# Patient Record
Sex: FEMALE | Race: BLACK OR AFRICAN AMERICAN | NOT HISPANIC OR LATINO | Employment: UNEMPLOYED | URBAN - METROPOLITAN AREA
[De-identification: names, ages, dates, MRNs, and addresses within clinical notes are randomized per-mention and may not be internally consistent; named-entity substitution may affect disease eponyms.]

---

## 2018-02-22 ENCOUNTER — OFFICE VISIT (OUTPATIENT)
Dept: FAMILY MEDICINE CLINIC | Facility: CLINIC | Age: 33
End: 2018-02-22

## 2018-02-22 VITALS
WEIGHT: 134 LBS | HEART RATE: 62 BPM | BODY MASS INDEX: 22.33 KG/M2 | HEIGHT: 65 IN | OXYGEN SATURATION: 99 % | RESPIRATION RATE: 20 BRPM | SYSTOLIC BLOOD PRESSURE: 114 MMHG | TEMPERATURE: 98.4 F | DIASTOLIC BLOOD PRESSURE: 64 MMHG

## 2018-02-22 DIAGNOSIS — M79.671 PAIN IN RIGHT FOOT: ICD-10-CM

## 2018-02-22 DIAGNOSIS — Z76.89 ESTABLISHING CARE WITH NEW DOCTOR, ENCOUNTER FOR: ICD-10-CM

## 2018-02-22 DIAGNOSIS — N92.0 MENORRHAGIA WITH REGULAR CYCLE: Primary | ICD-10-CM

## 2018-02-22 LAB
25(OH)D3 SERPL-MCNC: 6.2 NG/ML
ALBUMIN SERPL-MCNC: 4.1 G/DL (ref 3.2–4.8)
ALBUMIN/GLOB SERPL: 1.7 G/DL (ref 1.5–2.5)
ALP SERPL-CCNC: 78 U/L (ref 25–100)
ALT SERPL W P-5'-P-CCNC: 11 U/L (ref 7–40)
ANION GAP SERPL CALCULATED.3IONS-SCNC: 3 MMOL/L (ref 3–11)
ARTICHOKE IGE QN: 72 MG/DL (ref 0–130)
AST SERPL-CCNC: 13 U/L (ref 0–33)
BASOPHILS # BLD AUTO: 0.02 10*3/MM3 (ref 0–0.2)
BASOPHILS NFR BLD AUTO: 0.4 % (ref 0–1)
BILIRUB BLD-MCNC: NEGATIVE MG/DL
BILIRUB SERPL-MCNC: 0.3 MG/DL (ref 0.3–1.2)
BUN BLD-MCNC: 10 MG/DL (ref 9–23)
BUN/CREAT SERPL: 12.5 (ref 7–25)
CALCIUM SPEC-SCNC: 8.6 MG/DL (ref 8.7–10.4)
CHLORIDE SERPL-SCNC: 108 MMOL/L (ref 99–109)
CHOLEST SERPL-MCNC: 140 MG/DL (ref 0–200)
CLARITY, POC: CLEAR
CO2 SERPL-SCNC: 28 MMOL/L (ref 20–31)
COLOR UR: YELLOW
CREAT BLD-MCNC: 0.8 MG/DL (ref 0.6–1.3)
DEPRECATED RDW RBC AUTO: 47.7 FL (ref 37–54)
EOSINOPHIL # BLD AUTO: 0.12 10*3/MM3 (ref 0–0.3)
EOSINOPHIL NFR BLD AUTO: 2.2 % (ref 0–3)
ERYTHROCYTE [DISTWIDTH] IN BLOOD BY AUTOMATED COUNT: 13.9 % (ref 11.3–14.5)
EXPIRATION DATE: ABNORMAL
GFR SERPL CREATININE-BSD FRML MDRD: 100 ML/MIN/1.73
GLOBULIN UR ELPH-MCNC: 2.4 GM/DL
GLUCOSE BLD-MCNC: 88 MG/DL (ref 70–100)
GLUCOSE UR STRIP-MCNC: NEGATIVE MG/DL
HBA1C MFR BLD: 5.5 % (ref 4.8–5.6)
HCT VFR BLD AUTO: 39.6 % (ref 34.5–44)
HDLC SERPL-MCNC: 60 MG/DL (ref 40–60)
HGB BLD-MCNC: 13.1 G/DL (ref 11.5–15.5)
IMM GRANULOCYTES # BLD: 0.02 10*3/MM3 (ref 0–0.03)
IMM GRANULOCYTES NFR BLD: 0.4 % (ref 0–0.6)
KETONES UR QL: NEGATIVE
LEUKOCYTE EST, POC: NEGATIVE
LYMPHOCYTES # BLD AUTO: 2.06 10*3/MM3 (ref 0.6–4.8)
LYMPHOCYTES NFR BLD AUTO: 37.1 % (ref 24–44)
Lab: ABNORMAL
MCH RBC QN AUTO: 31 PG (ref 27–31)
MCHC RBC AUTO-ENTMCNC: 33.1 G/DL (ref 32–36)
MCV RBC AUTO: 93.8 FL (ref 80–99)
MONOCYTES # BLD AUTO: 0.26 10*3/MM3 (ref 0–1)
MONOCYTES NFR BLD AUTO: 4.7 % (ref 0–12)
NEUTROPHILS # BLD AUTO: 3.08 10*3/MM3 (ref 1.5–8.3)
NEUTROPHILS NFR BLD AUTO: 55.2 % (ref 41–71)
NITRITE UR-MCNC: NEGATIVE MG/ML
PH UR: 5.5 [PH] (ref 5–8)
PLATELET # BLD AUTO: 250 10*3/MM3 (ref 150–450)
PMV BLD AUTO: 11.5 FL (ref 6–12)
POTASSIUM BLD-SCNC: 4.5 MMOL/L (ref 3.5–5.5)
PROT SERPL-MCNC: 6.5 G/DL (ref 5.7–8.2)
PROT UR STRIP-MCNC: NEGATIVE MG/DL
RBC # BLD AUTO: 4.22 10*6/MM3 (ref 3.89–5.14)
RBC # UR STRIP: ABNORMAL /UL
SODIUM BLD-SCNC: 139 MMOL/L (ref 132–146)
SP GR UR: 1.03 (ref 1–1.03)
TRIGL SERPL-MCNC: 55 MG/DL (ref 0–150)
TSH SERPL DL<=0.05 MIU/L-ACNC: 0.73 MIU/ML (ref 0.35–5.35)
UROBILINOGEN UR QL: NORMAL
WBC NRBC COR # BLD: 5.56 10*3/MM3 (ref 3.5–10.8)

## 2018-02-22 PROCEDURE — 80053 COMPREHEN METABOLIC PANEL: CPT | Performed by: NURSE PRACTITIONER

## 2018-02-22 PROCEDURE — 82306 VITAMIN D 25 HYDROXY: CPT | Performed by: NURSE PRACTITIONER

## 2018-02-22 PROCEDURE — 85025 COMPLETE CBC W/AUTO DIFF WBC: CPT | Performed by: NURSE PRACTITIONER

## 2018-02-22 PROCEDURE — 80061 LIPID PANEL: CPT | Performed by: NURSE PRACTITIONER

## 2018-02-22 PROCEDURE — 81003 URINALYSIS AUTO W/O SCOPE: CPT | Performed by: NURSE PRACTITIONER

## 2018-02-22 PROCEDURE — 83036 HEMOGLOBIN GLYCOSYLATED A1C: CPT | Performed by: NURSE PRACTITIONER

## 2018-02-22 PROCEDURE — 84443 ASSAY THYROID STIM HORMONE: CPT | Performed by: NURSE PRACTITIONER

## 2018-02-22 PROCEDURE — 99385 PREV VISIT NEW AGE 18-39: CPT | Performed by: NURSE PRACTITIONER

## 2018-02-22 NOTE — PROGRESS NOTES
Subjective   Rai Guillory is a 32 y.o. female.   Chief Complaint   Patient presents with   • Establish Care    Patient is here to establish care.     History of Present Illness   Patient is here to establish care. She has recently moved to the [facility] from [place].   She is concerned with her right foot - the ball continues to have an over growth of thick skin and it is painful to walk on it. She reports she has seen a podiatrist in the past and had surgery. Would like to have it reevaluated.   Patient is also concerned about an increase in the heaviness of her menstrual cycle. She reports [facility]. She would like to be seen by an GYN.     The following portions of the patient's history were reviewed and updated as appropriate: allergies, current medications, past family history, past medical history, past social history, past surgical history and problem list.    Review of Systems   HENT: Negative.    Eyes: Negative.    Respiratory: Negative.    Cardiovascular: Negative.    Gastrointestinal: Negative.    Genitourinary: Positive for vaginal bleeding.   Musculoskeletal:        Continued issues with ball of right foot - thickening of the pad - painful had surgery on in September 2017. Has not follow up - Moved from [facility].   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      Skin: Negative.    Allergic/Immunologic: Negative.    Neurological: Negative.    Hematological: Negative.    Psychiatric/Behavioral: Positive for sleep disturbance.        Occasional - sleeplessness.          Objective   No Known Allergies    Physical Exam   Constitutional: She is oriented to person, place, and time. She appears well-developed and well-nourished.   Eyes: Pupils are equal, round, and reactive to light.   Neck: Normal range of motion.   Cardiovascular: Normal rate, regular rhythm, normal heart sounds and intact distal pulses.  Exam reveals no gallop and no friction rub.    No murmur  heard.  Pulmonary/Chest: Effort normal and breath sounds normal.   Abdominal: Soft. Bowel sounds are normal.   Musculoskeletal: Normal range of motion. She exhibits no edema, tenderness or deformity.        Lymphadenopathy:     She has no cervical adenopathy.   Neurological: She is alert and oriented to person, place, and time.   Skin: Skin is warm and dry.   Psychiatric: She has a normal mood and affect. Her behavior is normal. Judgment and thought content normal.   Vitals reviewed.      Assessment/Plan   Rai was seen today for establish care.    Diagnoses and all orders for this visit:    Menorrhagia with regular cycle  -     Ambulatory Referral to Gynecology    Pain in right foot  -     Ambulatory Referral to Podiatry    Establishing care with new doctor, encounter for  -     CBC & Differential  -     Comprehensive Metabolic Panel  -     Hemoglobin A1c  -     Lipid Panel  -     POC Urinalysis Dipstick, Automated  -     TSH  -     Vitamin D 25 Hydroxy  -     CBC Auto Differential    Patient Counseling:   --Nutrition: Stressed importance of moderation in sodium/caffeine intake, saturated fat and cholesterol, caloric balance, sufficient intake of fresh fruits, vegetables, fiber, calcium, iron, and 1 mg of folate supplement per day (for females capable of pregnancy).   --Discussed the issue of calcium supplement, and the daily use of baby aspirin.   --Exercise: Stressed the importance of regular exercise.   --Substance Abuse: Discussed cessation/primary prevention of tobacco, alcohol, or other drug use; driving or other dangerous activities under the influence; availability of treatment for abuse.   --Sexuality: Discussed sexually transmitted diseases, partner selection, use of condoms, avoidance of unintended pregnancy and contraceptive alternatives.   --Injury prevention: Discussed safety belts, safety helmets, smoke detector, smoking near bedding or upholstery.   --Dental health: Discussed importance of  regular tooth brushing, flossing, and dental visits.   --Immunizations reviewed.   -  discussed smoking cessation - she is planning on having a quit date in mind at next visit.   See patient instructions: steps to quit smoking  Follow up in 2 weeks to review labs.   Keep referral appointments.              Jane Wong, APRN

## 2018-02-22 NOTE — PATIENT INSTRUCTIONS
Steps to Quit Smoking  Smoking tobacco can be bad for your health. It can also affect almost every organ in your body. Smoking puts you and people around you at risk for many serious long-lasting (chronic) diseases. Quitting smoking is hard, but it is one of the best things that you can do for your health. It is never too late to quit.  What are the benefits of quitting smoking?  When you quit smoking, you lower your risk for getting serious diseases and conditions. They can include:  · Lung cancer or lung disease.  · Heart disease.  · Stroke.  · Heart attack.  · Not being able to have children (infertility).  · Weak bones (osteoporosis) and broken bones (fractures).  If you have coughing, wheezing, and shortness of breath, those symptoms may get better when you quit. You may also get sick less often. If you are pregnant, quitting smoking can help to lower your chances of having a baby of low birth weight.  What can I do to help me quit smoking?  Talk with your doctor about what can help you quit smoking. Some things you can do (strategies) include:  · Quitting smoking totally, instead of slowly cutting back how much you smoke over a period of time.  · Going to in-person counseling. You are more likely to quit if you go to many counseling sessions.  · Using resources and support systems, such as:  ¨ Online chats with a counselor.  ¨ Phone quitlines.  ¨ Printed self-help materials.  ¨ Support groups or group counseling.  ¨ Text messaging programs.  ¨ Mobile phone apps or applications.  · Taking medicines. Some of these medicines may have nicotine in them. If you are pregnant or breastfeeding, do not take any medicines to quit smoking unless your doctor says it is okay. Talk with your doctor about counseling or other things that can help you.  Talk with your doctor about using more than one strategy at the same time, such as taking medicines while you are also going to in-person counseling. This can help make quitting  easier.  What things can I do to make it easier to quit?  Quitting smoking might feel very hard at first, but there is a lot that you can do to make it easier. Take these steps:  · Talk to your family and friends. Ask them to support and encourage you.  · Call phone quitlines, reach out to support groups, or work with a counselor.  · Ask people who smoke to not smoke around you.  · Avoid places that make you want (trigger) to smoke, such as:  ¨ Bars.  ¨ Parties.  ¨ Smoke-break areas at work.  · Spend time with people who do not smoke.  · Lower the stress in your life. Stress can make you want to smoke. Try these things to help your stress:  ¨ Getting regular exercise.  ¨ Deep-breathing exercises.  ¨ Yoga.  ¨ Meditating.  ¨ Doing a body scan. To do this, close your eyes, focus on one area of your body at a time from head to toe, and notice which parts of your body are tense. Try to relax the muscles in those areas.  · Download or buy apps on your mobile phone or tablet that can help you stick to your quit plan. There are many free apps, such as QuitGuide from the CDC (Centers for Disease Control and Prevention). You can find more support from smokefree.gov and other websites.  This information is not intended to replace advice given to you by your health care provider. Make sure you discuss any questions you have with your health care provider.  Document Released: 10/14/2010 Document Revised: 08/15/2017 Document Reviewed: 05/03/2016  Elsevier Interactive Patient Education © 2017 [employer]

## 2018-03-13 ENCOUNTER — OFFICE VISIT (OUTPATIENT)
Dept: OBSTETRICS AND GYNECOLOGY | Facility: CLINIC | Age: 33
End: 2018-03-13

## 2018-03-13 VITALS
HEIGHT: 63 IN | DIASTOLIC BLOOD PRESSURE: 72 MMHG | SYSTOLIC BLOOD PRESSURE: 102 MMHG | WEIGHT: 133.2 LBS | HEART RATE: 82 BPM | RESPIRATION RATE: 14 BRPM | OXYGEN SATURATION: 97 % | BODY MASS INDEX: 23.6 KG/M2

## 2018-03-13 DIAGNOSIS — N92.0 MENORRHAGIA WITH REGULAR CYCLE: Primary | ICD-10-CM

## 2018-03-13 PROCEDURE — 99204 OFFICE O/P NEW MOD 45 MIN: CPT | Performed by: OBSTETRICS & GYNECOLOGY

## 2018-03-13 NOTE — PROGRESS NOTES
"Subjective   Chief Complaint   Patient presents with   • Establish Care     Menses very heavy      Rai Guillory is a 32 y.o. year old .  Patient's last menstrual period was 2018 (exact date).  She presents to be seen for worsening menorrhagia and dysmenorrhea. She notes menses lasts for 5-7 days now with at least 4 days of heavy bleeding and more intense cramping. She denies pain or bleeding outside of menses     OTHER COMPLAINTS:  Nothing else    The following portions of the patient's history were reviewed and updated as appropriate:current medications, allergies, past family history, past medical history, past social history and past surgical history    Smoking status: Current Some Day Smoker                                                    Packs/day: 0.25      Years: 10.00        Types: Cigarettes  Smokeless tobacco: Never Used                        Review of Systems  Constitutional POS: nothing reported    NEG: anorexia or night sweats   Gastointestinal POS: nothing reported    NEG: bloating, change in bowel habits, melena or reflux symptoms   Genitourinary POS: see HPI    NEG: dysuria or hematuria   Integument POS: nothing reported    NEG: moles that are changing in size, shape, color or rashes   Breast POS: nothing reported    NEG: persistent breast lump, skin dimpling or nipple discharge         Objective   /72   Pulse 82   Resp 14   Ht 160 cm (63\")   Wt 60.4 kg (133 lb 3.2 oz)   LMP 2018 (Exact Date)   SpO2 97%   Breastfeeding? No   BMI 23.60 kg/m²     General:  well developed; well nourished  no acute distress   Skin:  No suspicious lesions seen   Thyroid: normal to inspection and palpation   Lungs:  breathing is unlabored   Heart:  regular rate and rhythm, S1, S2 normal, no murmur, click, rub or gallop   Breasts:  Not performed.   Abdomen: soft, non-tender; no masses  no umbilical or inginual hernias are present  no hepato-splenomegaly   Pelvis: Not performed. "     Lab Review   No data reviewed    Imaging   No data reviewed        Assessment   1. Menorrhagia with regular cycle  2. Dysmenorrhea     Plan   1. Discussed therapeutic options with patient. Pt opts for hysteroscopy d&c and novasure. Will schedule      No orders of the defined types were placed in this encounter.         This note was electronically signed.    Maximiliano Cuellar M.D. [facility]

## 2018-03-26 ENCOUNTER — TELEPHONE (OUTPATIENT)
Dept: OBSTETRICS AND GYNECOLOGY | Facility: CLINIC | Age: 33
End: 2018-03-26

## 2018-03-29 ENCOUNTER — OUTSIDE FACILITY SERVICE (OUTPATIENT)
Dept: OBSTETRICS AND GYNECOLOGY | Facility: CLINIC | Age: 33
End: 2018-03-29

## 2018-03-29 ENCOUNTER — LAB REQUISITION (OUTPATIENT)
Dept: LAB | Facility: HOSPITAL | Age: 33
End: 2018-03-29

## 2018-03-29 DIAGNOSIS — N92.0 EXCESSIVE AND FREQUENT MENSTRUATION WITH REGULAR CYCLE: ICD-10-CM

## 2018-03-29 PROCEDURE — 58563 HYSTEROSCOPY ABLATION: CPT | Performed by: OBSTETRICS & GYNECOLOGY

## 2018-03-29 PROCEDURE — 88305 TISSUE EXAM BY PATHOLOGIST: CPT | Performed by: OBSTETRICS & GYNECOLOGY

## 2018-03-30 ENCOUNTER — TELEPHONE (OUTPATIENT)
Dept: OBSTETRICS AND GYNECOLOGY | Facility: CLINIC | Age: 33
End: 2018-03-30

## 2018-03-30 LAB
CYTO UR: NORMAL
LAB AP CASE REPORT: NORMAL
LAB AP CLINICAL INFORMATION: NORMAL
Lab: NORMAL
PATH REPORT.FINAL DX SPEC: NORMAL
PATH REPORT.GROSS SPEC: NORMAL

## 2018-03-30 RX ORDER — HYDROCODONE BITARTRATE AND ACETAMINOPHEN 5; 325 MG/1; MG/1
1 TABLET ORAL EVERY 6 HOURS PRN
Qty: 20 TABLET | Refills: 0 | Status: SHIPPED | OUTPATIENT
Start: 2018-03-30 | End: 2018-04-13

## 2018-03-30 NOTE — TELEPHONE ENCOUNTER
Patient of Dr. Cuellar.  Comes to office today having trouble filling the Vicoprofen 5/200 he Rx.  Comes with the unfilled Rx.    New Medications Ordered This Visit   Medications   • HYDROcodone-acetaminophen (LORTAB) 5-325 MG per tablet     Sig: Take 1 tablet by mouth Every 6 (Six) Hours As Needed for Mild Pain  or Moderate Pain .     Dispense:  20 tablet     Refill:  0

## 2018-03-30 NOTE — TELEPHONE ENCOUNTER
Provider Name: Polo  Reason for Call: The medicine called in was the wrong mg, Please call patient. She also states she doesn't want to drive to [place]  Pharmacy Name:  Call Back Number [phone]

## 2018-04-03 ENCOUNTER — TELEPHONE (OUTPATIENT)
Dept: OBSTETRICS AND GYNECOLOGY | Facility: CLINIC | Age: 33
End: 2018-04-03

## 2018-04-13 ENCOUNTER — OFFICE VISIT (OUTPATIENT)
Dept: OBSTETRICS AND GYNECOLOGY | Facility: CLINIC | Age: 33
End: 2018-04-13

## 2018-04-13 VITALS
DIASTOLIC BLOOD PRESSURE: 78 MMHG | SYSTOLIC BLOOD PRESSURE: 106 MMHG | BODY MASS INDEX: 23.57 KG/M2 | RESPIRATION RATE: 14 BRPM | OXYGEN SATURATION: 99 % | HEART RATE: 91 BPM | HEIGHT: 63 IN | WEIGHT: 133 LBS

## 2018-04-13 DIAGNOSIS — Z09 POSTOPERATIVE EXAMINATION: Primary | ICD-10-CM

## 2018-04-13 PROCEDURE — 99024 POSTOP FOLLOW-UP VISIT: CPT | Performed by: OBSTETRICS & GYNECOLOGY

## 2018-04-13 NOTE — PROGRESS NOTES
"Subjective   Chief Complaint   Patient presents with   • Post-op     No complaints     Rai Guillory is a 32 y.o. year old  presenting to be seen for her post-operative visit.  Currently she reports no problems with eating, bowel movements, voiding, or wound drainage and pain is well controlled.    The pathology results from her procedure are in Rai's record and are benign.      OTHER COMPLAINTS:  Nothing else    The following portions of the patient's history were reviewed and updated as appropriate:current medications and allergies       Objective   /78   Pulse 91   Resp 14   Ht 160 cm (63\")   Wt 60.3 kg (133 lb)   LMP 04/10/2018   SpO2 99%   Breastfeeding? No   BMI 23.56 kg/m²     General:  well developed; well nourished  no acute distress   Abdomen: soft, non-tender; no masses  no umbilical or inginual hernias are present  no hepato-splenomegaly   Pelvis: Not performed.          Assessment   1. S/P endometrial ablation     Plan   1. May return to full activity with no restrictions  2. Follow up for annual exam 5 month    No orders of the defined types were placed in this encounter.         This note was electronically signed.    Maximiliano Cuellar M.D. [facility]  2018  "

## 2019-01-22 ENCOUNTER — TRANSCRIBE ORDERS (OUTPATIENT)
Dept: LAB | Facility: HOSPITAL | Age: 34
End: 2019-01-22

## 2019-01-22 ENCOUNTER — LAB (OUTPATIENT)
Dept: LAB | Facility: HOSPITAL | Age: 34
End: 2019-01-22

## 2019-01-22 DIAGNOSIS — Z20.2 VENEREAL DISEASE CONTACT: ICD-10-CM

## 2019-01-22 DIAGNOSIS — Z20.2 VENEREAL DISEASE CONTACT: Primary | ICD-10-CM

## 2019-01-22 LAB
HBV SURFACE AG SERPL QL IA: NORMAL
HCV AB SER DONR QL: NORMAL
HIV1+2 AB SER QL: NORMAL

## 2019-01-22 PROCEDURE — 86803 HEPATITIS C AB TEST: CPT

## 2019-01-22 PROCEDURE — 87340 HEPATITIS B SURFACE AG IA: CPT

## 2019-01-22 PROCEDURE — 86592 SYPHILIS TEST NON-TREP QUAL: CPT

## 2019-01-22 PROCEDURE — 36415 COLL VENOUS BLD VENIPUNCTURE: CPT

## 2019-01-22 PROCEDURE — 86696 HERPES SIMPLEX TYPE 2 TEST: CPT

## 2019-01-22 PROCEDURE — G0432 EIA HIV-1/HIV-2 SCREEN: HCPCS

## 2019-01-22 PROCEDURE — 86695 HERPES SIMPLEX TYPE 1 TEST: CPT

## 2019-01-23 LAB
HSV1 IGG SER IA-ACNC: <0.91 INDEX (ref 0–0.9)
HSV2 IGG SER IA-ACNC: 6.53 INDEX (ref 0–0.9)
RPR SER QL: NORMAL

## 2019-03-05 ENCOUNTER — OFFICE VISIT (OUTPATIENT)
Dept: FAMILY MEDICINE CLINIC | Facility: CLINIC | Age: 34
End: 2019-03-05

## 2019-03-05 VITALS
TEMPERATURE: 98.4 F | SYSTOLIC BLOOD PRESSURE: 98 MMHG | DIASTOLIC BLOOD PRESSURE: 64 MMHG | BODY MASS INDEX: 21.85 KG/M2 | WEIGHT: 131.13 LBS | HEIGHT: 65 IN | HEART RATE: 109 BPM | RESPIRATION RATE: 18 BRPM | OXYGEN SATURATION: 97 %

## 2019-03-05 DIAGNOSIS — R05.9 COUGH: ICD-10-CM

## 2019-03-05 DIAGNOSIS — J06.9 ACUTE URI: Primary | ICD-10-CM

## 2019-03-05 PROCEDURE — 99214 OFFICE O/P EST MOD 30 MIN: CPT | Performed by: NURSE PRACTITIONER

## 2019-03-05 RX ORDER — AZITHROMYCIN 250 MG/1
TABLET, FILM COATED ORAL
Qty: 6 TABLET | Refills: 0 | Status: SHIPPED | OUTPATIENT
Start: 2019-03-05 | End: 2019-07-15

## 2019-03-05 RX ORDER — FLUCONAZOLE 150 MG/1
150 TABLET ORAL DAILY
Qty: 2 TABLET | Refills: 0 | Status: SHIPPED | OUTPATIENT
Start: 2019-03-05 | End: 2019-07-15

## 2019-03-05 RX ORDER — DEXTROMETHORPHAN HYDROBROMIDE AND PROMETHAZINE HYDROCHLORIDE 15; 6.25 MG/5ML; MG/5ML
5 SYRUP ORAL NIGHTLY PRN
Qty: 118 ML | Refills: 0 | Status: SHIPPED | OUTPATIENT
Start: 2019-03-05 | End: 2019-07-15

## 2019-03-05 NOTE — PROGRESS NOTES
Subjective   Rai Guillory is a 33 y.o. female.     URI    This is a new problem. Episode onset: 3 days ago. The problem has been gradually worsening. There has been no fever. Associated symptoms include congestion, coughing, headaches, a plugged ear sensation, rhinorrhea, sinus pain and a sore throat. Pertinent negatives include no abdominal pain, chest pain, diarrhea, dysuria, ear pain, nausea, rash, vomiting or wheezing. She has tried antihistamine and decongestant for the symptoms. The treatment provided no relief.       The following portions of the patient's history were reviewed and updated as appropriate: allergies, current medications, past family history, past medical history, past social history, past surgical history and problem list.     No Known Allergies   No routine medications    Review of Systems   Constitutional: Positive for fatigue. Negative for appetite change, chills, diaphoresis and fever.   HENT: Positive for congestion, postnasal drip, rhinorrhea, sinus pressure, sinus pain and sore throat. Negative for ear pain.    Respiratory: Positive for cough. Negative for choking, chest tightness, shortness of breath, wheezing and stridor.    Cardiovascular: Negative.  Negative for chest pain.   Gastrointestinal: Negative for abdominal pain, diarrhea, nausea and vomiting.   Genitourinary: Negative for dysuria.   Musculoskeletal: Negative for arthralgias and myalgias.   Skin: Negative for rash.   Neurological: Positive for headaches. Negative for dizziness.       Objective   Physical Exam   Constitutional: She is oriented to person, place, and time. Vital signs are normal. She appears well-developed and well-nourished. She is cooperative. She appears ill. No distress.   HENT:   Head: Normocephalic and atraumatic.   Right Ear: External ear normal. Tympanic membrane is bulging. Tympanic membrane is not erythematous. A middle ear effusion is present.   Left Ear: External ear normal. Tympanic membrane is  bulging. Tympanic membrane is not erythematous. A middle ear effusion is present.   Nose: Mucosal edema present.   Mouth/Throat: Uvula is midline and mucous membranes are normal. Posterior oropharyngeal erythema (mild) present.   Neck: Normal range of motion. Neck supple. No thyromegaly present.   Cardiovascular: Normal rate, regular rhythm and normal heart sounds.   Pulmonary/Chest: Effort normal and breath sounds normal.   Lymphadenopathy:     She has no cervical adenopathy.   Neurological: She is alert and oriented to person, place, and time.   Skin: Skin is warm, dry and intact. No rash noted. She is not diaphoretic.   Psychiatric: She has a normal mood and affect. Her behavior is normal. Judgment and thought content normal.   Vitals reviewed.    Vitals:    03/05/19 0831   BP: 98/64   Pulse: 109   Resp: 18   Temp: 98.4 °F (36.9 °C)   SpO2: 97%   Body mass index is 21.82 kg/m².     Assessment/Plan   Rai was seen today for cough and uri.    Diagnoses and all orders for this visit:    Acute URI  -     azithromycin (ZITHROMAX) 250 MG tablet; Take 2 tablets the first day, then 1 tablet daily for 4 days.  -     fluconazole (DIFLUCAN) 150 MG tablet; Take 1 tablet by mouth Daily.    Cough  -     promethazine-dextromethorphan (PROMETHAZINE-DM) 6.25-15 MG/5ML syrup; Take 5 mL by mouth At Night As Needed for Cough.       Discussed the nature of the medical condition(s) risks, complications, implications, management, safe and proper use of medications. Encouraged medication compliance, and keeping scheduled follow up appointments with me and any other providers.

## 2019-03-05 NOTE — PATIENT INSTRUCTIONS
"Upper Respiratory Infection, Adult  Most upper respiratory infections (URIs) are a viral infection of the air passages leading to the lungs. A URI affects the nose, throat, and upper air passages. The most common type of URI is nasopharyngitis and is typically referred to as \"the common cold.\"  URIs run their course and usually go away on their own. Most of the time, a URI does not require medical attention, but sometimes a bacterial infection in the upper airways can follow a viral infection. This is called a secondary infection. Sinus and middle ear infections are common types of secondary upper respiratory infections.  Bacterial pneumonia can also complicate a URI. A URI can worsen asthma and chronic obstructive pulmonary disease (COPD). Sometimes, these complications can require emergency medical care and may be life threatening.  What are the causes?  Almost all URIs are caused by viruses. A virus is a type of germ and can spread from one person to another.  What increases the risk?  You may be at risk for a URI if:  · You smoke.  · You have chronic heart or lung disease.  · You have a weakened defense (immune) system.  · You are very young or very old.  · You have nasal allergies or asthma.  · You work in crowded or poorly ventilated areas.  · You work in health care facilities or schools.    What are the signs or symptoms?  Symptoms typically develop 2-3 days after you come in contact with a cold virus. Most viral URIs last 7-10 days. However, viral URIs from the influenza virus (flu virus) can last 14-18 days and are typically more severe. Symptoms may include:  · Runny or stuffy (congested) nose.  · Sneezing.  · Cough.  · Sore throat.  · Headache.  · Fatigue.  · Fever.  · Loss of appetite.  · Pain in your forehead, behind your eyes, and over your cheekbones (sinus pain).  · Muscle aches.    How is this diagnosed?  Your health care provider may diagnose a URI by:  · Physical exam.  · Tests to check that your " symptoms are not due to another condition such as:  ? Strep throat.  ? Sinusitis.  ? Pneumonia.  ? Asthma.    How is this treated?  A URI goes away on its own with time. It cannot be cured with medicines, but medicines may be prescribed or recommended to relieve symptoms. Medicines may help:  · Reduce your fever.  · Reduce your cough.  · Relieve nasal congestion.    Follow these instructions at home:  · Take medicines only as directed by your health care provider.  · Gargle warm saltwater or take cough drops to comfort your throat as directed by your health care provider.  · Use a warm mist humidifier or inhale steam from a shower to increase air moisture. This may make it easier to breathe.  · Drink enough fluid to keep your urine clear or pale yellow.  · Eat soups and other clear broths and maintain good nutrition.  · Rest as needed.  · Return to work when your temperature has returned to normal or as your health care provider advises. You may need to stay home longer to avoid infecting others. You can also use a face mask and careful hand washing to prevent spread of the virus.  · Increase the usage of your inhaler if you have asthma.  · Do not use any tobacco products, including cigarettes, chewing tobacco, or electronic cigarettes. If you need help quitting, ask your health care provider.  How is this prevented?  The best way to protect yourself from getting a cold is to practice good hygiene.  · Avoid oral or hand contact with people with cold symptoms.  · Wash your hands often if contact occurs.    There is no clear evidence that vitamin C, vitamin E, echinacea, or exercise reduces the chance of developing a cold. However, it is always recommended to get plenty of rest, exercise, and practice good nutrition.  Contact a health care provider if:  · You are getting worse rather than better.  · Your symptoms are not controlled by medicine.  · You have chills.  · You have worsening shortness of breath.  · You have  brown or red mucus.  · You have yellow or brown nasal discharge.  · You have pain in your face, especially when you bend forward.  · You have a fever.  · You have swollen neck glands.  · You have pain while swallowing.  · You have white areas in the back of your throat.  Get help right away if:  · You have severe or persistent:  ? Headache.  ? Ear pain.  ? Sinus pain.  ? Chest pain.  · You have chronic lung disease and any of the following:  ? Wheezing.  ? Prolonged cough.  ? Coughing up blood.  ? A change in your usual mucus.  · You have a stiff neck.  · You have changes in your:  ? Vision.  ? Hearing.  ? Thinking.  ? Mood.  This information is not intended to replace advice given to you by your health care provider. Make sure you discuss any questions you have with your health care provider.  Document Released: 06/13/2002 Document Revised: 08/20/2017 Document Reviewed: 03/25/2015  [employer] Interactive Patient Education © 2018 [employer]    Cough, Adult  Coughing is a reflex that clears your throat and your airways. Coughing helps to heal and protect your lungs. It is normal to cough occasionally, but a cough that happens with other symptoms or lasts a long time may be a sign of a condition that needs treatment. A cough may last only 2-3 weeks (acute), or it may last longer than 8 weeks (chronic).  What are the causes?  Coughing is commonly caused by:  · Breathing in substances that irritate your lungs.  · A viral or bacterial respiratory infection.  · Allergies.  · Asthma.  · Postnasal drip.  · Smoking.  · Acid backing up from the stomach into the esophagus (gastroesophageal reflux).  · Certain medicines.  · Chronic lung problems, including COPD (or rarely, lung cancer).  · Other medical conditions such as heart failure.    Follow these instructions at home:  Pay attention to any changes in your symptoms. Take these actions to help with your discomfort:  · Take medicines only as told by your health care  provider.  ? If you were prescribed an antibiotic medicine, take it as told by your health care provider. Do not stop taking the antibiotic even if you start to feel better.  ? Talk with your health care provider before you take a cough suppressant medicine.  · Drink enough fluid to keep your urine clear or pale yellow.  · If the air is dry, use a cold steam vaporizer or humidifier in your bedroom or your home to help loosen secretions.  · Avoid anything that causes you to cough at work or at home.  · If your cough is worse at night, try sleeping in a semi-upright position.  · Avoid cigarette smoke. If you smoke, quit smoking. If you need help quitting, ask your health care provider.  · Avoid caffeine.  · Avoid alcohol.  · Rest as needed.    Contact a health care provider if:  · You have new symptoms.  · You cough up pus.  · Your cough does not get better after 2-3 weeks, or your cough gets worse.  · You cannot control your cough with suppressant medicines and you are losing sleep.  · You develop pain that is getting worse or pain that is not controlled with pain medicines.  · You have a fever.  · You have unexplained weight loss.  · You have night sweats.  Get help right away if:  · You cough up blood.  · You have difficulty breathing.  · Your heartbeat is very fast.  This information is not intended to replace advice given to you by your health care provider. Make sure you discuss any questions you have with your health care provider.  Document Released: 06/15/2012 Document Revised: 05/25/2017 Document Reviewed: 02/24/2016  [employer] Interactive Patient Education © 2018 [employer]    Cough, Adult  Coughing is a reflex that clears your throat and your airways. Coughing helps to heal and protect your lungs. It is normal to cough occasionally, but a cough that happens with other symptoms or lasts a long time may be a sign of a condition that needs treatment. A cough may last only 2-3 weeks (acute), or it may last  longer than 8 weeks (chronic).  What are the causes?  Coughing is commonly caused by:  · Breathing in substances that irritate your lungs.  · A viral or bacterial respiratory infection.  · Allergies.  · Asthma.  · Postnasal drip.  · Smoking.  · Acid backing up from the stomach into the esophagus (gastroesophageal reflux).  · Certain medicines.  · Chronic lung problems, including COPD (or rarely, lung cancer).  · Other medical conditions such as heart failure.    Follow these instructions at home:  Pay attention to any changes in your symptoms. Take these actions to help with your discomfort:  · Take medicines only as told by your health care provider.  ? If you were prescribed an antibiotic medicine, take it as told by your health care provider. Do not stop taking the antibiotic even if you start to feel better.  ? Talk with your health care provider before you take a cough suppressant medicine.  · Drink enough fluid to keep your urine clear or pale yellow.  · If the air is dry, use a cold steam vaporizer or humidifier in your bedroom or your home to help loosen secretions.  · Avoid anything that causes you to cough at work or at home.  · If your cough is worse at night, try sleeping in a semi-upright position.  · Avoid cigarette smoke. If you smoke, quit smoking. If you need help quitting, ask your health care provider.  · Avoid caffeine.  · Avoid alcohol.  · Rest as needed.    Contact a health care provider if:  · You have new symptoms.  · You cough up pus.  · Your cough does not get better after 2-3 weeks, or your cough gets worse.  · You cannot control your cough with suppressant medicines and you are losing sleep.  · You develop pain that is getting worse or pain that is not controlled with pain medicines.  · You have a fever.  · You have unexplained weight loss.  · You have night sweats.  Get help right away if:  · You cough up blood.  · You have difficulty breathing.  · Your heartbeat is very fast.  This  information is not intended to replace advice given to you by your health care provider. Make sure you discuss any questions you have with your health care provider.  Document Released: 06/15/2012 Document Revised: 05/25/2017 Document Reviewed: 02/24/2016  [employer] Interactive Patient Education © 2018 [employer]  Dextromethorphan; Promethazine oral solution  What is this medicine?  DEXTROMETHORPHAN; PROMETHAZINE (dex troe meth OR fan; proe METH a zeen) is a cough suppressant and an antihistamine. It is used to treat coughing due to colds or allergies. This medicine will not treat an infection.  This medicine may be used for other purposes; ask your health care provider or pharmacist if you have questions.  COMMON BRAND NAME(S): Phen Tuss DM  What should I tell my health care provider before I take this medicine?  They need to know if you have any of the following conditions:  -asthma or other lung disease  -diabetes  -eczema  -seizure disorder  -serious or chronic illness  -sleep apnea  -an unusual or allergic reaction to dextromethorphan, promethazine, phenothiazines, other medicines, foods, dyes, or preservatives  -pregnant or trying to get pregnant  -breast-feeding  How should I use this medicine?  Take this medicine by mouth with a glass of water. Follow the directions on the prescription label. Use a specially marked spoon or container to measure your medicine. Household spoons are not accurate. Take your doses at regular intervals. Do not take your medicine more often than directed.  Talk to your pediatrician regarding the use of this medicine in children. Special care may be needed. Do not use this medicine in children less than 2 years of age.  Patients over 65 years old may have a stronger reaction and need a smaller dose.  Overdosage: If you think you have taken too much of this medicine contact a poison control center or emergency room at once.  NOTE: This medicine is only for you. Do not share this  medicine with others.  What if I miss a dose?  If you miss a dose, take it as soon as you can. If it is almost time for your next dose, take only that dose. Do not take double or extra doses.  What may interact with this medicine?  Do not take this medicine with any of the following medications:  -MAOIs like Carbex, Eldepryl, Marplan, Nardil, and Parnate  This medicine may also interact with the following medications:  -alcohol or alcohol-containing products  -barbiturate medicines like phenobarbital  -epinephrine  -medicines for depression, anxiety or psychotic disturbances  -medicines for Parkinson's disease  -medicines for sleep  -medicines for the stomach like metoclopramide, dicyclomine, glycopyrrolate  -pain medicines  -radio contrast dyes  -sibutramine  -some medicines for cold or allergies  This list may not describe all possible interactions. Give your health care provider a list of all the medicines, herbs, non-prescription drugs, or dietary supplements you use. Also tell them if you smoke, drink alcohol, or use illegal drugs. Some items may interact with your medicine.  What should I watch for while using this medicine?  Tell your doctor if your symptoms do not improve or if they get worse.  You may get drowsy or dizzy. Do not drive, use machinery, or do anything that needs mental alertness until you know how this medicine affects you. Do not stand or sit up quickly, especially if you are an older patient. This reduces the risk of dizzy or fainting spells. Alcohol may interfere with the effect of this medicine. Avoid alcoholic drinks.  This medicine can make you more sensitive to the sun. Keep out of the sun. If you cannot avoid being in the sun, wear protective clothing and use sunscreen. Do not use sun lamps or tanning beds/booths.  What side effects may I notice from receiving this medicine?  Side effects that you should report to your doctor or health care professional as soon as possible:  -allergic  reactions like skin rash, itching or hives, swelling of the face, lips, or tongue  -breathing problems  -changes in vision  -confused, disoriented, excitable  -fast, irregular heartbeat  -fever, sweating  -hallucinations  -high or low blood pressure  -lightheaded  -muscle stiffness  -seizures  -tremors, twitches  -yellow eyes or skin  Side effects that usually do not require medical attention (report to your doctor or health care professional if they continue or are bothersome):  -congestion in the nose  -dry mouth  -nausea, vomiting  -stomach upset  -trouble sleeping  This list may not describe all possible side effects. Call your doctor for medical advice about side effects. You may report side effects to FDA at 9-842-FDA-5671.  Where should I keep my medicine?  Keep out of the reach of children.  Store at room temperature between 20 and 25 degrees C (68 and 77 degrees F). Protect from light. Throw away any unused medicine after the expiration date.  NOTE: This sheet is a summary. It may not cover all possible information. If you have questions about this medicine, talk to your doctor, pharmacist, or health care provider.  © 2018 [employer] (2009-04-06 17:01:49)  Dextromethorphan; Promethazine oral solution  What is this medicine?  DEXTROMETHORPHAN; PROMETHAZINE (dex troe meth OR fan; proe METH a zeen) is a cough suppressant and an antihistamine. It is used to treat coughing due to colds or allergies. This medicine will not treat an infection.  This medicine may be used for other purposes; ask your health care provider or pharmacist if you have questions.  COMMON BRAND NAME(S): Phen Tuss DM  What should I tell my health care provider before I take this medicine?  They need to know if you have any of the following conditions:  -asthma or other lung disease  -diabetes  -eczema  -seizure disorder  -serious or chronic illness  -sleep apnea  -an unusual or allergic reaction to dextromethorphan,  promethazine, phenothiazines, other medicines, foods, dyes, or preservatives  -pregnant or trying to get pregnant  -breast-feeding  How should I use this medicine?  Take this medicine by mouth with a glass of water. Follow the directions on the prescription label. Use a specially marked spoon or container to measure your medicine. Household spoons are not accurate. Take your doses at regular intervals. Do not take your medicine more often than directed.  Talk to your pediatrician regarding the use of this medicine in children. Special care may be needed. Do not use this medicine in children less than 2 years of age.  Patients over 65 years old may have a stronger reaction and need a smaller dose.  Overdosage: If you think you have taken too much of this medicine contact a poison control center or emergency room at once.  NOTE: This medicine is only for you. Do not share this medicine with others.  What if I miss a dose?  If you miss a dose, take it as soon as you can. If it is almost time for your next dose, take only that dose. Do not take double or extra doses.  What may interact with this medicine?  Do not take this medicine with any of the following medications:  -MAOIs like Carbex, Eldepryl, Marplan, Nardil, and Parnate  This medicine may also interact with the following medications:  -alcohol or alcohol-containing products  -barbiturate medicines like phenobarbital  -epinephrine  -medicines for depression, anxiety or psychotic disturbances  -medicines for Parkinson's disease  -medicines for sleep  -medicines for the stomach like metoclopramide, dicyclomine, glycopyrrolate  -pain medicines  -radio contrast dyes  -sibutramine  -some medicines for cold or allergies  This list may not describe all possible interactions. Give your health care provider a list of all the medicines, herbs, non-prescription drugs, or dietary supplements you use. Also tell them if you smoke, drink alcohol, or use illegal drugs. Some  items may interact with your medicine.  What should I watch for while using this medicine?  Tell your doctor if your symptoms do not improve or if they get worse.  You may get drowsy or dizzy. Do not drive, use machinery, or do anything that needs mental alertness until you know how this medicine affects you. Do not stand or sit up quickly, especially if you are an older patient. This reduces the risk of dizzy or fainting spells. Alcohol may interfere with the effect of this medicine. Avoid alcoholic drinks.  This medicine can make you more sensitive to the sun. Keep out of the sun. If you cannot avoid being in the sun, wear protective clothing and use sunscreen. Do not use sun lamps or tanning beds/booths.  What side effects may I notice from receiving this medicine?  Side effects that you should report to your doctor or health care professional as soon as possible:  -allergic reactions like skin rash, itching or hives, swelling of the face, lips, or tongue  -breathing problems  -changes in vision  -confused, disoriented, excitable  -fast, irregular heartbeat  -fever, sweating  -hallucinations  -high or low blood pressure  -lightheaded  -muscle stiffness  -seizures  -tremors, twitches  -yellow eyes or skin  Side effects that usually do not require medical attention (report to your doctor or health care professional if they continue or are bothersome):  -congestion in the nose  -dry mouth  -nausea, vomiting  -stomach upset  -trouble sleeping  This list may not describe all possible side effects. Call your doctor for medical advice about side effects. You may report side effects to FDA at 9-402-FDA-5549.  Where should I keep my medicine?  Keep out of the reach of children.  Store at room temperature between 20 and 25 degrees C (68 and 77 degrees F). Protect from light. Throw away any unused medicine after the expiration date.  NOTE: This sheet is a summary. It may not cover all possible information. If you have  questions about this medicine, talk to your doctor, pharmacist, or health care provider.  © 2018 [employer] (2009-04-06 17:01:49)  Fluconazole tablets  What is this medicine?  FLUCONAZOLE (floo JHONNY na zole) is an antifungal medicine. It is used to treat certain kinds of fungal or yeast infections.  This medicine may be used for other purposes; ask your health care provider or pharmacist if you have questions.  COMMON BRAND NAME(S): Diflucan  What should I tell my health care provider before I take this medicine?  They need to know if you have any of these conditions:  -history of irregular heart beat  -kidney disease  -an unusual or allergic reaction to fluconazole, other azole antifungals, medicines, foods, dyes, or preservatives  -pregnant or trying to get pregnant  -breast-feeding  How should I use this medicine?  Take this medicine by mouth. Follow the directions on the prescription label. Do not take your medicine more often than directed.  Talk to your pediatrician regarding the use of this medicine in children. Special care may be needed. This medicine has been used in children as young as 6 months of age.  Overdosage: If you think you have taken too much of this medicine contact a poison control center or emergency room at once.  NOTE: This medicine is only for you. Do not share this medicine with others.  What if I miss a dose?  If you miss a dose, take it as soon as you can. If it is almost time for your next dose, take only that dose. Do not take double or extra doses.  What may interact with this medicine?  Do not take this medicine with any of the following medications:  -astemizole  -certain medicines for irregular heart beat like dofetilide, dronedarone, quinidine  -cisapride  -erythromycin  -lomitapide  -other medicines that prolong the QT interval (cause an abnormal heart rhythm)  -pimozide  -terfenadine  -thioridazine  -tolvaptan  -ziprasidone  This medicine may also interact with the  following medications:  -antiviral medicines for HIV or AIDS  -birth control pills  -certain antibiotics like rifabutin, rifampin  -certain medicines for blood pressure like amlodipine, isradipine, felodipine, hydrochlorothiazide, losartan, nifedipine  -certain medicines for cancer like cyclophosphamide, vinblastine, vincristine  -certain medicines for cholesterol like atorvastatin, lovastatin, fluvastatin, simvastatin  -certain medicines for depression, anxiety, or psychotic disturbances like amitriptyline, midazolam, nortriptyline, triazolam  -certain medicines for diabetes like glipizide, glyburide, tolbutamide  -certain medicines for pain like alfentanil, fentanyl, methadone  -certain medicines for seizures like carbamazepine, phenytoin  -certain medicines that treat or prevent blood clots like warfarin  -halofantrine  -medicines that lower your chance of fighting infection like cyclosporine, prednisone, tacrolimus  -NSAIDS, medicines for pain and inflammation, like celecoxib, diclofenac, flurbiprofen, ibuprofen, meloxicam, naproxen  -other medicines for fungal infections  -sirolimus  -theophylline  -tofacitinib  This list may not describe all possible interactions. Give your health care provider a list of all the medicines, herbs, non-prescription drugs, or dietary supplements you use. Also tell them if you smoke, drink alcohol, or use illegal drugs. Some items may interact with your medicine.  What should I watch for while using this medicine?  Visit your doctor or health care professional for regular checkups. If you are taking this medicine for a long time you may need blood work. Tell your doctor if your symptoms do not improve. Some fungal infections need many weeks or months of treatment to cure.  Alcohol can increase possible damage to your liver. Avoid alcoholic drinks.  If you have a vaginal infection, do not have sex until you have finished your treatment. You can wear a sanitary napkin. Do not use  tampons. Wear freshly washed cotton, not synthetic, panties.  What side effects may I notice from receiving this medicine?  Side effects that you should report to your doctor or health care professional as soon as possible:  -allergic reactions like skin rash or itching, hives, swelling of the lips, mouth, tongue, or throat  -dark urine  -feeling dizzy or faint  -irregular heartbeat or chest pain  -redness, blistering, peeling or loosening of the skin, including inside the mouth  -trouble breathing  -unusual bruising or bleeding  -vomiting  -yellowing of the eyes or skin  Side effects that usually do not require medical attention (report to your doctor or health care professional if they continue or are bothersome):  -changes in how food tastes  -diarrhea  -headache  -stomach upset or nausea  This list may not describe all possible side effects. Call your doctor for medical advice about side effects. You may report side effects to FDA at 0-810-FDA-2243.  Where should I keep my medicine?  Keep out of the reach of children.  Store at room temperature below 30 degrees C (86 degrees F). Throw away any medicine after the expiration date.  NOTE: This sheet is a summary. It may not cover all possible information. If you have questions about this medicine, talk to your doctor, pharmacist, or health care provider.  © 2018 [employer]vier/Gold Standard (2014-07-26 19:37:38)

## 2019-07-15 ENCOUNTER — OFFICE VISIT (OUTPATIENT)
Dept: FAMILY MEDICINE CLINIC | Facility: CLINIC | Age: 34
End: 2019-07-15

## 2019-07-15 VITALS
HEART RATE: 92 BPM | HEIGHT: 65 IN | TEMPERATURE: 98.4 F | SYSTOLIC BLOOD PRESSURE: 106 MMHG | DIASTOLIC BLOOD PRESSURE: 62 MMHG | BODY MASS INDEX: 20.83 KG/M2 | WEIGHT: 125 LBS | OXYGEN SATURATION: 99 % | RESPIRATION RATE: 16 BRPM

## 2019-07-15 DIAGNOSIS — K52.9 GASTROENTERITIS: Primary | ICD-10-CM

## 2019-07-15 PROCEDURE — 96372 THER/PROPH/DIAG INJ SC/IM: CPT | Performed by: NURSE PRACTITIONER

## 2019-07-15 PROCEDURE — 99213 OFFICE O/P EST LOW 20 MIN: CPT | Performed by: NURSE PRACTITIONER

## 2019-07-15 RX ORDER — PROMETHAZINE HYDROCHLORIDE 25 MG/ML
12.5 INJECTION, SOLUTION INTRAMUSCULAR; INTRAVENOUS ONCE
Status: COMPLETED | OUTPATIENT
Start: 2019-07-15 | End: 2019-07-15

## 2019-07-15 RX ORDER — SULFAMETHOXAZOLE AND TRIMETHOPRIM 400; 80 MG/1; MG/1
1 TABLET ORAL 2 TIMES DAILY
COMMUNITY
End: 2019-08-26

## 2019-07-15 RX ORDER — ONDANSETRON 4 MG/1
4 TABLET, ORALLY DISINTEGRATING ORAL EVERY 8 HOURS PRN
Qty: 20 TABLET | Refills: 0 | Status: SHIPPED | OUTPATIENT
Start: 2019-07-15 | End: 2019-08-26

## 2019-07-15 RX ADMIN — PROMETHAZINE HYDROCHLORIDE 12.5 MG: 25 INJECTION, SOLUTION INTRAMUSCULAR; INTRAVENOUS at 19:16

## 2019-07-15 NOTE — PATIENT INSTRUCTIONS
Viral Gastroenteritis, Adult  Viral gastroenteritis is also known as the stomach flu. This condition is caused by certain germs (viruses). These germs can be passed from person to person very easily (are very contagious). This condition can cause sudden watery poop (diarrhea), fever, and throwing up (vomiting).  Having watery poop and throwing up can make you feel weak and cause you to get dehydrated. Dehydration can make you tired and thirsty, make you have a dry mouth, and make it so you pee (urinate) less often. Older adults and people with other diseases or a weak defense system (immune system) are at higher risk for dehydration. It is important to replace the fluids that you lose from having watery poop and throwing up.  Follow these instructions at home:  Follow instructions from your doctor about how to care for yourself at home.  Eating and drinking  Follow these instructions as told by your doctor:  · Take an oral rehydration solution (ORS). This is a drink that is sold at pharmacies and stores.  · Drink clear fluids in small amounts as you are able, such as:  ? Water.  ? Ice chips.  ? Diluted fruit juice.  ? Low-calorie sports drinks.  · Eat bland, easy-to-digest foods in small amounts as you are able, such as:  ? Bananas.  ? Applesauce.  ? Rice.  ? Low-fat (lean) meats.  ? Toast.  ? Crackers.  · Avoid fluids that have a lot of sugar or caffeine in them.  · Avoid alcohol.  · Avoid spicy or fatty foods.    General instructions  · Drink enough fluid to keep your pee (urine) clear or pale yellow.  · Wash your hands often. If you cannot use soap and water, use hand .  · Make sure that all people in your home wash their hands well and often.  · Rest at home while you get better.  · Take over-the-counter and prescription medicines only as told by your doctor.  · Watch your condition for any changes.  · Take a warm bath to help with any burning or pain from having watery poop.  · Keep all follow-up  visits as told by your doctor. This is important.  Contact a doctor if:  · You cannot keep fluids down.  · Your symptoms get worse.  · You have new symptoms.  · You feel light-headed or dizzy.  · You have muscle cramps.  Get help right away if:  · You have chest pain.  · You feel very weak or you pass out (faint).  · You see blood in your throw-up.  · Your throw-up looks like coffee grounds.  · You have bloody or black poop (stools) or poop that look like tar.  · You have a very bad headache, a stiff neck, or both.  · You have a rash.  · You have very bad pain, cramping, or bloating in your belly (abdomen).  · You have trouble breathing.  · You are breathing very quickly.  · Your heart is beating very quickly.  · Your skin feels cold and clammy.  · You feel confused.  · You have pain when you pee.  · You have signs of dehydration, such as:  ? Dark pee, hardly any pee, or no pee.  ? Cracked lips.  ? Dry mouth.  ? Sunken eyes.  ? Sleepiness.  ? Weakness.  This information is not intended to replace advice given to you by your health care provider. Make sure you discuss any questions you have with your health care provider.  Document Released: 06/05/2009 Document Revised: 02/16/2018 Document Reviewed: 08/23/2016  [employer] Patient Education © 2019 [employer]

## 2019-07-15 NOTE — PROGRESS NOTES
Julio Guillory is a 34 y.o. female.   Chief Complaint   Patient presents with   • Vomiting     N&V with diarrhea since 3 this morning.     walk in acute visit   Julio Guillory is a 34 y.o. female who presents for evaluation of nausea and vomiting. Onset of symptoms was 2 am this morning. Patient describes nausea as moderate.  Vomiting has occurred 20  times over the past 16 hours. Vomitus is described as yellow emesis. Symptoms have been associated with nausea and diarrhea.  Symptoms have not changed.. Treatment to date has been drinking powerade.      last diarrhea was earlier today.   She missed work - she works for [employer].     She is here with a friend whom she reported drove her.     The flowing portions of the patient's history were reviewed and updated as appropriate: allergies, current medications, past family history, past medical history, past social history, past surgical history and problem list.    Review of Systems   Constitutional: Positive for activity change, appetite change, chills and fatigue.   Eyes: Negative.    Respiratory: Negative.    Cardiovascular: Negative.    Gastrointestinal: Positive for diarrhea, nausea and vomiting. Negative for abdominal pain.        Abdominal cramps before having diarrhea      Genitourinary: Negative.    Musculoskeletal: Negative.    Skin: Negative.    Allergic/Immunologic: Positive for environmental allergies.   Neurological: Positive for headaches.     Past Surgical History:   Procedure Laterality Date   • DIAGNOSTIC LAPAROSCOPY  03/29/2018    Endometrial ablation   • FOOT SURGERY  09/2017   • KNEE ACL RECONSTRUCTION  06/2007   • TUBAL ABDOMINAL LIGATION  11/2013     Past Medical History:   Diagnosis Date   • History of abnormal cervical Pap smear    • Ovarian cyst    • STD exposure     Chlamydia and GC       Objective   No Known Allergies  Visit Vitals  /62   Pulse 92   Temp 98.4 °F (36.9 °C)   Resp 16   Ht 165.1 cm  "(65\")   Wt 56.7 kg (125 lb)   LMP  (LMP Unknown)   SpO2 99%   BMI 20.80 kg/m²       Physical Exam   Constitutional: She is oriented to person, place, and time. Vital signs are normal. She appears well-developed and well-nourished. She is cooperative. She does not appear ill.   HENT:   Head: Normocephalic.   Right Ear: Hearing, tympanic membrane, external ear and ear canal normal.   Left Ear: Hearing, tympanic membrane, external ear and ear canal normal.   Nose: Right sinus exhibits no maxillary sinus tenderness and no frontal sinus tenderness. Left sinus exhibits no maxillary sinus tenderness and no frontal sinus tenderness.   Mouth/Throat: Uvula is midline, oropharynx is clear and moist and mucous membranes are normal.   Eyes: Pupils are equal, round, and reactive to light.   Neck: Normal range of motion.   Cardiovascular: Normal rate and regular rhythm.   Pulmonary/Chest: Effort normal and breath sounds normal.   Abdominal: Soft. She exhibits no shifting dullness, no distension, no pulsatile liver, no fluid wave, no abdominal bruit, no ascites, no pulsatile midline mass and no mass. Bowel sounds are increased. There is no tenderness. There is no CVA tenderness.   Musculoskeletal: Normal range of motion.   Lymphadenopathy:     She has no cervical adenopathy.   Neurological: She is alert and oriented to person, place, and time.   Skin: Skin is warm, dry and intact. Capillary refill takes less than 2 seconds.   Psychiatric: She has a normal mood and affect. Her behavior is normal.   Vitals reviewed.      Assessment/Plan   Rai was seen today for vomiting.    Diagnoses and all orders for this visit:    Gastroenteritis  -     promethazine (PHENERGAN) injection 12.5 mg  -     ondansetron ODT (ZOFRAN-ODT) 4 MG disintegrating tablet; Take 1 tablet by mouth Every 8 (Eight) Hours As Needed for Nausea or Vomiting.    increase fluid intake.   Follow up as needed.   May take over the counter antidiarrheal after 48 hours " such as peptobismol or kaopectate.   See viral gastroenteritis                 Patient Instructions   Viral Gastroenteritis, Adult  Viral gastroenteritis is also known as the stomach flu. This condition is caused by certain germs (viruses). These germs can be passed from person to person very easily (are very contagious). This condition can cause sudden watery poop (diarrhea), fever, and throwing up (vomiting).  Having watery poop and throwing up can make you feel weak and cause you to get dehydrated. Dehydration can make you tired and thirsty, make you have a dry mouth, and make it so you pee (urinate) less often. Older adults and people with other diseases or a weak defense system (immune system) are at higher risk for dehydration. It is important to replace the fluids that you lose from having watery poop and throwing up.  Follow these instructions at home:  Follow instructions from your doctor about how to care for yourself at home.  Eating and drinking  Follow these instructions as told by your doctor:  · Take an oral rehydration solution (ORS). This is a drink that is sold at pharmacies and stores.  · Drink clear fluids in small amounts as you are able, such as:  ? Water.  ? Ice chips.  ? Diluted fruit juice.  ? Low-calorie sports drinks.  · Eat bland, easy-to-digest foods in small amounts as you are able, such as:  ? Bananas.  ? Applesauce.  ? Rice.  ? Low-fat (lean) meats.  ? Toast.  ? Crackers.  · Avoid fluids that have a lot of sugar or caffeine in them.  · Avoid alcohol.  · Avoid spicy or fatty foods.    General instructions  · Drink enough fluid to keep your pee (urine) clear or pale yellow.  · Wash your hands often. If you cannot use soap and water, use hand .  · Make sure that all people in your home wash their hands well and often.  · Rest at home while you get better.  · Take over-the-counter and prescription medicines only as told by your doctor.  · Watch your condition for any  changes.  · Take a warm bath to help with any burning or pain from having watery poop.  · Keep all follow-up visits as told by your doctor. This is important.  Contact a doctor if:  · You cannot keep fluids down.  · Your symptoms get worse.  · You have new symptoms.  · You feel light-headed or dizzy.  · You have muscle cramps.  Get help right away if:  · You have chest pain.  · You feel very weak or you pass out (faint).  · You see blood in your throw-up.  · Your throw-up looks like coffee grounds.  · You have bloody or black poop (stools) or poop that look like tar.  · You have a very bad headache, a stiff neck, or both.  · You have a rash.  · You have very bad pain, cramping, or bloating in your belly (abdomen).  · You have trouble breathing.  · You are breathing very quickly.  · Your heart is beating very quickly.  · Your skin feels cold and clammy.  · You feel confused.  · You have pain when you pee.  · You have signs of dehydration, such as:  ? Dark pee, hardly any pee, or no pee.  ? Cracked lips.  ? Dry mouth.  ? Sunken eyes.  ? Sleepiness.  ? Weakness.  This information is not intended to replace advice given to you by your health care provider. Make sure you discuss any questions you have with your health care provider.  Document Released: 06/05/2009 Document Revised: 02/16/2018 Document Reviewed: 08/23/2016  [employer] Patient Education © 2019 [employer]        Jane Wong, APRN

## 2019-08-26 ENCOUNTER — OFFICE VISIT (OUTPATIENT)
Dept: FAMILY MEDICINE CLINIC | Facility: CLINIC | Age: 34
End: 2019-08-26

## 2019-08-26 VITALS
RESPIRATION RATE: 18 BRPM | DIASTOLIC BLOOD PRESSURE: 60 MMHG | TEMPERATURE: 98.2 F | OXYGEN SATURATION: 99 % | WEIGHT: 132 LBS | HEART RATE: 70 BPM | HEIGHT: 65 IN | SYSTOLIC BLOOD PRESSURE: 98 MMHG | BODY MASS INDEX: 21.99 KG/M2

## 2019-08-26 DIAGNOSIS — M79.671 PAIN IN RIGHT FOOT: Primary | ICD-10-CM

## 2019-08-26 DIAGNOSIS — L84 FOOT CALLUS: ICD-10-CM

## 2019-08-26 DIAGNOSIS — M21.612 BUNION OF GREAT TOE OF LEFT FOOT: ICD-10-CM

## 2019-08-26 DIAGNOSIS — M21.611 BUNION OF GREAT TOE OF RIGHT FOOT: ICD-10-CM

## 2019-08-26 PROCEDURE — 99213 OFFICE O/P EST LOW 20 MIN: CPT | Performed by: NURSE PRACTITIONER

## 2019-08-26 NOTE — PROGRESS NOTES
"Subjective   Rai Guillory is a 34 y.o. female.   Chief Complaint   Patient presents with   • Foot Pain     Right foot pain.       History of Present Illness Patient is here for foot pain. She wants are referral to see an orthopedic surgeon. She did see the podiatrist. \"He made xrays, ordered orthopedic inserts, only wanted to do cold therapy. His advice was to rebreak her foot if the pain did not stop. \"   She works at [employer]. She stands and walks for most of her shift. Pain is 6-8   She has seen  [facility] [place], [place] for her first foot surgery.   Past PCP -[facility] -[phone] [place], [place]   Has relocated to [place] from [place]     The following portions of the patient's history were reviewed and updated as appropriate: allergies, current medications, past family history, past medical history, past social history, past surgical history and problem list.     Review of Systems   Constitutional: Negative.    HENT: Negative.    Eyes: Negative.    Respiratory: Negative.    Cardiovascular: Negative.    Gastrointestinal: Negative.    Endocrine: Negative.    Genitourinary: Negative.    Musculoskeletal: Positive for myalgias.   Skin: Negative.    Allergic/Immunologic: Negative.    Neurological: Negative.    Hematological: Negative.    Psychiatric/Behavioral: Negative.      Past Surgical History:   Procedure Laterality Date   • DIAGNOSTIC LAPAROSCOPY  03/29/2018    Endometrial ablation   • FOOT SURGERY  09/2017   • KNEE ACL RECONSTRUCTION  06/2007   • TUBAL ABDOMINAL LIGATION  11/2013     Past Medical History:   Diagnosis Date   • History of abnormal cervical Pap smear    • Ovarian cyst    • STD exposure     Chlamydia and GC       Objective   No Known Allergies  Visit Vitals  BP 98/60   Pulse 70   Temp 98.2 °F (36.8 °C)   Resp 18   Ht 165.1 cm (65\")   Wt 59.9 kg (132 lb)   LMP  (LMP Unknown)   SpO2 99%   Breastfeeding? No   BMI 21.97 kg/m²       Physical Exam   Constitutional: She is oriented to " person, place, and time. She appears well-developed and well-nourished.   HENT:   Head: Normocephalic.   Cardiovascular: Normal rate.   Pulmonary/Chest: Effort normal and breath sounds normal.   Abdominal: Soft. Bowel sounds are normal.   Musculoskeletal: She exhibits tenderness.        Lymphadenopathy:     She has no cervical adenopathy.   Neurological: She is alert and oriented to person, place, and time.   Skin: Skin is warm, dry and intact. Capillary refill takes less than 2 seconds.   Bunion to both great toes   There is a thickening of skin - it feels firm in the sole of her right foot.   Painful, tender to palpation.      Psychiatric: She has a normal mood and affect. Her behavior is normal.   Vitals reviewed.      Assessment/Plan   aRi was seen today for foot pain.    Diagnoses and all orders for this visit:    Pain in right foot  -     Ambulatory Referral to Orthopedic Surgery  -     diclofenac (VOLTAREN) 50 MG EC tablet; Take 1 tablet by mouth 2 (Two) Times a Day.    Bunion of great toe of left foot  -     Ambulatory Referral to Orthopedic Surgery    Bunion of great toe of right foot  -     Ambulatory Referral to Orthopedic Surgery    Foot callus    patient reports she's had her left foot surgery in the past.   She reports it feels like she is walking on a marble or a sea shell.   It is getting worse. Has been to see     She had been to see a podiatrist - Dr. Faizan Michael   She was not happy with the care.   She would like to see an foot surgeon.     Will refer to Dr. Lucero    Follow up as needed and for annual exam.          LETA Kennedy

## 2019-09-13 ENCOUNTER — OFFICE VISIT (OUTPATIENT)
Dept: FAMILY MEDICINE CLINIC | Facility: CLINIC | Age: 34
End: 2019-09-13

## 2019-09-13 VITALS
TEMPERATURE: 98.1 F | HEART RATE: 78 BPM | DIASTOLIC BLOOD PRESSURE: 62 MMHG | WEIGHT: 132 LBS | OXYGEN SATURATION: 99 % | HEIGHT: 65 IN | BODY MASS INDEX: 21.99 KG/M2 | RESPIRATION RATE: 17 BRPM | SYSTOLIC BLOOD PRESSURE: 111 MMHG

## 2019-09-13 DIAGNOSIS — Z20.2 POSSIBLE EXPOSURE TO STD: Primary | ICD-10-CM

## 2019-09-13 DIAGNOSIS — Z00.00 PHYSICAL EXAM, ANNUAL: ICD-10-CM

## 2019-09-13 DIAGNOSIS — R79.89 LOW VITAMIN D LEVEL: ICD-10-CM

## 2019-09-13 LAB
25(OH)D3 SERPL-MCNC: 10.2 NG/ML (ref 30–100)
ALBUMIN SERPL-MCNC: 4.6 G/DL (ref 3.5–5.2)
ALBUMIN/GLOB SERPL: 1.8 G/DL
ALP SERPL-CCNC: 90 U/L (ref 39–117)
ALT SERPL W P-5'-P-CCNC: 9 U/L (ref 1–33)
ANION GAP SERPL CALCULATED.3IONS-SCNC: 11.2 MMOL/L (ref 5–15)
AST SERPL-CCNC: 16 U/L (ref 1–32)
BASOPHILS # BLD AUTO: 0.02 10*3/MM3 (ref 0–0.2)
BASOPHILS NFR BLD AUTO: 0.3 % (ref 0–1.5)
BILIRUB BLD-MCNC: NEGATIVE MG/DL
BILIRUB SERPL-MCNC: 0.3 MG/DL (ref 0.2–1.2)
BUN BLD-MCNC: 8 MG/DL (ref 6–20)
BUN/CREAT SERPL: 8.6 (ref 7–25)
CALCIUM SPEC-SCNC: 8.7 MG/DL (ref 8.6–10.5)
CHLORIDE SERPL-SCNC: 101 MMOL/L (ref 98–107)
CHOLEST SERPL-MCNC: 122 MG/DL (ref 0–200)
CLARITY, POC: CLEAR
CO2 SERPL-SCNC: 25.8 MMOL/L (ref 22–29)
COLOR UR: YELLOW
CREAT BLD-MCNC: 0.93 MG/DL (ref 0.57–1)
DEPRECATED RDW RBC AUTO: 53.4 FL (ref 37–54)
EOSINOPHIL # BLD AUTO: 0.12 10*3/MM3 (ref 0–0.4)
EOSINOPHIL NFR BLD AUTO: 1.9 % (ref 0.3–6.2)
ERYTHROCYTE [DISTWIDTH] IN BLOOD BY AUTOMATED COUNT: 14.6 % (ref 12.3–15.4)
GFR SERPL CREATININE-BSD FRML MDRD: 83 ML/MIN/1.73
GLOBULIN UR ELPH-MCNC: 2.5 GM/DL
GLUCOSE BLD-MCNC: 50 MG/DL (ref 65–99)
GLUCOSE UR STRIP-MCNC: NEGATIVE MG/DL
HBA1C MFR BLD: 5.3 % (ref 4.8–5.6)
HCT VFR BLD AUTO: 47.2 % (ref 34–46.6)
HDLC SERPL-MCNC: 67 MG/DL (ref 40–60)
HGB BLD-MCNC: 14.9 G/DL (ref 12–15.9)
IMM GRANULOCYTES # BLD AUTO: 0.02 10*3/MM3 (ref 0–0.05)
IMM GRANULOCYTES NFR BLD AUTO: 0.3 % (ref 0–0.5)
KETONES UR QL: NEGATIVE
LDLC SERPL CALC-MCNC: 32 MG/DL (ref 0–100)
LDLC/HDLC SERPL: 0.47 {RATIO}
LEUKOCYTE EST, POC: NEGATIVE
LYMPHOCYTES # BLD AUTO: 1.82 10*3/MM3 (ref 0.7–3.1)
LYMPHOCYTES NFR BLD AUTO: 28.7 % (ref 19.6–45.3)
MCH RBC QN AUTO: 31.1 PG (ref 26.6–33)
MCHC RBC AUTO-ENTMCNC: 31.6 G/DL (ref 31.5–35.7)
MCV RBC AUTO: 98.5 FL (ref 79–97)
MONOCYTES # BLD AUTO: 0.47 10*3/MM3 (ref 0.1–0.9)
MONOCYTES NFR BLD AUTO: 7.4 % (ref 5–12)
NEUTROPHILS # BLD AUTO: 3.89 10*3/MM3 (ref 1.7–7)
NEUTROPHILS NFR BLD AUTO: 61.4 % (ref 42.7–76)
NITRITE UR-MCNC: NEGATIVE MG/ML
NRBC BLD AUTO-RTO: 0 /100 WBC (ref 0–0.2)
PH UR: 7 [PH] (ref 5–8)
PLATELET # BLD AUTO: 275 10*3/MM3 (ref 140–450)
PMV BLD AUTO: 11.1 FL (ref 6–12)
POTASSIUM BLD-SCNC: 5.1 MMOL/L (ref 3.5–5.2)
PROT SERPL-MCNC: 7.1 G/DL (ref 6–8.5)
PROT UR STRIP-MCNC: NEGATIVE MG/DL
RBC # BLD AUTO: 4.79 10*6/MM3 (ref 3.77–5.28)
RBC # UR STRIP: ABNORMAL /UL
SODIUM BLD-SCNC: 138 MMOL/L (ref 136–145)
SP GR UR: 1.02 (ref 1–1.03)
TRIGL SERPL-MCNC: 116 MG/DL (ref 0–150)
TSH SERPL DL<=0.05 MIU/L-ACNC: 0.52 UIU/ML (ref 0.27–4.2)
UROBILINOGEN UR QL: NORMAL
VLDLC SERPL-MCNC: 23.2 MG/DL (ref 5–40)
WBC NRBC COR # BLD: 6.34 10*3/MM3 (ref 3.4–10.8)

## 2019-09-13 PROCEDURE — 80061 LIPID PANEL: CPT | Performed by: NURSE PRACTITIONER

## 2019-09-13 PROCEDURE — 84443 ASSAY THYROID STIM HORMONE: CPT | Performed by: NURSE PRACTITIONER

## 2019-09-13 PROCEDURE — 85025 COMPLETE CBC W/AUTO DIFF WBC: CPT | Performed by: NURSE PRACTITIONER

## 2019-09-13 PROCEDURE — 80053 COMPREHEN METABOLIC PANEL: CPT | Performed by: NURSE PRACTITIONER

## 2019-09-13 PROCEDURE — 99395 PREV VISIT EST AGE 18-39: CPT | Performed by: NURSE PRACTITIONER

## 2019-09-13 PROCEDURE — 83036 HEMOGLOBIN GLYCOSYLATED A1C: CPT | Performed by: NURSE PRACTITIONER

## 2019-09-13 PROCEDURE — 82306 VITAMIN D 25 HYDROXY: CPT | Performed by: NURSE PRACTITIONER

## 2019-09-13 NOTE — PROGRESS NOTES
"Subjective   Rai Guillory is a 34 y.o. female.   Chief Complaint   Patient presents with   • Annual Exam     Needs referral to gynocology       History of Present Illness   Patient is here for an annual exam she is fasting for labs.    She is having some white vaginal discharge. Possible STD exposure.   Started several weeks ago. No itching.     The following portions of the patient's history were reviewed and updated as appropriate: allergies, current medications, past family history, past medical history, past social history, past surgical history and problem list.    Review of Systems   Constitutional: Negative.    HENT: Negative.    Eyes: Negative.    Respiratory: Negative.    Cardiovascular: Negative.    Gastrointestinal: Negative.    Genitourinary: Positive for vaginal discharge.        White cloudy discharge      Musculoskeletal: Negative.    Skin: Negative.    Allergic/Immunologic: Positive for environmental allergies.   Neurological: Negative.    Hematological: Negative.    Psychiatric/Behavioral: Negative.      Past Surgical History:   Procedure Laterality Date   • DIAGNOSTIC LAPAROSCOPY  03/29/2018    Endometrial ablation   • FOOT SURGERY  09/2017   • KNEE ACL RECONSTRUCTION  06/2007   • TUBAL ABDOMINAL LIGATION  11/2013     Past Medical History:   Diagnosis Date   • History of abnormal cervical Pap smear    • Ovarian cyst    • STD exposure     Chlamydia and GC       Objective   No Known Allergies  Visit Vitals  /62   Pulse 78   Temp 98.1 °F (36.7 °C)   Resp 17   Ht 165.1 cm (65\")   Wt 59.9 kg (132 lb)   LMP  (LMP Unknown)   SpO2 99%   BMI 21.97 kg/m²       Physical Exam   Constitutional: She is oriented to person, place, and time. Vital signs are normal. She appears well-developed and well-nourished. She is cooperative. She does not appear ill.   HENT:   Head: Normocephalic.   Eyes: Pupils are equal, round, and reactive to light.   Neck: Normal range of motion.   Cardiovascular: Normal rate and " regular rhythm.   Pulmonary/Chest: Effort normal and breath sounds normal.   Abdominal: Soft. Bowel sounds are normal.   Neurological: She is alert and oriented to person, place, and time.   Skin: Skin is warm, dry and intact. Capillary refill takes less than 2 seconds.   Psychiatric: She has a normal mood and affect. Her behavior is normal. Judgment and thought content normal.   Vitals reviewed.      Assessment/Plan   Rai was seen today for annual exam.    Diagnoses and all orders for this visit:    Possible exposure to STD    Physical exam, annual  -     Ambulatory Referral to Gynecology  -     CBC & Differential  -     Comprehensive Metabolic Panel  -     Hemoglobin A1c  -     Lipid Panel  -     POC Urinalysis Dipstick, Automated  -     TSH  -     Vitamin D 25 Hydroxy  -     CBC Auto Differential    Low vitamin D level  -     Vitamin D 25 Hydroxy    Other orders  -     SCANNED - LABS      ONE Swab for STD testing   She wants to wait on the results for any treatment   Patient Counseling:  --Nutrition: Stressed importance of moderation in sodium/caffeine intake, saturated fat and cholesterol, caloric balance, sufficient intake of fresh fruits, vegetables, fiber, calcium, iron, and 1 mg of folate supplement per day (for females capable of pregnancy).  --Discussed the issue of estrogen replacement, calcium supplement, and the daily use of baby aspirin.  --Exercise: Stressed the importance of regular exercise.   --Substance Abuse: Discussed cessation/primary prevention of tobacco, alcohol, or other drug use; driving or other dangerous activities under the influence; availability of treatment for abuse.    --Sexuality: Discussed sexually transmitted diseases, partner selection, use of condoms, avoidance of unintended pregnancy and contraceptive alternatives.   --Injury prevention: Discussed safety belts, safety helmets, smoke detector, smoking near bedding or upholstery.   --Dental health: Discussed importance of  regular tooth brushing, flossing, and dental visits.  --Immunizations reviewed.  ---After hours’ service discussed with patient  Follow up in 4 weeks and as needed.            Jane Wong, APRN

## 2019-10-16 ENCOUNTER — OFFICE VISIT (OUTPATIENT)
Dept: ORTHOPEDIC SURGERY | Facility: CLINIC | Age: 34
End: 2019-10-16

## 2019-10-16 VITALS — OXYGEN SATURATION: 99 % | WEIGHT: 129.85 LBS | HEART RATE: 72 BPM | HEIGHT: 65 IN | BODY MASS INDEX: 21.63 KG/M2

## 2019-10-16 DIAGNOSIS — M77.41 METATARSALGIA OF RIGHT FOOT: ICD-10-CM

## 2019-10-16 DIAGNOSIS — M79.671 RIGHT FOOT PAIN: Primary | ICD-10-CM

## 2019-10-16 PROCEDURE — 99203 OFFICE O/P NEW LOW 30 MIN: CPT | Performed by: PHYSICIAN ASSISTANT

## 2019-10-16 NOTE — PROGRESS NOTES
[facility] Note    Subjective     Patient: Rai Guillory  : 1985    Primary Care Provider: Jane Wong APRN    Requesting Provider: As above    Pain of the Right Foot      History    Chief Complaint: Right foot pain    History of Present Illness: This is a very pleasant 34-year-old female presenting today to discuss her long-standing right foot pain since .  She reports she had surgery on the foot up in [place] and did not have any improvement of her pain.  She complains of forefoot pain with weightbearing and walking as well as some rest pain and night pain.  She complains of feeling like she is walking on a rock and has significant callus and with third metatarsal head.  Pain is 5-6/10 and dull aching and throbbing.  She has treated with anti-inflammatories without improvement but actually worsening pain.  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  She has never had any custom orthotics.  She is here for further evaluation and treatment recommendations.    Current Outpatient Medications on File Prior to Visit   Medication Sig Dispense Refill   • diclofenac (VOLTAREN) 50 MG EC tablet Take 1 tablet by mouth 2 (Two) Times a Day. 60 tablet 2     No current facility-administered medications on file prior to visit.       No Known Allergies   Past Medical History:   Diagnosis Date   • History of abnormal cervical Pap smear    • Ovarian cyst    • STD exposure     Chlamydia and GC     Past Surgical History:   Procedure Laterality Date   • DIAGNOSTIC LAPAROSCOPY  2018    Endometrial ablation   • FOOT SURGERY  2017   • KNEE ACL RECONSTRUCTION  2007   • TUBAL ABDOMINAL LIGATION  2013     Family History   Problem Relation Age of Onset   • No Known Problems Mother    • Diabetes Father    • Heart disease Father    • Alcohol abuse Father    • Hypertension Father       Social History     Socioeconomic History   • Marital status: Single     Spouse name:  "Not on file   • Number of children: Not on file   • Years of education: Not on file   • Highest education level: Not on file   Tobacco Use   • Smoking status: Current Some Day Smoker     Packs/day: 0.25     Years: 10.00     Pack years: 2.50     Types: Cigarettes   • Smokeless tobacco: Never Used   Substance and Sexual Activity   • Alcohol use: Yes     Alcohol/week: 1.2 oz     Types: 2 Shots of liquor per week     Comment: once or twice a wk   • Drug use: No   • Sexual activity: Yes     Partners: Male     Birth control/protection: Surgical     Comment: Tubal ligation        Review of Systems   Constitutional: Negative.    HENT: Negative.    Eyes: Negative.    Respiratory: Negative.    Cardiovascular: Negative.    Gastrointestinal: Negative.    Endocrine: Negative.    Genitourinary: Negative.    Musculoskeletal: Positive for myalgias.   Skin: Negative.    Allergic/Immunologic: Negative.    Neurological: Negative.    Hematological: Negative.    Psychiatric/Behavioral: Negative.        The following portions of the patient's history were reviewed and updated as appropriate: allergies, current medications, past family history, past medical history, past social history, past surgical history and problem list.      Objective      Physical Exam  Pulse 72   Ht 165.1 cm (65\")   Wt 58.9 kg (129 lb 13.6 oz)   SpO2 99%   BMI 21.61 kg/m²     Body mass index is 21.61 kg/m².    GENERAL: Body habitus: normal weight for height    Lower extremity edema: Left: none; Right: none    Varicose veins:  Left: none; Right: none    Gait: normal     Mental Status:  awake and alert; oriented to person, place, and time    Voice:  clear  SKIN:  Normal    Hair Growth:  Right:normal; Left:  normal  HEENT: Head: Normocephalic, atraumatic,  without obvious abnormality.  eye: normal external eye, no icterus   PULM:  Repiratory effort normal    Ortho Exam  V:  Dorsalis Pedis:  Right: 2+; Left:2+    Posterior Tibial: Right:2+; Left:2+    Capillary " Refill:  Brisk  MSK:      Tibia:  Right:  non tender; Left:  non tender      Ankle:  Right: non tender, ROM  normal and motor function  normal; Left:  non tender      Foot:  Right:  tender exquisitely under the third metatarsal head with thick callus.  She has thick callus over the median aspect of the great toe.  She has tenderness in the second metatarsal head.; Left:  non tender      NEURO: Heel Walking:  Right:  normal; Left:  normal    Toe Walking:  Right:  limited ability, painful; Left:  normal     [place] 5.07 monofilament test: not evaluated    Lower extremity sensation: intact     Calf Atrophy:none    Motor Function: all 5/5          Medical Decision Making    Data Review:   ordered and reviewed x-rays today    Assessment:  1. Right foot pain    2. Metatarsalgia of right foot        Plan:  Right foot metatarsalgia.  I reviewed clinical findings and today's x-rays past and current treatment the patient.  On exam, she has a very thick callus under the third metatarsal head and is exquisitely tender there.  She has some tenderness in the second metatarsal head as well.  X-rays today show mild hallux valgus metatarsus primus varus, slight irregularity at the neck of the third metatarsal suggesting possible prior plantar condylectomy, no hardware, mild third hammertoe, mild valgus of toes 2, 3, 4, no comparison. I explained to the patient that this is a very common problem, it is commonly hereditary but is made worse by shoe wear.  It commonly presents as a feeling of walking on a marble, or a rock.  It is generally worse barefoot, feels better with padding .  I explained the overload on the metatarsal head.  We discussed treatment recommendations including custom orthotics with metatarsal posting as well is daily ped egg on the calluses.  I explained that once the initial hard callus settles down this will also significantly improve her pain.  When the callus gets they can hurt his leg walking on a  be.  I have given her prescription for custom orthotics she will return to see us as needed.    Patient history, diagnosis and treatment plan discussed with Dr. Lucero.        Lalitha Rich PA-C  10/17/19  1:58 PM

## 2019-11-30 ENCOUNTER — OFFICE VISIT (OUTPATIENT)
Dept: FAMILY MEDICINE CLINIC | Facility: CLINIC | Age: 34
End: 2019-11-30

## 2019-11-30 VITALS
WEIGHT: 131.8 LBS | SYSTOLIC BLOOD PRESSURE: 120 MMHG | HEIGHT: 65 IN | RESPIRATION RATE: 18 BRPM | TEMPERATURE: 98.1 F | OXYGEN SATURATION: 99 % | HEART RATE: 71 BPM | BODY MASS INDEX: 21.96 KG/M2 | DIASTOLIC BLOOD PRESSURE: 68 MMHG

## 2019-11-30 DIAGNOSIS — N61.0 ACUTE MASTITIS: Primary | ICD-10-CM

## 2019-11-30 DIAGNOSIS — N64.52 DISCHARGE FROM LEFT NIPPLE: ICD-10-CM

## 2019-11-30 DIAGNOSIS — N64.4 NIPPLE PAIN: ICD-10-CM

## 2019-11-30 PROCEDURE — 99213 OFFICE O/P EST LOW 20 MIN: CPT | Performed by: NURSE PRACTITIONER

## 2019-11-30 RX ORDER — CEPHALEXIN 500 MG/1
500 CAPSULE ORAL 4 TIMES DAILY
Qty: 40 CAPSULE | Refills: 0 | Status: SHIPPED | OUTPATIENT
Start: 2019-11-30 | End: 2019-12-10

## 2019-11-30 RX ORDER — IBUPROFEN 600 MG/1
TABLET ORAL
Qty: 60 TABLET | Refills: 0 | OUTPATIENT
Start: 2019-11-30 | End: 2020-01-05

## 2019-12-01 NOTE — PROGRESS NOTES
Subjective   Rai Guillory is a 34 y.o. female.     Ms. Guillory presents today with c/o severe left breast x 2 days. She denies injury/trauma. She states that he has been wearing a new bra that she thinks may have irritated her skin.      Breast Pain   This is a new problem. Episode onset: 2 days ago. The problem occurs constantly. The problem has been gradually worsening. Pertinent negatives include no abdominal pain, arthralgias, change in bowel habit, chest pain, chills, diaphoresis, fatigue, fever, headaches, myalgias, nausea, rash, swollen glands or vomiting. Exacerbated by: anything that touches her breast. Treatments tried: warm compress. The treatment provided no relief.       The following portions of the patient's history were reviewed and updated as appropriate: allergies, current medications, past family history, past medical history, past social history, past surgical history and problem list.    Allergies as of 11/30/2019   • (No Known Allergies)       No current outpatient medications on file prior to visit.     No current facility-administered medications on file prior to visit.      No LMP recorded. Patient has had an ablation. She also had a tubal ligation 2013.    Review of Systems   Constitutional: Negative for activity change, appetite change, chills, diaphoresis, fatigue and fever.   HENT: Negative.    Respiratory: Negative.    Cardiovascular: Negative.  Negative for chest pain.   Gastrointestinal: Negative for abdominal pain, change in bowel habit, diarrhea, nausea and vomiting.   Musculoskeletal: Negative for arthralgias and myalgias.   Skin: Negative for rash.        Left nipple very sore and tender, states had some discharge from her nipple that was yellow with a small amount of bright red blood   Neurological: Negative.  Negative for headaches.       Objective   Physical Exam   Constitutional: She is oriented to person, place, and time. Vital signs are normal. She appears well-developed  and well-nourished. She is cooperative. She does not appear ill. No distress.   HENT:   Right Ear: Tympanic membrane normal.   Left Ear: Tympanic membrane normal.   Mouth/Throat: Uvula is midline and mucous membranes are normal.   Cardiovascular: Normal rate, regular rhythm and normal heart sounds.   Pulmonary/Chest: Effort normal and breath sounds normal. Right breast exhibits inverted nipple. Right breast exhibits no mass, no nipple discharge, no skin change and no tenderness. Left breast exhibits tenderness (severe). Left breast exhibits no inverted nipple, no mass, no nipple discharge and no skin change. Breasts are symmetrical. There is breast swelling (mild left breast swelling).   Genitourinary: No breast discharge or bleeding.   Neurological: She is alert and oriented to person, place, and time.   Skin: Skin is warm, dry and intact. No rash noted. She is not diaphoretic.   Psychiatric: She has a normal mood and affect. Her behavior is normal. Judgment and thought content normal.   Vitals reviewed.    Vitals:    11/30/19 1434   BP: 120/68   Pulse: 71   Resp: 18   Temp: 98.1 °F (36.7 °C)   SpO2: 99%     Body mass index is 21.93 kg/m².    Assessment/Plan   Rai was seen today for breast pain.    Diagnoses and all orders for this visit:    Acute mastitis  -     cephalexin (KEFLEX) 500 MG capsule; Take 1 capsule by mouth 4 (Four) Times a Day for 10 days.  -     mupirocin (BACTROBAN) 2 % ointment; Apply  topically to the appropriate area as directed 3 (Three) Times a Day.    Nipple pain  -     ibuprofen (ADVIL,MOTRIN) 600 MG tablet; 1 PO TID prn with food    Discharge from left nipple    Discussed the nature of the medical condition(s) risks, complications, implications, management, safe and proper use of medications. Encouraged medication compliance, and keeping scheduled follow up appointments with me and any other providers.      RTC if symptoms fail to improve, to ER if symptoms worsen.

## 2020-01-05 ENCOUNTER — HOSPITAL ENCOUNTER (EMERGENCY)
Facility: HOSPITAL | Age: 35
Discharge: HOME | End: 2020-01-05
Attending: EMERGENCY MEDICINE | Admitting: EMERGENCY MEDICINE

## 2020-01-05 ENCOUNTER — APPOINTMENT (OUTPATIENT)
Dept: CT IMAGING | Facility: HOSPITAL | Age: 35
End: 2020-01-05

## 2020-01-05 VITALS
RESPIRATION RATE: 17 BRPM | SYSTOLIC BLOOD PRESSURE: 116 MMHG | DIASTOLIC BLOOD PRESSURE: 76 MMHG | HEIGHT: 65 IN | HEART RATE: 88 BPM | WEIGHT: 128 LBS | OXYGEN SATURATION: 100 % | BODY MASS INDEX: 21.33 KG/M2 | TEMPERATURE: 99.1 F

## 2020-01-05 DIAGNOSIS — N61.1 BREAST ABSCESS: Primary | ICD-10-CM

## 2020-01-05 LAB
ALBUMIN SERPL-MCNC: 4.2 G/DL (ref 3.5–5.2)
ALBUMIN/GLOB SERPL: 1.6 G/DL
ALP SERPL-CCNC: 88 U/L (ref 39–117)
ALT SERPL W P-5'-P-CCNC: 7 U/L (ref 1–33)
AMPHET+METHAMPHET UR QL: NEGATIVE
AMPHETAMINES UR QL: NEGATIVE
ANION GAP SERPL CALCULATED.3IONS-SCNC: 9 MMOL/L (ref 5–15)
AST SERPL-CCNC: 12 U/L (ref 1–32)
BARBITURATES UR QL SCN: NEGATIVE
BASOPHILS # BLD AUTO: 0.02 10*3/MM3 (ref 0–0.2)
BASOPHILS NFR BLD AUTO: 0.2 % (ref 0–1.5)
BENZODIAZ UR QL SCN: NEGATIVE
BILIRUB SERPL-MCNC: 0.3 MG/DL (ref 0.2–1.2)
BUN BLD-MCNC: 9 MG/DL (ref 6–20)
BUN/CREAT SERPL: 14.8 (ref 7–25)
BUPRENORPHINE SERPL-MCNC: NEGATIVE NG/ML
CALCIUM SPEC-SCNC: 8.9 MG/DL (ref 8.6–10.5)
CANNABINOIDS SERPL QL: POSITIVE
CHLORIDE SERPL-SCNC: 106 MMOL/L (ref 98–107)
CO2 SERPL-SCNC: 26 MMOL/L (ref 22–29)
COCAINE UR QL: POSITIVE
CREAT BLD-MCNC: 0.61 MG/DL (ref 0.57–1)
D-LACTATE SERPL-SCNC: 1.1 MMOL/L (ref 0.5–2)
DEPRECATED RDW RBC AUTO: 47.8 FL (ref 37–54)
EOSINOPHIL # BLD AUTO: 0.11 10*3/MM3 (ref 0–0.4)
EOSINOPHIL NFR BLD AUTO: 1.3 % (ref 0.3–6.2)
ERYTHROCYTE [DISTWIDTH] IN BLOOD BY AUTOMATED COUNT: 13.3 % (ref 12.3–15.4)
GFR SERPL CREATININE-BSD FRML MDRD: 135 ML/MIN/1.73
GLOBULIN UR ELPH-MCNC: 2.7 GM/DL
GLUCOSE BLD-MCNC: 95 MG/DL (ref 65–99)
HCT VFR BLD AUTO: 37 % (ref 34–46.6)
HGB BLD-MCNC: 12.4 G/DL (ref 12–15.9)
IMM GRANULOCYTES # BLD AUTO: 0.04 10*3/MM3 (ref 0–0.05)
IMM GRANULOCYTES NFR BLD AUTO: 0.5 % (ref 0–0.5)
LYMPHOCYTES # BLD AUTO: 1.48 10*3/MM3 (ref 0.7–3.1)
LYMPHOCYTES NFR BLD AUTO: 17.7 % (ref 19.6–45.3)
MCH RBC QN AUTO: 32.5 PG (ref 26.6–33)
MCHC RBC AUTO-ENTMCNC: 33.5 G/DL (ref 31.5–35.7)
MCV RBC AUTO: 97.1 FL (ref 79–97)
METHADONE UR QL SCN: NEGATIVE
MONOCYTES # BLD AUTO: 0.58 10*3/MM3 (ref 0.1–0.9)
MONOCYTES NFR BLD AUTO: 6.9 % (ref 5–12)
NEUTROPHILS # BLD AUTO: 6.13 10*3/MM3 (ref 1.7–7)
NEUTROPHILS NFR BLD AUTO: 73.4 % (ref 42.7–76)
NRBC BLD AUTO-RTO: 0 /100 WBC (ref 0–0.2)
OPIATES UR QL: NEGATIVE
OXYCODONE UR QL SCN: NEGATIVE
PCP UR QL SCN: NEGATIVE
PLATELET # BLD AUTO: 252 10*3/MM3 (ref 140–450)
PMV BLD AUTO: 11.2 FL (ref 6–12)
POTASSIUM BLD-SCNC: 4.1 MMOL/L (ref 3.5–5.2)
PROCALCITONIN SERPL-MCNC: 0.04 NG/ML (ref 0.1–0.25)
PROPOXYPH UR QL: NEGATIVE
PROT SERPL-MCNC: 6.9 G/DL (ref 6–8.5)
RBC # BLD AUTO: 3.81 10*6/MM3 (ref 3.77–5.28)
SODIUM BLD-SCNC: 141 MMOL/L (ref 136–145)
TRICYCLICS UR QL SCN: NEGATIVE
WBC NRBC COR # BLD: 8.36 10*3/MM3 (ref 3.4–10.8)

## 2020-01-05 PROCEDURE — 25010000002 IOPAMIDOL 61 % SOLUTION: Performed by: EMERGENCY MEDICINE

## 2020-01-05 PROCEDURE — 85025 COMPLETE CBC W/AUTO DIFF WBC: CPT | Performed by: PHYSICIAN ASSISTANT

## 2020-01-05 PROCEDURE — 71260 CT THORAX DX C+: CPT

## 2020-01-05 PROCEDURE — 80053 COMPREHEN METABOLIC PANEL: CPT | Performed by: PHYSICIAN ASSISTANT

## 2020-01-05 PROCEDURE — 87040 BLOOD CULTURE FOR BACTERIA: CPT | Performed by: PHYSICIAN ASSISTANT

## 2020-01-05 PROCEDURE — 83605 ASSAY OF LACTIC ACID: CPT | Performed by: PHYSICIAN ASSISTANT

## 2020-01-05 PROCEDURE — 99283 EMERGENCY DEPT VISIT LOW MDM: CPT

## 2020-01-05 PROCEDURE — 80307 DRUG TEST PRSMV CHEM ANLYZR: CPT | Performed by: EMERGENCY MEDICINE

## 2020-01-05 PROCEDURE — 84145 PROCALCITONIN (PCT): CPT | Performed by: PHYSICIAN ASSISTANT

## 2020-01-05 RX ORDER — SODIUM CHLORIDE 0.9 % (FLUSH) 0.9 %
10 SYRINGE (ML) INJECTION AS NEEDED
Status: DISCONTINUED | OUTPATIENT
Start: 2020-01-05 | End: 2020-01-05 | Stop reason: HOSPADM

## 2020-01-05 RX ORDER — CEPHALEXIN 500 MG/1
500 CAPSULE ORAL 4 TIMES DAILY
Qty: 40 CAPSULE | Refills: 0 | Status: SHIPPED | OUTPATIENT
Start: 2020-01-05 | End: 2020-02-11

## 2020-01-05 RX ORDER — SULFAMETHOXAZOLE AND TRIMETHOPRIM 800; 160 MG/1; MG/1
1 TABLET ORAL 2 TIMES DAILY
Qty: 20 TABLET | Refills: 0 | Status: SHIPPED | OUTPATIENT
Start: 2020-01-05 | End: 2020-02-11

## 2020-01-05 RX ORDER — OXYCODONE HYDROCHLORIDE AND ACETAMINOPHEN 5; 325 MG/1; MG/1
1 TABLET ORAL ONCE
Status: COMPLETED | OUTPATIENT
Start: 2020-01-05 | End: 2020-01-05

## 2020-01-05 RX ORDER — HYDROCODONE BITARTRATE AND ACETAMINOPHEN 5; 325 MG/1; MG/1
1 TABLET ORAL EVERY 6 HOURS PRN
Qty: 12 TABLET | Refills: 0 | Status: SHIPPED | OUTPATIENT
Start: 2020-01-05 | End: 2020-02-11

## 2020-01-05 RX ORDER — FLUCONAZOLE 200 MG/1
200 TABLET ORAL DAILY
Qty: 2 TABLET | Refills: 0 | Status: SHIPPED | OUTPATIENT
Start: 2020-01-05 | End: 2020-02-11

## 2020-01-05 RX ADMIN — IOPAMIDOL 75 ML: 612 INJECTION, SOLUTION INTRAVENOUS at 16:43

## 2020-01-05 RX ADMIN — OXYCODONE HYDROCHLORIDE AND ACETAMINOPHEN 1 TABLET: 5; 325 TABLET ORAL at 16:27

## 2020-01-05 NOTE — DISCHARGE INSTRUCTIONS
Keflex and Bactrim as prescribed.  Call Dr. Costa (general surgery) tomorrow for follow up in office.  Return to ER if worse.  Lortab as prescribed for pain.

## 2020-01-05 NOTE — ED PROVIDER NOTES
Subjective   Rai Guillory is a 34 y.o.female who presents to the ED with complaints of left breast pain. The patient reports she has been experiencing pain and swelling to her left breast for the past two days. She describes her discomfort as a hot sensation. She states she feels a knot where the area is most painful. She experienced similar symptoms two weeks ago when she was diagnosed with mastitis. She was given a prescription for Amoxicillin, which did relieve her discomfort until it returned two days ago. Additionally, she denies any breast feeding or current pregnancy. She has been diagnosed with mastitis three times in the past. There are no other complaints at this time.       History provided by:  Patient  Breast Pain   Location:  Left breast  Quality:  Pain  Severity:  Moderate  Onset quality:  Sudden  Duration:  2 days  Timing:  Constant  Progression:  Unchanged  Chronicity:  Recurrent  Context:  Dx mastitis two weeks ago. Symptoms returned two days ago  Relieved by:  Nothing  Worsened by:  Nothing  Ineffective treatments:  Amoxicillin  Associated symptoms: no abdominal pain, no chest pain, no cough, no fever, no headaches, no nausea, no shortness of breath and no vomiting        Review of Systems   Constitutional: Negative for chills and fever.   Respiratory: Negative for cough and shortness of breath.    Cardiovascular: Negative for chest pain.   Gastrointestinal: Negative for abdominal pain, nausea and vomiting.   Genitourinary: Negative for dysuria.   Musculoskeletal: Negative for back pain.        Left breast pain and swelling.    Skin:        Left periaureolar induration.   Allergic/Immunologic: Negative for immunocompromised state.   Neurological: Negative for headaches.   Hematological: Negative for adenopathy.   Psychiatric/Behavioral: Negative.    All other systems reviewed and are negative.      Past Medical History:   Diagnosis Date   • History of abnormal cervical Pap smear    • Mastitis     • Ovarian cyst    • STD exposure     Chlamydia and GC       No Known Allergies    Past Surgical History:   Procedure Laterality Date   • DIAGNOSTIC LAPAROSCOPY  03/29/2018    Endometrial ablation   • FOOT SURGERY  09/2017   • KNEE ACL RECONSTRUCTION  06/2007   • TUBAL ABDOMINAL LIGATION  11/2013       Family History   Problem Relation Age of Onset   • No Known Problems Mother    • Diabetes Father    • Heart disease Father    • Alcohol abuse Father    • Hypertension Father        Social History     Socioeconomic History   • Marital status: Significant Other     Spouse name: Not on file   • Number of children: Not on file   • Years of education: Not on file   • Highest education level: Not on file   Tobacco Use   • Smoking status: Current Some Day Smoker     Packs/day: 0.25     Years: 10.00     Pack years: 2.50     Types: Cigarettes   • Smokeless tobacco: Never Used   Substance and Sexual Activity   • Alcohol use: Yes     Alcohol/week: 2.0 standard drinks     Types: 2 Shots of liquor per week     Comment: once or twice a wk   • Drug use: No   • Sexual activity: Yes     Partners: Male     Birth control/protection: Surgical     Comment: Tubal ligation         Objective   Physical Exam   Constitutional: She is oriented to person, place, and time. She appears well-developed and well-nourished.   HENT:   Head: Normocephalic and atraumatic.   Eyes: Pupils are equal, round, and reactive to light. Conjunctivae are normal. No scleral icterus.   Neck: Normal range of motion. Neck supple.   Cardiovascular: Normal rate, regular rhythm, normal heart sounds and intact distal pulses.   No murmur heard.  Pulmonary/Chest: Effort normal and breath sounds normal. No respiratory distress.   Abdominal: Soft. Bowel sounds are normal. There is no tenderness.   Musculoskeletal: Normal range of motion. She exhibits no edema.   Left breast periareolar induration with mild erythema and tenderness. No nipple discharge. No axillary  lymphadenopathy.    Neurological: She is alert and oriented to person, place, and time.   Skin: Skin is warm and dry.   Left periaureolar induration and tenderness.  No nipple d/c.     Psychiatric: She has a normal mood and affect. Her behavior is normal.   Nursing note and vitals reviewed.      Procedures         ED Course  ED Course as of Jan 05 2244   Sun Jan 05, 2020   [address] complete. [id number]    [TB]      ED Course User Index  [TB] Leopoldo Wheeler   CT chest shows possible 1 cm subaureolar abscess.  There are some left axillary nodes as well.  No concerning labs.  I spoke with Dr. Harris who recommended Keflex and Bactrim and f/u with Dr Costa.  Recent Results (from the past 24 hour(s))   Comprehensive Metabolic Panel    Collection Time: 01/05/20  4:22 PM   Result Value Ref Range    Glucose 95 65 - 99 mg/dL    BUN 9 6 - 20 mg/dL    Creatinine 0.61 0.57 - 1.00 mg/dL    Sodium 141 136 - 145 mmol/L    Potassium 4.1 3.5 - 5.2 mmol/L    Chloride 106 98 - 107 mmol/L    CO2 26.0 22.0 - 29.0 mmol/L    Calcium 8.9 8.6 - 10.5 mg/dL    Total Protein 6.9 6.0 - 8.5 g/dL    Albumin 4.20 3.50 - 5.20 g/dL    ALT (SGPT) 7 1 - 33 U/L    AST (SGOT) 12 1 - 32 U/L    Alkaline Phosphatase 88 39 - 117 U/L    Total Bilirubin 0.3 0.2 - 1.2 mg/dL    eGFR  African Amer 135 >60 mL/min/1.73    Globulin 2.7 gm/dL    A/G Ratio 1.6 g/dL    BUN/Creatinine Ratio 14.8 7.0 - 25.0    Anion Gap 9.0 5.0 - 15.0 mmol/L   Procalcitonin    Collection Time: 01/05/20  4:22 PM   Result Value Ref Range    Procalcitonin 0.04 (L) 0.10 - 0.25 ng/mL   Lactic Acid, Plasma    Collection Time: 01/05/20  4:23 PM   Result Value Ref Range    Lactate 1.1 0.5 - 2.0 mmol/L   CBC Auto Differential    Collection Time: 01/05/20  4:23 PM   Result Value Ref Range    WBC 8.36 3.40 - 10.80 10*3/mm3    RBC 3.81 3.77 - 5.28 10*6/mm3    Hemoglobin 12.4 12.0 - 15.9 g/dL    Hematocrit 37.0 34.0 - 46.6 %    MCV 97.1 (H) 79.0 - 97.0 fL    MCH 32.5 26.6 - 33.0 pg    MCHC 33.5  31.5 - 35.7 g/dL    RDW 13.3 12.3 - 15.4 %    RDW-SD 47.8 37.0 - 54.0 fl    MPV 11.2 6.0 - 12.0 fL    Platelets 252 140 - 450 10*3/mm3    Neutrophil % 73.4 42.7 - 76.0 %    Lymphocyte % 17.7 (L) 19.6 - 45.3 %    Monocyte % 6.9 5.0 - 12.0 %    Eosinophil % 1.3 0.3 - 6.2 %    Basophil % 0.2 0.0 - 1.5 %    Immature Grans % 0.5 0.0 - 0.5 %    Neutrophils, Absolute 6.13 1.70 - 7.00 10*3/mm3    Lymphocytes, Absolute 1.48 0.70 - 3.10 10*3/mm3    Monocytes, Absolute 0.58 0.10 - 0.90 10*3/mm3    Eosinophils, Absolute 0.11 0.00 - 0.40 10*3/mm3    Basophils, Absolute 0.02 0.00 - 0.20 10*3/mm3    Immature Grans, Absolute 0.04 0.00 - 0.05 10*3/mm3    nRBC 0.0 0.0 - 0.2 /100 WBC   Urine Drug Screen - Urine, Clean Catch    Collection Time: 01/05/20  5:13 PM   Result Value Ref Range    THC, Screen, Urine Positive (A) Negative    Phencyclidine (PCP), Urine Negative Negative    Cocaine Screen, Urine Positive (A) Negative    Methamphetamine, Ur Negative Negative    Opiate Screen Negative Negative    Amphetamine Screen, Urine Negative Negative    Benzodiazepine Screen, Urine Negative Negative    Tricyclic Antidepressants Screen Negative Negative    Methadone Screen, Urine Negative Negative    Barbiturates Screen, Urine Negative Negative    Oxycodone Screen, Urine Negative Negative    Propoxyphene Screen Negative Negative    Buprenorphine, Screen, Urine Negative Negative     Note: In addition to lab results from this visit, the labs listed above may include labs taken at another facility or during a different encounter within the last 24 hours. Please correlate lab times with ED admission and discharge times for further clarification of the services performed during this visit.    CT Chest With Contrast   Preliminary Result   1. Cutaneous thickening and focal inflammation around the left areola.   Faint suggestion of a 1 cm subareolar abscess, axial image #34 of series   2.    2. No evidence of active chest disease elsewhere.      "  DICTATED:   01/05/2020   EDITED/ls :   01/05/2020             Vitals:    01/05/20 1448   BP: 116/76   Pulse: 88   Resp: 17   Temp: 99.1 °F (37.3 °C)   SpO2: 100%   Weight: 58.1 kg (128 lb)   Height: 165.1 cm (65\")     Medications   oxyCODONE-acetaminophen (PERCOCET) 5-325 MG per tablet 1 tablet (1 tablet Oral Given 1/5/20 1627)   iopamidol (ISOVUE-300) 61 % injection 100 mL (75 mL Intravenous Given 1/5/20 1643)     ECG/EMG Results (last 24 hours)     ** No results found for the last 24 hours. **        No orders to display                       MDM    Final diagnoses:   Breast abscess       Documentation assistance provided by gauri Wheeler.  Information recorded by the gauri was done at my direction and has been verified and validated by me.     Leopoldo Wheeler  01/05/20 1619       John Dillon PA  01/05/20 [id number]       John Dillon PA  01/05/20 2226    "

## 2020-01-10 LAB
BACTERIA SPEC AEROBE CULT: NORMAL
BACTERIA SPEC AEROBE CULT: NORMAL

## 2020-02-11 ENCOUNTER — OFFICE VISIT (OUTPATIENT)
Dept: FAMILY MEDICINE CLINIC | Facility: CLINIC | Age: 35
End: 2020-02-11

## 2020-02-11 VITALS
BODY MASS INDEX: 21.43 KG/M2 | OXYGEN SATURATION: 94 % | HEIGHT: 65 IN | SYSTOLIC BLOOD PRESSURE: 122 MMHG | WEIGHT: 128.6 LBS | DIASTOLIC BLOOD PRESSURE: 84 MMHG | TEMPERATURE: 99.9 F | RESPIRATION RATE: 16 BRPM | HEART RATE: 108 BPM

## 2020-02-11 DIAGNOSIS — R68.89 FLU-LIKE SYMPTOMS: ICD-10-CM

## 2020-02-11 DIAGNOSIS — J10.1 INFLUENZA A: Primary | ICD-10-CM

## 2020-02-11 LAB
EXPIRATION DATE: ABNORMAL
EXPIRATION DATE: NORMAL
FLUAV AG NPH QL: POSITIVE
FLUBV AG NPH QL: NEGATIVE
INTERNAL CONTROL: ABNORMAL
INTERNAL CONTROL: NORMAL
Lab: ABNORMAL
Lab: NORMAL
S PYO AG THROAT QL: NEGATIVE

## 2020-02-11 PROCEDURE — 87804 INFLUENZA ASSAY W/OPTIC: CPT | Performed by: NURSE PRACTITIONER

## 2020-02-11 PROCEDURE — 87880 STREP A ASSAY W/OPTIC: CPT | Performed by: NURSE PRACTITIONER

## 2020-02-11 PROCEDURE — 99213 OFFICE O/P EST LOW 20 MIN: CPT | Performed by: NURSE PRACTITIONER

## 2020-02-11 RX ORDER — BROMPHENIRAMINE MALEATE, PSEUDOEPHEDRINE HYDROCHLORIDE, AND DEXTROMETHORPHAN HYDROBROMIDE 2; 30; 10 MG/5ML; MG/5ML; MG/5ML
10 SYRUP ORAL EVERY 6 HOURS PRN
Qty: 400 ML | Refills: 0 | Status: SHIPPED | OUTPATIENT
Start: 2020-02-11 | End: 2020-02-21

## 2020-02-11 RX ORDER — IBUPROFEN 600 MG/1
600 TABLET ORAL EVERY 8 HOURS PRN
Qty: 20 TABLET | Refills: 0 | Status: SHIPPED | OUTPATIENT
Start: 2020-02-11 | End: 2020-05-07 | Stop reason: SDUPTHER

## 2020-02-11 RX ORDER — OSELTAMIVIR PHOSPHATE 75 MG/1
75 CAPSULE ORAL 2 TIMES DAILY
Qty: 10 CAPSULE | Refills: 0 | Status: SHIPPED | OUTPATIENT
Start: 2020-02-11 | End: 2020-02-16

## 2020-02-11 NOTE — PATIENT INSTRUCTIONS
"Influenza, Adult  Influenza, more commonly known as \"the flu,\" is a viral infection that mainly affects the respiratory tract. The respiratory tract includes organs that help you breathe, such as the lungs, nose, and throat. The flu causes many symptoms similar to the common cold along with high fever and body aches.  The flu spreads easily from person to person (is contagious). Getting a flu shot (influenza vaccination) every year is the best way to prevent the flu.  What are the causes?  This condition is caused by the influenza virus. You can get the virus by:  · Breathing in droplets that are in the air from an infected person's cough or sneeze.  · Touching something that has been exposed to the virus (has been contaminated) and then touching your mouth, nose, or eyes.  What increases the risk?  The following factors may make you more likely to get the flu:  · Not washing or sanitizing your hands often.  · Having close contact with many people during cold and flu season.  · Touching your mouth, eyes, or nose without first washing or sanitizing your hands.  · Not getting a yearly (annual) flu shot.  You may have a higher risk for the flu, including serious problems such as a lung infection (pneumonia), if you:  · Are older than 65.  · Are pregnant.  · Have a weakened disease-fighting system (immune system). You may have a weakened immune system if you:  ? Have HIV or AIDS.  ? Are undergoing chemotherapy.  ? Are taking medicines that reduce (suppress) the activity of your immune system.  · Have a long-term (chronic) illness, such as heart disease, kidney disease, diabetes, or lung disease.  · Have a liver disorder.  · Are severely overweight (morbidly obese).  · Have anemia. This is a condition that affects your red blood cells.  · Have asthma.  What are the signs or symptoms?  Symptoms of this condition usually begin suddenly and last 4-14 days. They may include:  · Fever and chills.  · Headaches, body aches, or " muscle aches.  · Sore throat.  · Cough.  · Runny or stuffy (congested) nose.  · Chest discomfort.  · Poor appetite.  · Weakness or fatigue.  · Dizziness.  · Nausea or vomiting.  How is this diagnosed?  This condition may be diagnosed based on:  · Your symptoms and medical history.  · A physical exam.  · Swabbing your nose or throat and testing the fluid for the influenza virus.  How is this treated?  If the flu is diagnosed early, you can be treated with medicine that can help reduce how severe the illness is and how long it lasts (antiviral medicine). This may be given by mouth (orally) or through an IV.  Taking care of yourself at home can help relieve symptoms. Your health care provider may recommend:  · Taking over-the-counter medicines.  · Drinking plenty of fluids.  In many cases, the flu goes away on its own. If you have severe symptoms or complications, you may be treated in a hospital.  Follow these instructions at home:  Activity  · Rest as needed and get plenty of sleep.  · Stay home from work or school as told by your health care provider. Unless you are visiting your health care provider, avoid leaving home until your fever has been gone for 24 hours without taking medicine.  Eating and drinking  · Take an oral rehydration solution (ORS). This is a drink that is sold at pharmacies and retail stores.  · Drink enough fluid to keep your urine pale yellow.  · Drink clear fluids in small amounts as you are able. Clear fluids include water, ice chips, diluted fruit juice, and low-calorie sports drinks.  · Eat bland, easy-to-digest foods in small amounts as you are able. These foods include bananas, applesauce, rice, lean meats, toast, and crackers.  · Avoid drinking fluids that contain a lot of sugar or caffeine, such as energy drinks, regular sports drinks, and soda.  · Avoid alcohol.  · Avoid spicy or fatty foods.  General instructions         · Take over-the-counter and prescription medicines only as told  "by your health care provider.  · Use a cool mist humidifier to add humidity to the air in your home. This can make it easier to breathe.  · Cover your mouth and nose when you cough or sneeze.  · Wash your hands with soap and water often, especially after you cough or sneeze. If soap and water are not available, use alcohol-based hand .  · Keep all follow-up visits as told by your health care provider. This is important.  How is this prevented?    · Get an annual flu shot. You may get the flu shot in late summer, fall, or winter. Ask your health care provider when you should get your flu shot.  · Avoid contact with people who are sick during cold and flu season. This is generally fall and winter.  Contact a health care provider if:  · You develop new symptoms.  · You have:  ? Chest pain.  ? Diarrhea.  ? A fever.  · Your cough gets worse.  · You produce more mucus.  · You feel nauseous or you vomit.  Get help right away if:  · You develop shortness of breath or difficulty breathing.  · Your skin or nails turn a bluish color.  · You have severe pain or stiffness in your neck.  · You develop a sudden headache or sudden pain in your face or ear.  · You cannot eat or drink without vomiting.  Summary  · Influenza, more commonly known as \"the flu,\" is a viral infection that primarily affects your respiratory tract.  · Symptoms of the flu usually begin suddenly and last 4-14 days.  · Getting an annual flu shot is the best way to prevent getting the flu.  · Stay home from work or school as told by your health care provider. Unless you are visiting your health care provider, avoid leaving home until your fever has been gone for 24 hours without taking medicine.  · Keep all follow-up visits as told by your health care provider. This is important.  This information is not intended to replace advice given to you by your health care provider. Make sure you discuss any questions you have with your health care " provider.  Document Released: 12/15/2001 Document Revised: 06/05/2019 Document Reviewed: 06/05/2019  [employer] Interactive Patient Education © 2019 [employer]

## 2020-05-07 ENCOUNTER — TELEMEDICINE (OUTPATIENT)
Dept: FAMILY MEDICINE CLINIC | Facility: CLINIC | Age: 35
End: 2020-05-07

## 2020-05-07 DIAGNOSIS — N61.1 BREAST ABSCESS: Primary | ICD-10-CM

## 2020-05-07 DIAGNOSIS — A49.9 RECURRENT BACTERIAL INFECTION: ICD-10-CM

## 2020-05-07 PROCEDURE — 99213 OFFICE O/P EST LOW 20 MIN: CPT | Performed by: NURSE PRACTITIONER

## 2020-05-07 RX ORDER — IBUPROFEN 600 MG/1
600 TABLET ORAL EVERY 8 HOURS PRN
Qty: 30 TABLET | Refills: 0 | Status: SHIPPED | OUTPATIENT
Start: 2020-05-07 | End: 2021-06-03

## 2020-05-07 RX ORDER — CEPHALEXIN 500 MG/1
500 CAPSULE ORAL 2 TIMES DAILY
Qty: 20 CAPSULE | Refills: 0 | Status: SHIPPED | OUTPATIENT
Start: 2020-05-07 | End: 2020-05-17

## 2020-05-07 NOTE — PROGRESS NOTES
Subjective   Rai Guillory is a 35 y.o. female.     Ms. Guillory presents today via video visit with c/o left breast pain x 2 days. Patient has a history of recurrent mastitis of the left breast and was last treated at [facility] ER 1/5/20. At that time she had a CT of the chest with contrast which revealed a 1 cm subareolar abscess. She was treated with a course of Cephalexin and Bactrim and her symptoms subsequently resolved. Patient is concerned about why she keeps having recurrent infections in the same breast.     Breast Mass   This is a new problem. Episode onset: 2 days. The problem occurs constantly. The problem has been gradually worsening. Pertinent negatives include no chills, diaphoresis, fatigue, fever, myalgias, nausea, swollen glands or vomiting. Exacerbated by: palpation. Treatments tried: warm compress. The treatment provided no relief.     EXAMINATION: CT CHEST W CONTRAST - 01/05/2020     INDICATION: Recurrent left breast abscess, pain.      TECHNIQUE: Post IV contrast 5 mm axial images through the chest.     The radiation dose reduction device was turned on for each scan per the  ALARA (As Low as Reasonably Achievable) protocol.     COMPARISON: None.     FINDINGS: History indicates left-sided periareolar pain, breast abscess.     There is diffuse soft tissue thickening around the left areola. Axial  image #34 of series 34 gives the impression of a hypoenhancing round  subareolar area, potentially a 1 cm abscess. There is some fat stranding  of the deeper soft tissues but no other evidence of potential abscess  elsewhere. Otherwise, dense left breast soft tissue largely mirrors the  pattern seen on the contralateral right side.     Elsewhere, thyroid appears mildly and uniformly enlarged with no obvious  nodule. No mediastinal or hilar adenopathy, pericardial or pleural  effusion is seen. There do appear to be a few mildly enlarged left  axillary lymph nodes. Lung window images show a few apical  bullae. No  active pulmonary parenchymal disease is seen. Included images of the  upper abdomen show diffuse fatty liver change. Spleen is not enlarged.  No significant abnormalities are seen in the pancreatic tail, adrenal  glands or upper renal poles.     IMPRESSION:  1. Cutaneous thickening and focal inflammation around the left areola.  Faint suggestion of a 1 cm subareolar abscess, axial image #34 of series  2.   2. No evidence of active chest disease elsewhere.     The following portions of the patient's history were reviewed and updated as appropriate: allergies, current medications, past family history, past medical history, past social history, past surgical history and problem list.    Allergies as of 05/07/2020   • (No Known Allergies)       Current Outpatient Medications on File Prior to Visit   Medication Sig   • [DISCONTINUED] ibuprofen (ADVIL,MOTRIN) 600 MG tablet Take 1 tablet by mouth Every 8 (Eight) Hours As Needed for Mild Pain  or Fever.     No current facility-administered medications on file prior to visit.        Review of Systems   Constitutional: Negative for activity change, appetite change, chills, diaphoresis, fatigue and fever.   Respiratory: Negative.    Cardiovascular: Negative.    Gastrointestinal: Negative.  Negative for nausea and vomiting.   Musculoskeletal: Negative for myalgias.   Skin:        Skin around left areola red, irritated, painful, swollen   Neurological: Negative.        Objective   Physical Exam   Constitutional: She is oriented to person, place, and time. Vital signs are normal. She appears well-developed and well-nourished. She is cooperative.  Non-toxic appearance. She does not have a sickly appearance. She does not appear ill. No distress.   Pulmonary/Chest: Effort normal. No stridor. No respiratory distress. She has no wheezes. Right breast exhibits no skin change. Left breast exhibits skin change. There is breast swelling (mild left). No breast discharge.   Skin  around left areola (inferior to nipple approximately 3 o'clock to 9 o'clock position) appears erythematous and mildly swollen. Patient describes as very tender.       Neurological: She is alert and oriented to person, place, and time.   Skin: Skin is intact. She is not diaphoretic.   Psychiatric: She has a normal mood and affect. Her behavior is normal. Judgment and thought content normal.   Vitals reviewed.    There were no vitals filed for this visit.  There is no height or weight on file to calculate BMI.    Assessment/Plan   Tomilia was seen today for breast mass.    Diagnoses and all orders for this visit:    Breast abscess  -     cephalexin (KEFLEX) 500 MG capsule; Take 1 capsule by mouth 2 (Two) Times a Day for 10 days.  -     mupirocin (Bactroban) 2 % ointment; Apply  topically to the appropriate area as directed 3 (Three) Times a Day.  -     ibuprofen (ADVIL,MOTRIN) 600 MG tablet; Take 1 tablet by mouth Every 8 (Eight) Hours As Needed for Moderate Pain  or Fever.  -     Mammo diagnostic digital tomosynthesis bilateral w CAD; Future    Recurrent bacterial infection  -     Mammo diagnostic digital tomosynthesis bilateral w CAD; Future       Discussed the nature of the medical condition(s) risks, complications, implications, management, safe and proper use of medications. Encouraged medication compliance, and keeping scheduled follow up appointments with me and any other providers.     Call office if symptoms fail to improve, to ER if symptoms worsen.    This was an audio and video enabled telemedicine encounter.  I personally spent 10 minutes face to face with the patient (via video visit) in counseling and discussion and/or coordination of care as described above.   I personally reviewed CT results from ER visit in January of this year.     Diagnostic imaging ordered today. Further recommendations mammogram evaluation.

## 2020-05-07 NOTE — PATIENT INSTRUCTIONS
Mastitis    Mastitis is inflammation of the breast tissue. It occurs most often in women who are breastfeeding, but it can also affect other women, and sometimes even men.  What are the causes?  This condition is usually caused by a bacterial infection. Bacteria enter the breast tissue through cuts or openings in the skin. Typically, this occurs with breastfeeding because of cracked or irritated nipples. Sometimes, it can occur when there is no opening in the skin. This is usually caused by plugged milk ducts.  Other causes include:  · Nipple piercing.  · Some forms of breast cancer.  What are the signs or symptoms?  Symptoms of this condition include:  · Swelling, redness, tenderness, and pain in an area of the breast. The area may also feel warm to the touch. These symptoms usually affect the upper part of the breast, toward the armpit region.  · Swelling of the glands under the arm on the same side.  · Fever.  · Rapid pulse.  · Fatigue, headache, and flu-like muscle aches.  If an infection is allowed to progress, a collection of pus (abscess) may develop.  How is this diagnosed?  This condition can usually be diagnosed based on a physical exam and your symptoms. You may also have other tests, such as:  · Blood tests to determine if your body is fighting a bacterial infection.  · Mammogram or ultrasound tests to rule out other problems or diseases.  · Testing of pus and other fluids. Pus from the breast may be collected and examined in the lab. If an abscess has developed, the fluid in the abscess can be removed with a needle. This test can be used to confirm the diagnosis and identify the bacteria present.  · If you are breastfeeding, breast milk may be cultured and tested for bacteria.  How is this treated?  Treatment for this condition may include:  · Applying heat or cold compresses to the affected area.  · Medicine for pain.  · Antibiotic medicine to treat a bacterial infection. This is usually taken by  mouth.  · Self-care such as rest and increased fluid intake.  · If an abscess has developed, it may be treated by removing fluid with a needle.  Mastitis that occurs with breastfeeding will sometimes go away on its own, so your health care provider may choose to wait 24 hours after first seeing you to decide whether a prescription medicine is needed. You may be told of different ways to help manage breastfeeding, such as continuing to breastfeed or pump in order to ensure adequate milk flow.  Follow these instructions at home:  Medicines  · Take over-the-counter and prescription medicines only as told by your health care provider.  · If you were prescribed an antibiotic medicine, take it as told by your health care provider. Do not stop taking the antibiotic even if you start to feel better.  General instructions  · Do not wear a tight or underwire bra. Wear a soft, supportive bra.  · Increase your fluid intake, especially if you have a fever.  · Get plenty of rest.  If you are breastfeeding:  · Continue to empty your breasts as often as possible either by breastfeeding or by using a breast pump. This will decrease the pressure and the pain that comes with it. Ask your health care provider if changes need to be made to your breastfeeding or pumping routine.  · Keep your nipples clean and dry.  · During breastfeeding, empty the first breast completely before going to the other breast. If your baby is not emptying your breasts completely, use a breast pump to empty your breasts.  · Use breast massage during feeding or pumping sessions.  · If directed, apply moist heat to the affected area of your breast right before breastfeeding or pumping. Use the heat source that your health care provider recommends.  · If directed, put ice on the affected area of your breast right after breastfeeding or pumping:  ? Put ice in a plastic bag.  ? Place a towel between your skin and the bag.  ? Leave the ice on for 20 minutes.  · If  you go back to work, pump your breasts while at work to stay within your nursing schedule.  · Avoid allowing your breasts to become overly filled with milk (engorged).  Contact a health care provider if:  · You have pus-like discharge from the breast.  · You have a fever.  · Your symptoms do not improve within 2 days of starting treatment.  Get help right away if:  · Your pain and swelling are getting worse.  · You have pain that is not controlled with medicine.  · You have a red line extending from the breast toward your armpit.  Summary  · Mastitis is inflammation of the breast tissue. It occurs most often in women who are breastfeeding, but it can also affect non-breastfeeding women and some men.  · This condition is usually caused by a bacterial infection.  · This condition may be treated with hot and cold compresses, medicines, self-care, and certain breastfeeding strategies.  · If you were prescribed an antibiotic medicine, take it as told by your health care provider. Do not stop taking the antibiotic even if you start to feel better.  This information is not intended to replace advice given to you by your health care provider. Make sure you discuss any questions you have with your health care provider.  Document Released: 12/18/2006 Document Revised: 01/09/2018 Document Reviewed: 01/09/2018  [employer] Patient Education © 2020 [employer]    Skin Abscess    A skin abscess is an infected area on or under your skin that contains a collection of pus and other material. An abscess may also be called a furuncle, carbuncle, or boil. An abscess can occur in or on almost any part of your body.  Some abscesses break open (rupture) on their own. Most continue to get worse unless they are treated. The infection can spread deeper into the body and eventually into your blood, which can make you feel ill. Treatment usually involves draining the abscess.  What are the causes?  An abscess occurs when germs, like  bacteria, pass through your skin and cause an infection. This may be caused by:  · A scrape or cut on your skin.  · A puncture wound through your skin, including a needle injection or insect bite.  · Blocked oil or sweat glands.  · Blocked and infected hair follicles.  · A cyst that forms beneath your skin (sebaceous cyst) and becomes infected.  What increases the risk?  This condition is more likely to develop in people who:  · Have a weak body defense system (immune system).  · Have diabetes.  · Have dry and irritated skin.  · Get frequent injections or use illegal IV drugs.  · Have a foreign body in a wound, such as a splinter.  · Have problems with their lymph system or veins.  What are the signs or symptoms?  Symptoms of this condition include:  · A painful, firm bump under the skin.  · A bump with pus at the top. This may break through the skin and drain.  Other symptoms include:  · Redness surrounding the abscess site.  · Warmth.  · Swelling of the lymph nodes (glands) near the abscess.  · Tenderness.  · A sore on the skin.  How is this diagnosed?  This condition may be diagnosed based on:  · A physical exam.  · Your medical history.  · A sample of pus. This may be used to find out what is causing the infection.  · Blood tests.  · Imaging tests, such as an ultrasound, CT scan, or MRI.  How is this treated?  A small abscess that drains on its own may not need treatment. Treatment for larger abscesses may include:  · Moist heat or heat pack applied to the area several times a day.  · A procedure to drain the abscess (incision and drainage).  · Antibiotic medicines. For a severe abscess, you may first get antibiotics through an IV and then change to antibiotics by mouth.  Follow these instructions at home:  Medicines    · Take over-the-counter and prescription medicines only as told by your health care provider.  · If you were prescribed an antibiotic medicine, take it as told by your health care provider. Do  not stop taking the antibiotic even if you start to feel better.  Abscess care    · If you have an abscess that has not drained, apply heat to the affected area. Use the heat source that your health care provider recommends, such as a moist heat pack or a heating pad.  ? Place a towel between your skin and the heat source.  ? Leave the heat on for 20-30 minutes.  ? Remove the heat if your skin turns bright red. This is especially important if you are unable to feel pain, heat, or cold. You may have a greater risk of getting burned.  · Follow instructions from your health care provider about how to take care of your abscess. Make sure you:  ? Cover the abscess with a bandage (dressing).  ? Change your dressing or gauze as told by your health care provider.  ? Wash your hands with soap and water before you change the dressing or gauze. If soap and water are not available, use hand .  · Check your abscess every day for signs of a worsening infection. Check for:  ? More redness, swelling, or pain.  ? More fluid or blood.  ? Warmth.  ? More pus or a bad smell.  General instructions  · To avoid spreading the infection:  ? Do not share personal care items, towels, or hot tubs with others.  ? Avoid making skin contact with other people.  · Keep all follow-up visits as told by your health care provider. This is important.  Contact a health care provider if you have:  · More redness, swelling, or pain around your abscess.  · More fluid or blood coming from your abscess.  · Warm skin around your abscess.  · More pus or a bad smell coming from your abscess.  · A fever.  · Muscle aches.  · Chills or a general ill feeling.  Get help right away if you:  · Have severe pain.  · See red streaks on your skin spreading away from the abscess.  Summary  · A skin abscess is an infected area on or under your skin that contains a collection of pus and other material.  · A small abscess that drains on its own may not need  treatment.  · Treatment for larger abscesses may include having a procedure to drain the abscess and taking an antibiotic.  This information is not intended to replace advice given to you by your health care provider. Make sure you discuss any questions you have with your health care provider.  Document Released: 09/27/2006 Document Revised: 01/31/2019 Document Reviewed: 01/31/2019  [employer] Interactive Patient Education © 2020 [employer]  Cephalexin tablets or capsules  What is this medicine?  CEPHALEXIN (sef a LARRY in) is a cephalosporin antibiotic. It is used to treat certain kinds of bacterial infections It will not work for colds, flu, or other viral infections.  This medicine may be used for other purposes; ask your health care provider or pharmacist if you have questions.  COMMON BRAND NAME(S): Biocef, Daxbia, Keflex, Keftab  What should I tell my health care provider before I take this medicine?  They need to know if you have any of these conditions:  -kidney disease  -stomach or intestine problems, especially colitis  -an unusual or allergic reaction to cephalexin, other cephalosporins, penicillins, other antibiotics, medicines, foods, dyes or preservatives  -pregnant or trying to get pregnant  -breast-feeding  How should I use this medicine?  Take this medicine by mouth with a full glass of water. Follow the directions on the prescription label. This medicine can be taken with or without food. Take your medicine at regular intervals. Do not take your medicine more often than directed. Take all of your medicine as directed even if you think you are better. Do not skip doses or stop your medicine early.  Talk to your pediatrician regarding the use of this medicine in children. While this drug may be prescribed for selected conditions, precautions do apply.  Overdosage: If you think you have taken too much of this medicine contact a poison control center or emergency room at once.  NOTE: This medicine is  only for you. Do not share this medicine with others.  What if I miss a dose?  If you miss a dose, take it as soon as you can. If it is almost time for your next dose, take only that dose. Do not take double or extra doses. There should be at least 4 to 6 hours between doses.  What may interact with this medicine?  -probenecid  -some other antibiotics  This list may not describe all possible interactions. Give your health care provider a list of all the medicines, herbs, non-prescription drugs, or dietary supplements you use. Also tell them if you smoke, drink alcohol, or use illegal drugs. Some items may interact with your medicine.  What should I watch for while using this medicine?  Tell your doctor or health care professional if your symptoms do not begin to improve in a few days.  Do not treat diarrhea with over the counter products. Contact your doctor if you have diarrhea that lasts more than 2 days or if it is severe and watery.  If you have diabetes, you may get a false-positive result for sugar in your urine. Check with your doctor or health care professional.  What side effects may I notice from receiving this medicine?  Side effects that you should report to your doctor or health care professional as soon as possible:  -allergic reactions like skin rash, itching or hives, swelling of the face, lips, or tongue  -breathing problems  -pain or trouble passing urine  -redness, blistering, peeling or loosening of the skin, including inside the mouth  -severe or watery diarrhea  -unusually weak or tired  -yellowing of the eyes, skin  Side effects that usually do not require medical attention (report to your doctor or health care professional if they continue or are bothersome):  -gas or heartburn  -genital or anal irritation  -headache  -joint or muscle pain  -nausea, vomiting  This list may not describe all possible side effects. Call your doctor for medical advice about side effects. You may report side  effects to FDA at 2-415-FDA-1384.  Where should I keep my medicine?  Keep out of the reach of children.  Store at room temperature between 59 and 86 degrees F (15 and 30 degrees C). Throw away any unused medicine after the expiration date.  NOTE: This sheet is a summary. It may not cover all possible information. If you have questions about this medicine, talk to your doctor, pharmacist, or health care provider.  © 2020 [employer] (2009-03-23 17:09:13)  Ibuprofen tablets and capsules  What is this medicine?  IBUPROFEN (eye BYOO proe fen) is a non-steroidal anti-inflammatory drug (NSAID). It is used for dental pain, fever, headaches or migraines, osteoarthritis, rheumatoid arthritis, or painful monthly periods. It can also relieve minor aches and pains caused by a cold, flu, or sore throat.  This medicine may be used for other purposes; ask your health care provider or pharmacist if you have questions.  COMMON BRAND NAME(S): Advil, Advil Raphael Strength, Advil Migraine, Genpril, Ibren, IBU, Midol, Midol Cramps and Body Aches, Motrin, Motrin IB, Motrin Raphael Strength, Motrin Migraine Pain, Manson-8, Toxicology Saliva Collection  What should I tell my health care provider before I take this medicine?  They need to know if you have any of these conditions:  -cigarette smoker  -coronary artery bypass graft (CABG) surgery within the past 2 weeks  -drink more than 3 alcohol-containing drinks a day  -heart disease  -high blood pressure  -history of stomach bleeding  -kidney disease  -liver disease  -lung or breathing disease, like asthma  -an unusual or allergic reaction to ibuprofen, aspirin, other NSAIDs, other medicines, foods, dyes, or preservatives  -pregnant or trying to get pregnant  -breast-feeding  How should I use this medicine?  Take this medicine by mouth with a glass of water. Follow the directions on the prescription label. Take this medicine with food if your stomach gets upset. Try to not lie  down for at least 10 minutes after you take the medicine. Take your medicine at regular intervals. Do not take your medicine more often than directed.  A special MedGuide will be given to you by the pharmacist with each prescription and refill. Be sure to read this information carefully each time.  Talk to your pediatrician regarding the use of this medicine in children. Special care may be needed.  Overdosage: If you think you have taken too much of this medicine contact a poison control center or emergency room at once.  NOTE: This medicine is only for you. Do not share this medicine with others.  What if I miss a dose?  If you miss a dose, take it as soon as you can. If it is almost time for your next dose, take only that dose. Do not take double or extra doses.  What may interact with this medicine?  Do not take this medicine with any of the following medications:  -cidofovir  -ketorolac  -methotrexate  -pemetrexed  This medicine may also interact with the following medications:  -alcohol  -aspirin  -diuretics  -lithium  -other drugs for inflammation like prednisone  -warfarin  This list may not describe all possible interactions. Give your health care provider a list of all the medicines, herbs, non-prescription drugs, or dietary supplements you use. Also tell them if you smoke, drink alcohol, or use illegal drugs. Some items may interact with your medicine.  What should I watch for while using this medicine?  Tell your doctor or healthcare professional if your symptoms do not start to get better or if they get worse.  This medicine does not prevent heart attack or stroke. In fact, this medicine may increase the chance of a heart attack or stroke. The chance may increase with longer use of this medicine and in people who have heart disease. If you take aspirin to prevent heart attack or stroke, talk with your doctor or health care professional.  Do not take other medicines that contain aspirin, ibuprofen, or  naproxen with this medicine. Side effects such as stomach upset, nausea, or ulcers may be more likely to occur. Many medicines available without a prescription should not be taken with this medicine.  This medicine can cause ulcers and bleeding in the stomach and intestines at any time during treatment. Ulcers and bleeding can happen without warning symptoms and can cause death. To reduce your risk, do not smoke cigarettes or drink alcohol while you are taking this medicine.  You may get drowsy or dizzy. Do not drive, use machinery, or do anything that needs mental alertness until you know how this medicine affects you. Do not stand or sit up quickly, especially if you are an older patient. This reduces the risk of dizzy or fainting spells.  This medicine can cause you to bleed more easily. Try to avoid damage to your teeth and gums when you brush or floss your teeth.  This medicine may be used to treat migraines. If you take migraine medicines for 10 or more days a month, your migraines may get worse. Keep a diary of headache days and medicine use. Contact your healthcare professional if your migraine attacks occur more frequently.  What side effects may I notice from receiving this medicine?  Side effects that you should report to your doctor or health care professional as soon as possible:  -allergic reactions like skin rash, itching or hives, swelling of the face, lips, or tongue  -severe stomach pain  -signs and symptoms of bleeding such as bloody or black, tarry stools; red or dark-brown urine; spitting up blood or brown material that looks like coffee grounds; red spots on the skin; unusual bruising or bleeding from the eye, gums, or nose  -signs and symptoms of a blood clot such as changes in vision; chest pain; severe, sudden headache; trouble speaking; sudden numbness or weakness of the face, arm, or leg  -unexplained weight gain or swelling  -unusually weak or tired  -yellowing of eyes or skin  Side  effects that usually do not require medical attention (report to your doctor or health care professional if they continue or are bothersome):  -bruising  -diarrhea  -dizziness, drowsiness  -headache  -nausea, vomiting  This list may not describe all possible side effects. Call your doctor for medical advice about side effects. You may report side effects to FDA at 1-156-XUR-9824.  Where should I keep my medicine?  Keep out of the reach of children.  Store at room temperature between 15 and 30 degrees C (59 and 86 degrees F). Keep container tightly closed. Throw away any unused medicine after the expiration date.  NOTE: This sheet is a summary. It may not cover all possible information. If you have questions about this medicine, talk to your doctor, pharmacist, or health care provider.  © 2020 [employer] (2018-08-22 12:43:57)

## 2020-05-13 DIAGNOSIS — N61.1 BREAST ABSCESS: ICD-10-CM

## 2020-05-13 RX ORDER — IBUPROFEN 600 MG/1
TABLET ORAL
Qty: 30 TABLET | Refills: 0 | OUTPATIENT
Start: 2020-05-13

## 2020-05-21 ENCOUNTER — HOSPITAL ENCOUNTER (OUTPATIENT)
Dept: ULTRASOUND IMAGING | Facility: HOSPITAL | Age: 35
Discharge: HOME | End: 2020-05-21

## 2020-05-21 ENCOUNTER — HOSPITAL ENCOUNTER (OUTPATIENT)
Dept: MAMMOGRAPHY | Facility: HOSPITAL | Age: 35
Discharge: HOME | End: 2020-05-21
Admitting: NURSE PRACTITIONER

## 2020-05-21 DIAGNOSIS — N61.1 BREAST ABSCESS: ICD-10-CM

## 2020-05-21 DIAGNOSIS — A49.9 RECURRENT BACTERIAL INFECTION: ICD-10-CM

## 2020-05-21 PROCEDURE — 76642 ULTRASOUND BREAST LIMITED: CPT | Performed by: RADIOLOGY

## 2020-05-21 PROCEDURE — 77066 DX MAMMO INCL CAD BI: CPT | Performed by: RADIOLOGY

## 2020-05-21 PROCEDURE — G0279 TOMOSYNTHESIS, MAMMO: HCPCS

## 2020-05-21 PROCEDURE — 76642 ULTRASOUND BREAST LIMITED: CPT

## 2020-05-21 PROCEDURE — 77066 DX MAMMO INCL CAD BI: CPT

## 2020-05-21 PROCEDURE — 77062 BREAST TOMOSYNTHESIS BI: CPT | Performed by: RADIOLOGY

## 2020-11-08 ENCOUNTER — TELEMEDICINE (OUTPATIENT)
Dept: FAMILY MEDICINE CLINIC | Facility: TELEHEALTH | Age: 35
End: 2020-11-08

## 2020-11-08 VITALS — TEMPERATURE: 98.9 F

## 2020-11-08 DIAGNOSIS — N64.4 BREAST PAIN: Primary | ICD-10-CM

## 2020-11-08 PROCEDURE — 99211 OFF/OP EST MAY X REQ PHY/QHP: CPT | Performed by: NURSE PRACTITIONER

## 2020-11-09 ENCOUNTER — OFFICE VISIT (OUTPATIENT)
Dept: FAMILY MEDICINE CLINIC | Facility: CLINIC | Age: 35
End: 2020-11-09

## 2020-11-09 VITALS
HEIGHT: 65 IN | SYSTOLIC BLOOD PRESSURE: 120 MMHG | HEART RATE: 76 BPM | DIASTOLIC BLOOD PRESSURE: 80 MMHG | BODY MASS INDEX: 24.89 KG/M2 | TEMPERATURE: 98.1 F | OXYGEN SATURATION: 99 % | WEIGHT: 149.4 LBS

## 2020-11-09 DIAGNOSIS — N60.12 MASTITIS CHRONIC, LEFT: Primary | ICD-10-CM

## 2020-11-09 DIAGNOSIS — B37.31 YEAST VAGINITIS: ICD-10-CM

## 2020-11-09 DIAGNOSIS — F41.9 ANXIETY: ICD-10-CM

## 2020-11-09 PROCEDURE — 99213 OFFICE O/P EST LOW 20 MIN: CPT | Performed by: NURSE PRACTITIONER

## 2020-11-09 PROCEDURE — 85025 COMPLETE CBC W/AUTO DIFF WBC: CPT | Performed by: NURSE PRACTITIONER

## 2020-11-09 RX ORDER — FLUCONAZOLE 150 MG/1
150 TABLET ORAL ONCE
Qty: 1 TABLET | Refills: 0 | Status: SHIPPED | OUTPATIENT
Start: 2020-11-09 | End: 2020-11-09

## 2020-11-09 RX ORDER — CEPHALEXIN 500 MG/1
500 CAPSULE ORAL 2 TIMES DAILY
Qty: 20 CAPSULE | Refills: 0 | Status: SHIPPED | OUTPATIENT
Start: 2020-11-09 | End: 2021-04-13

## 2020-11-09 RX ORDER — HYDROXYZINE HYDROCHLORIDE 25 MG/1
25 TABLET, FILM COATED ORAL NIGHTLY PRN
Qty: 30 TABLET | Refills: 1 | Status: SHIPPED | OUTPATIENT
Start: 2020-11-09 | End: 2021-04-13

## 2020-11-09 RX ORDER — SULFAMETHOXAZOLE AND TRIMETHOPRIM 800; 160 MG/1; MG/1
1 TABLET ORAL 2 TIMES DAILY
Qty: 14 TABLET | Refills: 0 | Status: SHIPPED | OUTPATIENT
Start: 2020-11-09 | End: 2021-04-13

## 2020-11-09 NOTE — PROGRESS NOTES
Subjective   Ria Guillory is a 35 y.o. female.     History of Present Illness Patient is here with her 4th episode of left breast mastitis.  She has been evaluated in the past at  and at [facility].  This episode started 4 days ago. Describes pain, heaviness, warmth, and extreme tenderness under left aerola with inflammation. She is not breastfeeding. Not taking HRT. Seen last night at [email or web address] and referred to ER but did not go.  Denies fever, nausea and vomiting. Does have a headache. Treated with warm compresses and Ibuprofen 400mg tid.  Denies waxing or piercing of nipple.  Previous CT showed small abscess under nipple. Dr Bob did mammogram and US in May which was normal and told to follow up with regular screening at .  No family history of breast cancer in first degree relative, but she is very worried that she has it.  Previously referred to general surgery but did not follow up.    The following portions of the patient's history were reviewed and updated as appropriate: allergies, current medications, past family history, past medical history, past social history, past surgical history and problem list.    Review of Systems   Constitutional: Negative for appetite change, fever, unexpected weight gain and unexpected weight loss.   HENT: Negative for congestion, nosebleeds, sore throat and trouble swallowing.    Eyes: Negative for visual disturbance.   Respiratory: Negative for cough, shortness of breath and wheezing.    Cardiovascular: Negative for chest pain, palpitations and leg swelling.   Gastrointestinal: Negative for abdominal pain, blood in stool, constipation, diarrhea, nausea and vomiting.   Endocrine: Negative for polydipsia, polyphagia and polyuria.   Genitourinary: Positive for breast pain. Negative for dysuria, frequency and hematuria.   Musculoskeletal: Negative for arthralgias, joint swelling and myalgias.   Skin: Negative for rash.   Neurological: Negative for dizziness, seizures, syncope and  numbness.   Hematological: Negative for adenopathy. Does not bruise/bleed easily.   Psychiatric/Behavioral: Negative for behavioral problems, sleep disturbance and depressed mood. The patient is not nervous/anxious.        Objective   Physical Exam  Vitals signs and nursing note reviewed.   Constitutional:       General: She is not in acute distress.     Appearance: She is well-developed.   HENT:      Head: Normocephalic and atraumatic.      Right Ear: External ear normal.      Left Ear: External ear normal.      Nose: Nose normal.   Eyes:      General: No scleral icterus.        Right eye: No discharge.         Left eye: No discharge.      Conjunctiva/sclera: Conjunctivae normal.      Pupils: Pupils are equal, round, and reactive to light.   Neck:      Musculoskeletal: Neck supple.   Cardiovascular:      Rate and Rhythm: Normal rate and regular rhythm.   Pulmonary:      Effort: Pulmonary effort is normal.   Chest:      Breasts:         Left: Swelling and tenderness present.          Comments: Left breast with mild inflammation. NO streaks or induration. Extremely tender to palpation under nipple. No masses appreciated. Skin is not hot to touch.  Musculoskeletal:         General: No deformity.   Skin:     General: Skin is warm and dry.      Findings: No rash.   Neurological:      Mental Status: She is alert and oriented to person, place, and time.      Motor: No abnormal muscle tone.      Coordination: Coordination normal.   Psychiatric:         Behavior: Behavior normal.         Thought Content: Thought content normal.         Judgment: Judgment normal.           Assessment/Plan   Diagnoses and all orders for this visit:    1. Mastitis chronic, left (Primary)  -     Ambulatory Referral to General Surgery  -     CBC & Differential  -     sulfamethoxazole-trimethoprim (BACTRIM DS,SEPTRA DS) 800-160 MG per tablet; Take 1 tablet by mouth 2 (Two) Times a Day.  Dispense: 14 tablet; Refill: 0  -     cephalexin (Keflex)  500 MG capsule; Take 1 capsule by mouth 2 (Two) Times a Day.  Dispense: 20 capsule; Refill: 0  -     CBC Auto Differential    2. Anxiety  -     hydrOXYzine (ATARAX) 25 MG tablet; Take 1 tablet by mouth At Night As Needed for Anxiety.  Dispense: 30 tablet; Refill: 1    3. Yeast vaginitis  -     fluconazole (DIFLUCAN) 150 MG tablet; Take 1 tablet by mouth 1 (One) Time for 1 dose.  Dispense: 1 tablet; Refill: 0    Discussed the nature of the disease including, risks, complications, implications, management, safe and proper use of medications. Encouraged therapeutic lifestyle changes including low calorie diet with plenty of fruits and vegetables, daily exercise, medication compliance, and keeping scheduled follow up appointments with me and any other providers. Encouraged patient to have appointment for complete physical, fasting labs, appropriate screenings, and immunizations on an annual basis.  Follow up symptoms persist or worsen or go to ER.

## 2020-11-09 NOTE — PROGRESS NOTES
CHIEF COMPLAINT  Chief Complaint   Patient presents with   • Breast Problem         HPI  Rai Guillory is a 35 y.o. female  presents with complaint of breast pain and swelling. Reports this started 2 days ago but is getting worse. Reports she has been using warm compresses and bactroban. Reports she has had this 3 times this year. + nausea. Reports she feels like she is going to vomit. Reports pain is a 8 on 1-10 pain scale. Reports this is the third episode with this this year    Review of Systems   Constitutional: Negative for chills, fatigue and fever.   HENT: Negative.    Eyes: Negative.    Respiratory: Negative.    Gastrointestinal: Positive for nausea.   Musculoskeletal: Negative.    Neurological: Negative.    Psychiatric/Behavioral: Negative.        Past Medical History:   Diagnosis Date   • History of abnormal cervical Pap smear    • Mastitis    • Ovarian cyst    • STD exposure     Chlamydia and GC       Family History   Problem Relation Age of Onset   • No Known Problems Mother    • Diabetes Father    • Heart disease Father    • Alcohol abuse Father    • Hypertension Father    • Breast cancer Paternal Cousin 23   • Ovarian cancer Neg Hx        Social History     Socioeconomic History   • Marital status: Significant Other     Spouse name: Not on file   • Number of children: Not on file   • Years of education: Not on file   • Highest education level: Not on file   Tobacco Use   • Smoking status: Current Some Day Smoker     Packs/day: 0.25     Years: 10.00     Pack years: 2.50     Types: Cigarettes   • Smokeless tobacco: Never Used   Substance and Sexual Activity   • Alcohol use: Yes     Alcohol/week: 2.0 standard drinks     Types: 2 Shots of liquor per week     Comment: once or twice a wk   • Drug use: No   • Sexual activity: Yes     Partners: Male     Birth control/protection: Surgical     Comment: Tubal ligation         Temp 98.9 °F (37.2 °C) Comment: patient reported  Breastfeeding No     PHYSICAL  EXAM  Physical Exam   Constitutional: She is oriented to person, place, and time. She appears well-developed and well-nourished. No distress.   HENT:   Head: Normocephalic and atraumatic.   Right Ear: Hearing normal.   Left Ear: Hearing normal.   Mouth/Throat: Mouth/Lips are normal.  Eyes: Conjunctivae and lids are normal.   Pulmonary/Chest: Effort normal. No stridor.  No respiratory distress.    .  Neurological: She is alert and oriented to person, place, and time.   Skin: Skin is warm and dry.   Psychiatric: She has a normal mood and affect. Her speech is normal and behavior is normal.       Results for orders placed or performed in visit on 02/11/20   POC Influenza A / B    Specimen: Swab   Result Value Ref Range    Rapid Influenza A Ag Positive (A) Negative    Rapid Influenza B Ag Negative Negative    Internal Control Passed Passed    Lot Number [id number]     Expiration Date 2022-05-06    POC Rapid Strep A    Specimen: Swab   Result Value Ref Range    Rapid Strep A Screen Negative Negative, VALID, INVALID, Not Performed    Internal Control Passed Passed    Lot Number [id number]     Expiration Date 2022-08-05        Diagnoses and all orders for this visit:    1. Breast pain (Primary)    advised to go to ER for further evaluation. Patient need further workup and possible pain control. Understands the importance of going to ER      Patient verbalizes understanding of medication dosage, comfort measures, instructions for treatment and follow-up.    LETA Green  11/08/2020  23:18 EST    This visit was performed via Telehealth.

## 2020-11-10 LAB
BASOPHILS # BLD AUTO: 0.01 10*3/MM3 (ref 0–0.2)
BASOPHILS NFR BLD AUTO: 0.2 % (ref 0–1.5)
DEPRECATED RDW RBC AUTO: 42.8 FL (ref 37–54)
EOSINOPHIL # BLD AUTO: 0.08 10*3/MM3 (ref 0–0.4)
EOSINOPHIL NFR BLD AUTO: 1.2 % (ref 0.3–6.2)
ERYTHROCYTE [DISTWIDTH] IN BLOOD BY AUTOMATED COUNT: 12.5 % (ref 12.3–15.4)
HCT VFR BLD AUTO: 38.5 % (ref 34–46.6)
HGB BLD-MCNC: 12.8 G/DL (ref 12–15.9)
IMM GRANULOCYTES # BLD AUTO: 0.02 10*3/MM3 (ref 0–0.05)
IMM GRANULOCYTES NFR BLD AUTO: 0.3 % (ref 0–0.5)
LYMPHOCYTES # BLD AUTO: 1.64 10*3/MM3 (ref 0.7–3.1)
LYMPHOCYTES NFR BLD AUTO: 25.4 % (ref 19.6–45.3)
MCH RBC QN AUTO: 31 PG (ref 26.6–33)
MCHC RBC AUTO-ENTMCNC: 33.2 G/DL (ref 31.5–35.7)
MCV RBC AUTO: 93.2 FL (ref 79–97)
MONOCYTES # BLD AUTO: 0.55 10*3/MM3 (ref 0.1–0.9)
MONOCYTES NFR BLD AUTO: 8.5 % (ref 5–12)
NEUTROPHILS NFR BLD AUTO: 4.16 10*3/MM3 (ref 1.7–7)
NEUTROPHILS NFR BLD AUTO: 64.4 % (ref 42.7–76)
NRBC BLD AUTO-RTO: 0 /100 WBC (ref 0–0.2)
PLATELET # BLD AUTO: 236 10*3/MM3 (ref 140–450)
PMV BLD AUTO: 11.5 FL (ref 6–12)
RBC # BLD AUTO: 4.13 10*6/MM3 (ref 3.77–5.28)
WBC # BLD AUTO: 6.46 10*3/MM3 (ref 3.4–10.8)

## 2020-12-14 ENCOUNTER — APPOINTMENT (OUTPATIENT)
Dept: PREADMISSION TESTING | Facility: HOSPITAL | Age: 35
End: 2020-12-14

## 2020-12-14 LAB
ANION GAP SERPL CALCULATED.3IONS-SCNC: 6 MMOL/L (ref 5–15)
BUN SERPL-MCNC: 10 MG/DL (ref 6–20)
BUN/CREAT SERPL: 15.9 (ref 7–25)
CALCIUM SPEC-SCNC: 9 MG/DL (ref 8.6–10.5)
CHLORIDE SERPL-SCNC: 106 MMOL/L (ref 98–107)
CO2 SERPL-SCNC: 26 MMOL/L (ref 22–29)
CREAT SERPL-MCNC: 0.63 MG/DL (ref 0.57–1)
DEPRECATED RDW RBC AUTO: 46.2 FL (ref 37–54)
ERYTHROCYTE [DISTWIDTH] IN BLOOD BY AUTOMATED COUNT: 13.5 % (ref 12.3–15.4)
GFR SERPL CREATININE-BSD FRML MDRD: 129 ML/MIN/1.73
GLUCOSE SERPL-MCNC: 93 MG/DL (ref 65–99)
HCT VFR BLD AUTO: 39.7 % (ref 34–46.6)
HGB BLD-MCNC: 13 G/DL (ref 12–15.9)
MCH RBC QN AUTO: 30.7 PG (ref 26.6–33)
MCHC RBC AUTO-ENTMCNC: 32.7 G/DL (ref 31.5–35.7)
MCV RBC AUTO: 93.6 FL (ref 79–97)
PLATELET # BLD AUTO: 244 10*3/MM3 (ref 140–450)
PMV BLD AUTO: 10.7 FL (ref 6–12)
POTASSIUM SERPL-SCNC: 4 MMOL/L (ref 3.5–5.2)
RBC # BLD AUTO: 4.24 10*6/MM3 (ref 3.77–5.28)
SODIUM SERPL-SCNC: 138 MMOL/L (ref 136–145)
WBC # BLD AUTO: 5.36 10*3/MM3 (ref 3.4–10.8)

## 2020-12-14 PROCEDURE — 85027 COMPLETE CBC AUTOMATED: CPT

## 2020-12-14 PROCEDURE — 36415 COLL VENOUS BLD VENIPUNCTURE: CPT

## 2020-12-14 PROCEDURE — 93005 ELECTROCARDIOGRAM TRACING: CPT

## 2020-12-14 PROCEDURE — 80048 BASIC METABOLIC PNL TOTAL CA: CPT

## 2020-12-14 PROCEDURE — U0004 COV-19 TEST NON-CDC HGH THRU: HCPCS

## 2020-12-14 PROCEDURE — C9803 HOPD COVID-19 SPEC COLLECT: HCPCS

## 2020-12-14 PROCEDURE — 93010 ELECTROCARDIOGRAM REPORT: CPT | Performed by: INTERNAL MEDICINE

## 2020-12-15 LAB
QT INTERVAL: 388 MS
QTC INTERVAL: 427 MS
SARS-COV-2 RNA RESP QL NAA+PROBE: NOT DETECTED

## 2020-12-17 ENCOUNTER — LAB (OUTPATIENT)
Dept: LAB | Facility: HOSPITAL | Age: 35
End: 2020-12-17

## 2020-12-17 ENCOUNTER — TRANSCRIBE ORDERS (OUTPATIENT)
Dept: LAB | Facility: HOSPITAL | Age: 35
End: 2020-12-17

## 2020-12-17 ENCOUNTER — LAB REQUISITION (OUTPATIENT)
Dept: LAB | Facility: HOSPITAL | Age: 35
End: 2020-12-17

## 2020-12-17 DIAGNOSIS — N61.1 CARBUNCLE OF BREAST: Primary | ICD-10-CM

## 2020-12-17 DIAGNOSIS — N61.1 CARBUNCLE OF BREAST: ICD-10-CM

## 2020-12-17 DIAGNOSIS — N61.1 ABSCESS OF THE BREAST AND NIPPLE: ICD-10-CM

## 2020-12-17 PROCEDURE — 87176 TISSUE HOMOGENIZATION CULTR: CPT

## 2020-12-17 PROCEDURE — 87102 FUNGUS ISOLATION CULTURE: CPT

## 2020-12-17 PROCEDURE — 88305 TISSUE EXAM BY PATHOLOGIST: CPT | Performed by: SURGERY

## 2020-12-17 PROCEDURE — 88342 IMHCHEM/IMCYTCHM 1ST ANTB: CPT | Performed by: SURGERY

## 2020-12-17 PROCEDURE — 87205 SMEAR GRAM STAIN: CPT

## 2020-12-17 PROCEDURE — 88360 TUMOR IMMUNOHISTOCHEM/MANUAL: CPT | Performed by: SURGERY

## 2020-12-17 PROCEDURE — 87015 SPECIMEN INFECT AGNT CONCNTJ: CPT

## 2020-12-17 PROCEDURE — 88341 IMHCHEM/IMCYTCHM EA ADD ANTB: CPT | Performed by: SURGERY

## 2020-12-17 PROCEDURE — 87075 CULTR BACTERIA EXCEPT BLOOD: CPT

## 2020-12-17 PROCEDURE — 87147 CULTURE TYPE IMMUNOLOGIC: CPT

## 2020-12-17 PROCEDURE — 87070 CULTURE OTHR SPECIMN AEROBIC: CPT

## 2020-12-19 LAB
BACTERIA SPEC AEROBE CULT: ABNORMAL
GRAM STN SPEC: ABNORMAL
GRAM STN SPEC: ABNORMAL

## 2020-12-22 LAB — BACTERIA SPEC ANAEROBE CULT: NORMAL

## 2020-12-28 LAB
CYTO UR: NORMAL
LAB AP CASE REPORT: NORMAL
LAB AP CLINICAL INFORMATION: NORMAL
LAB AP DIAGNOSIS COMMENT: NORMAL
LAB AP SPECIAL STAINS: NORMAL
PATH REPORT.ADDENDUM SPEC: NORMAL
PATH REPORT.FINAL DX SPEC: NORMAL
PATH REPORT.GROSS SPEC: NORMAL

## 2021-01-06 ENCOUNTER — NURSE NAVIGATOR (OUTPATIENT)
Dept: OTHER | Facility: HOSPITAL | Age: 36
End: 2021-01-06

## 2021-01-06 DIAGNOSIS — D05.12 DUCTAL CARCINOMA IN SITU (DCIS) OF LEFT BREAST: Primary | ICD-10-CM

## 2021-01-06 NOTE — PROGRESS NOTES
Per Dr. Cortes's verbal order patient will be scheduled for genetic counseling. Request has been made to schedule on the same day as breast MRI. Will follow up with the patient.

## 2021-01-06 NOTE — PROGRESS NOTES
Followed up with patient after her surgical consultation with Dr. Cortes on 1/5/2021. Patient describes anxiety and sleep issues with new diagnosis. Referral made to [facility] Richardson. Patient also encouraged to contact PCP for assistance. She verbalizes understanding and is agreeable to the plan. She will call with any concerns.

## 2021-01-26 ENCOUNTER — LAB (OUTPATIENT)
Dept: LAB | Facility: HOSPITAL | Age: 36
End: 2021-01-26

## 2021-01-26 ENCOUNTER — CLINICAL SUPPORT (OUTPATIENT)
Dept: GENETICS | Facility: HOSPITAL | Age: 36
End: 2021-01-26

## 2021-01-26 ENCOUNTER — HOSPITAL ENCOUNTER (OUTPATIENT)
Dept: MRI IMAGING | Facility: HOSPITAL | Age: 36
Discharge: HOME | End: 2021-01-26

## 2021-01-26 DIAGNOSIS — N61.1 ABSCESS OF THE BREAST AND NIPPLE: Primary | ICD-10-CM

## 2021-01-26 DIAGNOSIS — Z80.3 FAMILY HISTORY OF BREAST CANCER: ICD-10-CM

## 2021-01-26 DIAGNOSIS — D05.12 INTRADUCTAL CARCINOMA IN SITU OF LEFT BREAST: ICD-10-CM

## 2021-01-26 DIAGNOSIS — D05.12 DUCTAL CARCINOMA IN SITU (DCIS) OF LEFT BREAST: Primary | ICD-10-CM

## 2021-01-26 DIAGNOSIS — Z13.79 GENETIC TESTING: ICD-10-CM

## 2021-01-26 PROCEDURE — 96040: CPT | Performed by: GENETIC COUNSELOR, MS

## 2021-01-26 PROCEDURE — 77049 MRI BREAST C-+ W/CAD BI: CPT

## 2021-01-26 PROCEDURE — A9577 INJ MULTIHANCE: HCPCS | Performed by: SURGERY

## 2021-01-26 PROCEDURE — 0 GADOBENATE DIMEGLUMINE 529 MG/ML SOLUTION: Performed by: SURGERY

## 2021-01-26 PROCEDURE — 77049 MRI BREAST C-+ W/CAD BI: CPT | Performed by: RADIOLOGY

## 2021-01-26 RX ADMIN — GADOBENATE DIMEGLUMINE 13 ML: 529 INJECTION, SOLUTION INTRAVENOUS at 15:32

## 2021-01-26 NOTE — PROGRESS NOTES
Rai Guillory is a 35-year-old female who was seen for genetic counseling due to a personal and family history of breast cancer.  Ms. Guillory was diagnosed with an ER/OR+ DCIS at age 35.  Ms. Guillory is premenopausal and retains her uterus and ovaries.  Ms. Guillory was interested in discussing her risk for a hereditary cancer syndrome, and decided to pursue genetic testing.   Ms. Guillory opted to pursue comprehensive testing via the CancerNext panel ordered through [employer] which includes BRCA1/2 and 34 additional genes associated with an increased risk of breast cancer or other cancers (APC, ROHIT, AXIN2, BARD1, BMPR1A, BRCA1, BRCA2, BRIP1, CDH1, CDK4, CDKN2A, CHEK2, DICER1, EPCAM, GREM1, HOXB13, MLH1, MRE11A, MSH2, MSH3, MSH6, MUTYH, NBN, NF1, NTHL1, PALB2, PMS2, POLD1, POLE, PTEN, RAD51C, RAD51D, RECQL, SMAD4, SMARCA4, STK11, and TP53). Results from the high/moderate risk breast cancer genes are expected in 10 days, and results from the remainder of the panel are expected in 2-3 weeks.     PERTINENT FAMILY HISTORY:  Pat. 1st cousin:  Breast cancer, 24    RISK ASSESSMENT:  Ms. Guillory’s personal history of early onset breast cancer raises the question of a hereditary cancer syndrome.   NCCN guidelines recommend BRCA1/2 testing for individuals diagnosed with breast cancer at or under the age of 45 regardless of family history.  Therefore, testing is appropriate for Ms. Guillory.  We discussed that standard approach to BRCA1/2 testing is via a multigene panel that evaluates BRCA1/2 and multiple other genes known to impact cancer risk.  This risk assessment is based on the family history information provided at the time of the appointment.  The assessment could change in the future should new information be obtained.     GENETIC COUNSELING (35 minutes): We reviewed the family history information in detail.  Cases of breast cancer follow three general patterns: sporadic, familial, and hereditary.  While most  cancer is sporadic, some cases appear to occur in family clusters.  These cases are said to be familial and account for 10-20% of breast cancer cases.  Familial cases may be due to a combination of shared genes and environmental factors among family members.  In even fewer cases (5-10%), the risk for cancer is inherited, and the genes responsible for the increased cancer risk are known.       Family histories typical of hereditary cancer syndromes usually include multiple first- and second-degree relatives diagnosed with cancer types that define a syndrome.  These cases tend to be diagnosed at younger-than-expected ages and can be bilateral or multifocal.  The cancer in these families follows an autosomal dominant inheritance pattern, which indicates the likely presence of a mutation in a cancer susceptibility gene.  Children and siblings of an individual believed to carry this mutation have a 50% chance of inheriting that mutation, thereby inheriting the increased risk to develop cancer.  These mutations can be passed down from the maternal or the paternal lineage.     Hereditary breast cancer accounts for 5-10% of all cases of breast cancer.  A significant proportion of hereditary breast cancer can be attributed to mutations in the BRCA1 and BRCA2 genes.  Mutations in these genes confer an increased risk for breast cancer, ovarian cancer, male breast cancer, prostate cancer and pancreatic cancer.  There are other genes that are known to be associated with an increased risk for breast cancer and other cancers. Based on Ms. Guillory’s desire to get as much information as possible regarding her personal risks and potential risks for her family, she opted to pursue testing through a panel that would look at several other genes known to increase the risk for breast cancer.     GENETIC TESTING:  The risks, benefits and limitations of genetic testing and implications for clinical management following testing were  reviewed.  DNA test results can influence decisions regarding screening, prevention and surgical management.  Genetic testing can have significant psychological implications for both individuals and families.  Also discussed was the possibility of insurance discrimination based on genetic test results and the laws ([place]) in place to prevent this.     We discussed panel testing, which would involve testing for BRCA1/2 as well as several other cancer susceptibility genes at the same time.  The benefits and limitations of genetic testing were discussed and Ms. Guillory decided to pursue testing. The implications of a positive or negative test result were discussed. We discussed the possibility that, in some cases, genetic test results may be uninformative due to the identification of a genetic variant of uncertain significance. These variants may or may not be associated with an increased cancer risk.  VUSs are frequently reported through multigene panel testing, given the presence of genetic variation in the population and the number of genes being analyzed. Given her personal history, a negative test result would not eliminate all breast cancer risk to her relatives, although the risk would not be as high as it would with positive genetic testing.          PLAN: The patient will be contacted by telephone once results are available.  Genetic counseling remains available to Ms. Guillory and her family in the future, and she is welcome to contact us at [phone] with any questions she may have.        Natasha Carter MS, [facility] [place] [facility]  Licensed Certified Genetic Counselor

## 2021-01-28 LAB — FUNGUS WND CULT: NORMAL

## 2021-02-02 ENCOUNTER — TELEPHONE (OUTPATIENT)
Dept: MRI IMAGING | Facility: HOSPITAL | Age: 36
End: 2021-02-02

## 2021-02-02 NOTE — TELEPHONE ENCOUNTER
Called patient with MRI Breast results. Recommended 4 site MRI biopsy. This is scheduled for 2/10 at 8:00, pt to arrive at 7:40. Pt not on BT. Pt expressed understanding and was encouraged to call with any further questions or concerns. Pt is going to speak with Dr. Cortes before her biopsies to get advice. She will let us know if she changes her surgical decision.

## 2021-02-10 ENCOUNTER — HOSPITAL ENCOUNTER (OUTPATIENT)
Dept: MRI IMAGING | Facility: HOSPITAL | Age: 36
Discharge: HOME | End: 2021-02-10

## 2021-02-10 ENCOUNTER — HOSPITAL ENCOUNTER (OUTPATIENT)
Dept: MAMMOGRAPHY | Facility: HOSPITAL | Age: 36
Discharge: HOME | End: 2021-02-10

## 2021-02-10 DIAGNOSIS — D05.12 INTRADUCTAL CARCINOMA IN SITU OF LEFT BREAST: ICD-10-CM

## 2021-02-10 PROCEDURE — 88360 TUMOR IMMUNOHISTOCHEM/MANUAL: CPT | Performed by: SURGERY

## 2021-02-10 PROCEDURE — 0 GADOBENATE DIMEGLUMINE 529 MG/ML SOLUTION: Performed by: SURGERY

## 2021-02-10 PROCEDURE — 88342 IMHCHEM/IMCYTCHM 1ST ANTB: CPT | Performed by: SURGERY

## 2021-02-10 PROCEDURE — 19086 BX BREAST ADD LESION MR IMAG: CPT | Performed by: RADIOLOGY

## 2021-02-10 PROCEDURE — 88305 TISSUE EXAM BY PATHOLOGIST: CPT | Performed by: SURGERY

## 2021-02-10 PROCEDURE — 25010000003 LIDOCAINE 1 % SOLUTION: Performed by: SURGERY

## 2021-02-10 PROCEDURE — 19085 BX BREAST 1ST LESION MR IMAG: CPT | Performed by: RADIOLOGY

## 2021-02-10 PROCEDURE — A9577 INJ MULTIHANCE: HCPCS | Performed by: SURGERY

## 2021-02-10 PROCEDURE — A4648 IMPLANTABLE TISSUE MARKER: HCPCS

## 2021-02-10 PROCEDURE — 77066 DX MAMMO INCL CAD BI: CPT | Performed by: RADIOLOGY

## 2021-02-10 PROCEDURE — 88341 IMHCHEM/IMCYTCHM EA ADD ANTB: CPT | Performed by: SURGERY

## 2021-02-10 RX ORDER — LIDOCAINE HYDROCHLORIDE AND EPINEPHRINE 10; 10 MG/ML; UG/ML
7 INJECTION, SOLUTION INFILTRATION; PERINEURAL
Status: COMPLETED | OUTPATIENT
Start: 2021-02-10 | End: 2021-02-10

## 2021-02-10 RX ORDER — LIDOCAINE HYDROCHLORIDE 10 MG/ML
2 INJECTION, SOLUTION INFILTRATION; PERINEURAL
Status: COMPLETED | OUTPATIENT
Start: 2021-02-10 | End: 2021-02-10

## 2021-02-10 RX ORDER — LIDOCAINE HYDROCHLORIDE 10 MG/ML
1 INJECTION, SOLUTION INFILTRATION; PERINEURAL
Status: COMPLETED | OUTPATIENT
Start: 2021-02-10 | End: 2021-02-10

## 2021-02-10 RX ORDER — LIDOCAINE HYDROCHLORIDE AND EPINEPHRINE 10; 10 MG/ML; UG/ML
6 INJECTION, SOLUTION INFILTRATION; PERINEURAL
Status: COMPLETED | OUTPATIENT
Start: 2021-02-10 | End: 2021-02-10

## 2021-02-10 RX ORDER — LIDOCAINE HYDROCHLORIDE AND EPINEPHRINE 10; 10 MG/ML; UG/ML
5 INJECTION, SOLUTION INFILTRATION; PERINEURAL
Status: COMPLETED | OUTPATIENT
Start: 2021-02-10 | End: 2021-02-10

## 2021-02-10 RX ADMIN — LIDOCAINE HYDROCHLORIDE,EPINEPHRINE BITARTRATE 7 ML: 10; .01 INJECTION, SOLUTION INFILTRATION; PERINEURAL at 11:39

## 2021-02-10 RX ADMIN — LIDOCAINE HYDROCHLORIDE,EPINEPHRINE BITARTRATE 5 ML: 10; .01 INJECTION, SOLUTION INFILTRATION; PERINEURAL at 11:22

## 2021-02-10 RX ADMIN — LIDOCAINE HYDROCHLORIDE,EPINEPHRINE BITARTRATE 6 ML: 10; .01 INJECTION, SOLUTION INFILTRATION; PERINEURAL at 11:46

## 2021-02-10 RX ADMIN — LIDOCAINE HYDROCHLORIDE 2 ML: 10 INJECTION, SOLUTION INFILTRATION; PERINEURAL at 11:23

## 2021-02-10 RX ADMIN — GADOBENATE DIMEGLUMINE 13 ML: 529 INJECTION, SOLUTION INTRAVENOUS at 11:20

## 2021-02-10 RX ADMIN — LIDOCAINE HYDROCHLORIDE 1 ML: 10 INJECTION, SOLUTION INFILTRATION; PERINEURAL at 11:48

## 2021-02-10 RX ADMIN — LIDOCAINE HYDROCHLORIDE,EPINEPHRINE BITARTRATE 6 ML: 10; .01 INJECTION, SOLUTION INFILTRATION; PERINEURAL at 11:52

## 2021-02-10 RX ADMIN — LIDOCAINE HYDROCHLORIDE 1 ML: 10 INJECTION, SOLUTION INFILTRATION; PERINEURAL at 11:41

## 2021-02-10 RX ADMIN — LIDOCAINE HYDROCHLORIDE 1 ML: 10 INJECTION, SOLUTION INFILTRATION; PERINEURAL at 11:52

## 2021-02-11 LAB
CYTO UR: NORMAL
LAB AP CASE REPORT: NORMAL
LAB AP CLINICAL INFORMATION: NORMAL
LAB AP DIAGNOSIS COMMENT: NORMAL
PATH REPORT.FINAL DX SPEC: NORMAL
PATH REPORT.GROSS SPEC: NORMAL

## 2021-02-12 ENCOUNTER — TELEPHONE (OUTPATIENT)
Dept: MAMMOGRAPHY | Facility: HOSPITAL | Age: 36
End: 2021-02-12

## 2021-02-12 DIAGNOSIS — D05.12 DUCTAL CARCINOMA IN SITU (DCIS) OF LEFT BREAST: Primary | ICD-10-CM

## 2021-02-12 NOTE — TELEPHONE ENCOUNTER
Referring provider's office notified pathology returned as cancer & patient will be notified. Pt notified of pathology results and recommendation. Verbalizes understanding. Denies discomfort. Denies signs and symptoms of infection.   Patient is established with Dr JUDY Cortes. Patient notified of appointment on 2.16.21 @ 7145. Pt given office contact & location information.   Reviewed what would be discussed at surgical consult visit, including detailed explanation of pathology report & imaging reports; treatment options & pros/cons, availability of nurse navigator. Patient encouraged to call back or contact SYLVESTER Chowdhury RN, OCN, Breast Navigator, with any questions or concerns. Pt information sent to SYLVESTER Chowdhury RN, OCN, Breast Navigator for evaluation. Patient verbalized understanding.

## 2021-02-24 ENCOUNTER — OFFICE VISIT (OUTPATIENT)
Dept: PSYCHIATRY | Facility: CLINIC | Age: 36
End: 2021-02-24

## 2021-02-24 ENCOUNTER — DOCUMENTATION (OUTPATIENT)
Dept: GENETICS | Facility: HOSPITAL | Age: 36
End: 2021-02-24

## 2021-02-24 DIAGNOSIS — F43.22 ADJUSTMENT DISORDER WITH ANXIOUS MOOD: Primary | ICD-10-CM

## 2021-02-24 DIAGNOSIS — F51.05 INSOMNIA DUE TO MENTAL CONDITION: ICD-10-CM

## 2021-02-24 PROCEDURE — 90792 PSYCH DIAG EVAL W/MED SRVCS: CPT | Performed by: NURSE PRACTITIONER

## 2021-02-24 RX ORDER — LORAZEPAM 0.5 MG/1
TABLET ORAL
Qty: 30 TABLET | Refills: 0 | Status: SHIPPED | OUTPATIENT
Start: 2021-02-24 | End: 2021-04-28 | Stop reason: SDUPTHER

## 2021-02-24 RX ORDER — TRAZODONE HYDROCHLORIDE 50 MG/1
TABLET ORAL
Qty: 60 TABLET | Refills: 2 | Status: SHIPPED | OUTPATIENT
Start: 2021-02-24 | End: 2021-04-28

## 2021-02-24 NOTE — PROGRESS NOTES
"        Subjective   Rai Guillory is an employed 35 y.o. female who is here today for initial appointment face-to-face in person with COVID-19 precautions: Screening, mask, distance, good handwashing.. Patient was referred by: Dr Cortes general surgeon for patient's anxiety symptoms and sleep disturbance.  Patient was diagnosed with ductal carcinoma in situ and is going to be having bilateral mastectomies with reconstructive surgery.  Patient lives in [place] with her 3 daughters.  She has support from her mother and sister who live in [place] when will be here for her surgery and support from her boyfriend.    Chief Complaint: Anxiety poor sleep    History of Present Illness The patient reports the following symptoms of  anxiety: constant anxiety/worry, restlessness/on edge, difficulty concentrating, mind goes blank, mind racing with worry, irritability, muscle tension and sleep disturbance. The symptoms have been present for at least 4 month(s) because of breast irregularity and have caused impairment in important areas of functioning.  Patient reports difficulty falling asleep and if she wakes up she has difficulty falling back to sleep getting only \"chopped up sleep a few hours at a time\".  Patient reports feeling overwhelmed and anxious about treatment.  Patient denies depression or suicidal ideations.  She denies OCD, PTSD or rina ever.  She denies illicit drug use except for cannabis occasionally since she was 14 years old last use today..  She denies alcohol abuse, tobacco use since she was 16 years old and current smoker 3 to 4 cigarettes a day last use yesterday..    The following portions of the patient's history were reviewed and updated as appropriate: allergies, current medications, past family history, past medical history, past social history, past surgical history and problem list.      Past Psych History: Denies history, denies psychiatric admissions to hospital, denies suicidal " thoughts or attempts.    Substance Abuse:   Nicotine smokes cigarettes since she was 16 years old 3 to 4 cigarettes a day last use yesterday  Alcohol use: Since 18 years old occasional social denies binging denies daily use, last use over the weekend at a [employer] club  Cannabis use since she was 14 years old occasional last used today  Opioid use denied  Stimulants including cocaine, methamphetamine denied  Benzodiazepine denied    ABUSE HX: Denies  LEGAL HX: Denies    VIJI REVIEWED: No red flags 2021      Family Psychiatric History:  family history includes Alcohol abuse in her father; Breast cancer (age of onset: 23) in her paternal cousin; Diabetes in her father; Heart disease in her father; Hypertension in her father; No Known Problems in her mother.      Social History: Patient born and raised in [place] by her biological parents.  Her father is  and her stepfather is .  She reports good support from her mother and sister.  She does not know her brothers through her father but they are in contact through social media.  Patient has 3 daughters 710 and 14 years old.  Patient has never been .  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  She also has associate degree in medical assistant with phlebotomy.  She states she like to get back into the medical field.  She was on as needed float for [employer] here in [place].  Patient has a boyfriend who is supportive and caring.  She talks with her mother and sister daily.  She has patrick in God and [place] as her savior she reports.  Patient has a good sense of humor and utilizes this for coping measures.      Medical/Surgical History:  Past Medical History:   Diagnosis Date   • History of abnormal cervical Pap smear    • Mastitis    • Ovarian cyst    • STD exposure     Chlamydia and GC     Past Surgical History:   Procedure Laterality Date   • DIAGNOSTIC LAPAROSCOPY  2018     Endometrial ablation   • FOOT SURGERY  09/2017   • KNEE ACL RECONSTRUCTION  06/2007   • TUBAL ABDOMINAL LIGATION  11/2013       No Known Allergies    Current Medications:   Current Outpatient Medications   Medication Sig Dispense Refill   • cephalexin (Keflex) 500 MG capsule Take 1 capsule by mouth 2 (Two) Times a Day. 20 capsule 0   • HYDROcodone-acetaminophen (NORCO) 7.5-325 MG per tablet Take 1 tablet by mouth Every 6 (Six) Hours As Needed for pain 12 tablet 0   • hydrOXYzine (ATARAX) 25 MG tablet Take 1 tablet by mouth At Night As Needed for Anxiety. 30 tablet 1   • ibuprofen (ADVIL,MOTRIN) 600 MG tablet Take 1 tablet by mouth Every 8 (Eight) Hours As Needed for Moderate Pain  or Fever. 30 tablet 0   • LORazepam (ATIVAN) 0.5 MG tablet Take one tablet as needed up to once a day for severe anxiety 30 tablet 0   • sulfamethoxazole-trimethoprim (BACTRIM DS,SEPTRA DS) 800-160 MG per tablet Take 1 tablet by mouth 2 (Two) Times a Day. 14 tablet 0   • traZODone (DESYREL) 50 MG tablet Take one to two tablets at bedtime for sleep 60 tablet 2     No current facility-administered medications for this visit.        Lab Results: Reviewed most recent in epic        Review of Systems Constitutional: Negative for appetite change, chills, diaphoresis, fatigue, fever and unexpected weight change.   HENT: Negative for hearing loss, sore throat, trouble swallowing and voice change.    Eyes: Negative for photophobia and visual disturbance.   Respiratory: Negative for cough, chest tightness and shortness of breath.    Cardiovascular: Negative for chest pain and palpitations.   Gastrointestinal: Negative for abdominal pain, constipation, nausea and vomiting.   Endocrine: Negative for cold intolerance and heat intolerance.   Genitourinary: Negative for dysuria and frequency.   Musculoskeletal: Negative for arthralgia, back pain, joint swelling and neck stiffness.   Skin: Negative for color change and wound.   Allergic/Immunologic:  Negative for environmental allergies and immunocompromised state.   Neurological: Negative for dizziness, tremors, seizures, syncope, weakness, light-headedness and headaches.   Hematological: Negative for adenopathy. Does not bruise/bleed easily.    Objective   Physical Exam  not currently breastfeeding.    TARA-7:    Over the last two weeks, how often have you been bothered by the following problems?  Feeling nervous, anxious or on edge: Nearly every day  Not being able to stop or control worrying: Nearly every day  Worrying too much about different things: Nearly every day  Trouble Relaxing: Nearly every day  Being so restless that it is hard to sit still: Several days  Becoming easily annoyed or irritable: Nearly every day  Feeling afraid as if something awful might happen: More than half the days  TARA 7 Total Score: 18  If you checked any problems, how difficult have these problems made it for you to do your work, take care of things at home, or get along with other people: Very difficult  0-4: Minimal anxiety  5-9: Mild anxiety  10-14: Moderate anxiety  15-21: Severe anxiety    PHQ-9: 2/24/2021  PHQ-2/PHQ-9 Depression Screening 2/24/2021   Little interest or pleasure in doing things 0   Feeling down, depressed, or hopeless 1   Trouble falling or staying asleep, or sleeping too much 3   Feeling tired or having little energy 0   Poor appetite or overeating 0   Feeling bad about yourself - or that you are a failure or have let yourself or your family down 0   Trouble concentrating on things, such as reading the newspaper or watching television 1   Moving or speaking so slowly that other people could have noticed. Or the opposite - being so fidgety or restless that you have been moving around a lot more than usual 0   Thoughts that you would be better off dead, or of hurting yourself in some way 0   Total Score 5   If you checked off any problems, how difficult have these problems made it for you to do your work,  take care of things at home, or get along with other people? Somewhat difficult      5-9: Minimal symptoms the score is related to her anxiety with sleep disturbance  10-14: Major depression mild  15-19: Major depression moderate  Greater then 20: Major depression severe    Mental Status Exam:   Appearance: appropriate  Hygiene:   good  Cooperation:  Cooperative  Eye Contact:  Good  Psychomotor Behavior:  Appropriate  Mood:  anxious  Affect:  Appropriate  Hopelessness: Denies  Speech:  Normal  Thought Process:  Linear  Thought Content:  Normal  Suicidal:  None  Homicidal:  None  Hallucinations:  None  Delusion:  None  Memory:  Intact  Orientation:  Person, Place, Time and Situation  Reliability:  good  Insight:  Fair  Judgement:  Good  Impulse Control:  Good  Physical/Medical Issues:  Yes DCIS of breast awaiting bilateral mastectomies       Short-term goals: Patient will be compliant with clinic appointments.  Patient will be engaged in therapy, medication compliant with minimal side effects. Patient  will report decrease of symptoms and frequency.    Long-term goals: Patient will have minimal symptoms of mental health disorder with continued treatment. Patient will be compliant with treatment and appointments.       Problem list: Anxiety and sleep disturbance  Strengths: patient appears motivated for treatment, has good social support system, has medical background    Message sent to Margaret Carter genetic counselor to update patient on results from genetic testing  Message sent to Juani Aguilar oncology social worker as patient would like to speak with her.     Assessment/Plan   Diagnoses and all orders for this visit:    1. Adjustment disorder with anxious mood (Primary)  -     LORazepam (ATIVAN) 0.5 MG tablet; Take one tablet as needed up to once a day for severe anxiety  Dispense: 30 tablet; Refill: 0    2. Insomnia due to mental condition    Other orders  -     traZODone (DESYREL) 50 MG tablet; Take one to  two tablets at bedtime for sleep  Dispense: 60 tablet; Refill: 2        A psychological evaluation was conducted in order to assess past and current level of functioning. Areas assessed included, but were not limited to: perception of social support, perception of ability to face and deal with challenges in life (positive functioning), anxiety symptoms, depressive symptoms, perspective on beliefs/belief system, coping skills for stress, intelligence level,  Therapeutic rapport was established. Interventions conducted today were geared towards incorporating medication management along with support for continued therapy. Education was also provided as to the med management with this provider and what to expect in subsequent sessions.    Assisted patient in processing above session content; acknowledged and normalized patient’s thoughts, feelings, and concerns.  Applied  positive coping skills and behavior management in session.  Allowed patient to freely discuss issues without interruption or judgment. Provided safe, confidential environment to facilitate the development of positive therapeutic relationship and encourage open, honest communication. Assisted patient in identifying risk factors which would indicate the need for higher level of care including thoughts to harm self or others and/or self-harming behavior and encouraged patient to contact this office, call 911, or present to the nearest emergency room should any of these events occur. Discussed crisis intervention services and means to access.  Patient adamantly and convincingly denies current suicidal or homicidal ideation or perceptual disturbance.    Discussed diagnosis and recommendations for treatment:    PROVIDE: Cognitive Behavioral Therapy and Solution Focused Therapy to improve functioning, maintain stability, and avoid decompensation and the need for higher level of care.    MEDICATION MANAGEMENT RECOMMENDATIONS: Trazodone 50 mg 1-2 at at bedtime  as needed for sleeplessness  Lorazepam 0.5 mg 1 p.o. up to once a day as needed for high anxiety plan to help diminish anxiety would not keep patient on this this is while she is awaiting surgery and the tension that is caused on voiding for treatment.      We discussed risks, benefits,goals and side effects of the above medication and the patient was agreeable with the plan.Patient was educated on the importance of compliance with treatment and follow-up appointments.To call for questions or concerns and return early if necessary. Crisis plan reviewed including going to the Emergency department.       Treatment Plan: stabilize mood,  patient will stay out of the hospital and be at optimal level of functioning, take all medication as prescribed. Patient verbalized  understanding and agreement to plan.      Return in about 2 weeks (around 3/10/2021).

## 2021-02-24 NOTE — PROGRESS NOTES
Rai Guillory is a 35-year-old female who was seen for genetic counseling due to a personal and family history of breast cancer.  Ms. Guillory was diagnosed with an ER/SD+ DCIS at age 35.  Ms. Guillory is premenopausal and retains her uterus and ovaries.  Ms. Guillory was interested in discussing her risk for a hereditary cancer syndrome, and decided to pursue genetic testing.   Ms. Guillory opted to pursue comprehensive testing via the CancerNext panel ordered through [facility] which includes BRCA1/2 and 34 additional genes associated with an increased risk of breast cancer or other cancers (APC, ROHIT, AXIN2, BARD1, BMPR1A, BRCA1, BRCA2, BRIP1, CDH1, CDK4, CDKN2A, CHEK2, DICER1, EPCAM, GREM1, HOXB13, MLH1, MRE11A, MSH2, MSH3, MSH6, MUTYH, NBN, NF1, NTHL1, PALB2, PMS2, POLD1, POLE, PTEN, RAD51C, RAD51D, RECQL, SMAD4, SMARCA4, STK11, and TP53). Genetic testing was negative by DNA sequencing and rearrangement testing for deleterious mutations in the BRCA1/2 genes and 34 additional genes included on the CancerNext Panel (see attached results). These normal results were discussed with Ms. Guillory by telephone on 2/24/21.     PERTINENT FAMILY HISTORY:  Pat. 1st cousin:  Breast cancer, 24    RISK ASSESSMENT:  Ms. Guillory’s personal history of early onset breast cancer raises the question of a hereditary cancer syndrome.   NCCN guidelines recommend BRCA1/2 testing for individuals diagnosed with breast cancer at or under the age of 45 regardless of family history.  Therefore, testing is appropriate for Ms. Guillory.  We discussed that standard approach to BRCA1/2 testing is via a multigene panel that evaluates BRCA1/2 and multiple other genes known to impact cancer risk.  This risk assessment is based on the family history information provided at the time of the appointment.  The assessment could change in the future should new information be obtained.     GENETIC COUNSELING: We reviewed the family history information in  detail.  Cases of breast cancer follow three general patterns: sporadic, familial, and hereditary.  While most cancer is sporadic, some cases appear to occur in family clusters.  These cases are said to be familial and account for 10-20% of breast cancer cases.  Familial cases may be due to a combination of shared genes and environmental factors among family members.  In even fewer cases (5-10%), the risk for cancer is inherited, and the genes responsible for the increased cancer risk are known.       Family histories typical of hereditary cancer syndromes usually include multiple first- and second-degree relatives diagnosed with cancer types that define a syndrome.  These cases tend to be diagnosed at younger-than-expected ages and can be bilateral or multifocal.  The cancer in these families follows an autosomal dominant inheritance pattern, which indicates the likely presence of a mutation in a cancer susceptibility gene.  Children and siblings of an individual believed to carry this mutation have a 50% chance of inheriting that mutation, thereby inheriting the increased risk to develop cancer.  These mutations can be passed down from the maternal or the paternal lineage.     Hereditary breast cancer accounts for 5-10% of all cases of breast cancer.  A significant proportion of hereditary breast cancer can be attributed to mutations in the BRCA1 and BRCA2 genes.  Mutations in these genes confer an increased risk for breast cancer, ovarian cancer, male breast cancer, prostate cancer and pancreatic cancer.  There are other genes that are known to be associated with an increased risk for breast cancer and other cancers. In order to get as much information as possible regarding her personal risks and potential risks for her family, Ms. Guillory opted to pursue testing through a panel that would look at several other genes known to increase the risk for breast cancer.     GENETIC TESTING:  The risks, benefits and  limitations of genetic testing and implications for clinical management following testing were reviewed.  DNA test results can influence decisions regarding screening, prevention and surgical management.  Genetic testing can have significant psychological implications for both individuals and families.  Also discussed was the possibility of insurance discrimination based on genetic test results and the laws ([place]) in place to prevent this.     We discussed panel testing, which would involve testing for BRCA1/2 as well as several other cancer susceptibility genes at the same time.  The benefits and limitations of genetic testing were discussed and Ms. Guillory decided to pursue testing. The implications of a positive or negative test result were discussed. We discussed the possibility that, in some cases, genetic test results may be uninformative due to the identification of a genetic variant of uncertain significance. These variants may or may not be associated with an increased cancer risk.  VUSs are frequently reported through multigene panel testing, given the presence of genetic variation in the population and the number of genes being analyzed. Given her personal history, a negative test result would not eliminate all breast cancer risk to her relatives, although the risk would not be as high as it would with positive genetic testing.          TEST RESULTS:  Genetic testing was negative by sequencing, deletion/duplication testing for mutations in BRCA1/2 and the additional 34 genes on the CancerNext panel.  The negative result greatly lowers the risk of a hereditary cancer syndrome.  Ms. Guillory’s unaffected female relatives may still be considered to have a somewhat increased risk for breast cancer based on the family history.  This assessment is based on the information provided at the time of the consultation.    Cancer Prevention:  Despite the negative genetic test results, Ms. Guillory’s female relatives may  have a somewhat increased lifetime risk for breast cancer based on family history.  Female relatives could have a risk assessment performed using a family history-based model, such as the Tyrer-Cuzick model, to determine their individual risks.   Given the increased risk, options available to individuals with an elevated lifetime risk for breast cancer were briefly discussed.   Surveillance for individuals with an elevated lifetime risk of breast cancer (>20%, versus the average risk of 12%), based on NCCN guidelines, would consist of semi-annual clinical breast exams and monthly self-breast exams starting by age 18 and annual mammography starting 10 years younger than the earliest diagnosis in a close relative, or by age 40.  According to an American Cancer Society expert panel and NCCN guidelines, annual breast MRI should be offered to women whose lifetime risk of breast cancer is 20-25 percent or more, also starting ten years before the earliest diagnosis.      PLAN:  Genetic counseling remains available for Ms. Guillory.  If Ms. Guillory has any questions or concerns she is welcome to call us at [phone].         Natasha Carter MS, [facility] [place], [facility]  Licensed Certified Genetic Counselor    Cc: Rai Cortes MD

## 2021-02-26 ENCOUNTER — TELEPHONE (OUTPATIENT)
Dept: SOCIAL WORK | Facility: HOSPITAL | Age: 36
End: 2021-02-26

## 2021-02-26 NOTE — TELEPHONE ENCOUNTER
SW called pt to provide support and resources.  LVM requesting a return call.  SW available for ongoing support and resource needs.

## 2021-03-09 ENCOUNTER — NURSE NAVIGATOR (OUTPATIENT)
Dept: OTHER | Facility: HOSPITAL | Age: 36
End: 2021-03-09

## 2021-03-09 NOTE — PROGRESS NOTES
Appointment made for patient with [facility] Il[facility]. Appt is 3/12/2021 at 1345. Address and schedule given to patient and she will call with any new questions or concerns. She has requested assistance with utility bill. Will notify VALE Padilla.

## 2021-03-10 ENCOUNTER — OFFICE VISIT (OUTPATIENT)
Dept: PSYCHIATRY | Facility: CLINIC | Age: 36
End: 2021-03-10

## 2021-03-10 DIAGNOSIS — F51.05 INSOMNIA DUE TO MENTAL CONDITION: ICD-10-CM

## 2021-03-10 DIAGNOSIS — F43.22 ADJUSTMENT DISORDER WITH ANXIOUS MOOD: Primary | ICD-10-CM

## 2021-03-10 PROCEDURE — 99214 OFFICE O/P EST MOD 30 MIN: CPT | Performed by: NURSE PRACTITIONER

## 2021-03-10 NOTE — PROGRESS NOTES
"  Subjective   Rai Guillory is a 35 y.o. female who is here today for medication management follow up. You have chosen to receive care through a telephone visit. Do you consent to use a telephone visit for your medical care today? Yes    TIME IN:3p  TIME OUT: 315p    Chief Complaint: adjustment disorder with anxious mood, insomnia     History of Present Illness Patient reports still waiting for mastectomy surgery and intial reconstruction. She reports she has to use a  plastic surgeon. She states she is anxious to get that done and get the cancer out. Sleep improved with trazodone 100mg at hs getting about 7 hours instead of 3. Takes lorazepam rarely when very anxious otherwise rating it most days a 5. \"I just want to get this over with\" . Continues to work prior to surgery \"that's a good distraction\". Denies depression. Has good support from family.  Denies adverse effects from medications.   (Scales based on 0 - 10 with 10 being the worst)      The following portions of the patient's history were reviewed and updated as appropriate: allergies, current medications, past family history, past medical history, past social history, past surgical history and problem list.    Review of Systems  A 14 point review of systems was performed and is negative except as noted above.    Objective   Physical Exam  not currently breastfeeding.    No Known Allergies    Current Medications:   Current Outpatient Medications   Medication Sig Dispense Refill   • cephalexin (Keflex) 500 MG capsule Take 1 capsule by mouth 2 (Two) Times a Day. 20 capsule 0   • HYDROcodone-acetaminophen (NORCO) 7.5-325 MG per tablet Take 1 tablet by mouth Every 6 (Six) Hours As Needed for pain 12 tablet 0   • hydrOXYzine (ATARAX) 25 MG tablet Take 1 tablet by mouth At Night As Needed for Anxiety. 30 tablet 1   • ibuprofen (ADVIL,MOTRIN) 600 MG tablet Take 1 tablet by mouth Every 8 (Eight) Hours As Needed for Moderate Pain  or Fever. 30 tablet " 0   • LORazepam (ATIVAN) 0.5 MG tablet Take one tablet as needed up to once a day for severe anxiety 30 tablet 0   • sulfamethoxazole-trimethoprim (BACTRIM DS,SEPTRA DS) 800-160 MG per tablet Take 1 tablet by mouth 2 (Two) Times a Day. 14 tablet 0   • traZODone (DESYREL) 50 MG tablet Take one to two tablets at bedtime for sleep 60 tablet 2     No current facility-administered medications for this visit.       Appearance: NA   Hygiene:  REPORTS good  Cooperation:  Cooperative  Eye Contact:  NA  Psychomotor Behavior:  denies psychomotor agitation/retardation, No EPS, No motor tics  Mood:   Affect:  NA  Hopelessness: Denies  Speech:  Normal  Thought Process:  Linear  Thought Content:  Normal  Concentration: Normal   Suicidal: denies  Homicidal:  None  Hallucinations:  None  Delusion:  None  Memory:  Intact  Orientation:  Person, Place, Time and Situation  Reliability:  good  Insight:  Fair  Judgement: good  Impulse Control: good  Estimated Intelligence: average range    VIJI REVIEWED NO RED FLAGS 3.10.21     Assessment/Plan   Diagnoses and all orders for this visit:    1. Adjustment disorder with anxious mood (Primary)    2. Insomnia due to mental condition          IMPRESSION: anxiety managed, still not had surgery , sleep improved     PLAN: no changes in med management  Lorazepam 0.5mg one as needed for high anxiety  Cont trazodone 100 mg at hs for sleep as needed     We discussed risks, benefits, and side effects of the above medications and the patient was agreeable with the plan. Patient was educated on the importance of compliance with treatment and follow-up appointments.     PRN Provide Cognitive Behavioral Therapy and Solution Focused Therapy to improve functioning, maintain stability, and avoid decompensation and the need for higher level of care.    Counseled patient regarding multimodal approach with encouragement of healthy nutrition, healthy sleep, regular physical mobility, social involvement,  counseling, and medication compliance.     Assisted patient in identifying risk factors which would indicate the need for higher level of care including thoughts to harm self or others and/or self-harming behavior and encouraged patient to contact this office, call 911, or present to the nearest emergency room should any of these events occur. Discussed crisis intervention services and means to access.  Patient adamantly and convincingly denies current suicidal or homicidal ideation or perceptual disturbance.    Treatment Plan: stabilize mood, patient will stay out of psychiatric hospital and be at optimal level of functioning with therapy and take all medication as prescribed. Patient verbalized  understanding and agreement to plan.    Instructed to call for questions or concerns and return early if necessary.     Greater than 50% time was spent in coordination of care, and counseling the patient regarding current assessment, symptoms, plan of care going forward, supportive therapy.  Answered any questions patient had regarding medications and plan of care.    No follow-ups on file.

## 2021-03-11 ENCOUNTER — TELEPHONE (OUTPATIENT)
Dept: SOCIAL WORK | Facility: HOSPITAL | Age: 36
End: 2021-03-11

## 2021-03-11 NOTE — TELEPHONE ENCOUNTER
SW called pt to assist with financial concerns.  Pt is worried about her utility bill specifically.  SW informed pt of the various resources that may be available for her.  SW will email  a comprehensive list to pt.  Pt has an appointment with [facility] on 3/23.  SW encouraged pt to call with any future needs or concerns.  SW available for ongoing support and resource needs.

## 2021-03-16 ENCOUNTER — NURSE NAVIGATOR (OUTPATIENT)
Dept: OTHER | Facility: HOSPITAL | Age: 36
End: 2021-03-16

## 2021-03-16 NOTE — PROGRESS NOTES
Saw patient with Dr. Cortes regarding her L multifocal breast cancer. Patient wishes to have Dr. Cortes perform her procedure. She will have delayed reconstruction. Educational material provided and discussed. Patient states she was able to catch up her electric bill and rent. She will call with any new questions or concerns.

## 2021-03-31 ENCOUNTER — OFFICE VISIT (OUTPATIENT)
Dept: PSYCHIATRY | Facility: CLINIC | Age: 36
End: 2021-03-31

## 2021-03-31 DIAGNOSIS — F43.22 ADJUSTMENT DISORDER WITH ANXIOUS MOOD: Primary | ICD-10-CM

## 2021-03-31 DIAGNOSIS — F51.05 INSOMNIA DUE TO MENTAL CONDITION: ICD-10-CM

## 2021-03-31 PROCEDURE — 99442 PR PHYS/QHP TELEPHONE EVALUATION 11-20 MIN: CPT | Performed by: NURSE PRACTITIONER

## 2021-03-31 NOTE — PROGRESS NOTES
"  Subjective   Rai Guillory is a 35 y.o. female who is here today for medication management follow up. You have chosen to receive care through a telephone visit. Do you consent to use a telephone visit for your medical care today? Yes    TIME IN:  236p  TIME OUT:250    Chief Complaint: adjustment disorder with anxiety, sleep disturbance    History of Present Illness Patient reports has a date for mastectomies and feels better that is coming up. She reports she quit her job so doesn't have \"that hassle they weren't giving time off for appts and it was too stressful\". Got the stimulation check and paid her rent for next three months. She reports sleeping well enough. Went to [place] with her daughters and had good time, good distraction. Denies new concerns. Rare use to no use of lorazepam. Utilizes trazodone as needed for sleep \"it helps\". Surgery April 14th, will talk with pt 2 weeks after that.  .  Denies adverse effects from medications.   (Scales based on 0 - 10 with 10 being the worst)        The following portions of the patient's history were reviewed and updated as appropriate: allergies, current medications, past family history, past medical history, past social history, past surgical history and problem list.    Review of Systems  A 14 point review of systems was performed and is negative except as noted above.    Objective   Physical Exam  not currently breastfeeding.    No Known Allergies    Current Medications:   Current Outpatient Medications   Medication Sig Dispense Refill   • cephalexin (Keflex) 500 MG capsule Take 1 capsule by mouth 2 (Two) Times a Day. 20 capsule 0   • HYDROcodone-acetaminophen (NORCO) 7.5-325 MG per tablet Take 1 tablet by mouth Every 6 (Six) Hours As Needed for pain 12 tablet 0   • hydrOXYzine (ATARAX) 25 MG tablet Take 1 tablet by mouth At Night As Needed for Anxiety. 30 tablet 1   • ibuprofen (ADVIL,MOTRIN) 600 MG tablet Take 1 tablet by mouth Every 8 (Eight) Hours " As Needed for Moderate Pain  or Fever. 30 tablet 0   • LORazepam (ATIVAN) 0.5 MG tablet Take one tablet as needed up to once a day for severe anxiety 30 tablet 0   • sulfamethoxazole-trimethoprim (BACTRIM DS,SEPTRA DS) 800-160 MG per tablet Take 1 tablet by mouth 2 (Two) Times a Day. 14 tablet 0   • traZODone (DESYREL) 50 MG tablet Take one to two tablets at bedtime for sleep 60 tablet 2     No current facility-administered medications for this visit.       Appearance: na  Hygiene:  REPORTS good  Cooperation:  Cooperative  Eye Contact:  na  Psychomotor Behavior:  denies psychomotor agitation/retardation, No EPS, No motor tics  Mood:  within normal limits  Affect:  na  Hopelessness: Denies  Speech:  Normal  Thought Process:  Linear  Thought Content:  Normal  Concentration: Normal   Suicidal: denies  Homicidal:  None  Hallucinations:  None  Delusion:  None  Memory:  Intact  Orientation:  Person, Place, Time and Situation  Reliability:  good  Insight:  Fair  Judgement: good  Impulse Control: good  Estimated Intelligence: average range    VIJI REVIEWED NO RED FLAGS 3.30.2021    Assessment/Plan   Diagnoses and all orders for this visit:    1. Adjustment disorder with anxious mood (Primary)    2. Insomnia due to mental condition      IMPRESSION: improving mood, sleep improved    PLAN:   Cont trazodone as needed for sleep  Cont lorazepam as needed for severe anxiety has not needed any past two weeks     We discussed risks, benefits, and side effects of the above medications and the patient was agreeable with the plan. Patient was educated on the importance of compliance with treatment and follow-up appointments.     Counseled patient regarding multimodal approach with encouragement of healthy nutrition, healthy sleep, regular physical mobility, social involvement, counseling, and medication compliance.     Assisted patient in identifying risk factors which would indicate the need for higher level of care including  thoughts to harm self or others and/or self-harming behavior and encouraged patient to contact this office, call 911, or present to the nearest emergency room should any of these events occur. Discussed crisis intervention services and means to access.  Patient adamantly and convincingly denies current suicidal or homicidal ideation or perceptual disturbance.    Treatment Plan: stabilize mood, patient will stay out of psychiatric hospital and be at optimal level of functioning with therapy and take all medication as prescribed. Patient verbalized  understanding and agreement to plan.    Instructed to call for questions or concerns and return early if necessary.     Greater than 50% time was spent in coordination of care, and counseling the patient regarding current assessment, symptoms, plan of care going forward, supportive therapy.  Answered any questions patient had regarding medications and plan of care.    Return in about 4 weeks (around 4/28/2021).

## 2021-04-12 ENCOUNTER — APPOINTMENT (OUTPATIENT)
Dept: PREADMISSION TESTING | Facility: HOSPITAL | Age: 36
End: 2021-04-12

## 2021-04-12 LAB — SARS-COV-2 RNA PNL SPEC NAA+PROBE: NOT DETECTED

## 2021-04-12 PROCEDURE — U0004 COV-19 TEST NON-CDC HGH THRU: HCPCS

## 2021-04-12 PROCEDURE — C9803 HOPD COVID-19 SPEC COLLECT: HCPCS

## 2021-04-13 ENCOUNTER — ANESTHESIA EVENT (OUTPATIENT)
Dept: PERIOP | Facility: HOSPITAL | Age: 36
End: 2021-04-13

## 2021-04-13 ENCOUNTER — TRANSCRIBE ORDERS (OUTPATIENT)
Dept: ADMINISTRATIVE | Facility: HOSPITAL | Age: 36
End: 2021-04-13

## 2021-04-13 ENCOUNTER — PRE-ADMISSION TESTING (OUTPATIENT)
Dept: PREADMISSION TESTING | Facility: HOSPITAL | Age: 36
End: 2021-04-13

## 2021-04-13 VITALS — HEIGHT: 65 IN | WEIGHT: 151.68 LBS | BODY MASS INDEX: 25.27 KG/M2

## 2021-04-13 DIAGNOSIS — Z40.01: ICD-10-CM

## 2021-04-13 DIAGNOSIS — C50.412 MALIGNANT NEOPLASM OF UPPER-OUTER QUADRANT OF LEFT FEMALE BREAST, UNSPECIFIED ESTROGEN RECEPTOR STATUS (HCC): Primary | ICD-10-CM

## 2021-04-13 LAB
ANION GAP SERPL CALCULATED.3IONS-SCNC: 6 MMOL/L (ref 5–15)
BUN SERPL-MCNC: 11 MG/DL (ref 6–20)
BUN/CREAT SERPL: 15.3 (ref 7–25)
CALCIUM SPEC-SCNC: 8.7 MG/DL (ref 8.6–10.5)
CHLORIDE SERPL-SCNC: 104 MMOL/L (ref 98–107)
CO2 SERPL-SCNC: 25 MMOL/L (ref 22–29)
CREAT SERPL-MCNC: 0.72 MG/DL (ref 0.57–1)
DEPRECATED RDW RBC AUTO: 44.9 FL (ref 37–54)
ERYTHROCYTE [DISTWIDTH] IN BLOOD BY AUTOMATED COUNT: 13.1 % (ref 12.3–15.4)
GFR SERPL CREATININE-BSD FRML MDRD: 110 ML/MIN/1.73
GLUCOSE SERPL-MCNC: 100 MG/DL (ref 65–99)
HCT VFR BLD AUTO: 39.3 % (ref 34–46.6)
HGB BLD-MCNC: 13 G/DL (ref 12–15.9)
MCH RBC QN AUTO: 31 PG (ref 26.6–33)
MCHC RBC AUTO-ENTMCNC: 33.1 G/DL (ref 31.5–35.7)
MCV RBC AUTO: 93.6 FL (ref 79–97)
PLATELET # BLD AUTO: 205 10*3/MM3 (ref 140–450)
PMV BLD AUTO: 10.6 FL (ref 6–12)
POTASSIUM SERPL-SCNC: 4.1 MMOL/L (ref 3.5–5.2)
RBC # BLD AUTO: 4.2 10*6/MM3 (ref 3.77–5.28)
SODIUM SERPL-SCNC: 135 MMOL/L (ref 136–145)
WBC # BLD AUTO: 6.56 10*3/MM3 (ref 3.4–10.8)

## 2021-04-13 PROCEDURE — 36415 COLL VENOUS BLD VENIPUNCTURE: CPT

## 2021-04-13 PROCEDURE — 85027 COMPLETE CBC AUTOMATED: CPT

## 2021-04-13 PROCEDURE — 80048 BASIC METABOLIC PNL TOTAL CA: CPT

## 2021-04-13 RX ORDER — FAMOTIDINE 10 MG/ML
20 INJECTION, SOLUTION INTRAVENOUS ONCE
Status: CANCELLED | OUTPATIENT
Start: 2021-04-13 | End: 2021-04-13

## 2021-04-13 NOTE — PAT
Patient to apply Chlorhexadine wipes  to surgical area (as instructed) the night before procedure and the AM of procedure. Wipes provided.    An arrival time for procedure was not given during PAT visit. If patient had any questions or concerns about their arrival time, they were instructed to contact their surgeon/physician.  Additionally, if the patient referred to an arrival time that was acquired from their my chart account, patient was encouraged to verify that time with their surgeon/physician.  NO arrival times given in Pre Admission Testing Department.

## 2021-04-14 ENCOUNTER — HOSPITAL ENCOUNTER (OUTPATIENT)
Dept: NUCLEAR MEDICINE | Facility: HOSPITAL | Age: 36
Discharge: HOME | End: 2021-04-14

## 2021-04-14 ENCOUNTER — ANESTHESIA (OUTPATIENT)
Dept: PERIOP | Facility: HOSPITAL | Age: 36
End: 2021-04-14

## 2021-04-14 ENCOUNTER — HOSPITAL ENCOUNTER (OUTPATIENT)
Facility: HOSPITAL | Age: 36
Discharge: HOME | End: 2021-04-15
Attending: SURGERY | Admitting: SURGERY

## 2021-04-14 ENCOUNTER — ANESTHESIA EVENT CONVERTED (OUTPATIENT)
Dept: ANESTHESIOLOGY | Facility: HOSPITAL | Age: 36
End: 2021-04-14

## 2021-04-14 DIAGNOSIS — Z85.3 HISTORY OF LEFT BREAST CANCER: ICD-10-CM

## 2021-04-14 DIAGNOSIS — Z40.01: ICD-10-CM

## 2021-04-14 DIAGNOSIS — C50.412 MALIGNANT NEOPLASM OF UPPER-OUTER QUADRANT OF LEFT FEMALE BREAST, UNSPECIFIED ESTROGEN RECEPTOR STATUS (HCC): ICD-10-CM

## 2021-04-14 LAB
B-HCG UR QL: NEGATIVE
INTERNAL NEGATIVE CONTROL: NORMAL
INTERNAL POSITIVE CONTROL: REACTIVE
Lab: NORMAL
POTASSIUM SERPL-SCNC: 4.4 MMOL/L (ref 3.5–5.2)

## 2021-04-14 PROCEDURE — 84132 ASSAY OF SERUM POTASSIUM: CPT | Performed by: ANESTHESIOLOGY

## 2021-04-14 PROCEDURE — 81025 URINE PREGNANCY TEST: CPT | Performed by: ANESTHESIOLOGY

## 2021-04-14 PROCEDURE — 25010000002 DEXAMETHASONE PER 1 MG: Performed by: NURSE ANESTHETIST, CERTIFIED REGISTERED

## 2021-04-14 PROCEDURE — 25010000002 PROPOFOL 10 MG/ML EMULSION: Performed by: NURSE ANESTHETIST, CERTIFIED REGISTERED

## 2021-04-14 PROCEDURE — A9541 TC99M SULFUR COLLOID: HCPCS | Performed by: SURGERY

## 2021-04-14 PROCEDURE — 19303 MAST SIMPLE COMPLETE: CPT | Performed by: SPECIALIST/TECHNOLOGIST, OTHER

## 2021-04-14 PROCEDURE — 25010000002 ONDANSETRON PER 1 MG: Performed by: NURSE ANESTHETIST, CERTIFIED REGISTERED

## 2021-04-14 PROCEDURE — 25010000002 BUPRENORPHINE PER 0.1 MG: Performed by: ANESTHESIOLOGY

## 2021-04-14 PROCEDURE — 25010000003 CEFAZOLIN IN DEXTROSE 2-4 GM/100ML-% SOLUTION: Performed by: SURGERY

## 2021-04-14 PROCEDURE — 25010000002 NEOSTIGMINE 10 MG/10ML SOLUTION: Performed by: NURSE ANESTHETIST, CERTIFIED REGISTERED

## 2021-04-14 PROCEDURE — G0378 HOSPITAL OBSERVATION PER HR: HCPCS

## 2021-04-14 PROCEDURE — 25010000002 MIDAZOLAM PER 1 MG: Performed by: NURSE ANESTHETIST, CERTIFIED REGISTERED

## 2021-04-14 PROCEDURE — 88307 TISSUE EXAM BY PATHOLOGIST: CPT | Performed by: SURGERY

## 2021-04-14 PROCEDURE — 38792 RA TRACER ID OF SENTINL NODE: CPT

## 2021-04-14 PROCEDURE — 25010000002 FENTANYL CITRATE (PF) 100 MCG/2ML SOLUTION: Performed by: NURSE ANESTHETIST, CERTIFIED REGISTERED

## 2021-04-14 PROCEDURE — 0 TECHNETIUM FILTERED SULFUR COLLOID: Performed by: SURGERY

## 2021-04-14 PROCEDURE — 25010000002 DEXAMETHASONE SODIUM PHOSPHATE 10 MG/ML SOLUTION: Performed by: ANESTHESIOLOGY

## 2021-04-14 DEVICE — LIGACLIP MCA MULTIPLE CLIP APPLIERS, 20 MEDIUM CLIPS
Type: IMPLANTABLE DEVICE | Site: BREAST | Status: FUNCTIONAL
Brand: LIGACLIP

## 2021-04-14 RX ORDER — DEXAMETHASONE SODIUM PHOSPHATE 10 MG/ML
INJECTION INTRAMUSCULAR; INTRAVENOUS AS NEEDED
Status: DISCONTINUED | OUTPATIENT
Start: 2021-04-14 | End: 2021-04-14 | Stop reason: SURG

## 2021-04-14 RX ORDER — LIDOCAINE HYDROCHLORIDE 10 MG/ML
0.5 INJECTION, SOLUTION EPIDURAL; INFILTRATION; INTRACAUDAL; PERINEURAL ONCE AS NEEDED
Status: COMPLETED | OUTPATIENT
Start: 2021-04-14 | End: 2021-04-14

## 2021-04-14 RX ORDER — CEFAZOLIN SODIUM 2 G/100ML
2 INJECTION, SOLUTION INTRAVENOUS EVERY 8 HOURS
Status: COMPLETED | OUTPATIENT
Start: 2021-04-14 | End: 2021-04-15

## 2021-04-14 RX ORDER — IBUPROFEN 400 MG/1
400 TABLET ORAL EVERY 6 HOURS PRN
Status: DISCONTINUED | OUTPATIENT
Start: 2021-04-14 | End: 2021-04-15 | Stop reason: HOSPADM

## 2021-04-14 RX ORDER — EPHEDRINE SULFATE 50 MG/ML
INJECTION, SOLUTION INTRAVENOUS AS NEEDED
Status: DISCONTINUED | OUTPATIENT
Start: 2021-04-14 | End: 2021-04-14 | Stop reason: SURG

## 2021-04-14 RX ORDER — ONDANSETRON 2 MG/ML
INJECTION INTRAMUSCULAR; INTRAVENOUS AS NEEDED
Status: DISCONTINUED | OUTPATIENT
Start: 2021-04-14 | End: 2021-04-14 | Stop reason: SURG

## 2021-04-14 RX ORDER — FENTANYL CITRATE 50 UG/ML
50 INJECTION, SOLUTION INTRAMUSCULAR; INTRAVENOUS
Status: DISCONTINUED | OUTPATIENT
Start: 2021-04-14 | End: 2021-04-14 | Stop reason: HOSPADM

## 2021-04-14 RX ORDER — TRAMADOL HYDROCHLORIDE 50 MG/1
50 TABLET ORAL EVERY 6 HOURS PRN
Status: DISCONTINUED | OUTPATIENT
Start: 2021-04-14 | End: 2021-04-15 | Stop reason: HOSPADM

## 2021-04-14 RX ORDER — SODIUM CHLORIDE, SODIUM LACTATE, POTASSIUM CHLORIDE, CALCIUM CHLORIDE 600; 310; 30; 20 MG/100ML; MG/100ML; MG/100ML; MG/100ML
9 INJECTION, SOLUTION INTRAVENOUS CONTINUOUS
Status: DISCONTINUED | OUTPATIENT
Start: 2021-04-14 | End: 2021-04-15 | Stop reason: HOSPADM

## 2021-04-14 RX ORDER — ROCURONIUM BROMIDE 10 MG/ML
INJECTION, SOLUTION INTRAVENOUS AS NEEDED
Status: DISCONTINUED | OUTPATIENT
Start: 2021-04-14 | End: 2021-04-14 | Stop reason: SURG

## 2021-04-14 RX ORDER — DIPHENHYDRAMINE HYDROCHLORIDE 50 MG/ML
12.5 INJECTION INTRAMUSCULAR; INTRAVENOUS EVERY 6 HOURS PRN
Status: DISCONTINUED | OUTPATIENT
Start: 2021-04-14 | End: 2021-04-15 | Stop reason: HOSPADM

## 2021-04-14 RX ORDER — SODIUM CHLORIDE 0.9 % (FLUSH) 0.9 %
10 SYRINGE (ML) INJECTION AS NEEDED
Status: DISCONTINUED | OUTPATIENT
Start: 2021-04-14 | End: 2021-04-14 | Stop reason: HOSPADM

## 2021-04-14 RX ORDER — PROMETHAZINE HYDROCHLORIDE 12.5 MG/1
6.25 SUPPOSITORY RECTAL EVERY 6 HOURS PRN
Status: DISCONTINUED | OUTPATIENT
Start: 2021-04-14 | End: 2021-04-15 | Stop reason: HOSPADM

## 2021-04-14 RX ORDER — PROPOFOL 10 MG/ML
VIAL (ML) INTRAVENOUS AS NEEDED
Status: DISCONTINUED | OUTPATIENT
Start: 2021-04-14 | End: 2021-04-14 | Stop reason: SURG

## 2021-04-14 RX ORDER — CEFAZOLIN SODIUM 2 G/100ML
2 INJECTION, SOLUTION INTRAVENOUS ONCE
Status: COMPLETED | OUTPATIENT
Start: 2021-04-14 | End: 2021-04-14

## 2021-04-14 RX ORDER — PROMETHAZINE HYDROCHLORIDE 12.5 MG/1
6.25 TABLET ORAL EVERY 6 HOURS PRN
Status: DISCONTINUED | OUTPATIENT
Start: 2021-04-14 | End: 2021-04-15 | Stop reason: HOSPADM

## 2021-04-14 RX ORDER — GLYCOPYRROLATE 0.2 MG/ML
INJECTION INTRAMUSCULAR; INTRAVENOUS AS NEEDED
Status: DISCONTINUED | OUTPATIENT
Start: 2021-04-14 | End: 2021-04-14 | Stop reason: SURG

## 2021-04-14 RX ORDER — FAMOTIDINE 20 MG/1
20 TABLET, FILM COATED ORAL ONCE
Status: COMPLETED | OUTPATIENT
Start: 2021-04-14 | End: 2021-04-14

## 2021-04-14 RX ORDER — MIDAZOLAM HYDROCHLORIDE 1 MG/ML
1 INJECTION INTRAMUSCULAR; INTRAVENOUS
Status: DISCONTINUED | OUTPATIENT
Start: 2021-04-14 | End: 2021-04-14 | Stop reason: HOSPADM

## 2021-04-14 RX ORDER — TRAZODONE HYDROCHLORIDE 50 MG/1
50 TABLET ORAL NIGHTLY PRN
Status: DISCONTINUED | OUTPATIENT
Start: 2021-04-14 | End: 2021-04-15 | Stop reason: HOSPADM

## 2021-04-14 RX ORDER — HYDROMORPHONE HYDROCHLORIDE 1 MG/ML
0.5 INJECTION, SOLUTION INTRAMUSCULAR; INTRAVENOUS; SUBCUTANEOUS
Status: DISCONTINUED | OUTPATIENT
Start: 2021-04-14 | End: 2021-04-14 | Stop reason: HOSPADM

## 2021-04-14 RX ORDER — SODIUM CHLORIDE 9 MG/ML
50 INJECTION, SOLUTION INTRAVENOUS CONTINUOUS
Status: DISCONTINUED | OUTPATIENT
Start: 2021-04-14 | End: 2021-04-15 | Stop reason: HOSPADM

## 2021-04-14 RX ORDER — FENTANYL CITRATE 50 UG/ML
INJECTION, SOLUTION INTRAMUSCULAR; INTRAVENOUS AS NEEDED
Status: DISCONTINUED | OUTPATIENT
Start: 2021-04-14 | End: 2021-04-14 | Stop reason: SURG

## 2021-04-14 RX ORDER — MIDAZOLAM HYDROCHLORIDE 1 MG/ML
INJECTION INTRAMUSCULAR; INTRAVENOUS AS NEEDED
Status: DISCONTINUED | OUTPATIENT
Start: 2021-04-14 | End: 2021-04-14 | Stop reason: SURG

## 2021-04-14 RX ORDER — BUPIVACAINE HYDROCHLORIDE 2.5 MG/ML
INJECTION, SOLUTION EPIDURAL; INFILTRATION; INTRACAUDAL
Status: COMPLETED | OUTPATIENT
Start: 2021-04-14 | End: 2021-04-14

## 2021-04-14 RX ORDER — BUPRENORPHINE HYDROCHLORIDE 0.32 MG/ML
INJECTION INTRAMUSCULAR; INTRAVENOUS
Status: COMPLETED | OUTPATIENT
Start: 2021-04-14 | End: 2021-04-14

## 2021-04-14 RX ORDER — DEXAMETHASONE SODIUM PHOSPHATE 4 MG/ML
8 INJECTION, SOLUTION INTRA-ARTICULAR; INTRALESIONAL; INTRAMUSCULAR; INTRAVENOUS; SOFT TISSUE ONCE AS NEEDED
Status: DISCONTINUED | OUTPATIENT
Start: 2021-04-14 | End: 2021-04-14 | Stop reason: HOSPADM

## 2021-04-14 RX ORDER — LORAZEPAM 0.5 MG/1
0.5 TABLET ORAL DAILY PRN
Status: DISCONTINUED | OUTPATIENT
Start: 2021-04-14 | End: 2021-04-15 | Stop reason: HOSPADM

## 2021-04-14 RX ORDER — MIDAZOLAM HYDROCHLORIDE 1 MG/ML
2 INJECTION INTRAMUSCULAR; INTRAVENOUS
Status: DISCONTINUED | OUTPATIENT
Start: 2021-04-14 | End: 2021-04-14 | Stop reason: HOSPADM

## 2021-04-14 RX ORDER — ONDANSETRON 2 MG/ML
4 INJECTION INTRAMUSCULAR; INTRAVENOUS ONCE AS NEEDED
Status: DISCONTINUED | OUTPATIENT
Start: 2021-04-14 | End: 2021-04-14 | Stop reason: HOSPADM

## 2021-04-14 RX ORDER — OXYCODONE HYDROCHLORIDE 5 MG/1
5 TABLET ORAL EVERY 4 HOURS PRN
Status: DISCONTINUED | OUTPATIENT
Start: 2021-04-14 | End: 2021-04-15 | Stop reason: HOSPADM

## 2021-04-14 RX ORDER — MAGNESIUM HYDROXIDE 1200 MG/15ML
LIQUID ORAL AS NEEDED
Status: DISCONTINUED | OUTPATIENT
Start: 2021-04-14 | End: 2021-04-14 | Stop reason: HOSPADM

## 2021-04-14 RX ORDER — NEOSTIGMINE METHYLSULFATE 1 MG/ML
INJECTION, SOLUTION INTRAVENOUS AS NEEDED
Status: DISCONTINUED | OUTPATIENT
Start: 2021-04-14 | End: 2021-04-14 | Stop reason: SURG

## 2021-04-14 RX ORDER — LIDOCAINE HYDROCHLORIDE 20 MG/ML
INJECTION, SOLUTION INFILTRATION; PERINEURAL AS NEEDED
Status: DISCONTINUED | OUTPATIENT
Start: 2021-04-14 | End: 2021-04-14 | Stop reason: SURG

## 2021-04-14 RX ORDER — SODIUM CHLORIDE 0.9 % (FLUSH) 0.9 %
10 SYRINGE (ML) INJECTION EVERY 12 HOURS SCHEDULED
Status: DISCONTINUED | OUTPATIENT
Start: 2021-04-14 | End: 2021-04-14 | Stop reason: HOSPADM

## 2021-04-14 RX ORDER — DEXAMETHASONE SODIUM PHOSPHATE 10 MG/ML
INJECTION, SOLUTION INTRAMUSCULAR; INTRAVENOUS
Status: COMPLETED | OUTPATIENT
Start: 2021-04-14 | End: 2021-04-14

## 2021-04-14 RX ORDER — ACETAMINOPHEN 500 MG
1000 TABLET ORAL EVERY 6 HOURS
Status: DISCONTINUED | OUTPATIENT
Start: 2021-04-14 | End: 2021-04-15 | Stop reason: HOSPADM

## 2021-04-14 RX ORDER — ESMOLOL HYDROCHLORIDE 10 MG/ML
INJECTION INTRAVENOUS AS NEEDED
Status: DISCONTINUED | OUTPATIENT
Start: 2021-04-14 | End: 2021-04-14 | Stop reason: SURG

## 2021-04-14 RX ORDER — ONDANSETRON 2 MG/ML
4 INJECTION INTRAMUSCULAR; INTRAVENOUS EVERY 6 HOURS PRN
Status: DISCONTINUED | OUTPATIENT
Start: 2021-04-14 | End: 2021-04-15 | Stop reason: HOSPADM

## 2021-04-14 RX ADMIN — ESMOLOL HYDROCHLORIDE 20 MG: 10 INJECTION, SOLUTION INTRAVENOUS at 08:33

## 2021-04-14 RX ADMIN — GLYCOPYRROLATE 0.4 MG: 0.4 INJECTION INTRAMUSCULAR; INTRAVENOUS at 10:01

## 2021-04-14 RX ADMIN — FENTANYL CITRATE 50 MCG: 50 INJECTION, SOLUTION INTRAMUSCULAR; INTRAVENOUS at 09:47

## 2021-04-14 RX ADMIN — MIDAZOLAM HYDROCHLORIDE 2 MG: 1 INJECTION, SOLUTION INTRAMUSCULAR; INTRAVENOUS at 07:53

## 2021-04-14 RX ADMIN — PROPOFOL 50 MG: 10 INJECTION, EMULSION INTRAVENOUS at 09:41

## 2021-04-14 RX ADMIN — FENTANYL CITRATE 25 MCG: 50 INJECTION, SOLUTION INTRAMUSCULAR; INTRAVENOUS at 10:09

## 2021-04-14 RX ADMIN — BUPIVACAINE HYDROCHLORIDE 60 ML: 2.5 INJECTION, SOLUTION EPIDURAL; INFILTRATION; INTRACAUDAL; PERINEURAL at 08:01

## 2021-04-14 RX ADMIN — PROPOFOL 200 MG: 10 INJECTION, EMULSION INTRAVENOUS at 07:58

## 2021-04-14 RX ADMIN — TRAZODONE HYDROCHLORIDE 50 MG: 50 TABLET ORAL at 21:32

## 2021-04-14 RX ADMIN — BUPRENORPHINE HYDROCHLORIDE 0.3 MG: 0.32 INJECTION INTRAMUSCULAR; INTRAVENOUS at 08:01

## 2021-04-14 RX ADMIN — ACETAMINOPHEN 1000 MG: 500 TABLET, FILM COATED ORAL at 18:21

## 2021-04-14 RX ADMIN — ROCURONIUM BROMIDE 10 MG: 10 INJECTION INTRAVENOUS at 08:25

## 2021-04-14 RX ADMIN — PROPOFOL 50 MG: 10 INJECTION, EMULSION INTRAVENOUS at 09:05

## 2021-04-14 RX ADMIN — DEXAMETHASONE SODIUM PHOSPHATE 6 MG: 10 INJECTION INTRAMUSCULAR; INTRAVENOUS at 08:20

## 2021-04-14 RX ADMIN — ROCURONIUM BROMIDE 10 MG: 10 INJECTION INTRAVENOUS at 09:20

## 2021-04-14 RX ADMIN — OXYCODONE HYDROCHLORIDE 5 MG: 5 TABLET ORAL at 21:32

## 2021-04-14 RX ADMIN — LIDOCAINE HYDROCHLORIDE 50 MG: 20 INJECTION, SOLUTION INFILTRATION; PERINEURAL at 07:58

## 2021-04-14 RX ADMIN — DEXAMETHASONE SODIUM PHOSPHATE 4 MG: 10 INJECTION, SOLUTION INTRAMUSCULAR; INTRAVENOUS at 08:01

## 2021-04-14 RX ADMIN — EPHEDRINE SULFATE 10 MG: 50 INJECTION, SOLUTION INTRAVENOUS at 08:57

## 2021-04-14 RX ADMIN — LIDOCAINE HYDROCHLORIDE 0.5 ML: 10 INJECTION, SOLUTION EPIDURAL; INFILTRATION; INTRACAUDAL; PERINEURAL at 07:04

## 2021-04-14 RX ADMIN — ESMOLOL HYDROCHLORIDE 20 MG: 10 INJECTION, SOLUTION INTRAVENOUS at 09:43

## 2021-04-14 RX ADMIN — OXYCODONE HYDROCHLORIDE 5 MG: 5 TABLET ORAL at 17:15

## 2021-04-14 RX ADMIN — ONDANSETRON 4 MG: 2 INJECTION INTRAMUSCULAR; INTRAVENOUS at 09:40

## 2021-04-14 RX ADMIN — PROPOFOL 25 MCG/KG/MIN: 10 INJECTION, EMULSION INTRAVENOUS at 08:03

## 2021-04-14 RX ADMIN — ROCURONIUM BROMIDE 10 MG: 10 INJECTION INTRAVENOUS at 08:50

## 2021-04-14 RX ADMIN — FAMOTIDINE 20 MG: 20 TABLET, FILM COATED ORAL at 07:05

## 2021-04-14 RX ADMIN — ESMOLOL HYDROCHLORIDE 10 MG: 10 INJECTION, SOLUTION INTRAVENOUS at 09:06

## 2021-04-14 RX ADMIN — ACETAMINOPHEN 1000 MG: 500 TABLET, FILM COATED ORAL at 12:49

## 2021-04-14 RX ADMIN — PROPOFOL 50 MG: 10 INJECTION, EMULSION INTRAVENOUS at 09:06

## 2021-04-14 RX ADMIN — CEFAZOLIN SODIUM 2 G: 2 INJECTION, SOLUTION INTRAVENOUS at 07:53

## 2021-04-14 RX ADMIN — ROCURONIUM BROMIDE 40 MG: 10 INJECTION INTRAVENOUS at 07:58

## 2021-04-14 RX ADMIN — SODIUM CHLORIDE, POTASSIUM CHLORIDE, SODIUM LACTATE AND CALCIUM CHLORIDE 9 ML/HR: 600; 310; 30; 20 INJECTION, SOLUTION INTRAVENOUS at 07:04

## 2021-04-14 RX ADMIN — SODIUM CHLORIDE 50 ML/HR: 9 INJECTION, SOLUTION INTRAVENOUS at 12:49

## 2021-04-14 RX ADMIN — FENTANYL CITRATE 25 MCG: 50 INJECTION, SOLUTION INTRAMUSCULAR; INTRAVENOUS at 10:10

## 2021-04-14 RX ADMIN — TECHNETIUM TC 99M SULFUR COLLOID 1 DOSE: KIT at 07:51

## 2021-04-14 RX ADMIN — NEOSTIGMINE 2.5 MG: 1 INJECTION INTRAVENOUS at 10:01

## 2021-04-14 RX ADMIN — CEFAZOLIN SODIUM 2 G: 2 INJECTION, SOLUTION INTRAVENOUS at 17:15

## 2021-04-14 RX ADMIN — ESMOLOL HYDROCHLORIDE 10 MG: 10 INJECTION, SOLUTION INTRAVENOUS at 09:07

## 2021-04-14 NOTE — ANESTHESIA PROCEDURE NOTES
Peripheral Block      Patient reassessed immediately prior to procedure    Patient location during procedure: OR  Reason for block: at surgeon's request and post-op pain management  Performed by  Anesthesiologist: Efrain Krishnamurthy MD  Preanesthetic Checklist  Completed: patient identified, IV checked, site marked, risks and benefits discussed, surgical consent, monitors and equipment checked, pre-op evaluation and timeout performed  Prep:  Pt Position: supine  Sterile barriers:cap, gloves, gown and mask  Prep: ChloraPrep  Patient monitoring: blood pressure monitoring, continuous pulse oximetry and EKG  Procedure  Performed under: general  Guidance:ultrasound guided and landmark technique  Images:still images obtained, printed/placed on chart    Laterality:Bilateral  Block Type:PECS I and PECS II  Injection Technique:single-shot  Needle Type:short-bevel  Needle Gauge:20 G  Resistance on Injection: none    Medications Used: dexamethasone sodium phosphate injection, 4 mg  buprenorphine (BUPRENEX) injection, 0.3 mg  bupivacaine PF (MARCAINE) 0.25 % injection, 60 mL  Med admintered at 4/14/2021 8:01 AM      Medications  Preservative Free Saline:10ml  Comment:Block Injection:  Total volume of LA divided between Right and Left sided blocks         Post Assessment  Injection Assessment: negative aspiration for heme and incremental injection  Patient Tolerance:comfortable throughout block  Complications:no  Additional Notes  The pt. Was placed in the Supine Position and GA was induced     The insertion site was prepped with CHG and Ultrasound guidance with In-Plane techniquewas  a 4inch BBraun 360 degree echogenic needle was visualized.  Normal Saline PSF was  utilized for hydrodissection of tissue. PECS 1 Block- Pectoralis Major and Minor where identified and LA was injected between PMM and PmM at the level of the 3rd Rib(10ml),  PECS 2-  Pectoralis Minor and Serratus muscle where identified and the needle was advanced  laterally in-plane with the 4th rib as a backstop, pleura was monitored.  LA was injected between SA and PmM at the level of 4th rib( 20ml).  LA injection spread was visualized, injection was incremental 1-5ml, normal or low injection pressure, no intravascular injection, no pneumothorax appreciated.  Thank You.

## 2021-04-14 NOTE — ANESTHESIA PROCEDURE NOTES
Airway  Urgency: elective    Date/Time: 4/14/2021 8:02 AM  Airway not difficult    General Information and Staff    Patient location during procedure: OR    Indications and Patient Condition  Indications for airway management: airway protection    Preoxygenated: yes  MILS maintained throughout  Mask difficulty assessment: 1 - vent by mask    Final Airway Details  Final airway type: endotracheal airway      Successful airway: ETT  Cuffed: yes   Successful intubation technique: direct laryngoscopy  Facilitating devices/methods: intubating stylet  Endotracheal tube insertion site: oral  Blade: Shepherd  Blade size: 2  ETT size (mm): 7.0  Cormack-Lehane Classification: grade I - full view of glottis  Placement verified by: chest auscultation and capnometry   Measured from: lips  ETT/EBT  to lips (cm): 22  Number of attempts at approach: 1  Assessment: lips, teeth, and gum same as pre-op and atraumatic intubation

## 2021-04-14 NOTE — PROGRESS NOTES
"GEN SURG PROGRESS NOTE     LOS: 0 days   Patient Care Team:  Jane Wong APRN as PCP - General (Family Medicine)  Maximiliano Cuellar MD as Consulting Physician (Obstetrics and Gynecology)  Pankaj Cortes MD as Consulting Physician (General Surgery)    Chief Complaint:     Subjective     Interval History:     Patient Complaints: Sore chest  Patient Denies:    History taken from: patient    Review of Systems:        Objective     Vital Signs  /82 (BP Location: Right leg, Patient Position: Lying)   Pulse 58   Temp 98.1 °F (36.7 °C) (Oral)   Resp 18   Ht 165.1 cm (65\")   Wt 68.5 kg (151 lb)   SpO2 97%   BMI 25.13 kg/m²     Physical Exam:   Dressings dry.  Flaps are flat and viable.  ANN MARIE O as expected     Results Review:     I reviewed the patient's new clinical results.  Labs  Lab Results (last 72 hours)     Procedure Component Value Units Date/Time    Potassium [[id number]]  (Normal) Collected: 04/14/21 1204    Specimen: Blood Updated: 04/14/21 1251     Potassium 4.4 mmol/L     Tissue Pathology Exam [[id number]] Collected: 04/14/21 0827    Specimen: Tissue from Breast, Right; Tissue from Breast, Left; Tissue from [place] Lymph Node Updated: 04/14/21 1058    POC Urine Pregnancy Test [[id number]]  (Normal) Collected: 04/14/21 0624    Specimen: Urine Updated: 04/14/21 0624     HCG, Urine, QL Negative     Lot Number [id number] 7/22     Internal Positive Control Reactive     Internal Negative Control NA          Rads  Imaging Results (Last 72 Hours)     ** No results found for the last 72 hours. **          Assessment/Plan    Stable postop      History of left breast cancer        Pankaj Cortes MD  04/14/21  17:15 EDT        "

## 2021-04-14 NOTE — ANESTHESIA POSTPROCEDURE EVALUATION
Patient: Rai Guillory    Procedure Summary     Date: 04/14/21 Room / Location:  [facility]    Anesthesia Start: 0753 Anesthesia Stop: 1023    Procedure: BILATERAL MASTECTOMY, WITH LEFT SENTINEL NODE BIOPSY (Bilateral Breast) Diagnosis:     Surgeons: Pankaj Cortes MD Provider: Efrain Krishnamurthy MD    Anesthesia Type: general with block ASA Status: 3          Anesthesia Type: general with block    Vitals  Vitals Value Taken Time   /72 04/14/21 1021   Temp     Pulse     Resp     SpO2 93 % 04/14/21 1022   Vitals shown include unvalidated device data.        Post Anesthesia Care and Evaluation    Patient location during evaluation: PACU  Patient participation: complete - patient participated  Level of consciousness: awake and alert and responsive to verbal stimuli  Pain management: adequate  Airway patency: patent  Anesthetic complications: No anesthetic complications  PONV Status: none  Cardiovascular status: hemodynamically stable and acceptable  Respiratory status: nonlabored ventilation, acceptable and nasal cannula  Hydration status: acceptable

## 2021-04-14 NOTE — ANESTHESIA PREPROCEDURE EVALUATION
Anesthesia Evaluation     Patient summary reviewed and Nursing notes reviewed                Airway   Mallampati: II  TM distance: >3 FB  Neck ROM: full  No difficulty expected  Dental - normal exam     Pulmonary - normal exam   (+) a smoker (THC use) Current,   Cardiovascular - negative cardio ROS and normal exam        Neuro/Psych- negative ROS  GI/Hepatic/Renal/Endo - negative ROS     Musculoskeletal (-) negative ROS    Abdominal  - normal exam    Bowel sounds: normal.   Substance History - negative use     OB/GYN negative ob/gyn ROS         Other      history of cancer (breast)                    Anesthesia Plan    ASA 3     general with block   (PECS)  intravenous induction     Anesthetic plan, all risks, benefits, and alternatives have been provided, discussed and informed consent has been obtained with: patient.    Plan discussed with CRNA.

## 2021-04-14 NOTE — INTERVAL H&P NOTE
"[facility] Pre-op    Full history and physical note from office is up to date. 4/14/2021    /72 (BP Location: Right arm, Patient Position: Lying)   Pulse 72   Temp 98 °F (36.7 °C) (Temporal)   Resp 18   Ht 165.1 cm (65\")   Wt 68.5 kg (151 lb)   SpO2 99%   BMI 25.13 kg/m²     IMM:  Influenza:  no  Pneumococcal:  no  Tetanus:  no    LAB Results:  Lab Results   Component Value Date    WBC 6.56 04/13/2021    HGB 13.0 04/13/2021    HCT 39.3 04/13/2021    MCV 93.6 04/13/2021     04/13/2021    NEUTROABS 4.16 11/09/2020    GLUCOSE 100 (H) 04/13/2021    BUN 11 04/13/2021    CREATININE 0.72 04/13/2021    EGFRIFAFRI 110 04/13/2021     (L) 04/13/2021    K 4.1 04/13/2021     04/13/2021    CO2 25.0 04/13/2021    CALCIUM 8.7 04/13/2021    ALBUMIN 4.20 01/05/2020    AST 12 01/05/2020    ALT 7 01/05/2020    BILITOT 0.3 01/05/2020       Cancer Staging (if applicable)  Cancer Patient: __ yes __no __unknown__N/A; If yes, clinical stage T:__ N:__M:__, stage group or __N/A    ADAM Terrell 4/14/2021 07:03 EDT   H&P reviewed. The patient was examined and there are no changes to the H&P.        "

## 2021-04-14 NOTE — OP NOTE
BREAST MASTECTOMY WITH SENTINEL NODE BIOPSY  Procedure Note    Rai Guillory  [id number]   1985     Date of Surgery:  4/14/2021    Pre-op Diagnosis:   Carcinoma of the left breast    Post-op Diagnosis:     Same    Operative Procedure:   Bilateral mastectomies; left axillary sentinel node biopsy    Surgeon: Pankaj Cortes MD     Circulator: Paola Monroy RN  Physician Assistant: Balbina Yoo PA-C  Scrub Person: Stephenie Lopez  Assistant: Kellee Balderas PA  Orientee: Yasmeen Bob     Assistant: Kellee Balderas PA    Anesthesia: General     Indications:   Ms. 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  She has opted to have bilateral mastectomies and does not desire reconstructive efforts.  She presents today for elective surgery.    Operative note:  On 4/14/2021, the patient was taken to the operating room and placed supine on the operating table.  Following adequate induction of general endotracheal anesthesia, approximately 540 µCi of technetium sulfur colloid solution was injected dermally around the left nipple.  A roll was then placed underneath the left shoulder and the left arm padded and secured.  The left arm, axilla and both sides of the chest were then prepped and draped in the standard fashion.  Skin lines were marked out on each side in an elliptical manner to include the nipple areolar complex.  The unaffected right side was approached first by incising the skin.  Skin flaps were raised circumferentially around the breast.  These were of adequate thickness and vascularity.  No gross abnormality was encountered.  The left breast was then amputated off the pectoralis fascia with the cautery.  The breast was passed off the table.  The wound was irrigated.  Hemostasis was excellent.  A 10 mm flat ANN MARIE drain was brought from an inferior stab wound and directed across the pectoralis.  This was sutured to the skin with 3-0  "nylon.  Absorbable subcu staples were used to close the subcutaneous tissue and the skin closed with a running 4-0 Vicryl subcuticular suture.  The drain was placed to bulb suction and this side covered with a sterile towel.  The left side was then approached in a similar manner.  Skin flaps were raised circumferentially around the breast.  These were of adequate thickness and vascularity.  No gross abnormality was encountered.  The left breast was then amputated off the pectoralis fascia with the cautery.  The breast was then marked for pathology.  The neoprobe was then brought into the field and an area of uptake in the upper lateral aspect of the axilla identified.  Retraction was placed into the wound and 3 nodes were identified separately.  Each of these was harvested separately and ex vivo scanning of each revealed them to be \"hot\".  These were labeled sentinel nodes.  These were passed off the table for permanent sectioning.  Repeat scanning in the axilla revealed no other abnormalities.  There was no gross randee adenopathy noted.  The wound was then irrigated with sterile water.  Hemostasis was excellent.  210 mm flat ANN MARIE drains were brought from inferior stab wounds.  One was directed across the pectoralis, the other directed into the axilla.  These were sutured to the skin.  The subcutaneous tissue was then closed with interrupted absorbable staples and the skin closed with a running 4-0 Vicryl subcuticular suture.  These drains were placed to bulb suction and sterile dressings applied to both sides.  The patient tolerated the procedure well and there were no complications.  Estimated blood loss is 50 to 75 mL.  Both preoperative and postoperative sponge and needle counts were correct.  The patient was taken to the recovery room in satisfactory condition.    Assistant: Kellee Balderas PA  was responsible for performing the following activities: Retraction, Suction, Irrigation, Suturing, Closing and Placing " Dressing and their skilled assistance was necessary for the success of this case.    Pankaj Cortes MD     Date: 4/14/2021  Time: 10:18 EDT

## 2021-04-15 ENCOUNTER — READMISSION MANAGEMENT (OUTPATIENT)
Dept: CALL CENTER | Facility: HOSPITAL | Age: 36
End: 2021-04-15

## 2021-04-15 VITALS
HEART RATE: 64 BPM | HEIGHT: 65 IN | TEMPERATURE: 98.4 F | RESPIRATION RATE: 18 BRPM | BODY MASS INDEX: 25.16 KG/M2 | WEIGHT: 151 LBS | OXYGEN SATURATION: 94 % | SYSTOLIC BLOOD PRESSURE: 109 MMHG | DIASTOLIC BLOOD PRESSURE: 62 MMHG

## 2021-04-15 PROCEDURE — G0378 HOSPITAL OBSERVATION PER HR: HCPCS

## 2021-04-15 PROCEDURE — 25010000003 CEFAZOLIN IN DEXTROSE 2-4 GM/100ML-% SOLUTION: Performed by: SURGERY

## 2021-04-15 RX ORDER — OXYCODONE HYDROCHLORIDE 5 MG/1
5 TABLET ORAL EVERY 4 HOURS PRN
Qty: 16 TABLET | Refills: 0 | Status: SHIPPED | OUTPATIENT
Start: 2021-04-15 | End: 2021-04-21

## 2021-04-15 RX ADMIN — ACETAMINOPHEN 1000 MG: 500 TABLET, FILM COATED ORAL at 00:21

## 2021-04-15 RX ADMIN — OXYCODONE HYDROCHLORIDE 5 MG: 5 TABLET ORAL at 10:19

## 2021-04-15 RX ADMIN — CEFAZOLIN SODIUM 2 G: 2 INJECTION, SOLUTION INTRAVENOUS at 00:21

## 2021-04-15 RX ADMIN — ACETAMINOPHEN 1000 MG: 500 TABLET, FILM COATED ORAL at 06:10

## 2021-04-15 NOTE — OUTREACH NOTE
Prep Survey      Responses   [facility] patient discharged from?  [place]   Is LACE score < 7 ?  Yes   Emergency Room discharge w/ pulse ox?  No   Eligibility  [facility]   Date of Admission  04/14/21   Date of Discharge  04/15/21   Discharge Disposition  Home or Self Care   Discharge diagnosis  MASTECTOMY BILATERAL WITH LEFT SENTINEL NODE BIOPSY   Does the patient have one of the following disease processes/diagnoses(primary or secondary)?  General Surgery   Does the patient have Home health ordered?  No   Is there a DME ordered?  No   Prep survey completed?  Yes          Marybel Byrd RN

## 2021-04-15 NOTE — DISCHARGE SUMMARY
"Date of Discharge:  4/15/2021    Discharge Diagnosis:   Carcinoma of the left breast    Problem List:    History of left breast cancer      Presenting Problem/History of Present Illness  History of left breast cancer [Z85.3]        Hospital Course  Patient is a 35 y.o. female presented with chronic mastitis.  When this was treated surgically she was found to have a DCIS lesion of the central breast.  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  She opted for mastectomies without reconstruction.  On 4/14/2021, she underwent bilateral mastectomies and a left axillary sentinel node biopsy.  Her postoperative course has been on remarkable.  Her pain is been well controlled, she has been able to ambulate and void, and she is tolerated a diet.  On the date of discharge, she is afebrile and in satisfactory condition.  Her dressings are dry, her skin flaps flat and viable, and her Jeff-Carmichael output is as expected.  She is discharged home in satisfactory condition with wound care and activity limitations thoroughly discussed with her.  She will resume her medications as at home.  In addition, a prescription for Percocet 5 #16 was given.  She will follow-up in my office on 4/20/2021..      Procedures Performed  Procedure(s):  MASTECTOMY BILATERAL WITH LEFT SENTINEL NODE BIOPSY       Consults:   Consults     No orders found for last 30 day(s).          Pertinent Test Results:       Vital Signs  /59 (BP Location: Right leg, Patient Position: Lying)   Pulse 74   Temp 98.4 °F (36.9 °C) (Oral)   Resp 16   Ht 165.1 cm (65\")   Wt 68.5 kg (151 lb)   SpO2 95%   BMI 25.13 kg/m²     Discharge Disposition  Where: home      Discharge Medications     Discharge Medications      New Medications      Instructions Start Date   oxyCODONE 5 MG immediate release tablet  Commonly known as: ROXICODONE   5 mg, Oral, Every 4 Hours PRN         Changes to Medications      Instructions Start Date   LORazepam 0.5 MG " tablet  Commonly known as: ATIVAN  What changed:   · how much to take  · how to take this  · when to take this  · reasons to take this   Take one tablet as needed up to once a day for severe anxiety      traZODone 50 MG tablet  Commonly known as: DESYREL  What changed:   · how much to take  · how to take this  · when to take this  · reasons to take this   Take one to two tablets at bedtime for sleep         Continue These Medications      Instructions Start Date   ibuprofen 600 MG tablet  Commonly known as: ADVIL,MOTRIN   600 mg, Oral, Every 8 Hours PRN             Discharge Diet   Regular    Activity at Discharge  No heavy lifting or strenuous physical exercise    Follow-up Appointments  Dr. Cortes on 4/20/2021      Test Results Pending at Discharge  Final pathology      Pankaj Cortes MD  04/15/21  07:18 EDT

## 2021-04-15 NOTE — PLAN OF CARE
Problem: Adult Inpatient Plan of Care  Goal: Plan of Care Review  Outcome: Ongoing, Progressing  Flowsheets  Taken 4/15/2021 0304 by Ayaz Lucero, RN  Progress: improving  Outcome Summary: VSS. Resting well. JPs stripped and emptied q4, mild output. UOP adq. Incision dressing intact/ dry. Up ad latesha. Continue to monitor. Probable DC today.  Taken 4/14/2021 0716 by Beatriz Lin RN  Plan of Care Reviewed With: patient   Goal Outcome Evaluation:     Progress: improving  Outcome Summary: VSS. Resting well. JPs stripped and emptied q4, mild output. UOP adq. Incision dressing intact/ dry. Up ad latesha. Continue to monitor. Probable DC today.

## 2021-04-15 NOTE — PLAN OF CARE
Goal Outcome Evaluation:         Incision C/D/I, with no drainage. ANN MARIE drains x3 to bulb suction; bulbs emptied and tubes striped. Discharge education for home care of ANN MARIE drains provided; patient verbalized understanding and properly demonstrated how to care for drains. Discharged home.

## 2021-04-16 ENCOUNTER — TRANSITIONAL CARE MANAGEMENT TELEPHONE ENCOUNTER (OUTPATIENT)
Dept: CALL CENTER | Facility: HOSPITAL | Age: 36
End: 2021-04-16

## 2021-04-16 ENCOUNTER — HOSPITAL ENCOUNTER (EMERGENCY)
Facility: HOSPITAL | Age: 36
Discharge: HOME | End: 2021-04-16
Attending: EMERGENCY MEDICINE | Admitting: EMERGENCY MEDICINE

## 2021-04-16 VITALS
HEIGHT: 65 IN | RESPIRATION RATE: 16 BRPM | OXYGEN SATURATION: 99 % | TEMPERATURE: 97.5 F | DIASTOLIC BLOOD PRESSURE: 88 MMHG | BODY MASS INDEX: 25.16 KG/M2 | SYSTOLIC BLOOD PRESSURE: 116 MMHG | WEIGHT: 151 LBS | HEART RATE: 97 BPM

## 2021-04-16 DIAGNOSIS — Z90.13 STATUS POST MASTECTOMY, BILATERAL: ICD-10-CM

## 2021-04-16 DIAGNOSIS — Z51.89 VISIT FOR WOUND CHECK: Primary | ICD-10-CM

## 2021-04-16 PROCEDURE — 99282 EMERGENCY DEPT VISIT SF MDM: CPT

## 2021-04-16 NOTE — OUTREACH NOTE
Call Center TCM Note      Responses   [facility] patient discharged from?  [place]   Does the patient have one of the following disease processes/diagnoses(primary or secondary)?  General Surgery   TCM attempt successful?  Yes   Call start time  1306   Call end time  1313   Discharge diagnosis  MASTECTOMY BILATERAL WITH LEFT SENTINEL NODE BIOPSY   Is patient permission given to speak with other caregiver?  No   Meds reviewed with patient/caregiver?  Yes   Is the patient having any side effects they believe may be caused by any medication additions or changes?  No   Does the patient have all medications related to this admission filled (includes all antibiotics, pain medications, etc.)  Yes   Is the patient taking all medications as directed (includes completed medication regime)?  Yes   Does the patient have a follow up appointment scheduled with their surgeon?  Yes [4/20/21  2pm with Dr Cortes]   Has the patient kept scheduled appointments due by today?  N/A   Comments  PCP Jane Wong APRN. Patient declined PCP follow up appt.    Has home health visited the patient within 72 hours of discharge?  N/A   Psychosocial issues?  No   Comments  patient reports that she is following wound care instructions and emptying drains and recording output.    Did the patient receive a copy of their discharge instructions?  Yes   Nursing interventions  Reviewed instructions with patient   What is the patient's perception of their health status since discharge?  Improving   Nursing interventions  Nurse provided patient education   Is the patient /caregiver able to teach back basic post-op care?  Continue use of incentive spirometry at least 1 week post discharge, Practice 'cough and deep breath', Lifting as instructed by MD in discharge instructions, Keep incision areas clean,dry and protected   Is the patient/caregiver able to teach back signs and symptoms of incisional infection?  Increased redness, swelling or pain at  the incisonal site, Increased drainage or bleeding, Incisional warmth, Pus or odor from incision, Fever   Is the patient/caregiver able to teach back steps to recovery at home?  Eat a well-balance diet, Rest and rebuild strength, gradually increase activity, Set small, achievable goals for return to baseline health   Is the patient/caregiver able to teach back the hierarchy of who to call/visit for symptoms/problems? PCP, Specialist, Home health nurse, Urgent Care, ED, 911  Yes   TCM call completed?  Yes          Kiana Reyes RN    4/16/2021, 13:13 EDT

## 2021-04-17 NOTE — ED PROVIDER NOTES
EMERGENCY DEPARTMENT ENCOUNTER      Pt Name: Rai Guillory  MRN: [id number]  YOB: 1985  Date of evaluation: 4/16/2021  Provider: Maxime Waterman MD    CHIEF COMPLAINT       Chief Complaint   Patient presents with   • Post-op Problem         HISTORY OF PRESENT ILLNESS  (Location/Symptom, Timing/Onset, Context/Setting, Quality, Duration, Modifying Factors, Severity.)   Rai Guillory is a 35 y.o. female who presents to the emergency department 1 week status post bilateral mastectomy with Dr. Cortes.  Patient has ANN MARIE drains in place bilaterally and notes that today her left-sided ANN MARIE drain seems to be putting out less than normal and she is having some clear drainage from around the insertion site of the drain.  She denies any swelling, pain, fever, chills, or any other symptoms in association with this.      Nursing notes were reviewed.    REVIEW OF SYSTEMS    (2-9 systems for level 4, 10 or more for level 5)   ROS:  General:  No fevers, no chills, no weakness  Cardiovascular:  No chest pain, no palpitations  Respiratory:  No shortness of breath, no cough, no wheezing  Gastrointestinal:  No pain, no nausea, no vomiting, no diarrhea  Musculoskeletal:  No muscle pain, no joint pain  Skin:  No rash, no easy bruising  Neurologic:  No speech problems, no headache, no extremity numbness, no extremity tingling, no extremity weakness  Psychiatric:  No anxiety  Genitourinary:  No dysuria, no hematuria    Except as noted above the remainder of the review of systems was reviewed and negative.       PAST MEDICAL HISTORY     Past Medical History:   Diagnosis Date   • Cancer (CMS/HCC)     Breast    • History of abnormal cervical Pap smear    • Mastitis    • Ovarian cyst    • STD exposure     Chlamydia and GC   • Tattoos     x 3 above waist          SURGICAL HISTORY       Past Surgical History:   Procedure Laterality Date   • BREAST SURGERY Left 12/2020    Lumpectomy    • DIAGNOSTIC LAPAROSCOPY   03/29/2018    Endometrial ablation   • FOOT SURGERY  09/2017   • KNEE ACL RECONSTRUCTION  06/2007   • MASTECTOMY W/ SENTINEL NODE BIOPSY Bilateral 4/14/2021    Procedure: MASTECTOMY BILATERAL WITH LEFT SENTINEL NODE BIOPSY;  Surgeon: Pankaj Cortes MD;  [facility];  Service: General;  Laterality: Bilateral;   • TUBAL ABDOMINAL LIGATION  11/2013         CURRENT MEDICATIONS     No current facility-administered medications for this encounter.    Current Outpatient Medications:   •  ibuprofen (ADVIL,MOTRIN) 600 MG tablet, Take 1 tablet by mouth Every 8 (Eight) Hours As Needed for Moderate Pain  or Fever., Disp: 30 tablet, Rfl: 0  •  LORazepam (ATIVAN) 0.5 MG tablet, Take one tablet as needed up to once a day for severe anxiety (Patient taking differently: Take 0.5 mg by mouth Daily As Needed for Anxiety. Take one tablet as needed up to once a day for severe anxiety), Disp: 30 tablet, Rfl: 0  •  oxyCODONE (ROXICODONE) 5 MG immediate release tablet, Take 1 tablet by mouth Every 4 (Four) Hours As Needed for Severe Pain  for up to 6 days., Disp: 16 tablet, Rfl: 0  •  traZODone (DESYREL) 50 MG tablet, Take one to two tablets at bedtime for sleep (Patient taking differently: Take 50 mg by mouth At Night As Needed for Sleep. Take one to two tablets at bedtime for sleep), Disp: 60 tablet, Rfl: 2    ALLERGIES     Patient has no known allergies.    FAMILY HISTORY       Family History   Problem Relation Age of Onset   • No Known Problems Mother    • Diabetes Father    • Heart disease Father    • Alcohol abuse Father    • Hypertension Father    • Breast cancer Paternal Cousin 23   • Ovarian cancer Neg Hx           SOCIAL HISTORY       Social History     Socioeconomic History   • Marital status: Significant Other     Spouse name: Not on file   • Number of children: Not on file   • Years of education: Not on file   • Highest education level: Not on file   Tobacco Use   • Smoking status: Current Some Day Smoker      "Packs/day: 0.25     Years: 10.00     Pack years: 2.50     Types: Cigarettes   • Smokeless tobacco: Never Used   Vaping Use   • Vaping Use: Never used   Substance and Sexual Activity   • Alcohol use: Yes     Alcohol/week: 2.0 standard drinks     Types: 2 Shots of liquor per week     Comment: once or twice a wk   • Drug use: Yes     Types: Marijuana     Comment: Almost every day    • Sexual activity: Yes     Partners: Male     Birth control/protection: Surgical     Comment: Tubal ligation         PHYSICAL EXAM    (up to 7 for level 4, 8 or more for level 5)     Vitals:    04/16/21 1919 04/16/21 2235   BP: 113/76 116/88   BP Location: Left arm Right arm   Patient Position: Sitting Lying   Pulse: 75 97   Resp: 16 16   Temp: 97.5 °F (36.4 °C)    TempSrc: Temporal    SpO2: 100% 99%   Weight: 68.5 kg (151 lb)    Height: 165.1 cm (65\")        Physical Exam  General: Awake, alert, no acute distress.  HEENT: Conjunctiva normal.  Neck: Trachea midline.  Cardiac: Heart regular rate, rhythm, no murmurs, rubs, or gallops  Lungs: Lungs are clear to auscultation, there is no wheezing, rhonchi, or rales. There is no use of accessory muscles.  Chest wall: There is no tenderness to palpation over the chest wall or over ribs  Abdomen: Abdomen is soft, nontender, nondistended. There are no firm or pulsatile masses, no rebound rigidity or guarding.   Musculoskeletal: No deformity.  Neuro: Alert and oriented x 4.  Dermatology: Skin is warm and dry.  Bilateral ANN MARIE drains drains in place.  The right drain has more output than the left drain, however there is some output in the left drain.  There is a small amount of serous discharge from the left drain insertion site but no fluctuance, tenderness, induration, or evidence of erythema.  Psych: Mentation is grossly normal, cognition is grossly normal. Affect is appropriate.          EMERGENCY DEPARTMENT COURSE and DIFFERENTIAL DIAGNOSIS/MDM:   Vitals:    Vitals:    04/16/21 1919 04/16/21 2235 " "  BP: 113/76 116/88   BP Location: Left arm Right arm   Patient Position: Sitting Lying   Pulse: 75 97   Resp: 16 16   Temp: 97.5 °F (36.4 °C)    TempSrc: Temporal    SpO2: 100% 99%   Weight: 68.5 kg (151 lb)    Height: 165.1 cm (65\")        ED Course as of Apr 17 0531 Fri Apr 16, 2021   2230 Spoke w/ Dr. Harris regarding this patient. NTD. Follow up with Dr. Cortes on Tuesday.    [NS]      ED Course User Index  [NS] Maxime Waterman MD       Patient very well-appearing throughout the duration of her stay in the emergency department.  Her left-sided drain does have some decreased output, however there is some output still in the drain.  Assessment of the wound is very benign without any evidence of surrounding cellulitis, abscess, or purulent drainage.  I consulted surgery who recommends discharge and close outpatient follow-up.    I had a discussion with the patient/family regarding diagnosis, diagnostic results, treatment plan, and medications.  The patient/family indicated understanding of these instructions.  I spent adequate time at the bedside preceding discharge necessary to personally discuss the aftercare instructions, giving patient education, providing explanations of the results of our evaluations/findings, and my decision making to assure that the patient/family understand the plan of care.  Time was allotted to answer questions at that time and throughout the ED course.  Emphasis was placed on timely follow-up after discharge.  I also discussed the potential for the development of an acute emergent condition requiring further evaluation, admission, or even surgical intervention. I discussed that we found nothing during the visit today indicating the need for further workup, admission, or the presence of an unstable medical condition.  I encouraged the patient to return to the emergency department immediately for ANY concerns, worsening, new complaints, or if symptoms persist and unable to seek " follow-up in a timely fashion.  The patient/family expressed understanding and agreement with this plan.  The patient will follow-up with their PCP in 1-2 days for reevaluation.         FINAL IMPRESSION      1. Visit for wound check    2. Status post mastectomy, bilateral          DISPOSITION/PLAN     ED Disposition     ED Disposition Condition Comment    Discharge Stable           PATIENT REFERRED TO:  Marvin Jane Kiley, APRN  [address]  [place] [address]  [phone]    Schedule an appointment as soon as possible for a visit in 2 days      [facility] Emergency Department  [address] [address] [phone]    If symptoms worsen      DISCHARGE MEDICATIONS:     Medication List      CHANGE how you take these medications    LORazepam 0.5 MG tablet  Commonly known as: ATIVAN  Take one tablet as needed up to once a day for severe anxiety  What changed:   · how much to take  · how to take this  · when to take this  · reasons to take this     traZODone 50 MG tablet  Commonly known as: DESYREL  Take one to two tablets at bedtime for sleep  What changed:   · how much to take  · how to take this  · when to take this  · reasons to take this        CONTINUE taking these medications    ibuprofen 600 MG tablet  Commonly known as: ADVIL,MOTRIN  Take 1 tablet by mouth Every 8 (Eight) Hours As Needed for Moderate Pain  or Fever.     oxyCODONE 5 MG immediate release tablet  Commonly known as: ROXICODONE  Take 1 tablet by mouth Every 4 (Four) Hours As Needed for Severe Pain  for up to 6 days.                Comment: Please note this report has been produced using speech recognition software.      Maxime Waterman MD  Attending Emergency Physician               Maxime Waterman MD  04/17/21 0553

## 2021-04-28 ENCOUNTER — OFFICE VISIT (OUTPATIENT)
Dept: PSYCHIATRY | Facility: CLINIC | Age: 36
End: 2021-04-28

## 2021-04-28 DIAGNOSIS — F51.05 INSOMNIA DUE TO MENTAL CONDITION: ICD-10-CM

## 2021-04-28 DIAGNOSIS — F41.1 GENERALIZED ANXIETY DISORDER: Primary | ICD-10-CM

## 2021-04-28 PROCEDURE — 99443 PR PHYS/QHP TELEPHONE EVALUATION 21-30 MIN: CPT | Performed by: NURSE PRACTITIONER

## 2021-04-28 RX ORDER — LORAZEPAM 1 MG/1
1 TABLET ORAL DAILY PRN
Qty: 30 TABLET | Refills: 0 | Status: SHIPPED | OUTPATIENT
Start: 2021-04-28 | End: 2021-05-13 | Stop reason: SDUPTHER

## 2021-04-28 RX ORDER — TEMAZEPAM 30 MG/1
30 CAPSULE ORAL NIGHTLY PRN
Qty: 30 CAPSULE | Refills: 0 | Status: SHIPPED | OUTPATIENT
Start: 2021-04-28 | End: 2021-05-27 | Stop reason: SDUPTHER

## 2021-04-28 NOTE — PROGRESS NOTES
Subjective   Rai Guillory is a 36 y.o. female who is here today for medication management follow up. You have chosen to receive care through a telephone visit. Do you consent to use a telephone visit for your medical care today? Yes    TIME IN:2p  TIME OUT:2:26p    Chief Complaint: TARA, insomnia     History of Present Illness Patient presents via telephone reports she is very tired since her mastectomy and just got drains out two days ago. Sleep poor, awakening often throughout night. 100 mg Trazodone ineffective. Stress with going to see oncologist tomorrow.  She does not know what to expect she states.  Encouraged patient to take her boyfriend or family member or friend with her.  This is allowed on the first visit with oncologist.  Patient reports worrying about her health, difficulty with sleep, feeling internal pressure and muscle tension rating anxiety about a 7.  Denies depression.  Acknowledged and normalized patient's thoughts feelings and concerns.  Allowed patient to vent her concerns and processed feelings with her.  Denies adverse effects from medications.   (Scales based on 0 - 10 with 10 being the worst)      The following portions of the patient's history were reviewed and updated as appropriate: allergies, current medications, past family history, past medical history, past social history, past surgical history and problem list.    Review of Systems  A 14 point review of systems was performed and is negative except as noted above.    Objective   Physical Exam  not currently breastfeeding.    No Known Allergies    Current Medications:   Current Outpatient Medications   Medication Sig Dispense Refill   • ibuprofen (ADVIL,MOTRIN) 600 MG tablet Take 1 tablet by mouth Every 8 (Eight) Hours As Needed for Moderate Pain  or Fever. 30 tablet 0   • LORazepam (ATIVAN) 1 MG tablet Take 1 tablet by mouth Daily As Needed for Anxiety. Take one tablet as needed up to once a day for severe anxiety 30  tablet 0   • temazepam (RESTORIL) 30 MG capsule Take 1 capsule by mouth At Night As Needed for Sleep. 30 capsule 0     No current facility-administered medications for this visit.       Appearance: na  Hygiene:  REPORTS good  Cooperation:  Cooperative  Eye Contact:  na  Psychomotor Behavior:  denies psychomotor agitation/retardation, No EPS, No motor tics  Mood: anxious tired  Affect:  na  Hopelessness: Denies  Speech:  Normal  Thought Process:  Linear  Thought Content:  Normal  Concentration: Normal   Suicidal: denies  Homicidal:  None  Hallucinations:  None  Delusion:  None  Memory:  Intact  Orientation:  Person, Place, Time and Situation  Reliability:  good  Insight:  Fair  Judgement: good  Impulse Control: good  Estimated Intelligence: average range    VIJI REVIEWED NO RED FLAGS    Assessment/Plan   Diagnoses and all orders for this visit:    1. Generalized anxiety disorder (Primary)  -     LORazepam (ATIVAN) 1 MG tablet; Take 1 tablet by mouth Daily As Needed for Anxiety. Take one tablet as needed up to once a day for severe anxiety  Dispense: 30 tablet; Refill: 0    2. Insomnia due to mental condition  -     temazepam (RESTORIL) 30 MG capsule; Take 1 capsule by mouth At Night As Needed for Sleep.  Dispense: 30 capsule; Refill: 0      IMPRESSION: Heightened stressors increased anxiety and disturbance of sleep    PLAN: Trazodone ineffective we will discontinue  Add temazepam 30 mg 1 p.o. q. at bedtime for sleeplessness  Increase lorazepam to 1 mg as needed daily for anxiety and near panic feelings    We discussed risks, benefits, and side effects of the above medications and the patient was agreeable with the plan. Patient was educated on the importance of compliance with treatment and follow-up appointments.     Provide Cognitive Behavioral Therapy and Solution Focused Therapy to improve functioning, maintain stability, and avoid decompensation and the need for higher level of care.    Counseled patient  regarding multimodal approach with encouragement of healthy nutrition, healthy sleep, regular physical mobility, social involvement, counseling, and medication compliance.     Assisted patient in identifying risk factors which would indicate the need for higher level of care including thoughts to harm self or others and/or self-harming behavior and encouraged patient to contact this office, call 911, or present to the nearest emergency room should any of these events occur. Discussed crisis intervention services and means to access.  Patient adamantly and convincingly denies current suicidal or homicidal ideation or perceptual disturbance.    Treatment Plan: stabilize mood, patient will stay out of psychiatric hospital and be at optimal level of functioning with therapy and take all medication as prescribed. Patient verbalized  understanding and agreement to plan.    Instructed to call for questions or concerns and return early if necessary.     Greater than 50% time was spent in coordination of care, and counseling the patient regarding current assessment, symptoms, plan of care going forward, supportive therapy.  Answered any questions patient had regarding medications and plan of care.    Return in about 2 weeks (around 5/12/2021).

## 2021-04-29 ENCOUNTER — CONSULT (OUTPATIENT)
Dept: ONCOLOGY | Facility: CLINIC | Age: 36
End: 2021-04-29

## 2021-04-29 VITALS
HEIGHT: 65 IN | WEIGHT: 143.7 LBS | OXYGEN SATURATION: 96 % | RESPIRATION RATE: 16 BRPM | DIASTOLIC BLOOD PRESSURE: 67 MMHG | BODY MASS INDEX: 23.94 KG/M2 | HEART RATE: 107 BPM | TEMPERATURE: 96.6 F | SYSTOLIC BLOOD PRESSURE: 99 MMHG

## 2021-04-29 DIAGNOSIS — C50.412 CARCINOMA OF UPPER-OUTER QUADRANT OF LEFT BREAST IN FEMALE, ESTROGEN RECEPTOR POSITIVE (HCC): Primary | ICD-10-CM

## 2021-04-29 DIAGNOSIS — Z17.0 CARCINOMA OF UPPER-OUTER QUADRANT OF LEFT BREAST IN FEMALE, ESTROGEN RECEPTOR POSITIVE (HCC): Primary | ICD-10-CM

## 2021-04-29 PROCEDURE — 99205 OFFICE O/P NEW HI 60 MIN: CPT | Performed by: INTERNAL MEDICINE

## 2021-04-29 RX ORDER — SODIUM CHLORIDE 9 MG/ML
250 INJECTION, SOLUTION INTRAVENOUS ONCE
Status: CANCELLED | OUTPATIENT
Start: 2021-05-20

## 2021-04-29 RX ORDER — ONDANSETRON HYDROCHLORIDE 8 MG/1
8 TABLET, FILM COATED ORAL 3 TIMES DAILY PRN
Qty: 30 TABLET | Refills: 5 | Status: SHIPPED | OUTPATIENT
Start: 2021-04-29 | End: 2021-07-21

## 2021-04-29 RX ORDER — DOXORUBICIN HYDROCHLORIDE 2 MG/ML
60 INJECTION, SOLUTION INTRAVENOUS ONCE
Status: CANCELLED | OUTPATIENT
Start: 2021-05-20

## 2021-04-29 RX ORDER — PALONOSETRON 0.05 MG/ML
0.25 INJECTION, SOLUTION INTRAVENOUS ONCE
Status: CANCELLED | OUTPATIENT
Start: 2021-05-20

## 2021-04-29 RX ORDER — DEXAMETHASONE 4 MG/1
TABLET ORAL
Qty: 6 TABLET | Refills: 3 | Status: SHIPPED | OUTPATIENT
Start: 2021-04-29 | End: 2021-06-17 | Stop reason: SDUPTHER

## 2021-04-29 NOTE — PROGRESS NOTES
ID: 36 y.o. year old female from [facility] [id number]    PCP: Jane Wong APRN    REFERRING PHYSICIAN: Pankaj STEPHENSON MD    Reason for Consultation: Stage I B ER/WI positive HER-2 negative ductal carcinoma of the left breast (T3 N0 M0)    Dear Dr. Cortes and Ms. Wong    It is a pleasure to meet Ms. Guillory today.  She is a very pleasant 36-year-old lady of -American descent who presents today for consultation for stage Ib ER/WI positive HER-2 negative ductal carcinoma of the left breast.  She underwent a lumpectomy on 4/14/2021.  The tumor was greater than 5 cm and 1 out of 5 nodes was positive for metastases.  Her biopsy specimen suggested a ER/WI positive HER-2 negative disease.  She had no lymph vascular invasion noted.  She otherwise seems to be in reasonable health with no chronic medical issues.  Denies any headaches or chronic pain issues.  She presents today to discuss adjuvant therapy going forward.      Past Medical History:   Diagnosis Date   • Cancer (CMS/HCC)     Breast    • Carcinoma of upper-outer quadrant of left breast in female, estrogen receptor positive (CMS/HCC) 4/29/2021   • History of abnormal cervical Pap smear    • Mastitis    • Ovarian cyst    • STD exposure     Chlamydia and GC   • Tattoos     x 3 above waist        Past Surgical History:   Procedure Laterality Date   • BREAST SURGERY Left 12/2020    Lumpectomy    • DIAGNOSTIC LAPAROSCOPY  03/29/2018    Endometrial ablation   • FOOT SURGERY  09/2017   • KNEE ACL RECONSTRUCTION  06/2007   • MASTECTOMY W/ SENTINEL NODE BIOPSY Bilateral 4/14/2021    Procedure: MASTECTOMY BILATERAL WITH LEFT SENTINEL NODE BIOPSY;  Surgeon: Pankaj Cortes MD;  Location: [facility];  Service: General;  Laterality: Bilateral;   • TUBAL ABDOMINAL LIGATION  11/2013       Social History     Socioeconomic History   • Marital status: Significant Other     Spouse name: Not on file   • Number of children: Not on file   • Years of education: Not on file   •  Highest education level: Not on file   Tobacco Use   • Smoking status: Current Some Day Smoker     Packs/day: 0.25     Years: 10.00     Pack years: 2.50     Types: Cigarettes   • Smokeless tobacco: Never Used   Vaping Use   • Vaping Use: Never used   Substance and Sexual Activity   • Alcohol use: Yes     Alcohol/week: 2.0 standard drinks     Types: 2 Shots of liquor per week     Comment: once or twice a wk   • Drug use: Yes     Types: Marijuana     Comment: Almost every day    • Sexual activity: Yes     Partners: Male     Birth control/protection: Surgical     Comment: Tubal ligation       Family History   Problem Relation Age of Onset   • No Known Problems Mother    • Diabetes Father    • Heart disease Father    • Alcohol abuse Father    • Hypertension Father    • Breast cancer Paternal Cousin 23   • Ovarian cancer Neg Hx        Review of Systems:    16 point review of systems was performed and reviewed and scanned into the EMR    Review of Systems - Oncology      Current Outpatient Medications:   •  ibuprofen (ADVIL,MOTRIN) 600 MG tablet, Take 1 tablet by mouth Every 8 (Eight) Hours As Needed for Moderate Pain  or Fever., Disp: 30 tablet, Rfl: 0  •  LORazepam (ATIVAN) 1 MG tablet, Take 1 tablet by mouth Daily As Needed for Anxiety. Take one tablet as needed up to once a day for severe anxiety, Disp: 30 tablet, Rfl: 0  •  temazepam (RESTORIL) 30 MG capsule, Take 1 capsule by mouth At Night As Needed for Sleep., Disp: 30 capsule, Rfl: 0  •  dexamethasone (DECADRON) 4 MG tablet, Take 2 tablets in the morning daily on Days 2, 3 & 4.  Take with food., Disp: 6 tablet, Rfl: 3  •  ondansetron (ZOFRAN) 8 MG tablet, Take 1 tablet by mouth 3 (Three) Times a Day As Needed for Nausea or Vomiting., Disp: 30 tablet, Rfl: 5    Pain Medications             ibuprofen (ADVIL,MOTRIN) 600 MG tablet Take 1 tablet by mouth Every 8 (Eight) Hours As Needed for Moderate Pain  or Fever.    dexamethasone (DECADRON) 4 MG tablet Take 2 tablets  in the morning daily on Days 2, 3 & 4.  Take with food.           No Known Allergies    ECOG SCORE: 0              Objective     Vitals:    04/29/21 1346   BP: 99/67   Pulse: 107   Resp: 16   Temp: 96.6 °F (35.9 °C)   SpO2: 96%     Body mass index is 23.91 kg/m².  Body surface area is 1.72 meters squared.        04/29/21  1346   Weight: 65.2 kg (143 lb 11.2 oz)     Pain Score    04/29/21 1346   PainSc:   3   PainLoc: Breast          Physical Exam    General: well appearing, in no acute distress  HEENT: sclera anicteric, oropharynx clear, neck is supple  Lymphatics: no cervical, supraclavicular, or axillary adenopathy  Cardiovascular: regular rate and rhythm, no murmurs, rubs or gallops  Lungs: clear to auscultation bilaterally  Abdomen: soft, nontender, nondistended.  No palpable organomegaly  Extremities: no lower extremity edema  Skin: no rashes, lesions, bruising, or petechiae  Msk:  Shows no weakness of the large muscle groups  Psych: Mood is stable        Lab Results   Component Value Date    GLUCOSE 100 (H) 04/13/2021    BUN 11 04/13/2021    CREATININE 0.72 04/13/2021     (L) 04/13/2021    K 4.4 04/14/2021     04/13/2021    CO2 25.0 04/13/2021    CALCIUM 8.7 04/13/2021    PROTEINTOT 6.9 01/05/2020    ALBUMIN 4.20 01/05/2020    BILITOT 0.3 01/05/2020    ALKPHOS 88 01/05/2020    AST 12 01/05/2020    ALT 7 01/05/2020       Lab Results   Component Value Date    HGB 13.0 04/13/2021    HCT 39.3 04/13/2021    MCV 93.6 04/13/2021     04/13/2021    WBC 6.56 04/13/2021    NEUTROABS 4.16 11/09/2020    LYMPHSABS 1.64 11/09/2020    MONOSABS 0.55 11/09/2020    EOSABS 0.08 11/09/2020    BASOSABS 0.01 11/09/2020       Lab Results   Component Value Date    FINALDX  04/14/2021     1. BREAST, RIGHT, SIMPLE MASTECTOMY:  Benign breast parenchyma with fibrocystic change, adenosis, and pseudoangiomatous stromal hyperplasia.  Negative for atypical ductal hyperplasia, DCIS, or invasive carcinoma.  All margins  "benign.  Skin and nipple with no significant histopathologic abnormalities.    2. BREAST, LEFT, SIMPLE MASTECTOMY:   Invasive ductal carcinoma, at least 5 cm in greatest dimension, see comment   Associated high grade DCIS with comedonecrosis   See CAP template below for further details    3. RIGHT SENTINEL LYMPH NODES, EXCISION:   Metastatic ductal carcinoma present in 1 of 5 lymph nodes (1/5)    INVASIVE CARCINOMA OF THE BREAST    CLINICAL:   Clinical History: Prior history of breast cancer    SPECIMEN:   Procedure: Total mastectomy  Specimen Laterality: Left    TUMOR:   Tumor Site: Invasive Carcinoma:   - Central    Clock Position of Tumor Site: 1 o'clock, 12 o'clock  Histologic Type: Invasive carcinoma of no special type (ductal, not otherwise specified)  Glandular (Acinar) / Tubular Differentiation: Score 2  Nuclear Pleomorphism: Score 3  Mitotic Rate: Score 2 (4-7 mitoses per mm2)  Overall Grade: Grade 2 (scores of 6 or 7)  Tumor Size: 50 mm, see comment  Tumor Focality: Single focus of invasive carcinoma  Ductal Carcinoma In Situ (DCIS):   - DCIS is present in specimen    Size (Extent) of DCIS: 80 mm    Architectural Patterns: [place], Cribriform    Nuclear Grade: Grade III (high)    Necrosis: Present, central (expansive \"comedo\" necrosis)  Lobular Carcinoma In Situ (LCIS): No LCIS in specimen  Accessory Findings:   Lymphovascular Invasion: Not identified  Dermal Lymphovascular Invasion: Not identified  Microcalcifications: Not identified  Treatment Effect: No known presurgical therapy    MARGINS:   Invasive Carcionma Margins:   - Uninvolved by invasive carcinoma    Distance from Closest Margin in Millimeters (mm): Distance is > 10 Millimeters (mm)  DCIS Margins:   - Uninvolved by DCIS    Distance of DCIS from Closest Margin in Millimeters (mm): Distance is > 10 Millimeters (mm)    LYMPH NODES:   Number of Lymph Nodes with Macrometastases (> 2 mm): 1  Number of Lymph Nodes with Micrometastases (> 0.2 mm to 2 mm " and / or > 200 cells): 0  Size of Largest Metastatic Deposit in Millimeters (mm): 9 Millimeters (mm)  Extranodal Extension: Not identified  Number of Lymph Nodes Examined: 5  Number of [place] Nodes Examined: 5    PATHOLOGIC STAGE CLASSIFICATION (pTNM, AJCC 8th Edition):   Note: Reporting of pT, pN, and (when applicable) pM categories is based on information available to the pathologist at the time the report is issued.  As per the AJCC (Chapter 1, 8th Ed.) it is the managing physician's responsibility to establish the final pathologic stage based upon all pertinent information, including but potentially not limited to this pathology report.  Primary Tumor (Invasive Carcinoma) (pT): pT2  Regional Lymph Nodes (pN):   Modifier: (sn): Only sentinel node(s) evaluated.  If 6 or more nodes (sentinel or nonsentinel) are removed, this modifier should not be used.  Category (pN): pN1a            LEFT BREAST CENTRAL, DESIGNATED SITE #2, MRI GUIDED BIOPSY:                 Small focus of invasive well-differentiated ductal carcinoma (Cl[place]ax score 4/9)                Invasive carcinoma arising in background of intermediate grade in situ ductal carcinoma                 Area of invasive carcinoma measuring 2.5 mm               Neoplasm positive for estrogen receptor, positive for progesterone receptor, and negative (0+) for HER-2/cristian    Assessment/Plan      1.  Stage Ib ER/MI positive HER-2 negative left breast cancer (T3 N0 M0).  With her age and node positivity she is at fairly high risk for metastatic disease.  I am going to order a PET scan to make sure we not dealing with stage IV disease.  If that is clear then I would recommend a more aggressive adjuvant treatment option of chemotherapy followed by radiation and then hormone blockade.  I discussed the rationale behind adjuvant therapy.  I recommended dose dense Adriamycin and Cytoxan followed by weekly Taxol.  We discussed the side effects of the treatment and the  "expectations of her disease itself.  She will need a 2D echo to make sure sure her heart is normal.  She will also require a port placement to undergo chemotherapy.  I will see her once all these procedures and imaging has been performed and we plan to initiate therapy in 3 weeks.  This will give her adequate time to heal up from her recent surgery.    Total time of patient care on day of service including time prior to, face to face with patient, and following visit spent in reviewing records, lab results, imaging studies, discussion with patient, and documentation/charting was > 60 minutes.    Thank you for allowing me to participate in the care of this patient.    Yours sincerely,    Graham Herbert MD  [facility]  Hematology and Oncology    Return on: 05/20/21  Return in (Approximately): Schedule with next infusion    Orders Placed This Encounter   Procedures   • NM Pet Skull Base To Mid Thigh     Standing Status:   Future     Standing Expiration Date:   4/29/2022     Order Specific Question:   Patient Pregnant     Answer:   No     Order Specific Question:   What radiopharmaceutical is preferred for this exam?     Answer:   FDG  (offered at all sites)     Order Specific Question:   Release to patient     Answer:   Immediate   • Comprehensive metabolic panel     Standing Status:   Future     Standing Expiration Date:   5/20/2022     Order Specific Question:   Release to patient     Answer:   Immediate   • hCG, Quantitative, Pregnancy     Standing Status:   Future     Standing Expiration Date:   5/20/2022     Order Specific Question:   Release to patient     Answer:   Immediate   • Provider communication     Go to \"Add Orders\" to place order to assess Left Ventricular Ejection Fraction prior to starting therapy.    Associate one of the following ICD-10 codes to LVEF study:  Z01.818 - Examination prior to chemotherapy  or  Z51.11 - Patient on antineoplastic chemotherapy regimen / encounter for " chemotherapy management   • Adult Transthoracic Echo Complete W/ Cont if Necessary Per Protocol     Standing Status:   Future     Standing Expiration Date:   4/29/2022     Order Specific Question:   Reason for exam?     Answer:   Other Reasons     Order Specific Question:   Other reason(s)?     Answer:   Chemotherapy     Order Specific Question:   Release to patient     Answer:   Immediate   • CBC and Differential     Standing Status:   Future     Standing Expiration Date:   5/20/2022     Order Specific Question:   Manual Differential     Answer:   No     Order Specific Question:   Release to patient     Answer:   Immediate

## 2021-05-13 ENCOUNTER — OFFICE VISIT (OUTPATIENT)
Dept: PSYCHIATRY | Facility: CLINIC | Age: 36
End: 2021-05-13

## 2021-05-13 DIAGNOSIS — F43.21 ADJUSTMENT DISORDER WITH DEPRESSED MOOD: ICD-10-CM

## 2021-05-13 DIAGNOSIS — F51.05 INSOMNIA DUE TO MENTAL CONDITION: ICD-10-CM

## 2021-05-13 DIAGNOSIS — F41.1 GENERALIZED ANXIETY DISORDER: Primary | ICD-10-CM

## 2021-05-13 PROCEDURE — 99443 PR PHYS/QHP TELEPHONE EVALUATION 21-30 MIN: CPT | Performed by: NURSE PRACTITIONER

## 2021-05-13 RX ORDER — LORAZEPAM 1 MG/1
1 TABLET ORAL DAILY PRN
Qty: 30 TABLET | Refills: 0 | Status: SHIPPED | OUTPATIENT
Start: 2021-05-13 | End: 2021-05-27 | Stop reason: SDUPTHER

## 2021-05-13 RX ORDER — CITALOPRAM 20 MG/1
TABLET ORAL
Qty: 30 TABLET | Refills: 0 | Status: SHIPPED | OUTPATIENT
Start: 2021-05-13 | End: 2021-05-27 | Stop reason: SDDI

## 2021-05-13 NOTE — PROGRESS NOTES
Subjective   Rai Guillory is a 36 y.o. female who is here today for medication management follow up. You have chosen to receive care through a telephone visit. Do you consent to use a telephone visit for your medical care today? Yes    TIME IN:2p  TIME OUT:228p    Chief Complaint: TARA, depressive symptoms, sleep disturbance     History of Present Illness Patient reports that she had consult with Dr. Burnham medical oncologist and was told that she will need chemotherapy.  She states this was a big shock to her and has increased her anxiety with worry constant concerns about finances, worries about chemotherapy and how sick she will be how will she take care of her children.  She does have a mother who is involved and boyfriend.  She reports crying and feeling physically ill such as stomach cramping.  Venting of concerns and frustrations was conducted.  Feelings were processed and validated both negative and positive.  Acknowledged her worries and concerns and normalized patient's thoughts.  Discussed SSRI to begin for heightened anxiety and depressive symptoms.  She has little motivation or interest and cannot find much kiersten at this point.  Reviewed rationale for chemotherapy as she could not remember.  Discussed breast nurse navigator's role and will contact her and patient was agreeable for Mirta Cordova RN to call her.  Patient reports she has had more difficulty recently since knowing she has to have chemotherapy and sleeping however prior to that the temazepam was working better than the trazodone.  Denies adverse effects from medications.   (Scales based on 0 - 10 with 10 being the worst)        The following portions of the patient's history were reviewed and updated as appropriate: allergies, current medications, past family history, past medical history, past social history, past surgical history and problem list.    Review of Systems  A 14 point review of systems was performed and is negative  except as noted above.    Objective   Physical Exam  not currently breastfeeding.    No Known Allergies    Current Medications:   Current Outpatient Medications   Medication Sig Dispense Refill   • citalopram (CeleXA) 20 MG tablet Take 1/2 tablet daily x 7 days then increase to whole tablet daily for depression and anxiety 30 tablet 0   • dexamethasone (DECADRON) 4 MG tablet Take 2 tablets in the morning daily on Days 2, 3 & 4.  Take with food. 6 tablet 3   • ibuprofen (ADVIL,MOTRIN) 600 MG tablet Take 1 tablet by mouth Every 8 (Eight) Hours As Needed for Moderate Pain  or Fever. 30 tablet 0   • LORazepam (ATIVAN) 1 MG tablet Take 1 tablet by mouth Daily As Needed for Anxiety. Take one tablet as needed up to once a day for severe anxiety 30 tablet 0   • ondansetron (ZOFRAN) 8 MG tablet Take 1 tablet by mouth 3 (Three) Times a Day As Needed for Nausea or Vomiting. 30 tablet 5   • temazepam (RESTORIL) 30 MG capsule Take 1 capsule by mouth At Night As Needed for Sleep. 30 capsule 0     No current facility-administered medications for this visit.       Lab Results: Reviewed patient's pathology report and reviewed Dr. Burnham medical oncologist's consult notes      Appearance: NA  Hygiene:  REPORTS good  Cooperation:  Cooperative  Eye Contact: NA  Psychomotor Behavior:  denies psychomotor agitation/retardation, No EPS, No motor tics  Mood: Anxious depressed  Affect: NA  Hopelessness: Denies  Speech:  Normal  Thought Process:  Linear  Thought Content:  Normal  Concentration: Normal   Suicidal: denies  Homicidal:  None  Hallucinations:  None  Delusion:  None  Memory:  Intact  Orientation:  Person, Place, Time and Situation  Reliability:  good  Insight:  Fair  Judgement: good  Impulse Control: good  Estimated Intelligence: average range    VIJI REVIEWED NO RED FLAGS 5/13/2021    Assessment/Plan   Diagnoses and all orders for this visit:    1. Generalized anxiety disorder (Primary)  -     LORazepam (ATIVAN) 1 MG tablet; Take 1  tablet by mouth Daily As Needed for Anxiety. 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 Adjustment disorder with depressed mood    Other orders  -     citalopram (CeleXA) 20 MG tablet; Take 1/2 tablet daily x 7 days then increase to whole tablet daily for depression and anxiety  Dispense: 30 tablet; Refill: 0      IMPRESSION: very anxious and depressive symptoms regarding her pathology and requiring chemotherapy     PLAN:   Contacted Breast Nurse Navigator for assistance to pt, she will call pt and Rai is welcoming of this.     Reviewed medication management and will  Refill lorazepam 1mg take 1/2 -1 as needed for high anxiety  Cont temazepam 30mg po one qhs for sleeplessness  ADD citalopram 20 mg daily for anxiety and depressive symptoms    We discussed risks, benefits, and side effects of the above medications and the patient was agreeable with the plan. Patient was educated on the importance of compliance with treatment and follow-up appointments.     Provide Cognitive Behavioral Therapy and Solution Focused Therapy to improve functioning, maintain stability, and avoid decompensation and the need for higher level of care.    Counseled patient regarding multimodal approach with encouragement of healthy nutrition, healthy sleep, regular physical mobility, social involvement, counseling, and medication compliance.     Assisted patient in identifying risk factors which would indicate the need for higher level of care including thoughts to harm self or others and/or self-harming behavior and encouraged patient to contact this office, call 911, or present to the nearest emergency room should any of these events occur. Discussed crisis intervention services and means to access.  Patient adamantly and convincingly denies current suicidal or homicidal ideation or perceptual disturbance.    Treatment Plan: stabilize mood,  patient will stay out of psychiatric hospital and be at optimal level of functioning with therapy and take all medication as prescribed. Patient verbalized  understanding and agreement to plan.    Instructed to call for questions or concerns and return early if necessary.     Greater than 50% time was spent in coordination of care, and counseling the patient regarding current assessment, symptoms, plan of care going forward, supportive therapy.  Answered any questions patient had regarding medications and plan of care.    Return in about 2 weeks (around 5/27/2021).

## 2021-05-17 ENCOUNTER — OFFICE VISIT (OUTPATIENT)
Dept: ONCOLOGY | Facility: CLINIC | Age: 36
End: 2021-05-17

## 2021-05-17 ENCOUNTER — LAB (OUTPATIENT)
Dept: LAB | Facility: HOSPITAL | Age: 36
End: 2021-05-17

## 2021-05-17 ENCOUNTER — APPOINTMENT (OUTPATIENT)
Dept: PREADMISSION TESTING | Facility: HOSPITAL | Age: 36
End: 2021-05-17

## 2021-05-17 VITALS
OXYGEN SATURATION: 98 % | DIASTOLIC BLOOD PRESSURE: 82 MMHG | SYSTOLIC BLOOD PRESSURE: 116 MMHG | HEIGHT: 65 IN | RESPIRATION RATE: 16 BRPM | TEMPERATURE: 97.8 F | BODY MASS INDEX: 24.22 KG/M2 | WEIGHT: 145.4 LBS | HEART RATE: 93 BPM

## 2021-05-17 DIAGNOSIS — C50.412 CARCINOMA OF UPPER-OUTER QUADRANT OF LEFT BREAST IN FEMALE, ESTROGEN RECEPTOR POSITIVE (HCC): ICD-10-CM

## 2021-05-17 DIAGNOSIS — Z17.0 CARCINOMA OF UPPER-OUTER QUADRANT OF LEFT BREAST IN FEMALE, ESTROGEN RECEPTOR POSITIVE (HCC): Primary | ICD-10-CM

## 2021-05-17 DIAGNOSIS — Z17.0 CARCINOMA OF UPPER-OUTER QUADRANT OF LEFT BREAST IN FEMALE, ESTROGEN RECEPTOR POSITIVE (HCC): ICD-10-CM

## 2021-05-17 DIAGNOSIS — C50.412 CARCINOMA OF UPPER-OUTER QUADRANT OF LEFT BREAST IN FEMALE, ESTROGEN RECEPTOR POSITIVE (HCC): Primary | ICD-10-CM

## 2021-05-17 LAB
ALBUMIN SERPL-MCNC: 4.1 G/DL (ref 3.5–5.2)
ALBUMIN/GLOB SERPL: 1.6 G/DL
ALP SERPL-CCNC: 107 U/L (ref 39–117)
ALT SERPL W P-5'-P-CCNC: 8 U/L (ref 1–33)
ANION GAP SERPL CALCULATED.3IONS-SCNC: 8 MMOL/L (ref 5–15)
AST SERPL-CCNC: 13 U/L (ref 1–32)
BILIRUB SERPL-MCNC: 0.3 MG/DL (ref 0–1.2)
BUN SERPL-MCNC: 12 MG/DL (ref 6–20)
BUN/CREAT SERPL: 16.2 (ref 7–25)
CALCIUM SPEC-SCNC: 8.7 MG/DL (ref 8.6–10.5)
CHLORIDE SERPL-SCNC: 106 MMOL/L (ref 98–107)
CO2 SERPL-SCNC: 25 MMOL/L (ref 22–29)
CREAT SERPL-MCNC: 0.74 MG/DL (ref 0.57–1)
ERYTHROCYTE [DISTWIDTH] IN BLOOD BY AUTOMATED COUNT: 13.2 % (ref 12.3–15.4)
GFR SERPL CREATININE-BSD FRML MDRD: 106 ML/MIN/1.73
GLOBULIN UR ELPH-MCNC: 2.6 GM/DL
GLUCOSE SERPL-MCNC: 114 MG/DL (ref 65–99)
HCG INTACT+B SERPL-ACNC: <0.5 MIU/ML
HCT VFR BLD AUTO: 38.9 % (ref 34–46.6)
HGB BLD-MCNC: 12.9 G/DL (ref 12–15.9)
LYMPHOCYTES # BLD AUTO: 1.4 10*3/MM3 (ref 0.7–3.1)
LYMPHOCYTES NFR BLD AUTO: 22 % (ref 19.6–45.3)
MCH RBC QN AUTO: 30.4 PG (ref 26.6–33)
MCHC RBC AUTO-ENTMCNC: 33.1 G/DL (ref 31.5–35.7)
MCV RBC AUTO: 92 FL (ref 79–97)
MONOCYTES # BLD AUTO: 0.2 10*3/MM3 (ref 0.1–0.9)
MONOCYTES NFR BLD AUTO: 2.6 % (ref 5–12)
NEUTROPHILS NFR BLD AUTO: 4.8 10*3/MM3 (ref 1.7–7)
NEUTROPHILS NFR BLD AUTO: 75.4 % (ref 42.7–76)
PLATELET # BLD AUTO: 247 10*3/MM3 (ref 140–450)
PMV BLD AUTO: 8.2 FL (ref 6–12)
POTASSIUM SERPL-SCNC: 3.9 MMOL/L (ref 3.5–5.2)
PROT SERPL-MCNC: 6.7 G/DL (ref 6–8.5)
RBC # BLD AUTO: 4.23 10*6/MM3 (ref 3.77–5.28)
SARS-COV-2 RNA PNL SPEC NAA+PROBE: NOT DETECTED
SODIUM SERPL-SCNC: 139 MMOL/L (ref 136–145)
WBC # BLD AUTO: 6.4 10*3/MM3 (ref 3.4–10.8)

## 2021-05-17 PROCEDURE — 80053 COMPREHEN METABOLIC PANEL: CPT

## 2021-05-17 PROCEDURE — C9803 HOPD COVID-19 SPEC COLLECT: HCPCS

## 2021-05-17 PROCEDURE — 36415 COLL VENOUS BLD VENIPUNCTURE: CPT

## 2021-05-17 PROCEDURE — 85025 COMPLETE CBC W/AUTO DIFF WBC: CPT

## 2021-05-17 PROCEDURE — 84702 CHORIONIC GONADOTROPIN TEST: CPT

## 2021-05-17 PROCEDURE — 99215 OFFICE O/P EST HI 40 MIN: CPT | Performed by: NURSE PRACTITIONER

## 2021-05-17 PROCEDURE — U0004 COV-19 TEST NON-CDC HGH THRU: HCPCS

## 2021-05-17 RX ORDER — LIDOCAINE AND PRILOCAINE 25; 25 MG/G; MG/G
CREAM TOPICAL AS NEEDED
Qty: 30 G | Refills: 3 | Status: SHIPPED | OUTPATIENT
Start: 2021-05-17 | End: 2021-12-16

## 2021-05-17 NOTE — PROGRESS NOTES
CHEMOTHERAPY PREPARATION    Rai Guillory  [id number]  1985    Chief Complaint: chemo education     History of present illness:  Rai Guillory is a 36 y.o. year old female who is here today for chemotherapy preparation and needs assessment.  The patient has been diagnosed with breast cancer and is scheduled to begin treatment with DOXOrubicin / Cyclophosphamide x4 doses followed by weekly Taxol x12 doses.     Oncology History:    Oncology/Hematology History   Carcinoma of upper-outer quadrant of left breast in female, estrogen receptor positive (CMS/HCC)   4/14/2021 Cancer Staged    Staging form: Breast, AJCC 8th Edition  - Pathologic stage from 4/14/2021: Stage IB (pT3, pN1a, cM0, G2, ER+, KY+, HER2-) - Signed by Graham Herbert MD on 4/29/2021 4/29/2021 Initial Diagnosis    Carcinoma of upper-outer quadrant of left breast in female, estrogen receptor positive (CMS/HCC)     5/20/2021 -  Chemotherapy    OP BREAST AC DD DOXOrubicin / Cyclophosphamide         The current medication list and allergy list were reviewed and reconciled.     Past Medical History, Past Surgical History, Social History, Family History have been reviewed and are without significant changes except as mentioned.    Review of Systems:    Review of Systems   Constitutional: Positive for appetite change. Negative for fatigue, fever and unexpected weight change.   HENT: Negative for mouth sores, sore throat and trouble swallowing.    Respiratory: Negative for cough, shortness of breath and wheezing.    Cardiovascular: Negative for chest pain, palpitations and leg swelling.   Gastrointestinal: Negative for abdominal distention, abdominal pain, constipation, diarrhea, nausea and vomiting.   Genitourinary: Negative for difficulty urinating, dysuria and frequency.   Musculoskeletal: Negative for arthralgias.   Skin: Negative for pallor, rash and wound.   Neurological: Negative for dizziness and weakness.    Hematological: Does not bruise/bleed easily.   Psychiatric/Behavioral: Positive for sleep disturbance. Negative for confusion. The patient is nervous/anxious.        Physical Exam:    Vitals:    05/17/21 1005   BP: 116/82   Pulse: 93   Resp: 16   Temp: 97.8 °F (36.6 °C)   SpO2: 98%     Vitals:    05/17/21 1005   PainSc: 0-No pain          ECOG: (0) Fully Active - Able to Carry On All Pre-disease Performance Without Restriction    General: well appearing, in no acute distress  Cardiovascular: regular rate and rhythm, no murmurs noted  Lungs: clear to auscultation bilaterally, respirations even and unlabored  Extremities: no lower extremity edema  Skin: no rashes, lesions, bruising, or petechiae  Psych: Mood is stable            NEEDS ASSESSMENTS    Genetics  The patient's new diagnosis and family history have been reviewed for genetic counseling needs.     Psychosocial  The patient has completed a PHQ-9 Depression Screening and the Distress Thermometer (DT) today.   PHQ-9 results show 5-9 (Mild Depression). The patient scored their distress today as 3 on a scale of 0-10 with 0 being no distress and 10 being extreme distress.   Problems marked by the patient as being an issue for them within the last week include practical problems, emotional problems and physical problems.   Results were reviewed along with psychosocial resources offered by our cancer center.  Our oncology social worker will be flagged for a DT score of 4 or above, and a same day call will be made for a score of 9 or 10.  The patient has already seen LETA Richardson for consultation and has follow up appointment this week.  .   Copies of patient's questionnaires will be scanned into EMR for details and further reference.    Barriers to care  A barriers form was also completed by the patient today. We discussed services offered by our facility to help her have adequate access to care. The patient was given the name and contact information for our  "Oncology Social Worker, Elizabeth Aguilar.  Based upon barriers assessment today, the patient will require a follow-up call from the  to further discuss needs.  I have placed referral.    A copy of the barriers form will also be scanned into EMR for details and further reference.     VAD Assessment  The patient and I discussed planned intervenous chemotherapy as well as other IV treatments that are often needed throughout the course of treatment. These may include, but are not limited to blood transfusions, antibiotics, and IV hydration. The vasculature does appear to be adequate for multiple peripheral IVs throughout their treatment course. Discussed risks and benefits of VADs. The patient would like to pursue Port-A-Cath insertion prior to initiation of treatment.     Advanced Care Planning  The patient and I discussed advanced care planning, \"Conversations that Matter\".   This service was offered, free of charge, for development of advance directives with a certified ACP facilitator.  The patient does not have an up-to-date advanced directive.  The patient is not interested in an appointment with one of our facilitators to create or update their advanced directives.      Palliative Care  The patient and I discussed palliative care services. Palliative care is not the same as Hospice care. This is specialized medical care for people living with serious illness with the goal of improving quality of life for the patient and their family. [facility] has partnered with [facility] to offer our patients outpatient palliative care early along with their treatment to assist in coordination of care, symptom management, pain management, and medical decision making.  Oncology criteria for palliative care referral is not met at this time. The patient is not interested in a palliative care consultation.     Additional Referral needs  none      CHEMOTHERAPY EDUCATION    Booklets Given: Chemotherapy and " You [x]  Eating Hints [x]    Sexuality/Fertility Books []      Chemotherapy/Biotherapy Education Sheets: (list all that apply)  nausea management, acid reflux management, diarrhea management, Cancer resourse contacts information, skin and mouth care and vaccination information                                                                                                                                                                 Chemotherapy Regimen:   Treatment Plans     Name Type Plan Dates Plan Provider         Active    OP BREAST AC DD DOXOrubicin / Cyclophosphamide ONCOLOGY TREATMENT  5/19/2021 - Present Graham Herbert MD                    TOPICS EDUCATION PROVIDED COMMENTS   ANEMIA:  role of RBC, cause, s/s, ways to manage, role of transfusion [x]    THROMBOCYTOPENIA:  role of platelet, cause, s/s, ways to prevent bleeding, things to avoid, when to seek help [x]    NEUTROPENIA:  role of WBC, cause, infection precautions, s/s of infection, when to call MD [x]    NUTRITION & APPETITE CHANGES:  importance of maintaining healthy diet & weight, ways to manage to improve intake, dietary consult, exercise regimen [x]    DIARRHEA:  causes, s/s of dehydration, ways to manage, dietary changes, when to call MD [x]    CONSTIPATION:  causes, ways to manage, dietary changes, when to call MD [x]    NAUSEA & VOMITING:  cause, use of antiemetics, dietary changes, when to call MD [x]    MOUTH SORES:  causes, oral care, ways to manage [x]    ALOPECIA:  cause, ways to manage, resources [x]    INFERTILITY & SEXUALITY:  causes, fertility preservation options, sexuality changes, ways to manage, importance of birth control [x]    NERVOUS SYSTEM CHANGES:  causes, s/s, neuropathies, cognitive changes, ways to manage [x]    PAIN:  causes, ways to manage [x]    SKIN & NAIL CHANGES:  cause, s/s, ways to manage [x]    ORGAN TOXICITIES:  cause, s/s, need for diagnostic tests, labs, when to notify MD [x]    SURVIVORSHIP:   distress, distress assessment, secondary malignancies, early/late effects, follow-up, social issues, social support [x]    HOME CARE:  use of spill kits, storing of PO chemo, how to manage bodily fluids [x]    MISCELLANEOUS:  drug interactions, administration, vesicant, et [x]        Assessment and Plan:    Diagnoses and all orders for this visit:    1. Carcinoma of upper-outer quadrant of left breast in female, estrogen receptor positive (CMS/HCC) (Primary)  -     lidocaine-prilocaine (EMLA) 2.5-2.5 % cream; Apply  topically to the appropriate area as directed As Needed (45-60 minutes prior to port access.  Cover with saran/plastic wrap.).  Dispense: 30 g; Refill: 3  -     Ambulatory Referral to ONC Social Work        The patient and I have reviewed their cancer diagnosis and scheduled treatment plan. Needs assessment was completed including genetics, psychosocial needs, barriers to care, VAD evaluation, advanced care planning, and palliative care services. Referrals have been ordered as appropriate based upon our evaluation and patient desires.     Chemotherapy teaching was also completed today as documented above. Adequate time was given to answer all questions to her satisfaction. Patient is aware of her care team members and contact information if they have questions or problems throughout the treatment course. The patient is adequately prepared to begin treatment as scheduled on 5/21/2021.     Reviewed with patient education regarding EMLA cream, dexamethasone and Zofran prescriptions sent to pharmacy.        I reviewed the option of the urgent care clinic through our oncology office for evaluation and management of symptoms related to treatment.    Patient had pretreatment labs drawn today.  She is scheduled for ECHO on 5/19/2021, port placement on 5/20/2021 and first treatment on 5/21/2021.  She is scheduled for PET scan on 5/28/2021.  I discussed with Dr. Burnham and he does not want to delay starting treatment  for PET scan therefore we will proceed as planned.      I spent 60 minutes caring for Rai on this date of service. This time includes time spent by me in the following activities: preparing for the visit, counseling and educating the patient/family/caregiver, ordering medications, tests, or procedures, referring and communicating with other health care professionals, documenting information in the medical record and care coordination.     Gretchen Majano, LETA  05/17/2021

## 2021-05-19 ENCOUNTER — TRANSCRIBE ORDERS (OUTPATIENT)
Dept: GENERAL RADIOLOGY | Facility: HOSPITAL | Age: 36
End: 2021-05-19

## 2021-05-19 ENCOUNTER — HOSPITAL ENCOUNTER (OUTPATIENT)
Dept: CARDIOLOGY | Facility: HOSPITAL | Age: 36
Discharge: HOME | End: 2021-05-19
Admitting: INTERNAL MEDICINE

## 2021-05-19 VITALS — HEIGHT: 65 IN | BODY MASS INDEX: 24.16 KG/M2 | WEIGHT: 145 LBS

## 2021-05-19 DIAGNOSIS — Z17.0 CARCINOMA OF UPPER-OUTER QUADRANT OF LEFT BREAST IN FEMALE, ESTROGEN RECEPTOR POSITIVE (HCC): ICD-10-CM

## 2021-05-19 DIAGNOSIS — C50.412 MALIGNANT NEOPLASM OF UPPER-OUTER QUADRANT OF LEFT FEMALE BREAST, UNSPECIFIED ESTROGEN RECEPTOR STATUS (HCC): Primary | ICD-10-CM

## 2021-05-19 DIAGNOSIS — C50.412 CARCINOMA OF UPPER-OUTER QUADRANT OF LEFT BREAST IN FEMALE, ESTROGEN RECEPTOR POSITIVE (HCC): ICD-10-CM

## 2021-05-19 LAB
BH CV ECHO MEAS - AO MAX PG (FULL): 2.1 MMHG
BH CV ECHO MEAS - AO MAX PG: 4 MMHG
BH CV ECHO MEAS - AO MEAN PG (FULL): 1 MMHG
BH CV ECHO MEAS - AO MEAN PG: 2 MMHG
BH CV ECHO MEAS - AO ROOT AREA (BSA CORRECTED): 1.9
BH CV ECHO MEAS - AO ROOT AREA: 8 CM^2
BH CV ECHO MEAS - AO ROOT DIAM: 3.2 CM
BH CV ECHO MEAS - AO V2 MAX: 101 CM/SEC
BH CV ECHO MEAS - AO V2 MEAN: 74.8 CM/SEC
BH CV ECHO MEAS - AO V2 VTI: 20.8 CM
BH CV ECHO MEAS - AVA(I,A): 2.1 CM^2
BH CV ECHO MEAS - AVA(I,D): 2.1 CM^2
BH CV ECHO MEAS - AVA(V,A): 2.2 CM^2
BH CV ECHO MEAS - AVA(V,D): 2.2 CM^2
BH CV ECHO MEAS - BSA(HAYCOCK): 1.7 M^2
BH CV ECHO MEAS - BSA: 1.7 M^2
BH CV ECHO MEAS - BZI_BMI: 24.1 KILOGRAMS/M^2
BH CV ECHO MEAS - BZI_METRIC_HEIGHT: 165.1 CM
BH CV ECHO MEAS - BZI_METRIC_WEIGHT: 65.8 KG
BH CV ECHO MEAS - EDV(CUBED): 65.9 ML
BH CV ECHO MEAS - EDV(MOD-SP2): 39 ML
BH CV ECHO MEAS - EDV(MOD-SP4): 39 ML
BH CV ECHO MEAS - EDV(TEICH): 71.7 ML
BH CV ECHO MEAS - EF(CUBED): 64.9 %
BH CV ECHO MEAS - EF(MOD-BP): 61 %
BH CV ECHO MEAS - EF(MOD-SP2): 66.7 %
BH CV ECHO MEAS - EF(MOD-SP4): 56.4 %
BH CV ECHO MEAS - EF(TEICH): 56.9 %
BH CV ECHO MEAS - ESV(CUBED): 23.1 ML
BH CV ECHO MEAS - ESV(MOD-SP2): 13 ML
BH CV ECHO MEAS - ESV(MOD-SP4): 17 ML
BH CV ECHO MEAS - ESV(TEICH): 30.9 ML
BH CV ECHO MEAS - FS: 29.5 %
BH CV ECHO MEAS - IVS/LVPW: 1
BH CV ECHO MEAS - IVSD: 0.98 CM
BH CV ECHO MEAS - LA DIMENSION: 2.5 CM
BH CV ECHO MEAS - LA/AO: 0.78
BH CV ECHO MEAS - LAD MAJOR: 4.8 CM
BH CV ECHO MEAS - LAT PEAK E' VEL: 14.3 CM/SEC
BH CV ECHO MEAS - LATERAL E/E' RATIO: 4
BH CV ECHO MEAS - LV DIASTOLIC VOL/BSA (35-75): 22.6 ML/M^2
BH CV ECHO MEAS - LV IVRT: 0.1 SEC
BH CV ECHO MEAS - LV MASS(C)D: 124.7 GRAMS
BH CV ECHO MEAS - LV MASS(C)DI: 72.3 GRAMS/M^2
BH CV ECHO MEAS - LV MAX PG: 1.9 MMHG
BH CV ECHO MEAS - LV MEAN PG: 1 MMHG
BH CV ECHO MEAS - LV SYSTOLIC VOL/BSA (12-30): 9.9 ML/M^2
BH CV ECHO MEAS - LV V1 MAX: 69.3 CM/SEC
BH CV ECHO MEAS - LV V1 MEAN: 44.9 CM/SEC
BH CV ECHO MEAS - LV V1 VTI: 13.9 CM
BH CV ECHO MEAS - LVIDD: 4 CM
BH CV ECHO MEAS - LVIDS: 2.9 CM
BH CV ECHO MEAS - LVLD AP2: 7.2 CM
BH CV ECHO MEAS - LVLD AP4: 6.6 CM
BH CV ECHO MEAS - LVLS AP2: 6 CM
BH CV ECHO MEAS - LVLS AP4: 5.6 CM
BH CV ECHO MEAS - LVOT AREA (M): 3.1 CM^2
BH CV ECHO MEAS - LVOT AREA: 3.1 CM^2
BH CV ECHO MEAS - LVOT DIAM: 2 CM
BH CV ECHO MEAS - LVPWD: 0.97 CM
BH CV ECHO MEAS - MED PEAK E' VEL: 8.9 CM/SEC
BH CV ECHO MEAS - MEDIAL E/E' RATIO: 6.4
BH CV ECHO MEAS - MV A MAX VEL: 49.4 CM/SEC
BH CV ECHO MEAS - MV DEC SLOPE: 252 CM/SEC^2
BH CV ECHO MEAS - MV DEC TIME: 0.21 SEC
BH CV ECHO MEAS - MV E MAX VEL: 57 CM/SEC
BH CV ECHO MEAS - MV E/A: 1.2
BH CV ECHO MEAS - MV MAX PG: 1.7 MMHG
BH CV ECHO MEAS - MV MEAN PG: 1 MMHG
BH CV ECHO MEAS - MV P1/2T MAX VEL: 66.2 CM/SEC
BH CV ECHO MEAS - MV P1/2T: 76.9 MSEC
BH CV ECHO MEAS - MV V2 MAX: 64.5 CM/SEC
BH CV ECHO MEAS - MV V2 MEAN: 47.6 CM/SEC
BH CV ECHO MEAS - MV V2 VTI: 16.8 CM
BH CV ECHO MEAS - MVA P1/2T LCG: 3.3 CM^2
BH CV ECHO MEAS - MVA(P1/2T): 2.9 CM^2
BH CV ECHO MEAS - MVA(VTI): 2.6 CM^2
BH CV ECHO MEAS - PA ACC SLOPE: 315 CM/SEC^2
BH CV ECHO MEAS - PA ACC TIME: 0.16 SEC
BH CV ECHO MEAS - PA MAX PG: 2.1 MMHG
BH CV ECHO MEAS - PA PR(ACCEL): 6.6 MMHG
BH CV ECHO MEAS - PA V2 MAX: 73.3 CM/SEC
BH CV ECHO MEAS - RAP SYSTOLE: 3 MMHG
BH CV ECHO MEAS - RVSP: 17 MMHG
BH CV ECHO MEAS - SI(AO): 97 ML/M^2
BH CV ECHO MEAS - SI(CUBED): 24.8 ML/M^2
BH CV ECHO MEAS - SI(LVOT): 25.3 ML/M^2
BH CV ECHO MEAS - SI(MOD-SP2): 15.1 ML/M^2
BH CV ECHO MEAS - SI(MOD-SP4): 12.8 ML/M^2
BH CV ECHO MEAS - SI(TEICH): 23.7 ML/M^2
BH CV ECHO MEAS - SV(AO): 167.3 ML
BH CV ECHO MEAS - SV(CUBED): 42.8 ML
BH CV ECHO MEAS - SV(LVOT): 43.7 ML
BH CV ECHO MEAS - SV(MOD-SP2): 26 ML
BH CV ECHO MEAS - SV(MOD-SP4): 22 ML
BH CV ECHO MEAS - SV(TEICH): 40.8 ML
BH CV ECHO MEAS - TAPSE (>1.6): 1.6 CM
BH CV ECHO MEAS - TR MAX PG: 14 MMHG
BH CV ECHO MEAS - TR MAX VEL: 185 CM/SEC
BH CV ECHO MEASUREMENTS AVERAGE E/E' RATIO: 4.91
BH CV VAS BP RIGHT ARM: NORMAL MMHG
BH CV XLRA - RV BASE: 2.9 CM
BH CV XLRA - RV LENGTH: 5.2 CM
BH CV XLRA - RV MID: 2.7 CM
BH CV XLRA - TDI S': 10.7 CM/SEC
IVRT: 98 MSEC
LEFT ATRIUM VOLUME INDEX: 20.3 ML/M^2
LEFT ATRIUM VOLUME: 35 ML

## 2021-05-19 PROCEDURE — 93356 MYOCRD STRAIN IMG SPCKL TRCK: CPT

## 2021-05-19 PROCEDURE — 93306 TTE W/DOPPLER COMPLETE: CPT | Performed by: INTERNAL MEDICINE

## 2021-05-19 PROCEDURE — 93306 TTE W/DOPPLER COMPLETE: CPT

## 2021-05-19 PROCEDURE — 93356 MYOCRD STRAIN IMG SPCKL TRCK: CPT | Performed by: INTERNAL MEDICINE

## 2021-05-20 ENCOUNTER — HOSPITAL ENCOUNTER (OUTPATIENT)
Dept: GENERAL RADIOLOGY | Facility: HOSPITAL | Age: 36
Discharge: HOME | End: 2021-05-20

## 2021-05-20 DIAGNOSIS — C50.412 MALIGNANT NEOPLASM OF UPPER-OUTER QUADRANT OF LEFT FEMALE BREAST, UNSPECIFIED ESTROGEN RECEPTOR STATUS (HCC): ICD-10-CM

## 2021-05-20 PROCEDURE — 71045 X-RAY EXAM CHEST 1 VIEW: CPT

## 2021-05-21 ENCOUNTER — HOSPITAL ENCOUNTER (OUTPATIENT)
Dept: ONCOLOGY | Facility: HOSPITAL | Age: 36
Setting detail: INFUSION SERIES
Discharge: HOME | End: 2021-05-21

## 2021-05-21 ENCOUNTER — EDUCATION (OUTPATIENT)
Dept: ONCOLOGY | Facility: HOSPITAL | Age: 36
End: 2021-05-21

## 2021-05-21 VITALS
HEIGHT: 65 IN | WEIGHT: 146 LBS | DIASTOLIC BLOOD PRESSURE: 86 MMHG | TEMPERATURE: 97.8 F | SYSTOLIC BLOOD PRESSURE: 122 MMHG | BODY MASS INDEX: 24.32 KG/M2 | HEART RATE: 107 BPM | RESPIRATION RATE: 18 BRPM

## 2021-05-21 DIAGNOSIS — Z17.0 CARCINOMA OF UPPER-OUTER QUADRANT OF LEFT BREAST IN FEMALE, ESTROGEN RECEPTOR POSITIVE (HCC): Primary | ICD-10-CM

## 2021-05-21 DIAGNOSIS — Z45.2 ENCOUNTER FOR CENTRAL LINE CARE: ICD-10-CM

## 2021-05-21 DIAGNOSIS — C50.412 CARCINOMA OF UPPER-OUTER QUADRANT OF LEFT BREAST IN FEMALE, ESTROGEN RECEPTOR POSITIVE (HCC): Primary | ICD-10-CM

## 2021-05-21 PROCEDURE — 96413 CHEMO IV INFUSION 1 HR: CPT

## 2021-05-21 PROCEDURE — 96415 CHEMO IV INFUSION ADDL HR: CPT

## 2021-05-21 PROCEDURE — 25010000002 HEPARIN LOCK FLUSH PER 10 UNITS: Performed by: INTERNAL MEDICINE

## 2021-05-21 PROCEDURE — 25010000002 PALONOSETRON 0.25 MG/5ML SOLUTION PREFILLED SYRINGE: Performed by: INTERNAL MEDICINE

## 2021-05-21 PROCEDURE — 25010000002 FOSAPREPITANT PER 1 MG: Performed by: INTERNAL MEDICINE

## 2021-05-21 PROCEDURE — 96367 TX/PROPH/DG ADDL SEQ IV INF: CPT

## 2021-05-21 PROCEDURE — 25010000002 DOXORUBICIN PER 10 MG: Performed by: INTERNAL MEDICINE

## 2021-05-21 PROCEDURE — 25010000002 DEXAMETHASONE SODIUM PHOSPHATE 100 MG/10ML SOLUTION: Performed by: INTERNAL MEDICINE

## 2021-05-21 PROCEDURE — 96411 CHEMO IV PUSH ADDL DRUG: CPT

## 2021-05-21 PROCEDURE — 96409 CHEMO IV PUSH SNGL DRUG: CPT

## 2021-05-21 PROCEDURE — 96375 TX/PRO/DX INJ NEW DRUG ADDON: CPT

## 2021-05-21 PROCEDURE — 25010000002 CYCLOPHOSPHAMIDE PER 100 MG: Performed by: INTERNAL MEDICINE

## 2021-05-21 RX ORDER — PALONOSETRON 0.05 MG/ML
0.25 INJECTION, SOLUTION INTRAVENOUS ONCE
Status: COMPLETED | OUTPATIENT
Start: 2021-05-21 | End: 2021-05-21

## 2021-05-21 RX ORDER — HEPARIN SODIUM (PORCINE) LOCK FLUSH IV SOLN 100 UNIT/ML 100 UNIT/ML
500 SOLUTION INTRAVENOUS AS NEEDED
Status: CANCELLED | OUTPATIENT
Start: 2021-05-21

## 2021-05-21 RX ORDER — DOXORUBICIN HYDROCHLORIDE 2 MG/ML
60 INJECTION, SOLUTION INTRAVENOUS ONCE
Status: COMPLETED | OUTPATIENT
Start: 2021-05-21 | End: 2021-05-21

## 2021-05-21 RX ORDER — HEPARIN SODIUM (PORCINE) LOCK FLUSH IV SOLN 100 UNIT/ML 100 UNIT/ML
500 SOLUTION INTRAVENOUS AS NEEDED
Status: DISCONTINUED | OUTPATIENT
Start: 2021-05-21 | End: 2021-05-22 | Stop reason: HOSPADM

## 2021-05-21 RX ORDER — SODIUM CHLORIDE 9 MG/ML
250 INJECTION, SOLUTION INTRAVENOUS ONCE
Status: COMPLETED | OUTPATIENT
Start: 2021-05-21 | End: 2021-05-21

## 2021-05-21 RX ADMIN — DEXAMETHASONE SODIUM PHOSPHATE 12 MG: 10 INJECTION, SOLUTION INTRAMUSCULAR; INTRAVENOUS at 11:45

## 2021-05-21 RX ADMIN — SODIUM CHLORIDE 250 ML: 9 INJECTION, SOLUTION INTRAVENOUS at 11:42

## 2021-05-21 RX ADMIN — DOXORUBICIN HYDROCHLORIDE 104 MG: 2 INJECTION, SOLUTION INTRAVENOUS at 12:25

## 2021-05-21 RX ADMIN — CYCLOPHOSPHAMIDE 1000 MG: 1 INJECTION, POWDER, FOR SOLUTION INTRAVENOUS; ORAL at 12:36

## 2021-05-21 RX ADMIN — PALONOSETRON HYDROCHLORIDE 0.25 MG: 0.05 INJECTION, SOLUTION INTRAVENOUS at 11:42

## 2021-05-21 RX ADMIN — SODIUM CHLORIDE 150 MG: 9 INJECTION, SOLUTION INTRAVENOUS at 11:45

## 2021-05-21 RX ADMIN — HEPARIN 500 UNITS: 100 SYRINGE at 13:20

## 2021-05-21 NOTE — PLAN OF CARE
Outpatient Infusion • [address] • [address] • [place] [address] • [phone]      CHEMOTHERAPY EDUCATION SHEET    NAME:  Rai Guillory      : 1985           DATE: 21    Booklets Given: Chemotherapy and You []  Eating Hints []    Sexuality/Fertility Books []     Chemotherapy/Biotherapy Education Sheets: (list all that apply)  Doxorubicin, Cyclophosphamide, Pegfilgrastim-cbqv                                                                                                                                                                Chemotherapy Regimen:  Doxorubicin, Cyclophosphamide, Pegfilgrastim-cbqv    TOPICS EDUCATION PROVIDED EDUCATION REINFORCED COMMENTS   ANEMIA:  role of RBC, cause, s/s, ways to manage, role of transfusion [x] [] Discussed role of RBC, signs/symptoms and ways to manage.    THROMBOCYTOPENIA:  role of platelet, cause, s/s, ways to prevent bleeding, things to avoid, when to seek help [x] [] Discussed role of platelets, signs/symptoms, things to be cautious of and what to look for.    NEUTROPENIA:  role of WBC, cause, infection precautions, s/s of infection, when to call MD [x] [] Discussed role of WBC, signs/symptoms and ways to avoid infection.   NUTRITION & APPETITE CHANGES:  importance of maintaining healthy diet & weight, ways to manage to improve intake, dietary consult, exercise regimen [x] [] Discussed potential for changes in taste/appetite.    DIARRHEA:  causes, s/s of dehydration, ways to manage, dietary changes, when to call MD [x] [] Discussed increased risk of diarrhea, how to manage this at home with immodium and when to seek help.    CONSTIPATION:  causes, ways to manage, dietary changes, when to call MD [x] [] Discussed increased risk of constipation, how to manage this at home with miralax and when to seek help.   NAUSEA & VOMITING:  cause, use of antiemetics, dietary changes, when to call MD [x] [] Discussed risk of N/V, how to manage at home  with medications and when to call MD.    MOUTH SORES:  causes, oral care, ways to manage [x] [] Discussed increased risk for mouth sores, how to prevent, how to treat and when to call MD.   ALOPECIA:  cause, ways to manage, resources [x] [] Discussed risk of alopecia and prescription for wig if wanted.    INFERTILITY & SEXUALITY:  causes, fertility preservation options, sexuality changes, ways to manage, importance of birth control [x] [] Discussed safe sex practices.    NERVOUS SYSTEM CHANGES:  causes, s/s, neuropathies, cognitive changes, ways to manage [] []    PAIN:  causes, ways to manage [] [] ????   SKIN & NAIL CHANGES:  cause, s/s, ways to manage [x] [] Discussed risk of rash, how to manage at home and when to call MD.    ORGAN TOXICITIES:  cause, s/s, need for diagnostic tests, labs, when to notify MD [x] [] Discussed potential organ toxicities and monitoring that will be completed through labs.   SURVIVORSHIP:  distress, distress assessment, secondary malignancies, early/late effects, follow-up, social issues, social support [] []    HOME CARE:  use of spill kits, storing of PO chemo, how to manage bodily fluids [x] [] Discussed the importance of washing hands, wearing gloves and washing linens separately if handling bodily fluids.    MISCELLANEOUS:  drug interactions, administration, vesicant, et [] []      Referrals:        Notes:   Spoke with patient during visit and was able to supply patient with material applicable to her treatment regimen. Provided patient with pharmacists contact information. Patient was given a personalized monthly calendar of her treatment plan. Patient was educated on Doxorubicin, Cyclophosphamide and Pegfilgrastim-cbqv. Discussed the potential for red bodily fluids. Discussed the use of Claritin the night before and morning of her Pegfiilgrastim to decrease bone pain. All questions and concerns have been addressed at this time.    Thank you,   Iona David  PharmD Candidate  2022  12:06 EDT  5/21/2021

## 2021-05-23 ENCOUNTER — HOSPITAL ENCOUNTER (OUTPATIENT)
Dept: ONCOLOGY | Facility: HOSPITAL | Age: 36
Setting detail: INFUSION SERIES
Discharge: HOME | End: 2021-05-23

## 2021-05-23 VITALS
SYSTOLIC BLOOD PRESSURE: 115 MMHG | HEART RATE: 79 BPM | BODY MASS INDEX: 24.3 KG/M2 | TEMPERATURE: 98 F | HEIGHT: 65 IN | DIASTOLIC BLOOD PRESSURE: 88 MMHG | RESPIRATION RATE: 18 BRPM

## 2021-05-23 DIAGNOSIS — Z17.0 CARCINOMA OF UPPER-OUTER QUADRANT OF LEFT BREAST IN FEMALE, ESTROGEN RECEPTOR POSITIVE (HCC): Primary | ICD-10-CM

## 2021-05-23 DIAGNOSIS — C50.412 CARCINOMA OF UPPER-OUTER QUADRANT OF LEFT BREAST IN FEMALE, ESTROGEN RECEPTOR POSITIVE (HCC): Primary | ICD-10-CM

## 2021-05-23 PROCEDURE — 96372 THER/PROPH/DIAG INJ SC/IM: CPT

## 2021-05-23 PROCEDURE — 25010000002 PEGFILGRASTIM-CBQV 6 MG/0.6ML SOLUTION PREFILLED SYRINGE: Performed by: INTERNAL MEDICINE

## 2021-05-23 RX ADMIN — PEGFILGRASTIM-CBQV 6 MG: 6 INJECTION, SOLUTION SUBCUTANEOUS at 09:21

## 2021-05-27 ENCOUNTER — OFFICE VISIT (OUTPATIENT)
Dept: PSYCHIATRY | Facility: CLINIC | Age: 36
End: 2021-05-27

## 2021-05-27 DIAGNOSIS — F41.1 GENERALIZED ANXIETY DISORDER: Primary | ICD-10-CM

## 2021-05-27 DIAGNOSIS — F51.05 INSOMNIA DUE TO MENTAL CONDITION: ICD-10-CM

## 2021-05-27 PROCEDURE — 99442 PR PHYS/QHP TELEPHONE EVALUATION 11-20 MIN: CPT | Performed by: NURSE PRACTITIONER

## 2021-05-27 RX ORDER — TEMAZEPAM 30 MG/1
30 CAPSULE ORAL NIGHTLY PRN
Qty: 30 CAPSULE | Refills: 0 | Status: SHIPPED | OUTPATIENT
Start: 2021-05-27 | End: 2021-12-02

## 2021-05-27 RX ORDER — LORAZEPAM 1 MG/1
1 TABLET ORAL DAILY PRN
Qty: 30 TABLET | Refills: 0 | Status: SHIPPED | OUTPATIENT
Start: 2021-05-27 | End: 2021-12-02

## 2021-05-27 NOTE — PROGRESS NOTES
"  Subjective   Rai Guillory is a 36 y.o. female who is here today for medication management follow up. You have chosen to receive care through a telephone visit. Do you consent to use a telephone visit for your medical care today? Yes    TIME IN:1046  TIME OUT:1105    Chief Complaint: TARA, sleep disturbance    History of Present Illness Patient reports did not start citalopram \"don't want to be on a lot of meds and have side effects\". Takes temazepam at bedtime and sleeps well, takes lorazepam as needed for high anxiety. Had her first round of chemotherapy \"I did ok but was very tired and constipated\".  Rates anxiety about a 4 with episodic higher rating.  She states she has more good days than episodic feeling down.  Denies depression.  She is enjoying reading and trying to recover from both surgery and chemo.  Denies panic attacks.Acknowledged and normalized patient's thoughts, feelings, and concerns. Denies adverse effects from medications.   (Scales based on 0 - 10 with 10 being the worst)        The following portions of the patient's history were reviewed and updated as appropriate: allergies, current medications, past family history, past medical history, past social history, past surgical history and problem list.    Review of Systems  A 14 point review of systems was performed and is negative except as noted above.    Objective   Physical Exam  not currently breastfeeding.    No Known Allergies    Current Medications:   Current Outpatient Medications   Medication Sig Dispense Refill   • dexamethasone (DECADRON) 4 MG tablet Take 2 tablets in the morning daily on Days 2, 3 & 4.  Take with food. 6 tablet 3   • ibuprofen (ADVIL,MOTRIN) 600 MG tablet Take 1 tablet by mouth Every 8 (Eight) Hours As Needed for Moderate Pain  or Fever. 30 tablet 0   • lidocaine-prilocaine (EMLA) 2.5-2.5 % cream Apply  topically to the appropriate area as directed As Needed (45-60 minutes prior to port access.  Cover " "with saran/plastic wrap.). 30 g 3   • LORazepam (ATIVAN) 1 MG tablet Take 1 tablet by mouth Daily As Needed for Anxiety. Take one tablet as needed up to once a day for severe anxiety 30 tablet 0   • ondansetron (ZOFRAN) 8 MG tablet Take 1 tablet by mouth 3 (Three) Times a Day As Needed for Nausea or Vomiting. 30 tablet 5   • oxyCODONE-acetaminophen (PERCOCET) 5-325 MG per tablet Take 1 tablet by mouth Every 6 (Six) Hours As Needed. 10 tablet 0   • temazepam (RESTORIL) 30 MG capsule Take 1 capsule by mouth At Night As Needed for Sleep. 30 capsule 0     No current facility-administered medications for this visit.     Appearance: NA  Hygiene:  REPORTS good  Cooperation:  Cooperative  Eye Contact: NA  Psychomotor Behavior:  denies psychomotor agitation/retardation, No EPS, No motor tics  Mood:  within normal limits  Affect: NA  Hopelessness: Denies  Speech:  Normal  Thought Process:  Linear  Thought Content:  Normal  Concentration: Normal   Suicidal: denies  Homicidal:  None  Hallucinations:  None  Delusion:  None  Memory:  Intact  Orientation:  Person, Place, Time and Situation  Reliability:  good  Insight:  Fair  Judgement: good  Impulse Control: good  Estimated Intelligence: average range    VIJI REVIEWED NO RED FLAGS  5/27/2021 no red flags    Assessment/Plan   Diagnoses and all orders for this visit:    1. Generalized anxiety disorder (Primary)  -     LORazepam (ATIVAN) 1 MG tablet; Take 1 tablet by mouth Daily As Needed for Anxiety. Take one tablet as needed up to once a day for severe anxiety  Dispense: 30 tablet; Refill: 0    2. Insomnia due to mental condition  -     temazepam (RESTORIL) 30 MG capsule; Take 1 capsule by mouth At Night As Needed for Sleep.  Dispense: 30 capsule; Refill: 0          IMPRESSION: sleeping well, did not start citalopram \"don't want to be on a lot of meds and have side effects\".     PLAN: Refill lorazepam 1 mg take as needed for severe anxiety  Refill temazepam 30 mg 1 p.o. nightly " as needed for sleeplessness    We discussed risks, benefits, and side effects of the above medications and the patient was agreeable with the plan. Patient was educated on the importance of compliance with treatment and follow-up appointments.     Reviewed relaxation techniques    Counseled patient regarding multimodal approach with encouragement of healthy nutrition, healthy sleep, regular physical mobility, social involvement, counseling, and medication compliance.     Assisted patient in identifying risk factors which would indicate the need for higher level of care including thoughts to harm self or others and/or self-harming behavior and encouraged patient to contact this office, call 911, or present to the nearest emergency room should any of these events occur. Discussed crisis intervention services and means to access.  Patient adamantly and convincingly denies current suicidal or homicidal ideation or perceptual disturbance.    Treatment Plan: stabilize mood, patient will stay out of psychiatric hospital and be at optimal level of functioning with therapy and take all medication as prescribed. Patient verbalized  understanding and agreement to plan.    Instructed to call for questions or concerns and return early if necessary.     Greater than 50% time was spent in coordination of care, and counseling the patient regarding current assessment, symptoms, plan of care going forward, supportive therapy.  Answered any questions patient had regarding medications and plan of care.    Return in about 8 weeks (around 7/22/2021).  Medication management telephone

## 2021-05-28 ENCOUNTER — APPOINTMENT (OUTPATIENT)
Dept: CARDIOLOGY | Facility: HOSPITAL | Age: 36
End: 2021-05-28

## 2021-05-28 ENCOUNTER — HOSPITAL ENCOUNTER (OUTPATIENT)
Dept: PET IMAGING | Facility: HOSPITAL | Age: 36
Discharge: HOME | End: 2021-05-28

## 2021-05-28 DIAGNOSIS — C50.412 CARCINOMA OF UPPER-OUTER QUADRANT OF LEFT BREAST IN FEMALE, ESTROGEN RECEPTOR POSITIVE (HCC): ICD-10-CM

## 2021-05-28 DIAGNOSIS — Z17.0 CARCINOMA OF UPPER-OUTER QUADRANT OF LEFT BREAST IN FEMALE, ESTROGEN RECEPTOR POSITIVE (HCC): ICD-10-CM

## 2021-05-28 LAB
CYTO UR: NORMAL
GLUCOSE BLDC GLUCOMTR-MCNC: 105 MG/DL (ref 70–130)
LAB AP CASE REPORT: NORMAL
LAB AP CLINICAL INFORMATION: NORMAL
LAB AP DIAGNOSIS COMMENT: NORMAL
PATH REPORT.FINAL DX SPEC: NORMAL
PATH REPORT.GROSS SPEC: NORMAL

## 2021-05-28 PROCEDURE — 0 FLUDEOXYGLUCOSE F18 SOLUTION: Performed by: INTERNAL MEDICINE

## 2021-05-28 PROCEDURE — A9552 F18 FDG: HCPCS | Performed by: INTERNAL MEDICINE

## 2021-05-28 PROCEDURE — 82962 GLUCOSE BLOOD TEST: CPT

## 2021-05-28 PROCEDURE — 78815 PET IMAGE W/CT SKULL-THIGH: CPT

## 2021-05-28 RX ADMIN — FLUDEOXYGLUCOSE F18 1 DOSE: 300 INJECTION INTRAVENOUS at 10:52

## 2021-06-03 ENCOUNTER — DOCUMENTATION (OUTPATIENT)
Dept: SOCIAL WORK | Facility: HOSPITAL | Age: 36
End: 2021-06-03

## 2021-06-03 ENCOUNTER — HOSPITAL ENCOUNTER (OUTPATIENT)
Dept: ONCOLOGY | Facility: HOSPITAL | Age: 36
Setting detail: INFUSION SERIES
Discharge: HOME | End: 2021-06-03

## 2021-06-03 ENCOUNTER — DOCUMENTATION (OUTPATIENT)
Dept: NUTRITION | Facility: HOSPITAL | Age: 36
End: 2021-06-03

## 2021-06-03 ENCOUNTER — OFFICE VISIT (OUTPATIENT)
Dept: ONCOLOGY | Facility: CLINIC | Age: 36
End: 2021-06-03

## 2021-06-03 VITALS
OXYGEN SATURATION: 97 % | RESPIRATION RATE: 16 BRPM | HEIGHT: 65 IN | TEMPERATURE: 97.5 F | WEIGHT: 147.6 LBS | SYSTOLIC BLOOD PRESSURE: 119 MMHG | HEART RATE: 116 BPM | BODY MASS INDEX: 24.59 KG/M2 | DIASTOLIC BLOOD PRESSURE: 82 MMHG

## 2021-06-03 DIAGNOSIS — Z17.0 CARCINOMA OF UPPER-OUTER QUADRANT OF LEFT BREAST IN FEMALE, ESTROGEN RECEPTOR POSITIVE (HCC): Primary | ICD-10-CM

## 2021-06-03 DIAGNOSIS — C50.412 CARCINOMA OF UPPER-OUTER QUADRANT OF LEFT BREAST IN FEMALE, ESTROGEN RECEPTOR POSITIVE (HCC): Primary | ICD-10-CM

## 2021-06-03 DIAGNOSIS — Z45.2 ENCOUNTER FOR CENTRAL LINE CARE: ICD-10-CM

## 2021-06-03 LAB
ALBUMIN SERPL-MCNC: 4.1 G/DL (ref 3.5–5.2)
ALBUMIN/GLOB SERPL: 2 G/DL
ALP SERPL-CCNC: 125 U/L (ref 39–117)
ALT SERPL W P-5'-P-CCNC: 11 U/L (ref 1–33)
ANION GAP SERPL CALCULATED.3IONS-SCNC: 7 MMOL/L (ref 5–15)
AST SERPL-CCNC: 12 U/L (ref 1–32)
BILIRUB SERPL-MCNC: <0.2 MG/DL (ref 0–1.2)
BUN SERPL-MCNC: 13 MG/DL (ref 6–20)
BUN/CREAT SERPL: 16.3 (ref 7–25)
CALCIUM SPEC-SCNC: 8.7 MG/DL (ref 8.6–10.5)
CHLORIDE SERPL-SCNC: 109 MMOL/L (ref 98–107)
CO2 SERPL-SCNC: 23 MMOL/L (ref 22–29)
CREAT SERPL-MCNC: 0.8 MG/DL (ref 0.57–1)
ERYTHROCYTE [DISTWIDTH] IN BLOOD BY AUTOMATED COUNT: 14.1 % (ref 12.3–15.4)
GFR SERPL CREATININE-BSD FRML MDRD: 97 ML/MIN/1.73
GLOBULIN UR ELPH-MCNC: 2.1 GM/DL
GLUCOSE SERPL-MCNC: 107 MG/DL (ref 65–99)
HCT VFR BLD AUTO: 37.9 % (ref 34–46.6)
HGB BLD-MCNC: 12.6 G/DL (ref 12–15.9)
LYMPHOCYTES # BLD AUTO: 1.6 10*3/MM3 (ref 0.7–3.1)
LYMPHOCYTES NFR BLD AUTO: 13.7 % (ref 19.6–45.3)
MCH RBC QN AUTO: 30.8 PG (ref 26.6–33)
MCHC RBC AUTO-ENTMCNC: 33.3 G/DL (ref 31.5–35.7)
MCV RBC AUTO: 92.5 FL (ref 79–97)
MONOCYTES # BLD AUTO: 0.5 10*3/MM3 (ref 0.1–0.9)
MONOCYTES NFR BLD AUTO: 4.1 % (ref 5–12)
NEUTROPHILS NFR BLD AUTO: 82.2 % (ref 42.7–76)
NEUTROPHILS NFR BLD AUTO: 9.5 10*3/MM3 (ref 1.7–7)
PLATELET # BLD AUTO: 185 10*3/MM3 (ref 140–450)
PMV BLD AUTO: 8.6 FL (ref 6–12)
POTASSIUM SERPL-SCNC: 4.7 MMOL/L (ref 3.5–5.2)
PROT SERPL-MCNC: 6.2 G/DL (ref 6–8.5)
RBC # BLD AUTO: 4.1 10*6/MM3 (ref 3.77–5.28)
SODIUM SERPL-SCNC: 139 MMOL/L (ref 136–145)
WBC # BLD AUTO: 11.5 10*3/MM3 (ref 3.4–10.8)

## 2021-06-03 PROCEDURE — 80053 COMPREHEN METABOLIC PANEL: CPT | Performed by: INTERNAL MEDICINE

## 2021-06-03 PROCEDURE — 25010000002 HEPARIN LOCK FLUSH PER 10 UNITS: Performed by: INTERNAL MEDICINE

## 2021-06-03 PROCEDURE — 96375 TX/PRO/DX INJ NEW DRUG ADDON: CPT

## 2021-06-03 PROCEDURE — 25010000002 DEXAMETHASONE SODIUM PHOSPHATE 100 MG/10ML SOLUTION: Performed by: INTERNAL MEDICINE

## 2021-06-03 PROCEDURE — 96367 TX/PROPH/DG ADDL SEQ IV INF: CPT

## 2021-06-03 PROCEDURE — 25010000002 FOSAPREPITANT PER 1 MG: Performed by: INTERNAL MEDICINE

## 2021-06-03 PROCEDURE — 99214 OFFICE O/P EST MOD 30 MIN: CPT | Performed by: INTERNAL MEDICINE

## 2021-06-03 PROCEDURE — 25010000002 PALONOSETRON 0.25 MG/5ML SOLUTION PREFILLED SYRINGE: Performed by: INTERNAL MEDICINE

## 2021-06-03 PROCEDURE — 25010000002 DOXORUBICIN PER 10 MG: Performed by: INTERNAL MEDICINE

## 2021-06-03 PROCEDURE — 96411 CHEMO IV PUSH ADDL DRUG: CPT

## 2021-06-03 PROCEDURE — 85025 COMPLETE CBC W/AUTO DIFF WBC: CPT | Performed by: INTERNAL MEDICINE

## 2021-06-03 PROCEDURE — 96413 CHEMO IV INFUSION 1 HR: CPT

## 2021-06-03 PROCEDURE — 25010000002 CYCLOPHOSPHAMIDE PER 100 MG: Performed by: INTERNAL MEDICINE

## 2021-06-03 RX ORDER — PALONOSETRON 0.05 MG/ML
0.25 INJECTION, SOLUTION INTRAVENOUS ONCE
Status: COMPLETED | OUTPATIENT
Start: 2021-06-03 | End: 2021-06-03

## 2021-06-03 RX ORDER — PALONOSETRON 0.05 MG/ML
0.25 INJECTION, SOLUTION INTRAVENOUS ONCE
Status: CANCELLED | OUTPATIENT
Start: 2021-06-17

## 2021-06-03 RX ORDER — PALONOSETRON 0.05 MG/ML
0.25 INJECTION, SOLUTION INTRAVENOUS ONCE
Status: CANCELLED | OUTPATIENT
Start: 2021-06-03

## 2021-06-03 RX ORDER — SODIUM CHLORIDE 9 MG/ML
250 INJECTION, SOLUTION INTRAVENOUS ONCE
Status: CANCELLED | OUTPATIENT
Start: 2021-06-03

## 2021-06-03 RX ORDER — DOXORUBICIN HYDROCHLORIDE 2 MG/ML
60 INJECTION, SOLUTION INTRAVENOUS ONCE
Status: CANCELLED | OUTPATIENT
Start: 2021-06-03

## 2021-06-03 RX ORDER — HEPARIN SODIUM (PORCINE) LOCK FLUSH IV SOLN 100 UNIT/ML 100 UNIT/ML
500 SOLUTION INTRAVENOUS AS NEEDED
Status: DISCONTINUED | OUTPATIENT
Start: 2021-06-03 | End: 2021-06-04 | Stop reason: HOSPADM

## 2021-06-03 RX ORDER — HEPARIN SODIUM (PORCINE) LOCK FLUSH IV SOLN 100 UNIT/ML 100 UNIT/ML
500 SOLUTION INTRAVENOUS AS NEEDED
Status: CANCELLED | OUTPATIENT
Start: 2021-06-03

## 2021-06-03 RX ORDER — DOXORUBICIN HYDROCHLORIDE 2 MG/ML
60 INJECTION, SOLUTION INTRAVENOUS ONCE
Status: CANCELLED | OUTPATIENT
Start: 2021-06-17

## 2021-06-03 RX ORDER — SODIUM CHLORIDE 9 MG/ML
250 INJECTION, SOLUTION INTRAVENOUS ONCE
Status: CANCELLED | OUTPATIENT
Start: 2021-06-17

## 2021-06-03 RX ORDER — DOXORUBICIN HYDROCHLORIDE 2 MG/ML
60 INJECTION, SOLUTION INTRAVENOUS ONCE
Status: COMPLETED | OUTPATIENT
Start: 2021-06-03 | End: 2021-06-03

## 2021-06-03 RX ORDER — SODIUM CHLORIDE 9 MG/ML
250 INJECTION, SOLUTION INTRAVENOUS ONCE
Status: COMPLETED | OUTPATIENT
Start: 2021-06-03 | End: 2021-06-03

## 2021-06-03 RX ADMIN — SODIUM CHLORIDE 250 ML: 9 INJECTION, SOLUTION INTRAVENOUS at 10:59

## 2021-06-03 RX ADMIN — SODIUM CHLORIDE 150 MG: 9 INJECTION, SOLUTION INTRAVENOUS at 11:02

## 2021-06-03 RX ADMIN — HEPARIN 500 UNITS: 100 SYRINGE at 12:49

## 2021-06-03 RX ADMIN — DEXAMETHASONE SODIUM PHOSPHATE 12 MG: 10 INJECTION, SOLUTION INTRAMUSCULAR; INTRAVENOUS at 11:00

## 2021-06-03 RX ADMIN — DOXORUBICIN HYDROCHLORIDE 104 MG: 2 INJECTION, SOLUTION INTRAVENOUS at 12:00

## 2021-06-03 RX ADMIN — PALONOSETRON HYDROCHLORIDE 0.25 MG: 0.05 INJECTION, SOLUTION INTRAVENOUS at 10:59

## 2021-06-03 RX ADMIN — CYCLOPHOSPHAMIDE 1000 MG: 1 INJECTION, POWDER, FOR SOLUTION INTRAVENOUS; ORAL at 12:14

## 2021-06-03 NOTE — PROGRESS NOTES
PROBLEM LIST:  Oncology/Hematology History   Carcinoma of upper-outer quadrant of left breast in female, estrogen receptor positive (CMS/HCC)   4/14/2021 Cancer Staged    Staging form: Breast, AJCC 8th Edition  - Pathologic stage from 4/14/2021: Stage IB (pT3, pN1a, cM0, G2, ER+, CA+, HER2-) - Signed by Graham Herbert MD on 4/29/2021 4/29/2021 Initial Diagnosis    Carcinoma of upper-outer quadrant of left breast in female, estrogen receptor positive (CMS/HCC)     5/21/2021 -  Chemotherapy    OP BREAST AC DD DOXOrubicin / Cyclophosphamide         REASON FOR VISIT: Breast cancer    HISTORY OF PRESENT ILLNESS:   36 y.o.  female presents today for follow-up of her breast cancer.  She is completed 1 cycle of dose dense Adriamycin and Cytoxan.  Seems to be tolerating it reasonably well.  Has some issues with fatigue.  No infections.  No significant issues with nausea vomiting.  No diarrhea.    Past medical history, social history and family history was reviewed 06/03/21 and unchanged from prior visit.    Review of Systems:    Review of Systems - Oncology         Medications:        Current Outpatient Medications:   •  dexamethasone (DECADRON) 4 MG tablet, Take 2 tablets in the morning daily on Days 2, 3 & 4.  Take with food., Disp: 6 tablet, Rfl: 3  •  lidocaine-prilocaine (EMLA) 2.5-2.5 % cream, Apply  topically to the appropriate area as directed As Needed (45-60 minutes prior to port access.  Cover with saran/plastic wrap.)., Disp: 30 g, Rfl: 3  •  LORazepam (ATIVAN) 1 MG tablet, Take 1 tablet by mouth Daily As Needed for Anxiety. Take one tablet as needed up to once a day for severe anxiety, Disp: 30 tablet, Rfl: 0  •  ondansetron (ZOFRAN) 8 MG tablet, Take 1 tablet by mouth 3 (Three) Times a Day As Needed for Nausea or Vomiting., Disp: 30 tablet, Rfl: 5  •  temazepam (RESTORIL) 30 MG capsule, Take 1 capsule by mouth At Night As Needed for Sleep., Disp: 30 capsule, Rfl: 0  No current facility-administered  "medications for this visit.    Facility-Administered Medications Ordered in Other Visits:   •  heparin injection 500 Units, 500 Units, Intravenous, PRN, Graham Herbert MD    Pain Medications             dexamethasone (DECADRON) 4 MG tablet Take 2 tablets in the morning daily on Days 2, 3 & 4.  Take with food.             ALLERGIES:  No Known Allergies      Physical Exam    VITAL SIGNS:  /82   Pulse 116   Temp 97.5 °F (36.4 °C) (Temporal)   Resp 16   Ht 165.1 cm (65\")   Wt 67 kg (147 lb 9.6 oz)   SpO2 97%   BMI 24.56 kg/m²                 Wt Readings from Last 3 Encounters:   06/03/21 67 kg (147 lb 9.6 oz)   05/21/21 66.2 kg (146 lb)   05/19/21 65.8 kg (145 lb)       Body mass index is 24.56 kg/m². Body surface area is 1.74 meters squared.    Pain Score    06/03/21 0957   PainSc: 0-No pain           Performance Status: 0    General: well appearing, in no acute distress  HEENT: sclera anicteric, neck is supple  Lymphatics: no cervical, supraclavicular, or axillary adenopathy  Cardiovascular: regular rate and rhythm, no murmurs, rubs or gallops  Lungs: clear to auscultation bilaterally  Abdomen: soft, nontender, nondistended.  No palpable organomegaly  Extremities: no lower extremity edema  Skin: no rashes, lesions, bruising, or petechiae  Msk:  Shows no weakness of the large muscle groups  Psych: Mood is stable        RECENT LABS:    Lab Results   Component Value Date    HGB 12.6 06/03/2021    HCT 37.9 06/03/2021    MCV 92.5 06/03/2021     06/03/2021    WBC 11.50 (H) 06/03/2021    NEUTROABS 9.50 (H) 06/03/2021    LYMPHSABS 1.60 06/03/2021    MONOSABS 0.50 06/03/2021    EOSABS 0.08 11/09/2020    BASOSABS 0.01 11/09/2020       Lab Results   Component Value Date    GLUCOSE 107 (H) 06/03/2021    BUN 13 06/03/2021    CREATININE 0.80 06/03/2021     06/03/2021    K 4.7 06/03/2021     (H) 06/03/2021    CO2 23.0 06/03/2021    CALCIUM 8.7 06/03/2021    PROTEINTOT 6.2 06/03/2021    ALBUMIN " "4.10 06/03/2021    BILITOT <0.2 06/03/2021    ALKPHOS 125 (H) 06/03/2021    AST 12 06/03/2021    ALT 11 06/03/2021       XR Chest 1 View    Result Date: 5/20/2021  No pneumothorax or other acute cardiopulmonary abnormality.  This report was finalized on 5/20/2021 12:38 PM by Khanh Prajapati.      Mammo Post Clip Placement Bilateral    Result Date: 2/12/2021  MRI guided core needle biopsy of the left breast as described above.  A post biopsy marking clip was placed. The pathology results are pending at time of dictation.  PATHOLOGY: Intermediate grade ductal carcinoma in situ with cancerization of lobules, luminal necrosis and calcifications. Breast biomarkers to be performed on alternate concurrent biopsy. Result is concordant.  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 This diagnosis following excision of a subareolar mass that was thought to be related to an abscess.  MRI demonstrates multiple areas of abnormal masslike enhancement site 2 left breast central.  COMPARISON:  Breast MRI dated 01/26/2021 as well as diagnostic mammogram dated 05/21/2020.  TECHNIQUE:  The risks and benefits of the procedure were explained to the patient and informed consent was obtained.  \"Time-out\" was performed to verify patient's identity and correct location of the breast abnormality.  After placement of an intravenous line the patient was placed prone of the MR scanner.  The left breast was positioned with mild compression within the breast biopsy coil.  A fixed fiducial marker was placed on the skin through the guidance grid at position C4 to aid in the determination of lesion location.  An [employer] CAD system was utilized for biopsy planning.  Precontrast and postcontrast images were obtained in the axial and sagittal planes. Next, dynamic " views in the axial and sagittal planes were obtained following the administration of 13 cc of gadolinium for all 4 biopsies.  The lesion to undergo biopsy was confirmed on subtraction images and lesion position, including depth, was determined.  The patient was brought out of the bore of the MR magnet.  The appropriate cube within the guidance grid was localized and the underlying skin was prepped with Betadine.  After administration of local anesthesia (1% lidocaine for superficial and 8 cc% lidocaine with epinephrine for deeper), the needle guide block was placed into the guidance grid.  An introducer and trocar were then placed into the breast from a lateral approach to the pre-determined depth .The trocar was removed and a localizing obturator was placed through the introducer.  Axial images revealed the tip of the obturator to be located at the lesion.  The patient was brought back out of the bore of the magnet.  The obturator was removed and a 10g SignatureoRx MRI compatible vacuum assisted core needle biopsy device was placed through the cannula to the appropriate depth.    A total of 12 samples were obtained.   A marking clip was then deployed.  The patient tolerated the procedure and there were no immediate complications.  Post-biopsy CC and ML views of the left breast were obtained for clip location. The clip appears to be accurately located based on anatomic landmarks.  IMPRESSION:  MRI guided core needle biopsy of the left breast as described above.  A post biopsy marking clip was placed. The pathology results are pending at time of dictation.  PATHOLOGY: Small focus of invasive well-differentiated ductal carcinoma, invasive carcinoma arising in a background of intermediate grade in situ ductal carcinoma. Area of invasive carcinoma measuring 2.5 mm. Neoplasm positive for estrogen receptor, positive progesterone receptor and negative for HER-2/cristian. Result is concordant.  RECOMMENDATION: SURGICAL AND ONCOLOGY  "FOLLOW-UP.  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 This diagnosis following excision of a subareolar mass that was thought to be related to an abscess MRI demonstrates multiple areas of abnormal masslike enhancement site 3 left breast lateral  COMPARISON:  Breast MRI dated 1/26/2021 as well as diagnostic mammogram dated 5/21/2020  TECHNIQUE:  The risks and benefits of the procedure were explained to the patient and informed consent was obtained.  \"Time-out\" was performed to verify patient's identity and correct location of the breast abnormality.  After placement of an intravenous line the patient was placed prone of the MR scanner.  The left breast was positioned with mild compression within the breast biopsy coil.  A fixed fiducial marker was placed on the skin through the guidance grid at position C4 to aid in the determination of lesion location.  An [employer] CAD system was utilized for biopsy planning.  Precontrast and postcontrast images were obtained in the axial and sagittal planes. Next, dynamic views in the axial and sagittal planes were obtained following the administration of 13 cc of gadolinium for all 4 biopsies .  The lesion to undergo biopsy was confirmed on subtraction images and lesion position, including depth, was determined.  The patient was brought out of the bore of the MR magnet.  The appropriate cube within the guidance grid was localized and the underlying skin was prepped with Betadine.  After administration of local anesthesia (1% lidocaine for superficial and 8% lidocaine with epinephrine for deeper), the needle guide block was placed into the guidance grid.  An introducer and trocar were then placed into the breast from a lateral approach to the pre-determined depth .The trocar was removed and a localizing obturator was placed through the " "introducer.  Axial images revealed the tip of the obturator to be located at the lesion.  The patient was brought back out of the bore of the magnet.  The obturator was removed and a 10g SenoRx MRI compatible vacuum assisted core needle biopsy device was placed through the cannula to the appropriate depth.    A total of 12 samples were obtained.   A marking clip was then deployed.  The patient tolerated the procedure and there were no immediate complications.  Post-biopsy CC and ML views of the left breast were obtained for clip location. The clip appears to be appropriately positioned based on anatomic landmarks  IMPRESSION:  MRI guided core needle biopsy of the left breast as described above.  A post biopsy marking clip was placed. The pathology results are pending at time of dictation.  PATHOLOGY: Fibrocystic change with focal sclerosing adenosis and fibrosis. Focal pseudoangiomatous stromal hyperplasia. Negative for in situ and invasive carcinoma. 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 This diagnosis following excision of a subareolar mass that was thought to be related to an abscess. MRI demonstrates multiple areas of abnormal masslike enhancement site 4 right breast upper outer quadrant.  COMPARISON:  Breast MRI dated 01/26/2021 as well as diagnostic mammogram dated 05/21/2020.  TECHNIQUE:  The risks and benefits of the procedure were explained to the patient and informed consent was obtained.  \"Time-out\" was performed to verify patient's identity and correct location of the breast abnormality.  After placement of an intravenous line the patient was placed prone of the MR scanner.  The right breast was positioned with mild compression within the breast biopsy coil.  A fixed fiducial marker was " placed on the skin through the guidance grid at position C4 to aid in the determination of lesion location.  An [employer] CAD system was utilized for biopsy planning.  Precontrast and postcontrast images were obtained in the axial and sagittal planes. Next, dynamic views in the axial and sagittal planes were obtained following the administration of 13 cc of gadolinium for all 4 biopsies.  The lesion to undergo biopsy was confirmed on subtraction images and lesion position, including depth, was determined.  The patient was brought out of the bore of the MR magnet.  The appropriate cube within the guidance grid was localized and the underlying skin was prepped with Betadine.  After administration of local anesthesia (1% lidocaine for superficial and 8 cc% lidocaine with epinephrine for deeper), the needle guide block was placed into the guidance grid.  An introducer and trocar were then placed into the breast from a lateral approach to the pre-determined depth .The trocar was removed and a localizing obturator was placed through the introducer.  Axial images revealed the tip of the obturator to be located at the lesion.  The patient was brought back out of the bore of the magnet.  The obturator was removed and a 10g SenoRx MRI compatible vacuum assisted core needle biopsy device was placed through the cannula to the appropriate depth.    A total of 12 samples were obtained.   A marking clip was then deployed.  The patient tolerated the procedure and there were no immediate complications.  Post-biopsy CC and ML views of the right breast were obtained for clip location. The clip appears to be adequately positioned based on anatomic landmarks.  IMPRESSION:  MRI guided core needle biopsy of the right breast as described above.  A post biopsy marking clip was placed. The pathology results are pending at time of dictation.  PATHOLOGY: Fibrocystic change with adenosis and focal pseudoangiomatous stromal hyperplasia. Focal  "microcalcifications present. Negative for atypia.  Result is concordant.  RECOMMENDATION: 6 MONTH FOLLOW-UP RIGHT MRI. SURGICAL AND ONCOLOGY FOLLOW-UP FOR LEFT BREAST CANCER.  D:  02/12/2021 E:  02/12/2021  This report was finalized on 2/12/2021 10:33 AM by Dr. Anna Hidalgo MD.      MRI Breast Biopsy Each Add With & Without Device    Result Date: 2/12/2021  MRI guided core needle biopsy of the left breast as described above.  A post biopsy marking clip was placed. The pathology results are pending at time of dictation.  PATHOLOGY: Intermediate grade ductal carcinoma in situ with cancerization of lobules, luminal necrosis and calcifications. Breast biomarkers to be performed on alternate concurrent biopsy. Result is concordant.  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 This diagnosis following excision of a subareolar mass that was thought to be related to an abscess.  MRI demonstrates multiple areas of abnormal masslike enhancement site 2 left breast central.  COMPARISON:  Breast MRI dated 01/26/2021 as well as diagnostic mammogram dated 05/21/2020.  TECHNIQUE:  The risks and benefits of the procedure were explained to the patient and informed consent was obtained.  \"Time-out\" was performed to verify patient's identity and correct location of the breast abnormality.  After placement of an intravenous line the patient was placed prone of the MR scanner.  The left breast was positioned with mild compression within the breast biopsy coil.  A fixed fiducial marker was placed on the skin through the guidance grid at position C4 to aid in the determination of lesion location.  An [employer] CAD system was utilized for biopsy planning.  Precontrast and postcontrast images were obtained in the axial and sagittal planes. Next, dynamic " views in the axial and sagittal planes were obtained following the administration of 13 cc of gadolinium for all 4 biopsies.  The lesion to undergo biopsy was confirmed on subtraction images and lesion position, including depth, was determined.  The patient was brought out of the bore of the MR magnet.  The appropriate cube within the guidance grid was localized and the underlying skin was prepped with Betadine.  After administration of local anesthesia (1% lidocaine for superficial and 8 cc% lidocaine with epinephrine for deeper), the needle guide block was placed into the guidance grid.  An introducer and trocar were then placed into the breast from a lateral approach to the pre-determined depth .The trocar was removed and a localizing obturator was placed through the introducer.  Axial images revealed the tip of the obturator to be located at the lesion.  The patient was brought back out of the bore of the magnet.  The obturator was removed and a 10g [employer]soRx MRI compatible vacuum assisted core needle biopsy device was placed through the cannula to the appropriate depth.    A total of 12 samples were obtained.   A marking clip was then deployed.  The patient tolerated the procedure and there were no immediate complications.  Post-biopsy CC and ML views of the left breast were obtained for clip location. The clip appears to be accurately located based on anatomic landmarks.  IMPRESSION:  MRI guided core needle biopsy of the left breast as described above.  A post biopsy marking clip was placed. The pathology results are pending at time of dictation.  PATHOLOGY: Small focus of invasive well-differentiated ductal carcinoma, invasive carcinoma arising in a background of intermediate grade in situ ductal carcinoma. Area of invasive carcinoma measuring 2.5 mm. Neoplasm positive for estrogen receptor, positive progesterone receptor and negative for HER-2/cristian. Result is concordant.  RECOMMENDATION: SURGICAL AND ONCOLOGY  "FOLLOW-UP.  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 This diagnosis following excision of a subareolar mass that was thought to be related to an abscess MRI demonstrates multiple areas of abnormal masslike enhancement site 3 left breast lateral  COMPARISON:  Breast MRI dated 1/26/2021 as well as diagnostic mammogram dated 5/21/2020  TECHNIQUE:  The risks and benefits of the procedure were explained to the patient and informed consent was obtained.  \"Time-out\" was performed to verify patient's identity and correct location of the breast abnormality.  After placement of an intravenous line the patient was placed prone of the MR scanner.  The left breast was positioned with mild compression within the breast biopsy coil.  A fixed fiducial marker was placed on the skin through the guidance grid at position C4 to aid in the determination of lesion location.  An [email or web address] CAD system was utilized for biopsy planning.  Precontrast and postcontrast images were obtained in the axial and sagittal planes. Next, dynamic views in the axial and sagittal planes were obtained following the administration of 13 cc of gadolinium for all 4 biopsies .  The lesion to undergo biopsy was confirmed on subtraction images and lesion position, including depth, was determined.  The patient was brought out of the bore of the MR magnet.  The appropriate cube within the guidance grid was localized and the underlying skin was prepped with Betadine.  After administration of local anesthesia (1% lidocaine for superficial and 8% lidocaine with epinephrine for deeper), the needle guide block was placed into the guidance grid.  An introducer and trocar were then placed into the breast from a lateral approach to the pre-determined depth .The trocar was removed and a localizing obturator was placed through the " placed on the skin through the guidance grid at position C4 to aid in the determination of lesion location.  An [employer] CAD system was utilized for biopsy planning.  Precontrast and postcontrast images were obtained in the axial and sagittal planes. Next, dynamic views in the axial and sagittal planes were obtained following the administration of 13 cc of gadolinium for all 4 biopsies.  The lesion to undergo biopsy was confirmed on subtraction images and lesion position, including depth, was determined.  The patient was brought out of the bore of the MR magnet.  The appropriate cube within the guidance grid was localized and the underlying skin was prepped with Betadine.  After administration of local anesthesia (1% lidocaine for superficial and 8 cc% lidocaine with epinephrine for deeper), the needle guide block was placed into the guidance grid.  An introducer and trocar were then placed into the breast from a lateral approach to the pre-determined depth .The trocar was removed and a localizing obturator was placed through the introducer.  Axial images revealed the tip of the obturator to be located at the lesion.  The patient was brought back out of the bore of the magnet.  The obturator was removed and a 10g SenoRx MRI compatible vacuum assisted core needle biopsy device was placed through the cannula to the appropriate depth.    A total of 12 samples were obtained.   A marking clip was then deployed.  The patient tolerated the procedure and there were no immediate complications.  Post-biopsy CC and ML views of the right breast were obtained for clip location. The clip appears to be adequately positioned based on anatomic landmarks.  IMPRESSION:  MRI guided core needle biopsy of the right breast as described above.  A post biopsy marking clip was placed. The pathology results are pending at time of dictation.  PATHOLOGY: Fibrocystic change with adenosis and focal pseudoangiomatous stromal hyperplasia. Focal  "microcalcifications present. Negative for atypia.  Result is concordant.  RECOMMENDATION: 6 MONTH FOLLOW-UP RIGHT MRI. SURGICAL AND ONCOLOGY FOLLOW-UP FOR LEFT BREAST CANCER.  D:  02/12/2021 E:  02/12/2021  This report was finalized on 2/12/2021 10:33 AM by Dr. Anna Hidalgo MD.      MRI Breast Biopsy Each Add With & Without Device    Result Date: 2/12/2021  MRI guided core needle biopsy of the left breast as described above.  A post biopsy marking clip was placed. The pathology results are pending at time of dictation.  PATHOLOGY: Intermediate grade ductal carcinoma in situ with cancerization of lobules, luminal necrosis and calcifications. Breast biomarkers to be performed on alternate concurrent biopsy. Result is concordant.  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 This diagnosis following excision of a subareolar mass that was thought to be related to an abscess.  MRI demonstrates multiple areas of abnormal masslike enhancement site 2 left breast central.  COMPARISON:  Breast MRI dated 01/26/2021 as well as diagnostic mammogram dated 05/21/2020.  TECHNIQUE:  The risks and benefits of the procedure were explained to the patient and informed consent was obtained.  \"Time-out\" was performed to verify patient's identity and correct location of the breast abnormality.  After placement of an intravenous line the patient was placed prone of the MR scanner.  The left breast was positioned with mild compression within the breast biopsy coil.  A fixed fiducial marker was placed on the skin through the guidance grid at position C4 to aid in the determination of lesion location.  An [email or web address] CAD system was utilized for biopsy planning.  Precontrast and postcontrast images were obtained in the axial and sagittal planes. Next, dynamic " views in the axial and sagittal planes were obtained following the administration of 13 cc of gadolinium for all 4 biopsies.  The lesion to undergo biopsy was confirmed on subtraction images and lesion position, including depth, was determined.  The patient was brought out of the bore of the MR magnet.  The appropriate cube within the guidance grid was localized and the underlying skin was prepped with Betadine.  After administration of local anesthesia (1% lidocaine for superficial and 8 cc% lidocaine with epinephrine for deeper), the needle guide block was placed into the guidance grid.  An introducer and trocar were then placed into the breast from a lateral approach to the pre-determined depth .The trocar was removed and a localizing obturator was placed through the introducer.  Axial images revealed the tip of the obturator to be located at the lesion.  The patient was brought back out of the bore of the magnet.  The obturator was removed and a 10g Shirley Mae'soRx MRI compatible vacuum assisted core needle biopsy device was placed through the cannula to the appropriate depth.    A total of 12 samples were obtained.   A marking clip was then deployed.  The patient tolerated the procedure and there were no immediate complications.  Post-biopsy CC and ML views of the left breast were obtained for clip location. The clip appears to be accurately located based on anatomic landmarks.  IMPRESSION:  MRI guided core needle biopsy of the left breast as described above.  A post biopsy marking clip was placed. The pathology results are pending at time of dictation.  PATHOLOGY: Small focus of invasive well-differentiated ductal carcinoma, invasive carcinoma arising in a background of intermediate grade in situ ductal carcinoma. Area of invasive carcinoma measuring 2.5 mm. Neoplasm positive for estrogen receptor, positive progesterone receptor and negative for HER-2/cristian. Result is concordant.  RECOMMENDATION: SURGICAL AND ONCOLOGY  "FOLLOW-UP.  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 This diagnosis following excision of a subareolar mass that was thought to be related to an abscess MRI demonstrates multiple areas of abnormal masslike enhancement site 3 left breast lateral  COMPARISON:  Breast MRI dated 1/26/2021 as well as diagnostic mammogram dated 5/21/2020  TECHNIQUE:  The risks and benefits of the procedure were explained to the patient and informed consent was obtained.  \"Time-out\" was performed to verify patient's identity and correct location of the breast abnormality.  After placement of an intravenous line the patient was placed prone of the MR scanner.  The left breast was positioned with mild compression within the breast biopsy coil.  A fixed fiducial marker was placed on the skin through the guidance grid at position C4 to aid in the determination of lesion location.  An [employer] CAD system was utilized for biopsy planning.  Precontrast and postcontrast images were obtained in the axial and sagittal planes. Next, dynamic views in the axial and sagittal planes were obtained following the administration of 13 cc of gadolinium for all 4 biopsies .  The lesion to undergo biopsy was confirmed on subtraction images and lesion position, including depth, was determined.  The patient was brought out of the bore of the MR magnet.  The appropriate cube within the guidance grid was localized and the underlying skin was prepped with Betadine.  After administration of local anesthesia (1% lidocaine for superficial and 8% lidocaine with epinephrine for deeper), the needle guide block was placed into the guidance grid.  An introducer and trocar were then placed into the breast from a lateral approach to the pre-determined depth .The trocar was removed and a localizing obturator was placed through the " placed on the skin through the guidance grid at position C4 to aid in the determination of lesion location.  An [employer] CAD system was utilized for biopsy planning.  Precontrast and postcontrast images were obtained in the axial and sagittal planes. Next, dynamic views in the axial and sagittal planes were obtained following the administration of 13 cc of gadolinium for all 4 biopsies.  The lesion to undergo biopsy was confirmed on subtraction images and lesion position, including depth, was determined.  The patient was brought out of the bore of the MR magnet.  The appropriate cube within the guidance grid was localized and the underlying skin was prepped with Betadine.  After administration of local anesthesia (1% lidocaine for superficial and 8 cc% lidocaine with epinephrine for deeper), the needle guide block was placed into the guidance grid.  An introducer and trocar were then placed into the breast from a lateral approach to the pre-determined depth .The trocar was removed and a localizing obturator was placed through the introducer.  Axial images revealed the tip of the obturator to be located at the lesion.  The patient was brought back out of the bore of the magnet.  The obturator was removed and a 10g SenoRx MRI compatible vacuum assisted core needle biopsy device was placed through the cannula to the appropriate depth.    A total of 12 samples were obtained.   A marking clip was then deployed.  The patient tolerated the procedure and there were no immediate complications.  Post-biopsy CC and ML views of the right breast were obtained for clip location. The clip appears to be adequately positioned based on anatomic landmarks.  IMPRESSION:  MRI guided core needle biopsy of the right breast as described above.  A post biopsy marking clip was placed. The pathology results are pending at time of dictation.  PATHOLOGY: Fibrocystic change with adenosis and focal pseudoangiomatous stromal hyperplasia. Focal  microcalcifications present. Negative for atypia.  Result is concordant.  RECOMMENDATION: 6 MONTH FOLLOW-UP RIGHT MRI. SURGICAL AND ONCOLOGY FOLLOW-UP FOR LEFT BREAST CANCER.  D:  02/12/2021 E:  02/12/2021  This report was finalized on 2/12/2021 10:33 AM by Dr. Anna Hidalgo MD.      NM Pet Skull Base To Mid Thigh    Result Date: 5/28/2021  Negative PET-CT scan.  D:  05/28/2021 E:  05/28/2021     This report was finalized on 5/28/2021 5:15 PM by Dr. Yesenia Hernnadez MD.      NM Mckenna Node Injection Only    Result Date: 4/14/2021  Dictation for the purposes of radiopharmaceutical usage alone with 544 uCi of Technetium 99m filtered sulfur colloid utilized in the left breast for lymphoscintigraphy and sentinel node mapping.  D:  04/14/2021 E:  04/14/2021     This report was finalized on 4/14/2021 5:32 PM by Dr. Regan Zuñiga.      MRI Breast Biopsy Initial With & Without Device    Result Date: 2/12/2021  MRI guided core needle biopsy of the left breast as described above.  A post biopsy marking clip was placed. The pathology results are pending at time of dictation.  PATHOLOGY: Intermediate grade ductal carcinoma in situ with cancerization of lobules, luminal necrosis and calcifications. Breast biomarkers to be performed on alternate concurrent biopsy. Result is concordant.  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 This diagnosis following excision of a subareolar mass that was thought to be related to an abscess.  MRI demonstrates multiple areas of abnormal masslike enhancement site 2 left breast central.  COMPARISON:  Breast MRI dated 01/26/2021 as well as diagnostic mammogram dated 05/21/2020.  TECHNIQUE:  The risks and benefits of the procedure were explained to the patient and informed consent was obtained.   "\"Time-out\" was performed to verify patient's identity and correct location of the breast abnormality.  After placement of an intravenous line the patient was placed prone of the MR scanner.  The left breast was positioned with mild compression within the breast biopsy coil.  A fixed fiducial marker was placed on the skin through the guidance grid at position C4 to aid in the determination of lesion location.  An [employer] CAD system was utilized for biopsy planning.  Precontrast and postcontrast images were obtained in the axial and sagittal planes. Next, dynamic views in the axial and sagittal planes were obtained following the administration of 13 cc of gadolinium for all 4 biopsies.  The lesion to undergo biopsy was confirmed on subtraction images and lesion position, including depth, was determined.  The patient was brought out of the bore of the MR magnet.  The appropriate cube within the guidance grid was localized and the underlying skin was prepped with Betadine.  After administration of local anesthesia (1% lidocaine for superficial and 8 cc% lidocaine with epinephrine for deeper), the needle guide block was placed into the guidance grid.  An introducer and trocar were then placed into the breast from a lateral approach to the pre-determined depth .The trocar was removed and a localizing obturator was placed through the introducer.  Axial images revealed the tip of the obturator to be located at the lesion.  The patient was brought back out of the bore of the magnet.  The obturator was removed and a 10g SenoRx MRI compatible vacuum assisted core needle biopsy device was placed through the cannula to the appropriate depth.    A total of 12 samples were obtained.   A marking clip was then deployed.  The patient tolerated the procedure and there were no immediate complications.  Post-biopsy CC and ML views of the left breast were obtained for clip location. The clip appears to be accurately located based on " "anatomic landmarks.  IMPRESSION:  MRI guided core needle biopsy of the left breast as described above.  A post biopsy marking clip was placed. The pathology results are pending at time of dictation.  PATHOLOGY: Small focus of invasive well-differentiated ductal carcinoma, invasive carcinoma arising in a background of intermediate grade in situ ductal carcinoma. Area of invasive carcinoma measuring 2.5 mm. Neoplasm positive for estrogen receptor, positive progesterone receptor and negative for HER-2/cristian. Result is concordant.  RECOMMENDATION: SURGICAL AND ONCOLOGY FOLLOW-UP.  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 This diagnosis following excision of a subareolar mass that was thought to be related to an abscess MRI demonstrates multiple areas of abnormal masslike enhancement site 3 left breast lateral  COMPARISON:  Breast MRI dated 1/26/2021 as well as diagnostic mammogram dated 5/21/2020  TECHNIQUE:  The risks and benefits of the procedure were explained to the patient and informed consent was obtained.  \"Time-out\" was performed to verify patient's identity and correct location of the breast abnormality.  After placement of an intravenous line the patient was placed prone of the MR scanner.  The left breast was positioned with mild compression within the breast biopsy coil.  A fixed fiducial marker was placed on the skin through the guidance grid at position C4 to aid in the determination of lesion location.  An [employer] CAD system was utilized for biopsy planning.  Precontrast and postcontrast images were obtained in the axial and sagittal planes. Next, dynamic views in the axial and sagittal planes were obtained following the administration of 13 cc of gadolinium for all 4 biopsies .  The lesion to undergo biopsy was confirmed on subtraction images and lesion position, " including depth, was determined.  The patient was brought out of the bore of the MR magnet.  The appropriate cube within the guidance grid was localized and the underlying skin was prepped with Betadine.  After administration of local anesthesia (1% lidocaine for superficial and 8% lidocaine with epinephrine for deeper), the needle guide block was placed into the guidance grid.  An introducer and trocar were then placed into the breast from a lateral approach to the pre-determined depth .The trocar was removed and a localizing obturator was placed through the introducer.  Axial images revealed the tip of the obturator to be located at the lesion.  The patient was brought back out of the bore of the magnet.  The obturator was removed and a 10g In[employer] InteractiveoRx MRI compatible vacuum assisted core needle biopsy device was placed through the cannula to the appropriate depth.    A total of 12 samples were obtained.   A marking clip was then deployed.  The patient tolerated the procedure and there were no immediate complications.  Post-biopsy CC and ML views of the left breast were obtained for clip location. The clip appears to be appropriately positioned based on anatomic landmarks  IMPRESSION:  MRI guided core needle biopsy of the left breast as described above.  A post biopsy marking clip was placed. The pathology results are pending at time of dictation.  PATHOLOGY: Fibrocystic change with focal sclerosing adenosis and fibrosis. Focal pseudoangiomatous stromal hyperplasia. Negative for in situ and invasive carcinoma. 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 This diagnosis following excision of a subareolar mass that was thought to be related to an abscess. MRI demonstrates multiple areas of abnormal masslike  "enhancement site 4 right breast upper outer quadrant.  COMPARISON:  Breast MRI dated 01/26/2021 as well as diagnostic mammogram dated 05/21/2020.  TECHNIQUE:  The risks and benefits of the procedure were explained to the patient and informed consent was obtained.  \"Time-out\" was performed to verify patient's identity and correct location of the breast abnormality.  After placement of an intravenous line the patient was placed prone of the MR scanner.  The right breast was positioned with mild compression within the breast biopsy coil.  A fixed fiducial marker was placed on the skin through the guidance grid at position C4 to aid in the determination of lesion location.  An [employer] CAD system was utilized for biopsy planning.  Precontrast and postcontrast images were obtained in the axial and sagittal planes. Next, dynamic views in the axial and sagittal planes were obtained following the administration of 13 cc of gadolinium for all 4 biopsies.  The lesion to undergo biopsy was confirmed on subtraction images and lesion position, including depth, was determined.  The patient was brought out of the bore of the MR magnet.  The appropriate cube within the guidance grid was localized and the underlying skin was prepped with Betadine.  After administration of local anesthesia (1% lidocaine for superficial and 8 cc% lidocaine with epinephrine for deeper), the needle guide block was placed into the guidance grid.  An introducer and trocar were then placed into the breast from a lateral approach to the pre-determined depth .The trocar was removed and a localizing obturator was placed through the introducer.  Axial images revealed the tip of the obturator to be located at the lesion.  The patient was brought back out of the bore of the magnet.  The obturator was removed and a 10g UnboundIDoRx MRI compatible vacuum assisted core needle biopsy device was placed through the cannula to the appropriate depth.    A total of 12 samples " were obtained.   A marking clip was then deployed.  The patient tolerated the procedure and there were no immediate complications.  Post-biopsy CC and ML views of the right breast were obtained for clip location. The clip appears to be adequately positioned based on anatomic landmarks.  IMPRESSION:  MRI guided core needle biopsy of the right breast as described above.  A post biopsy marking clip was placed. The pathology results are pending at time of dictation.  PATHOLOGY: Fibrocystic change with adenosis and focal pseudoangiomatous stromal hyperplasia. Focal microcalcifications present. Negative for atypia.  Result is concordant.  RECOMMENDATION: 6 MONTH FOLLOW-UP RIGHT MRI. SURGICAL AND ONCOLOGY FOLLOW-UP FOR LEFT BREAST CANCER.  D:  02/12/2021 E:  02/12/2021  This report was finalized on 2/12/2021 10:33 AM by Dr. Anna Hidalgo MD.      MRI Breast Biopsy Initial With & Without Device    Result Date: 2/12/2021  MRI guided core needle biopsy of the left breast as described above.  A post biopsy marking clip was placed. The pathology results are pending at time of dictation.  PATHOLOGY: Intermediate grade ductal carcinoma in situ with cancerization of lobules, luminal necrosis and calcifications. Breast biomarkers to be performed on alternate concurrent biopsy. Result is concordant.  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 This diagnosis following excision of a subareolar mass that was thought to be related to an abscess.  MRI demonstrates multiple areas of abnormal masslike enhancement site 2 left breast central.  COMPARISON:  Breast MRI dated 01/26/2021 as well as diagnostic mammogram dated 05/21/2020.  TECHNIQUE:  The risks and benefits of the procedure were explained to the patient and informed consent was obtained.   "\"Time-out\" was performed to verify patient's identity and correct location of the breast abnormality.  After placement of an intravenous line the patient was placed prone of the MR scanner.  The left breast was positioned with mild compression within the breast biopsy coil.  A fixed fiducial marker was placed on the skin through the guidance grid at position C4 to aid in the determination of lesion location.  An [employer] CAD system was utilized for biopsy planning.  Precontrast and postcontrast images were obtained in the axial and sagittal planes. Next, dynamic views in the axial and sagittal planes were obtained following the administration of 13 cc of gadolinium for all 4 biopsies.  The lesion to undergo biopsy was confirmed on subtraction images and lesion position, including depth, was determined.  The patient was brought out of the bore of the MR magnet.  The appropriate cube within the guidance grid was localized and the underlying skin was prepped with Betadine.  After administration of local anesthesia (1% lidocaine for superficial and 8 cc% lidocaine with epinephrine for deeper), the needle guide block was placed into the guidance grid.  An introducer and trocar were then placed into the breast from a lateral approach to the pre-determined depth .The trocar was removed and a localizing obturator was placed through the introducer.  Axial images revealed the tip of the obturator to be located at the lesion.  The patient was brought back out of the bore of the magnet.  The obturator was removed and a 10g SenoRx MRI compatible vacuum assisted core needle biopsy device was placed through the cannula to the appropriate depth.    A total of 12 samples were obtained.   A marking clip was then deployed.  The patient tolerated the procedure and there were no immediate complications.  Post-biopsy CC and ML views of the left breast were obtained for clip location. The clip appears to be accurately located based on " "anatomic landmarks.  IMPRESSION:  MRI guided core needle biopsy of the left breast as described above.  A post biopsy marking clip was placed. The pathology results are pending at time of dictation.  PATHOLOGY: Small focus of invasive well-differentiated ductal carcinoma, invasive carcinoma arising in a background of intermediate grade in situ ductal carcinoma. Area of invasive carcinoma measuring 2.5 mm. Neoplasm positive for estrogen receptor, positive progesterone receptor and negative for HER-2/cristian. Result is concordant.  RECOMMENDATION: SURGICAL AND ONCOLOGY FOLLOW-UP.  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 This diagnosis following excision of a subareolar mass that was thought to be related to an abscess MRI demonstrates multiple areas of abnormal masslike enhancement site 3 left breast lateral  COMPARISON:  Breast MRI dated 1/26/2021 as well as diagnostic mammogram dated 5/21/2020  TECHNIQUE:  The risks and benefits of the procedure were explained to the patient and informed consent was obtained.  \"Time-out\" was performed to verify patient's identity and correct location of the breast abnormality.  After placement of an intravenous line the patient was placed prone of the MR scanner.  The left breast was positioned with mild compression within the breast biopsy coil.  A fixed fiducial marker was placed on the skin through the guidance grid at position C4 to aid in the determination of lesion location.  An [employer] CAD system was utilized for biopsy planning.  Precontrast and postcontrast images were obtained in the axial and sagittal planes. Next, dynamic views in the axial and sagittal planes were obtained following the administration of 13 cc of gadolinium for all 4 biopsies .  The lesion to undergo biopsy was confirmed on subtraction images and lesion position, " including depth, was determined.  The patient was brought out of the bore of the MR magnet.  The appropriate cube within the guidance grid was localized and the underlying skin was prepped with Betadine.  After administration of local anesthesia (1% lidocaine for superficial and 8% lidocaine with epinephrine for deeper), the needle guide block was placed into the guidance grid.  An introducer and trocar were then placed into the breast from a lateral approach to the pre-determined depth .The trocar was removed and a localizing obturator was placed through the introducer.  Axial images revealed the tip of the obturator to be located at the lesion.  The patient was brought back out of the bore of the magnet.  The obturator was removed and a 10g [employer] MagneticsoRx MRI compatible vacuum assisted core needle biopsy device was placed through the cannula to the appropriate depth.    A total of 12 samples were obtained.   A marking clip was then deployed.  The patient tolerated the procedure and there were no immediate complications.  Post-biopsy CC and ML views of the left breast were obtained for clip location. The clip appears to be appropriately positioned based on anatomic landmarks  IMPRESSION:  MRI guided core needle biopsy of the left breast as described above.  A post biopsy marking clip was placed. The pathology results are pending at time of dictation.  PATHOLOGY: Fibrocystic change with focal sclerosing adenosis and fibrosis. Focal pseudoangiomatous stromal hyperplasia. Negative for in situ and invasive carcinoma. 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 This diagnosis following excision of a subareolar mass that was thought to be related to an abscess. MRI demonstrates multiple areas of abnormal masslike  "enhancement site 4 right breast upper outer quadrant.  COMPARISON:  Breast MRI dated 01/26/2021 as well as diagnostic mammogram dated 05/21/2020.  TECHNIQUE:  The risks and benefits of the procedure were explained to the patient and informed consent was obtained.  \"Time-out\" was performed to verify patient's identity and correct location of the breast abnormality.  After placement of an intravenous line the patient was placed prone of the MR scanner.  The right breast was positioned with mild compression within the breast biopsy coil.  A fixed fiducial marker was placed on the skin through the guidance grid at position C4 to aid in the determination of lesion location.  An [employer] CAD system was utilized for biopsy planning.  Precontrast and postcontrast images were obtained in the axial and sagittal planes. Next, dynamic views in the axial and sagittal planes were obtained following the administration of 13 cc of gadolinium for all 4 biopsies.  The lesion to undergo biopsy was confirmed on subtraction images and lesion position, including depth, was determined.  The patient was brought out of the bore of the MR magnet.  The appropriate cube within the guidance grid was localized and the underlying skin was prepped with Betadine.  After administration of local anesthesia (1% lidocaine for superficial and 8 cc% lidocaine with epinephrine for deeper), the needle guide block was placed into the guidance grid.  An introducer and trocar were then placed into the breast from a lateral approach to the pre-determined depth .The trocar was removed and a localizing obturator was placed through the introducer.  Axial images revealed the tip of the obturator to be located at the lesion.  The patient was brought back out of the bore of the magnet.  The obturator was removed and a 10g Safe BulkersoRx MRI compatible vacuum assisted core needle biopsy device was placed through the cannula to the appropriate depth.    A total of 12 samples " were obtained.   A marking clip was then deployed.  The patient tolerated the procedure and there were no immediate complications.  Post-biopsy CC and ML views of the right breast were obtained for clip location. The clip appears to be adequately positioned based on anatomic landmarks.  IMPRESSION:  MRI guided core needle biopsy of the right breast as described above.  A post biopsy marking clip was placed. The pathology results are pending at time of dictation.  PATHOLOGY: Fibrocystic change with adenosis and focal pseudoangiomatous stromal hyperplasia. Focal microcalcifications present. Negative for atypia.  Result is concordant.  RECOMMENDATION: 6 MONTH FOLLOW-UP RIGHT MRI. SURGICAL AND ONCOLOGY FOLLOW-UP FOR LEFT BREAST CANCER.  D:  02/12/2021 E:  02/12/2021  This report was finalized on 2/12/2021 10:33 AM by Dr. Anna Hidalgo MD.            Assessment/Plan    1.  Node positive ER/AZ positive HER-2 negative breast cancer.  Continue adjuvant therapy with dose dense Adriamycin and Cytoxan.  My plan is to treat her with 4 cycles of dose dense Adriamycin and Cytoxan followed by weekly Taxol.  She will need radiotherapy after that is completed.  At this time no dose reduction for treatment.  Plan to check on her again in 2 weeks to make sure she is doing well.        Total time of patient care on day of service including time prior to, face to face with patient, and following visit spent in reviewing records, lab results, imaging studies, discussion with patient, and documentation/charting was > 25 minutes.     Graham Herbert MD  [facility]    Return on: 06/17/21  Return in (Approximately): 2 weeks, Schedule with next infusion    Orders Placed This Encounter   Procedures   • Comprehensive metabolic panel   • Comprehensive metabolic panel   • CBC and Differential   • CBC and Differential       6/3/2021

## 2021-06-03 NOTE — PROGRESS NOTES
SW met with pt during her infusion to provide support and resources.  Pt states that she is doing pretty well today and that she got the approval from her doctor to return to work at least part-time.  SW provided a referral to [place] [facility], [facility], the [place] [facility] and also $45 in [employer] gift cards.  These resources should have assistance available for pt to help with some of her monthly bills while she is in active treatment.  SW available for ongoing support and resource needs.

## 2021-06-04 ENCOUNTER — HOSPITAL ENCOUNTER (OUTPATIENT)
Dept: ONCOLOGY | Facility: HOSPITAL | Age: 36
Setting detail: INFUSION SERIES
Discharge: HOME | End: 2021-06-04

## 2021-06-04 VITALS
TEMPERATURE: 98.6 F | DIASTOLIC BLOOD PRESSURE: 69 MMHG | SYSTOLIC BLOOD PRESSURE: 138 MMHG | RESPIRATION RATE: 18 BRPM | HEIGHT: 65 IN | HEART RATE: 143 BPM | WEIGHT: 143 LBS | BODY MASS INDEX: 23.82 KG/M2

## 2021-06-04 DIAGNOSIS — C50.412 CARCINOMA OF UPPER-OUTER QUADRANT OF LEFT BREAST IN FEMALE, ESTROGEN RECEPTOR POSITIVE (HCC): Primary | ICD-10-CM

## 2021-06-04 DIAGNOSIS — Z17.0 CARCINOMA OF UPPER-OUTER QUADRANT OF LEFT BREAST IN FEMALE, ESTROGEN RECEPTOR POSITIVE (HCC): Primary | ICD-10-CM

## 2021-06-04 PROCEDURE — 96372 THER/PROPH/DIAG INJ SC/IM: CPT

## 2021-06-04 PROCEDURE — 25010000002 PEGFILGRASTIM-CBQV 6 MG/0.6ML SOLUTION PREFILLED SYRINGE: Performed by: INTERNAL MEDICINE

## 2021-06-04 RX ADMIN — PEGFILGRASTIM-CBQV 6 MG: 6 INJECTION, SOLUTION SUBCUTANEOUS at 12:52

## 2021-06-04 NOTE — PROGRESS NOTES
"Outpatient Oncology Nutrition     Reason for Visit:      Oncology Nutrition Screening and Patient Education  / Met with patient during her chemotherapy infusion appointment (D1C2)    Patient Name:  Rai Guillory    :  1985    MRN:  [id number]    Date of Encounter: 2021    Nutrition Assessment     Cancer Dx: Node positive ER/AK positive HER-2 negative left breast cancer    Type of Cancer Treatment:     Surgery: bilateral mastectomies and a left axillary sentinel node biopsy (21)     Chemotherapy:  DD Adriamycin / Cytoxan - every 14 days x 4 followed by Taxol      Radiation: after the completion of chemo    Patient Active Problem List:    Patient Active Problem List   Diagnosis   • History of left breast cancer   • Carcinoma of upper-outer quadrant of left breast in female, estrogen receptor positive (CMS/HCC)   • Encounter for central line care       Food / Nutrition Related History   Patient reports tolerating her first cycle of chemo pretty well overall.  She states her appetite was decreased for a couple of days after chemo but has now returned to normal.  She also reports having mild constipation which she managed with her diet.    Hydration Status   Discussed the importance of hydration and reviewed hydrating fluids.    Goal: 64-80 ounces daily     How many 8 ounces glasses of water do you consume per day? Patient reports drinking juice and water.    Enteral Feeding       Anthropometric Measurements     Height:    Ht Readings from Last 1 Encounters:   21 165.1 cm (65\")       Weight:    Wt Readings from Last 1 Encounters:   21 67 kg (147 lb 9.6 oz)       BMI:  24.6 - Normal     Weight Change: patient reports her weight has been stable     Review of Lab Data (Time Frame - 1 month / 2 month)   Labs - 6/3/21 reviewed     Medication Review   MAR reviewed - Decadron 4mg - Take 2 tablets in the morning daily on Days 2, 3 & 4    Nutrition Focused Physical Findings " "      Nutrition Impact Symptoms   Altered appetite x a couple days after chemo  Constipation - resolved with prunes    Physical Activity   Normal with no limitations    Current Nutritional Intake     Oral diet:  Regular    Oral nutritional supplements:    Intake: patient reports her oral intake has been normal overall     Malnutrition Risk Assessment     Recent weight loss over the past 6 months:  0 = No    Eating poorly because of a decreased appetite:  0 = No    Malnutrition Screening Score:     MST = 0 or 1 Patient not at risk for malnutrition    Nutrition Diagnosis     Problem    Etiology    Signs / Symptoms      Nutrition Intervention   Discussed the importance of good nutrition during her treatment course focusing on adequate calorie, protein, nutrient and fluid intake.  Advised her to be consuming smaller more frequent meals/snacks throughout the day to aid with potential nausea management.  Emphasized the importance of protein and its role in the diet; reviewed high protein foods; and recommended she have a protein source at each meal/snack.  Discussed tips to aid with constipation management including adequate hydration and increasing foods high in insoluble fiber (prunes).      Provided and reviewed written diet material \"Nutritional Considerations in Breast Cancer\" and briefly discussed the basics of survivorship nutrition.    Goal   To maintain adequate nutritional and hydration intake during treatment.  To aid with nutrition impact symptom management as needed.     Monitoring / Evaluation   Answered her questions and she voiced understanding of information discussed.  RD's contact information provided and encouraged to call with questions.  Will follow up as indicated.    Yesenia Guzman MS, RD, LD  "

## 2021-06-15 RX ORDER — CITALOPRAM 20 MG/1
TABLET ORAL
Qty: 30 TABLET | Refills: 0 | OUTPATIENT
Start: 2021-06-15

## 2021-06-17 ENCOUNTER — OFFICE VISIT (OUTPATIENT)
Dept: ONCOLOGY | Facility: CLINIC | Age: 36
End: 2021-06-17

## 2021-06-17 ENCOUNTER — HOSPITAL ENCOUNTER (OUTPATIENT)
Dept: ONCOLOGY | Facility: HOSPITAL | Age: 36
Setting detail: INFUSION SERIES
Discharge: HOME | End: 2021-06-17

## 2021-06-17 VITALS
DIASTOLIC BLOOD PRESSURE: 69 MMHG | WEIGHT: 144 LBS | SYSTOLIC BLOOD PRESSURE: 99 MMHG | OXYGEN SATURATION: 99 % | HEIGHT: 65 IN | TEMPERATURE: 97.8 F | BODY MASS INDEX: 23.99 KG/M2 | RESPIRATION RATE: 18 BRPM | HEART RATE: 96 BPM

## 2021-06-17 DIAGNOSIS — C50.412 CARCINOMA OF UPPER-OUTER QUADRANT OF LEFT BREAST IN FEMALE, ESTROGEN RECEPTOR POSITIVE (HCC): Primary | ICD-10-CM

## 2021-06-17 DIAGNOSIS — Z17.0 CARCINOMA OF UPPER-OUTER QUADRANT OF LEFT BREAST IN FEMALE, ESTROGEN RECEPTOR POSITIVE (HCC): Primary | ICD-10-CM

## 2021-06-17 DIAGNOSIS — Z45.2 ENCOUNTER FOR CENTRAL LINE CARE: ICD-10-CM

## 2021-06-17 LAB
ALBUMIN SERPL-MCNC: 3.9 G/DL (ref 3.5–5.2)
ALBUMIN/GLOB SERPL: 2 G/DL
ALP SERPL-CCNC: 124 U/L (ref 39–117)
ALT SERPL W P-5'-P-CCNC: 9 U/L (ref 1–33)
ANION GAP SERPL CALCULATED.3IONS-SCNC: 7 MMOL/L (ref 5–15)
AST SERPL-CCNC: 10 U/L (ref 1–32)
BILIRUB SERPL-MCNC: <0.2 MG/DL (ref 0–1.2)
BUN SERPL-MCNC: 10 MG/DL (ref 6–20)
BUN/CREAT SERPL: 15.2 (ref 7–25)
CALCIUM SPEC-SCNC: 9 MG/DL (ref 8.6–10.5)
CHLORIDE SERPL-SCNC: 109 MMOL/L (ref 98–107)
CO2 SERPL-SCNC: 23 MMOL/L (ref 22–29)
CREAT SERPL-MCNC: 0.66 MG/DL (ref 0.57–1)
ERYTHROCYTE [DISTWIDTH] IN BLOOD BY AUTOMATED COUNT: 15 % (ref 12.3–15.4)
GFR SERPL CREATININE-BSD FRML MDRD: 121 ML/MIN/1.73
GLOBULIN UR ELPH-MCNC: 2 GM/DL
GLUCOSE SERPL-MCNC: 109 MG/DL (ref 65–99)
HCT VFR BLD AUTO: 31.9 % (ref 34–46.6)
HGB BLD-MCNC: 10.7 G/DL (ref 12–15.9)
LYMPHOCYTES # BLD AUTO: 1.2 10*3/MM3 (ref 0.7–3.1)
LYMPHOCYTES NFR BLD AUTO: 13 % (ref 19.6–45.3)
MCH RBC QN AUTO: 30.8 PG (ref 26.6–33)
MCHC RBC AUTO-ENTMCNC: 33.4 G/DL (ref 31.5–35.7)
MCV RBC AUTO: 92 FL (ref 79–97)
MONOCYTES # BLD AUTO: 0.4 10*3/MM3 (ref 0.1–0.9)
MONOCYTES NFR BLD AUTO: 4.6 % (ref 5–12)
NEUTROPHILS NFR BLD AUTO: 7.7 10*3/MM3 (ref 1.7–7)
NEUTROPHILS NFR BLD AUTO: 82.4 % (ref 42.7–76)
PLATELET # BLD AUTO: 222 10*3/MM3 (ref 140–450)
PMV BLD AUTO: 8.2 FL (ref 6–12)
POTASSIUM SERPL-SCNC: 4.3 MMOL/L (ref 3.5–5.2)
PROT SERPL-MCNC: 5.9 G/DL (ref 6–8.5)
RBC # BLD AUTO: 3.47 10*6/MM3 (ref 3.77–5.28)
SODIUM SERPL-SCNC: 139 MMOL/L (ref 136–145)
WBC # BLD AUTO: 9.3 10*3/MM3 (ref 3.4–10.8)

## 2021-06-17 PROCEDURE — 80053 COMPREHEN METABOLIC PANEL: CPT | Performed by: INTERNAL MEDICINE

## 2021-06-17 PROCEDURE — 96413 CHEMO IV INFUSION 1 HR: CPT

## 2021-06-17 PROCEDURE — 25010000002 PALONOSETRON 0.25 MG/5ML SOLUTION PREFILLED SYRINGE: Performed by: INTERNAL MEDICINE

## 2021-06-17 PROCEDURE — 25010000002 DEXAMETHASONE SODIUM PHOSPHATE 100 MG/10ML SOLUTION: Performed by: INTERNAL MEDICINE

## 2021-06-17 PROCEDURE — 96411 CHEMO IV PUSH ADDL DRUG: CPT

## 2021-06-17 PROCEDURE — 25010000002 FOSAPREPITANT PER 1 MG: Performed by: INTERNAL MEDICINE

## 2021-06-17 PROCEDURE — 85025 COMPLETE CBC W/AUTO DIFF WBC: CPT | Performed by: INTERNAL MEDICINE

## 2021-06-17 PROCEDURE — 96367 TX/PROPH/DG ADDL SEQ IV INF: CPT

## 2021-06-17 PROCEDURE — 25010000002 CYCLOPHOSPHAMIDE PER 100 MG: Performed by: INTERNAL MEDICINE

## 2021-06-17 PROCEDURE — 25010000002 HEPARIN LOCK FLUSH PER 10 UNITS: Performed by: INTERNAL MEDICINE

## 2021-06-17 PROCEDURE — 96375 TX/PRO/DX INJ NEW DRUG ADDON: CPT

## 2021-06-17 PROCEDURE — 25010000002 DOXORUBICIN PER 10 MG: Performed by: INTERNAL MEDICINE

## 2021-06-17 PROCEDURE — 99214 OFFICE O/P EST MOD 30 MIN: CPT | Performed by: INTERNAL MEDICINE

## 2021-06-17 PROCEDURE — 96366 THER/PROPH/DIAG IV INF ADDON: CPT

## 2021-06-17 RX ORDER — SODIUM CHLORIDE 9 MG/ML
250 INJECTION, SOLUTION INTRAVENOUS ONCE
Status: CANCELLED | OUTPATIENT
Start: 2021-07-01

## 2021-06-17 RX ORDER — HEPARIN SODIUM (PORCINE) LOCK FLUSH IV SOLN 100 UNIT/ML 100 UNIT/ML
500 SOLUTION INTRAVENOUS AS NEEDED
Status: CANCELLED | OUTPATIENT
Start: 2021-06-17

## 2021-06-17 RX ORDER — DOXORUBICIN HYDROCHLORIDE 2 MG/ML
60 INJECTION, SOLUTION INTRAVENOUS ONCE
Status: CANCELLED | OUTPATIENT
Start: 2021-07-01

## 2021-06-17 RX ORDER — PALONOSETRON 0.05 MG/ML
0.25 INJECTION, SOLUTION INTRAVENOUS ONCE
Status: CANCELLED | OUTPATIENT
Start: 2021-07-01

## 2021-06-17 RX ORDER — DOXORUBICIN HYDROCHLORIDE 2 MG/ML
60 INJECTION, SOLUTION INTRAVENOUS ONCE
Status: COMPLETED | OUTPATIENT
Start: 2021-06-17 | End: 2021-06-17

## 2021-06-17 RX ORDER — PALONOSETRON 0.05 MG/ML
0.25 INJECTION, SOLUTION INTRAVENOUS ONCE
Status: COMPLETED | OUTPATIENT
Start: 2021-06-17 | End: 2021-06-17

## 2021-06-17 RX ORDER — SODIUM CHLORIDE 9 MG/ML
250 INJECTION, SOLUTION INTRAVENOUS ONCE
Status: COMPLETED | OUTPATIENT
Start: 2021-06-17 | End: 2021-06-17

## 2021-06-17 RX ORDER — HEPARIN SODIUM (PORCINE) LOCK FLUSH IV SOLN 100 UNIT/ML 100 UNIT/ML
500 SOLUTION INTRAVENOUS AS NEEDED
Status: DISCONTINUED | OUTPATIENT
Start: 2021-06-17 | End: 2021-06-18 | Stop reason: HOSPADM

## 2021-06-17 RX ORDER — DEXAMETHASONE 4 MG/1
TABLET ORAL
Qty: 30 TABLET | Refills: 3 | Status: SHIPPED | OUTPATIENT
Start: 2021-06-17 | End: 2021-07-21

## 2021-06-17 RX ADMIN — PALONOSETRON HYDROCHLORIDE 0.25 MG: 0.05 INJECTION, SOLUTION INTRAVENOUS at 09:41

## 2021-06-17 RX ADMIN — SODIUM CHLORIDE 250 ML: 9 INJECTION, SOLUTION INTRAVENOUS at 09:41

## 2021-06-17 RX ADMIN — HEPARIN 500 UNITS: 100 SYRINGE at 11:11

## 2021-06-17 RX ADMIN — DEXAMETHASONE SODIUM PHOSPHATE 12 MG: 10 INJECTION, SOLUTION INTRAMUSCULAR; INTRAVENOUS at 09:45

## 2021-06-17 RX ADMIN — DOXORUBICIN HYDROCHLORIDE 104 MG: 2 INJECTION, SOLUTION INTRAVENOUS at 10:23

## 2021-06-17 RX ADMIN — CYCLOPHOSPHAMIDE 1000 MG: 1 INJECTION, POWDER, FOR SOLUTION INTRAVENOUS; ORAL at 10:29

## 2021-06-17 RX ADMIN — SODIUM CHLORIDE 150 MG: 9 INJECTION, SOLUTION INTRAVENOUS at 09:45

## 2021-06-17 NOTE — PROGRESS NOTES
PROBLEM LIST:  Oncology/Hematology History   Carcinoma of upper-outer quadrant of left breast in female, estrogen receptor positive (CMS/HCC)   4/14/2021 Cancer Staged    Staging form: Breast, AJCC 8th Edition  - Pathologic stage from 4/14/2021: Stage IB (pT3, pN1a, cM0, G2, ER+, DE+, HER2-) - Signed by Graham Herbert MD on 4/29/2021 4/29/2021 Initial Diagnosis    Carcinoma of upper-outer quadrant of left breast in female, estrogen receptor positive (CMS/HCC)     5/21/2021 -  Chemotherapy    OP BREAST AC DD DOXOrubicin / Cyclophosphamide     7/15/2021 -  Chemotherapy    OP BREAST PACLitaxel (weekly X 12)         REASON FOR VISIT: Breast cancer    HISTORY OF PRESENT ILLNESS:   36 y.o.  female presents today for follow-up of her breast cancer.  She is completed 2 cycles of dose dense Adriamycin and Cytoxan.  Seems to be tolerating it reasonably well.  Has some issues with fatigue.  No infections.  No significant issues with nausea vomiting.  No diarrhea.  Has had complete alopecia    Past medical history, social history and family history was reviewed 06/17/21 and unchanged from prior visit.    Review of Systems:    Review of Systems   Constitutional: Negative.    HENT:  Negative.    Eyes: Negative.    Respiratory: Negative.    Cardiovascular: Negative.    Gastrointestinal: Negative.    Endocrine: Negative.    Genitourinary: Negative.     Musculoskeletal: Negative.    Skin: Negative.    Neurological: Negative.    Hematological: Negative.    Psychiatric/Behavioral: Negative.             Medications:        Current Outpatient Medications:   •  dexamethasone (DECADRON) 4 MG tablet, Take 2 tablets in the morning daily on Days 2, 3 & 4.  Take with food., Disp: 30 tablet, Rfl: 3  •  lidocaine-prilocaine (EMLA) 2.5-2.5 % cream, Apply  topically to the appropriate area as directed As Needed (45-60 minutes prior to port access.  Cover with saran/plastic wrap.)., Disp: 30 g, Rfl: 3  •  LORazepam (ATIVAN) 1 MG tablet,  "Take 1 tablet by mouth Daily As Needed for Anxiety. Take one tablet as needed up to once a day for severe anxiety, Disp: 30 tablet, Rfl: 0  •  ondansetron (ZOFRAN) 8 MG tablet, Take 1 tablet by mouth 3 (Three) Times a Day As Needed for Nausea or Vomiting., Disp: 30 tablet, Rfl: 5  •  temazepam (RESTORIL) 30 MG capsule, Take 1 capsule by mouth At Night As Needed for Sleep., Disp: 30 capsule, Rfl: 0    Pain Medications             dexamethasone (DECADRON) 4 MG tablet Take 2 tablets in the morning daily on Days 2, 3 & 4.  Take with food.             ALLERGIES:  No Known Allergies      Physical Exam    VITAL SIGNS:  BP 99/69 Comment: RUE  Pulse 96   Temp 97.8 °F (36.6 °C) (Infrared)   Resp 18   Ht 165.1 cm (65\")   Wt 65.3 kg (144 lb)   SpO2 99% Comment: RA  BMI 23.96 kg/m²     ECOG score: 0           Wt Readings from Last 3 Encounters:   06/17/21 65.3 kg (144 lb)   06/04/21 64.9 kg (143 lb)   06/03/21 67 kg (147 lb 9.6 oz)       Body mass index is 23.96 kg/m². Body surface area is 1.72 meters squared.    Pain Score    06/17/21 0852   PainSc: 0-No pain           Performance Status: 0    General: well appearing, in no acute distress  HEENT: sclera anicteric, neck is supple  Lymphatics: no cervical, supraclavicular, or axillary adenopathy  Cardiovascular: regular rate and rhythm, no murmurs, rubs or gallops  Lungs: clear to auscultation bilaterally  Abdomen: soft, nontender, nondistended.  No palpable organomegaly  Extremities: no lower extremity edema  Skin: no rashes, lesions, bruising, or petechiae  Msk:  Shows no weakness of the large muscle groups  Psych: Mood is stable        RECENT LABS:    Lab Results   Component Value Date    HGB 12.6 06/03/2021    HCT 37.9 06/03/2021    MCV 92.5 06/03/2021     06/03/2021    WBC 11.50 (H) 06/03/2021    NEUTROABS 9.50 (H) 06/03/2021    LYMPHSABS 1.60 06/03/2021    MONOSABS 0.50 06/03/2021    EOSABS 0.08 11/09/2020    BASOSABS 0.01 11/09/2020       Lab Results " "  Component Value Date    GLUCOSE 107 (H) 06/03/2021    BUN 13 06/03/2021    CREATININE 0.80 06/03/2021     06/03/2021    K 4.7 06/03/2021     (H) 06/03/2021    CO2 23.0 06/03/2021    CALCIUM 8.7 06/03/2021    PROTEINTOT 6.2 06/03/2021    ALBUMIN 4.10 06/03/2021    BILITOT <0.2 06/03/2021    ALKPHOS 125 (H) 06/03/2021    AST 12 06/03/2021    ALT 11 06/03/2021       XR Chest 1 View    Result Date: 5/20/2021  No pneumothorax or other acute cardiopulmonary abnormality.  This report was finalized on 5/20/2021 12:38 PM by Khanh Prajapati.      Mammo Post Clip Placement Bilateral    Result Date: 2/12/2021  MRI guided core needle biopsy of the left breast as described above.  A post biopsy marking clip was placed. The pathology results are pending at time of dictation.  PATHOLOGY: Intermediate grade ductal carcinoma in situ with cancerization of lobules, luminal necrosis and calcifications. Breast biomarkers to be performed on alternate concurrent biopsy. Result is concordant.  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 This diagnosis following excision of a subareolar mass that was thought to be related to an abscess.  MRI demonstrates multiple areas of abnormal masslike enhancement site 2 left breast central.  COMPARISON:  Breast MRI dated 01/26/2021 as well as diagnostic mammogram dated 05/21/2020.  TECHNIQUE:  The risks and benefits of the procedure were explained to the patient and informed consent was obtained.  \"Time-out\" was performed to verify patient's identity and correct location of the breast abnormality.  After placement of an intravenous line the patient was placed prone of the MR scanner.  The left breast was positioned with mild compression within the breast biopsy coil.  A fixed fiducial marker was " placed on the skin through the guidance grid at position C4 to aid in the determination of lesion location.  An [employer] CAD system was utilized for biopsy planning.  Precontrast and postcontrast images were obtained in the axial and sagittal planes. Next, dynamic views in the axial and sagittal planes were obtained following the administration of 13 cc of gadolinium for all 4 biopsies.  The lesion to undergo biopsy was confirmed on subtraction images and lesion position, including depth, was determined.  The patient was brought out of the bore of the MR magnet.  The appropriate cube within the guidance grid was localized and the underlying skin was prepped with Betadine.  After administration of local anesthesia (1% lidocaine for superficial and 8 cc% lidocaine with epinephrine for deeper), the needle guide block was placed into the guidance grid.  An introducer and trocar were then placed into the breast from a lateral approach to the pre-determined depth .The trocar was removed and a localizing obturator was placed through the introducer.  Axial images revealed the tip of the obturator to be located at the lesion.  The patient was brought back out of the bore of the magnet.  The obturator was removed and a 10g SenoRx MRI compatible vacuum assisted core needle biopsy device was placed through the cannula to the appropriate depth.    A total of 12 samples were obtained.   A marking clip was then deployed.  The patient tolerated the procedure and there were no immediate complications.  Post-biopsy CC and ML views of the left breast were obtained for clip location. The clip appears to be accurately located based on anatomic landmarks.  IMPRESSION:  MRI guided core needle biopsy of the left breast as described above.  A post biopsy marking clip was placed. The pathology results are pending at time of dictation.  PATHOLOGY: Small focus of invasive well-differentiated ductal carcinoma, invasive carcinoma arising in a  "background of intermediate grade in situ ductal carcinoma. Area of invasive carcinoma measuring 2.5 mm. Neoplasm positive for estrogen receptor, positive progesterone receptor and negative for HER-2/cristian. Result is concordant.  RECOMMENDATION: SURGICAL AND ONCOLOGY FOLLOW-UP.  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 This diagnosis following excision of a subareolar mass that was thought to be related to an abscess MRI demonstrates multiple areas of abnormal masslike enhancement site 3 left breast lateral  COMPARISON:  Breast MRI dated 1/26/2021 as well as diagnostic mammogram dated 5/21/2020  TECHNIQUE:  The risks and benefits of the procedure were explained to the patient and informed consent was obtained.  \"Time-out\" was performed to verify patient's identity and correct location of the breast abnormality.  After placement of an intravenous line the patient was placed prone of the MR scanner.  The left breast was positioned with mild compression within the breast biopsy coil.  A fixed fiducial marker was placed on the skin through the guidance grid at position C4 to aid in the determination of lesion location.  An [employer] CAD system was utilized for biopsy planning.  Precontrast and postcontrast images were obtained in the axial and sagittal planes. Next, dynamic views in the axial and sagittal planes were obtained following the administration of 13 cc of gadolinium for all 4 biopsies .  The lesion to undergo biopsy was confirmed on subtraction images and lesion position, including depth, was determined.  The patient was brought out of the bore of the MR magnet.  The appropriate cube within the guidance grid was localized and the underlying skin was prepped with Betadine.  After administration of local anesthesia (1% lidocaine for superficial and 8% lidocaine with " "epinephrine for deeper), the needle guide block was placed into the guidance grid.  An introducer and trocar were then placed into the breast from a lateral approach to the pre-determined depth .The trocar was removed and a localizing obturator was placed through the introducer.  Axial images revealed the tip of the obturator to be located at the lesion.  The patient was brought back out of the bore of the magnet.  The obturator was removed and a 10g SenoRx MRI compatible vacuum assisted core needle biopsy device was placed through the cannula to the appropriate depth.    A total of 12 samples were obtained.   A marking clip was then deployed.  The patient tolerated the procedure and there were no immediate complications.  Post-biopsy CC and ML views of the left breast were obtained for clip location. The clip appears to be appropriately positioned based on anatomic landmarks  IMPRESSION:  MRI guided core needle biopsy of the left breast as described above.  A post biopsy marking clip was placed. The pathology results are pending at time of dictation.  PATHOLOGY: Fibrocystic change with focal sclerosing adenosis and fibrosis. Focal pseudoangiomatous stromal hyperplasia. Negative for in situ and invasive carcinoma. 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 This diagnosis following excision of a subareolar mass that was thought to be related to an abscess. MRI demonstrates multiple areas of abnormal masslike enhancement site 4 right breast upper outer quadrant.  COMPARISON:  Breast MRI dated 01/26/2021 as well as diagnostic mammogram dated 05/21/2020.  TECHNIQUE:  The risks and benefits of the procedure were explained to the patient and informed consent was obtained.  \"Time-out\" was performed to verify " patient's identity and correct location of the breast abnormality.  After placement of an intravenous line the patient was placed prone of the MR scanner.  The right breast was positioned with mild compression within the breast biopsy coil.  A fixed fiducial marker was placed on the skin through the guidance grid at position C4 to aid in the determination of lesion location.  An [employer] CAD system was utilized for biopsy planning.  Precontrast and postcontrast images were obtained in the axial and sagittal planes. Next, dynamic views in the axial and sagittal planes were obtained following the administration of 13 cc of gadolinium for all 4 biopsies.  The lesion to undergo biopsy was confirmed on subtraction images and lesion position, including depth, was determined.  The patient was brought out of the bore of the MR magnet.  The appropriate cube within the guidance grid was localized and the underlying skin was prepped with Betadine.  After administration of local anesthesia (1% lidocaine for superficial and 8 cc% lidocaine with epinephrine for deeper), the needle guide block was placed into the guidance grid.  An introducer and trocar were then placed into the breast from a lateral approach to the pre-determined depth .The trocar was removed and a localizing obturator was placed through the introducer.  Axial images revealed the tip of the obturator to be located at the lesion.  The patient was brought back out of the bore of the magnet.  The obturator was removed and a 10g SenoRx MRI compatible vacuum assisted core needle biopsy device was placed through the cannula to the appropriate depth.    A total of 12 samples were obtained.   A marking clip was then deployed.  The patient tolerated the procedure and there were no immediate complications.  Post-biopsy CC and ML views of the right breast were obtained for clip location. The clip appears to be adequately positioned based on anatomic landmarks.  IMPRESSION:   "MRI guided core needle biopsy of the right breast as described above.  A post biopsy marking clip was placed. The pathology results are pending at time of dictation.  PATHOLOGY: Fibrocystic change with adenosis and focal pseudoangiomatous stromal hyperplasia. Focal microcalcifications present. Negative for atypia.  Result is concordant.  RECOMMENDATION: 6 MONTH FOLLOW-UP RIGHT MRI. SURGICAL AND ONCOLOGY FOLLOW-UP FOR LEFT BREAST CANCER.  D:  02/12/2021 E:  02/12/2021  This report was finalized on 2/12/2021 10:33 AM by Dr. Anna Hidalgo MD.      MRI Breast Biopsy Each Add With & Without Device    Result Date: 2/12/2021  MRI guided core needle biopsy of the left breast as described above.  A post biopsy marking clip was placed. The pathology results are pending at time of dictation.  PATHOLOGY: Intermediate grade ductal carcinoma in situ with cancerization of lobules, luminal necrosis and calcifications. Breast biomarkers to be performed on alternate concurrent biopsy. Result is concordant.  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 This diagnosis following excision of a subareolar mass that was thought to be related to an abscess.  MRI demonstrates multiple areas of abnormal masslike enhancement site 2 left breast central.  COMPARISON:  Breast MRI dated 01/26/2021 as well as diagnostic mammogram dated 05/21/2020.  TECHNIQUE:  The risks and benefits of the procedure were explained to the patient and informed consent was obtained.  \"Time-out\" was performed to verify patient's identity and correct location of the breast abnormality.  After placement of an intravenous line the patient was placed prone of the MR scanner.  The left breast was positioned with mild compression within the breast biopsy coil.  A fixed fiducial marker " was placed on the skin through the guidance grid at position C4 to aid in the determination of lesion location.  An [employer] CAD system was utilized for biopsy planning.  Precontrast and postcontrast images were obtained in the axial and sagittal planes. Next, dynamic views in the axial and sagittal planes were obtained following the administration of 13 cc of gadolinium for all 4 biopsies.  The lesion to undergo biopsy was confirmed on subtraction images and lesion position, including depth, was determined.  The patient was brought out of the bore of the MR magnet.  The appropriate cube within the guidance grid was localized and the underlying skin was prepped with Betadine.  After administration of local anesthesia (1% lidocaine for superficial and 8 cc% lidocaine with epinephrine for deeper), the needle guide block was placed into the guidance grid.  An introducer and trocar were then placed into the breast from a lateral approach to the pre-determined depth .The trocar was removed and a localizing obturator was placed through the introducer.  Axial images revealed the tip of the obturator to be located at the lesion.  The patient was brought back out of the bore of the magnet.  The obturator was removed and a 10g SenoRx MRI compatible vacuum assisted core needle biopsy device was placed through the cannula to the appropriate depth.    A total of 12 samples were obtained.   A marking clip was then deployed.  The patient tolerated the procedure and there were no immediate complications.  Post-biopsy CC and ML views of the left breast were obtained for clip location. The clip appears to be accurately located based on anatomic landmarks.  IMPRESSION:  MRI guided core needle biopsy of the left breast as described above.  A post biopsy marking clip was placed. The pathology results are pending at time of dictation.  PATHOLOGY: Small focus of invasive well-differentiated ductal carcinoma, invasive carcinoma arising in a  "background of intermediate grade in situ ductal carcinoma. Area of invasive carcinoma measuring 2.5 mm. Neoplasm positive for estrogen receptor, positive progesterone receptor and negative for HER-2/cristian. Result is concordant.  RECOMMENDATION: SURGICAL AND ONCOLOGY FOLLOW-UP.  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 This diagnosis following excision of a subareolar mass that was thought to be related to an abscess MRI demonstrates multiple areas of abnormal masslike enhancement site 3 left breast lateral  COMPARISON:  Breast MRI dated 1/26/2021 as well as diagnostic mammogram dated 5/21/2020  TECHNIQUE:  The risks and benefits of the procedure were explained to the patient and informed consent was obtained.  \"Time-out\" was performed to verify patient's identity and correct location of the breast abnormality.  After placement of an intravenous line the patient was placed prone of the MR scanner.  The left breast was positioned with mild compression within the breast biopsy coil.  A fixed fiducial marker was placed on the skin through the guidance grid at position C4 to aid in the determination of lesion location.  An [employer] CAD system was utilized for biopsy planning.  Precontrast and postcontrast images were obtained in the axial and sagittal planes. Next, dynamic views in the axial and sagittal planes were obtained following the administration of 13 cc of gadolinium for all 4 biopsies .  The lesion to undergo biopsy was confirmed on subtraction images and lesion position, including depth, was determined.  The patient was brought out of the bore of the MR magnet.  The appropriate cube within the guidance grid was localized and the underlying skin was prepped with Betadine.  After administration of local anesthesia (1% lidocaine for superficial and 8% lidocaine with " patient's identity and correct location of the breast abnormality.  After placement of an intravenous line the patient was placed prone of the MR scanner.  The right breast was positioned with mild compression within the breast biopsy coil.  A fixed fiducial marker was placed on the skin through the guidance grid at position C4 to aid in the determination of lesion location.  An [employer] CAD system was utilized for biopsy planning.  Precontrast and postcontrast images were obtained in the axial and sagittal planes. Next, dynamic views in the axial and sagittal planes were obtained following the administration of 13 cc of gadolinium for all 4 biopsies.  The lesion to undergo biopsy was confirmed on subtraction images and lesion position, including depth, was determined.  The patient was brought out of the bore of the MR magnet.  The appropriate cube within the guidance grid was localized and the underlying skin was prepped with Betadine.  After administration of local anesthesia (1% lidocaine for superficial and 8 cc% lidocaine with epinephrine for deeper), the needle guide block was placed into the guidance grid.  An introducer and trocar were then placed into the breast from a lateral approach to the pre-determined depth .The trocar was removed and a localizing obturator was placed through the introducer.  Axial images revealed the tip of the obturator to be located at the lesion.  The patient was brought back out of the bore of the magnet.  The obturator was removed and a 10g SenoRx MRI compatible vacuum assisted core needle biopsy device was placed through the cannula to the appropriate depth.    A total of 12 samples were obtained.   A marking clip was then deployed.  The patient tolerated the procedure and there were no immediate complications.  Post-biopsy CC and ML views of the right breast were obtained for clip location. The clip appears to be adequately positioned based on anatomic landmarks.  IMPRESSION:   was placed on the skin through the guidance grid at position C4 to aid in the determination of lesion location.  An [employer] CAD system was utilized for biopsy planning.  Precontrast and postcontrast images were obtained in the axial and sagittal planes. Next, dynamic views in the axial and sagittal planes were obtained following the administration of 13 cc of gadolinium for all 4 biopsies.  The lesion to undergo biopsy was confirmed on subtraction images and lesion position, including depth, was determined.  The patient was brought out of the bore of the MR magnet.  The appropriate cube within the guidance grid was localized and the underlying skin was prepped with Betadine.  After administration of local anesthesia (1% lidocaine for superficial and 8 cc% lidocaine with epinephrine for deeper), the needle guide block was placed into the guidance grid.  An introducer and trocar were then placed into the breast from a lateral approach to the pre-determined depth .The trocar was removed and a localizing obturator was placed through the introducer.  Axial images revealed the tip of the obturator to be located at the lesion.  The patient was brought back out of the bore of the magnet.  The obturator was removed and a 10g SenoRx MRI compatible vacuum assisted core needle biopsy device was placed through the cannula to the appropriate depth.    A total of 12 samples were obtained.   A marking clip was then deployed.  The patient tolerated the procedure and there were no immediate complications.  Post-biopsy CC and ML views of the left breast were obtained for clip location. The clip appears to be accurately located based on anatomic landmarks.  IMPRESSION:  MRI guided core needle biopsy of the left breast as described above.  A post biopsy marking clip was placed. The pathology results are pending at time of dictation.  PATHOLOGY: Small focus of invasive well-differentiated ductal carcinoma, invasive carcinoma arising in a  "background of intermediate grade in situ ductal carcinoma. Area of invasive carcinoma measuring 2.5 mm. Neoplasm positive for estrogen receptor, positive progesterone receptor and negative for HER-2/cristian. Result is concordant.  RECOMMENDATION: SURGICAL AND ONCOLOGY FOLLOW-UP.  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 This diagnosis following excision of a subareolar mass that was thought to be related to an abscess MRI demonstrates multiple areas of abnormal masslike enhancement site 3 left breast lateral  COMPARISON:  Breast MRI dated 1/26/2021 as well as diagnostic mammogram dated 5/21/2020  TECHNIQUE:  The risks and benefits of the procedure were explained to the patient and informed consent was obtained.  \"Time-out\" was performed to verify patient's identity and correct location of the breast abnormality.  After placement of an intravenous line the patient was placed prone of the MR scanner.  The left breast was positioned with mild compression within the breast biopsy coil.  A fixed fiducial marker was placed on the skin through the guidance grid at position C4 to aid in the determination of lesion location.  An [employer] CAD system was utilized for biopsy planning.  Precontrast and postcontrast images were obtained in the axial and sagittal planes. Next, dynamic views in the axial and sagittal planes were obtained following the administration of 13 cc of gadolinium for all 4 biopsies .  The lesion to undergo biopsy was confirmed on subtraction images and lesion position, including depth, was determined.  The patient was brought out of the bore of the MR magnet.  The appropriate cube within the guidance grid was localized and the underlying skin was prepped with Betadine.  After administration of local anesthesia (1% lidocaine for superficial and 8% lidocaine with " patient's identity and correct location of the breast abnormality.  After placement of an intravenous line the patient was placed prone of the MR scanner.  The right breast was positioned with mild compression within the breast biopsy coil.  A fixed fiducial marker was placed on the skin through the guidance grid at position C4 to aid in the determination of lesion location.  An [employer] CAD system was utilized for biopsy planning.  Precontrast and postcontrast images were obtained in the axial and sagittal planes. Next, dynamic views in the axial and sagittal planes were obtained following the administration of 13 cc of gadolinium for all 4 biopsies.  The lesion to undergo biopsy was confirmed on subtraction images and lesion position, including depth, was determined.  The patient was brought out of the bore of the MR magnet.  The appropriate cube within the guidance grid was localized and the underlying skin was prepped with Betadine.  After administration of local anesthesia (1% lidocaine for superficial and 8 cc% lidocaine with epinephrine for deeper), the needle guide block was placed into the guidance grid.  An introducer and trocar were then placed into the breast from a lateral approach to the pre-determined depth .The trocar was removed and a localizing obturator was placed through the introducer.  Axial images revealed the tip of the obturator to be located at the lesion.  The patient was brought back out of the bore of the magnet.  The obturator was removed and a 10g SenoRx MRI compatible vacuum assisted core needle biopsy device was placed through the cannula to the appropriate depth.    A total of 12 samples were obtained.   A marking clip was then deployed.  The patient tolerated the procedure and there were no immediate complications.  Post-biopsy CC and ML views of the right breast were obtained for clip location. The clip appears to be adequately positioned based on anatomic landmarks.  IMPRESSION:   MRI guided core needle biopsy of the right breast as described above.  A post biopsy marking clip was placed. The pathology results are pending at time of dictation.  PATHOLOGY: Fibrocystic change with adenosis and focal pseudoangiomatous stromal hyperplasia. Focal microcalcifications present. Negative for atypia.  Result is concordant.  RECOMMENDATION: 6 MONTH FOLLOW-UP RIGHT MRI. SURGICAL AND ONCOLOGY FOLLOW-UP FOR LEFT BREAST CANCER.  D:  02/12/2021 E:  02/12/2021  This report was finalized on 2/12/2021 10:33 AM by Dr. Anna Hidalgo MD.      NM Pet Skull Base To Mid Thigh    Result Date: 5/28/2021  Negative PET-CT scan.  D:  05/28/2021 E:  05/28/2021     This report was finalized on 5/28/2021 5:15 PM by Dr. Yesenia Hernandez MD.      NM Curtis Node Injection Only    Result Date: 4/14/2021  Dictation for the purposes of radiopharmaceutical usage alone with 544 uCi of Technetium 99m filtered sulfur colloid utilized in the left breast for lymphoscintigraphy and sentinel node mapping.  D:  04/14/2021 E:  04/14/2021     This report was finalized on 4/14/2021 5:32 PM by Dr. Regan Zuñiga.      MRI Breast Biopsy Initial With & Without Device    Result Date: 2/12/2021  MRI guided core needle biopsy of the left breast as described above.  A post biopsy marking clip was placed. The pathology results are pending at time of dictation.  PATHOLOGY: Intermediate grade ductal carcinoma in situ with cancerization of lobules, luminal necrosis and calcifications. Breast biomarkers to be performed on alternate concurrent biopsy. Result is concordant.  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 This diagnosis following excision of a subareolar mass that was thought to be related to an abscess.  MRI demonstrates multiple areas of abnormal  "masslike enhancement site 2 left breast central.  COMPARISON:  Breast MRI dated 01/26/2021 as well as diagnostic mammogram dated 05/21/2020.  TECHNIQUE:  The risks and benefits of the procedure were explained to the patient and informed consent was obtained.  \"Time-out\" was performed to verify patient's identity and correct location of the breast abnormality.  After placement of an intravenous line the patient was placed prone of the MR scanner.  The left breast was positioned with mild compression within the breast biopsy coil.  A fixed fiducial marker was placed on the skin through the guidance grid at position C4 to aid in the determination of lesion location.  An [employer] CAD system was utilized for biopsy planning.  Precontrast and postcontrast images were obtained in the axial and sagittal planes. Next, dynamic views in the axial and sagittal planes were obtained following the administration of 13 cc of gadolinium for all 4 biopsies.  The lesion to undergo biopsy was confirmed on subtraction images and lesion position, including depth, was determined.  The patient was brought out of the bore of the MR magnet.  The appropriate cube within the guidance grid was localized and the underlying skin was prepped with Betadine.  After administration of local anesthesia (1% lidocaine for superficial and 8 cc% lidocaine with epinephrine for deeper), the needle guide block was placed into the guidance grid.  An introducer and trocar were then placed into the breast from a lateral approach to the pre-determined depth .The trocar was removed and a localizing obturator was placed through the introducer.  Axial images revealed the tip of the obturator to be located at the lesion.  The patient was brought back out of the bore of the magnet.  The obturator was removed and a 10g [employer]oRx MRI compatible vacuum assisted core needle biopsy device was placed through the cannula to the appropriate depth.    A total of 12 samples were " "obtained.   A marking clip was then deployed.  The patient tolerated the procedure and there were no immediate complications.  Post-biopsy CC and ML views of the left breast were obtained for clip location. The clip appears to be accurately located based on anatomic landmarks.  IMPRESSION:  MRI guided core needle biopsy of the left breast as described above.  A post biopsy marking clip was placed. The pathology results are pending at time of dictation.  PATHOLOGY: Small focus of invasive well-differentiated ductal carcinoma, invasive carcinoma arising in a background of intermediate grade in situ ductal carcinoma. Area of invasive carcinoma measuring 2.5 mm. Neoplasm positive for estrogen receptor, positive progesterone receptor and negative for HER-2/cristian. Result is concordant.  RECOMMENDATION: SURGICAL AND ONCOLOGY FOLLOW-UP.  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 This diagnosis following excision of a subareolar mass that was thought to be related to an abscess MRI demonstrates multiple areas of abnormal masslike enhancement site 3 left breast lateral  COMPARISON:  Breast MRI dated 1/26/2021 as well as diagnostic mammogram dated 5/21/2020  TECHNIQUE:  The risks and benefits of the procedure were explained to the patient and informed consent was obtained.  \"Time-out\" was performed to verify patient's identity and correct location of the breast abnormality.  After placement of an intravenous line the patient was placed prone of the MR scanner.  The left breast was positioned with mild compression within the breast biopsy coil.  A fixed fiducial marker was placed on the skin through the guidance grid at position C4 to aid in the determination of lesion location.  An [employer] CAD system was utilized for biopsy planning.  Precontrast and postcontrast images were obtained in " the axial and sagittal planes. Next, dynamic views in the axial and sagittal planes were obtained following the administration of 13 cc of gadolinium for all 4 biopsies .  The lesion to undergo biopsy was confirmed on subtraction images and lesion position, including depth, was determined.  The patient was brought out of the bore of the MR magnet.  The appropriate cube within the guidance grid was localized and the underlying skin was prepped with Betadine.  After administration of local anesthesia (1% lidocaine for superficial and 8% lidocaine with epinephrine for deeper), the needle guide block was placed into the guidance grid.  An introducer and trocar were then placed into the breast from a lateral approach to the pre-determined depth .The trocar was removed and a localizing obturator was placed through the introducer.  Axial images revealed the tip of the obturator to be located at the lesion.  The patient was brought back out of the bore of the magnet.  The obturator was removed and a 10g SenoRx MRI compatible vacuum assisted core needle biopsy device was placed through the cannula to the appropriate depth.    A total of 12 samples were obtained.   A marking clip was then deployed.  The patient tolerated the procedure and there were no immediate complications.  Post-biopsy CC and ML views of the left breast were obtained for clip location. The clip appears to be appropriately positioned based on anatomic landmarks  IMPRESSION:  MRI guided core needle biopsy of the left breast as described above.  A post biopsy marking clip was placed. The pathology results are pending at time of dictation.  PATHOLOGY: Fibrocystic change with focal sclerosing adenosis and fibrosis. Focal pseudoangiomatous stromal hyperplasia. Negative for in situ and invasive carcinoma. 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 This diagnosis following excision of a subareolar mass that was thought to be related to an abscess. MRI demonstrates multiple areas of abnormal masslike enhancement site 4 right breast upper outer quadrant.  COMPARISON:  Breast MRI dated 01/26/2021 as well as diagnostic mammogram dated 05/21/2020.  TECHNIQUE:  The risks and benefits of the procedure were explained to the patient and informed consent was obtained.  \"Time-out\" was performed to verify patient's identity and correct location of the breast abnormality.  After placement of an intravenous line the patient was placed prone of the MR scanner.  The right breast was positioned with mild compression within the breast biopsy coil.  A fixed fiducial marker was placed on the skin through the guidance grid at position C4 to aid in the determination of lesion location.  An [employer] CAD system was utilized for biopsy planning.  Precontrast and postcontrast images were obtained in the axial and sagittal planes. Next, dynamic views in the axial and sagittal planes were obtained following the administration of 13 cc of gadolinium for all 4 biopsies.  The lesion to undergo biopsy was confirmed on subtraction images and lesion position, including depth, was determined.  The patient was brought out of the bore of the MR magnet.  The appropriate cube within the guidance grid was localized and the underlying skin was prepped with Betadine.  After administration of local anesthesia (1% lidocaine for superficial and 8 cc% lidocaine with epinephrine for deeper), the needle guide block was placed into the guidance grid.  An introducer and trocar were then placed into the breast from a lateral approach to the pre-determined depth .The trocar was removed and a localizing obturator was placed through the introducer.  Axial images revealed the tip of the obturator to be located at the " lesion.  The patient was brought back out of the bore of the magnet.  The obturator was removed and a 10g SenoRx MRI compatible vacuum assisted core needle biopsy device was placed through the cannula to the appropriate depth.    A total of 12 samples were obtained.   A marking clip was then deployed.  The patient tolerated the procedure and there were no immediate complications.  Post-biopsy CC and ML views of the right breast were obtained for clip location. The clip appears to be adequately positioned based on anatomic landmarks.  IMPRESSION:  MRI guided core needle biopsy of the right breast as described above.  A post biopsy marking clip was placed. The pathology results are pending at time of dictation.  PATHOLOGY: Fibrocystic change with adenosis and focal pseudoangiomatous stromal hyperplasia. Focal microcalcifications present. Negative for atypia.  Result is concordant.  RECOMMENDATION: 6 MONTH FOLLOW-UP RIGHT MRI. SURGICAL AND ONCOLOGY FOLLOW-UP FOR LEFT BREAST CANCER.  D:  02/12/2021 E:  02/12/2021  This report was finalized on 2/12/2021 10:33 AM by Dr. Anna Hidalgo MD.      MRI Breast Biopsy Initial With & Without Device    Result Date: 2/12/2021  MRI guided core needle biopsy of the left breast as described above.  A post biopsy marking clip was placed. The pathology results are pending at time of dictation.  PATHOLOGY: Intermediate grade ductal carcinoma in situ with cancerization of lobules, luminal necrosis and calcifications. Breast biomarkers to be performed on alternate concurrent biopsy. Result is concordant.  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 This diagnosis following excision of a subareolar mass that was thought to be related to an abscess.  MRI demonstrates multiple areas of abnormal  "masslike enhancement site 2 left breast central.  COMPARISON:  Breast MRI dated 01/26/2021 as well as diagnostic mammogram dated 05/21/2020.  TECHNIQUE:  The risks and benefits of the procedure were explained to the patient and informed consent was obtained.  \"Time-out\" was performed to verify patient's identity and correct location of the breast abnormality.  After placement of an intravenous line the patient was placed prone of the MR scanner.  The left breast was positioned with mild compression within the breast biopsy coil.  A fixed fiducial marker was placed on the skin through the guidance grid at position C4 to aid in the determination of lesion location.  An [employer] CAD system was utilized for biopsy planning.  Precontrast and postcontrast images were obtained in the axial and sagittal planes. Next, dynamic views in the axial and sagittal planes were obtained following the administration of 13 cc of gadolinium for all 4 biopsies.  The lesion to undergo biopsy was confirmed on subtraction images and lesion position, including depth, was determined.  The patient was brought out of the bore of the MR magnet.  The appropriate cube within the guidance grid was localized and the underlying skin was prepped with Betadine.  After administration of local anesthesia (1% lidocaine for superficial and 8 cc% lidocaine with epinephrine for deeper), the needle guide block was placed into the guidance grid.  An introducer and trocar were then placed into the breast from a lateral approach to the pre-determined depth .The trocar was removed and a localizing obturator was placed through the introducer.  Axial images revealed the tip of the obturator to be located at the lesion.  The patient was brought back out of the bore of the magnet.  The obturator was removed and a 10g [employer]oRx MRI compatible vacuum assisted core needle biopsy device was placed through the cannula to the appropriate depth.    A total of 12 samples were " "obtained.   A marking clip was then deployed.  The patient tolerated the procedure and there were no immediate complications.  Post-biopsy CC and ML views of the left breast were obtained for clip location. The clip appears to be accurately located based on anatomic landmarks.  IMPRESSION:  MRI guided core needle biopsy of the left breast as described above.  A post biopsy marking clip was placed. The pathology results are pending at time of dictation.  PATHOLOGY: Small focus of invasive well-differentiated ductal carcinoma, invasive carcinoma arising in a background of intermediate grade in situ ductal carcinoma. Area of invasive carcinoma measuring 2.5 mm. Neoplasm positive for estrogen receptor, positive progesterone receptor and negative for HER-2/cristian. Result is concordant.  RECOMMENDATION: SURGICAL AND ONCOLOGY FOLLOW-UP.  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 This diagnosis following excision of a subareolar mass that was thought to be related to an abscess MRI demonstrates multiple areas of abnormal masslike enhancement site 3 left breast lateral  COMPARISON:  Breast MRI dated 1/26/2021 as well as diagnostic mammogram dated 5/21/2020  TECHNIQUE:  The risks and benefits of the procedure were explained to the patient and informed consent was obtained.  \"Time-out\" was performed to verify patient's identity and correct location of the breast abnormality.  After placement of an intravenous line the patient was placed prone of the MR scanner.  The left breast was positioned with mild compression within the breast biopsy coil.  A fixed fiducial marker was placed on the skin through the guidance grid at position C4 to aid in the determination of lesion location.  An [employer] CAD system was utilized for biopsy planning.  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 This diagnosis following excision of a subareolar mass that was thought to be related to an abscess. MRI demonstrates multiple areas of abnormal masslike enhancement site 4 right breast upper outer quadrant.  COMPARISON:  Breast MRI dated 01/26/2021 as well as diagnostic mammogram dated 05/21/2020.  TECHNIQUE:  The risks and benefits of the procedure were explained to the patient and informed consent was obtained.  \"Time-out\" was performed to verify patient's identity and correct location of the breast abnormality.  After placement of an intravenous line the patient was placed prone of the MR scanner.  The right breast was positioned with mild compression within the breast biopsy coil.  A fixed fiducial marker was placed on the skin through the guidance grid at position C4 to aid in the determination of lesion location.  An [employer] CAD system was utilized for biopsy planning.  Precontrast and postcontrast images were obtained in the axial and sagittal planes. Next, dynamic views in the axial and sagittal planes were obtained following the administration of 13 cc of gadolinium for all 4 biopsies.  The lesion to undergo biopsy was confirmed on subtraction images and lesion position, including depth, was determined.  The patient was brought out of the bore of the MR magnet.  The appropriate cube within the guidance grid was localized and the underlying skin was prepped with Betadine.  After administration of local anesthesia (1% lidocaine for superficial and 8 cc% lidocaine with epinephrine for deeper), the needle guide block was placed into the guidance grid.  An introducer and trocar were then placed into the breast from a lateral approach to the pre-determined depth .The trocar was removed and a localizing obturator was placed through the introducer.  Axial images revealed the tip of the obturator to be located at the " lesion.  The patient was brought back out of the bore of the magnet.  The obturator was removed and a 10g SenoRx MRI compatible vacuum assisted core needle biopsy device was placed through the cannula to the appropriate depth.    A total of 12 samples were obtained.   A marking clip was then deployed.  The patient tolerated the procedure and there were no immediate complications.  Post-biopsy CC and ML views of the right breast were obtained for clip location. The clip appears to be adequately positioned based on anatomic landmarks.  IMPRESSION:  MRI guided core needle biopsy of the right breast as described above.  A post biopsy marking clip was placed. The pathology results are pending at time of dictation.  PATHOLOGY: Fibrocystic change with adenosis and focal pseudoangiomatous stromal hyperplasia. Focal microcalcifications present. Negative for atypia.  Result is concordant.  RECOMMENDATION: 6 MONTH FOLLOW-UP RIGHT MRI. SURGICAL AND ONCOLOGY FOLLOW-UP FOR LEFT BREAST CANCER.  D:  02/12/2021 E:  02/12/2021  This report was finalized on 2/12/2021 10:33 AM by Dr. Anna Hidalgo MD.            Assessment/Plan    1.  Node positive ER/WA positive HER-2 negative breast cancer.  Continue adjuvant therapy with dose dense Adriamycin and Cytoxan.  She will get cycle #3 with no dose reduction today.  My plan is to treat her with 4 cycles of dose dense Adriamycin and Cytoxan followed by weekly Taxol.  She will need radiotherapy after that is completed.   Plan to check on her again in 2 weeks to make sure she is doing well.        Total time of patient care on day of service including time prior to, face to face with patient, and following visit spent in reviewing records, lab results, imaging studies, discussion with patient, and documentation/charting was > 25 minutes.     Graham Herbert MD  [facility] Hematology [facility] On[facility]    Return in (Approximately): 2 weeks, Schedule with next infusion    Orders Placed  This Encounter   Procedures   • Comprehensive metabolic panel   • CBC and Differential       6/17/2021

## 2021-06-18 ENCOUNTER — HOSPITAL ENCOUNTER (OUTPATIENT)
Dept: ONCOLOGY | Facility: HOSPITAL | Age: 36
Setting detail: INFUSION SERIES
Discharge: HOME | End: 2021-06-18

## 2021-06-18 VITALS
HEART RATE: 114 BPM | BODY MASS INDEX: 23.91 KG/M2 | SYSTOLIC BLOOD PRESSURE: 112 MMHG | TEMPERATURE: 98 F | HEIGHT: 65 IN | RESPIRATION RATE: 18 BRPM | DIASTOLIC BLOOD PRESSURE: 79 MMHG | WEIGHT: 143.5 LBS

## 2021-06-18 DIAGNOSIS — Z17.0 CARCINOMA OF UPPER-OUTER QUADRANT OF LEFT BREAST IN FEMALE, ESTROGEN RECEPTOR POSITIVE (HCC): Primary | ICD-10-CM

## 2021-06-18 DIAGNOSIS — C50.412 CARCINOMA OF UPPER-OUTER QUADRANT OF LEFT BREAST IN FEMALE, ESTROGEN RECEPTOR POSITIVE (HCC): Primary | ICD-10-CM

## 2021-06-18 PROCEDURE — 96372 THER/PROPH/DIAG INJ SC/IM: CPT

## 2021-06-18 PROCEDURE — 25010000002 PEGFILGRASTIM-CBQV 6 MG/0.6ML SOLUTION PREFILLED SYRINGE: Performed by: INTERNAL MEDICINE

## 2021-06-18 RX ADMIN — PEGFILGRASTIM-CBQV 6 MG: 6 INJECTION, SOLUTION SUBCUTANEOUS at 14:36

## 2021-06-22 ENCOUNTER — TELEPHONE (OUTPATIENT)
Dept: ONCOLOGY | Facility: CLINIC | Age: 36
End: 2021-06-22

## 2021-06-22 ENCOUNTER — TELEPHONE (OUTPATIENT)
Dept: SOCIAL WORK | Facility: HOSPITAL | Age: 36
End: 2021-06-22

## 2021-06-22 NOTE — TELEPHONE ENCOUNTER
SW received a call from pt asking for financial assistance with food needs.  SW offered to provide a [employer] gift card to pt and she will pick it up tomorrow.  SW available for ongoing support and resource needs.

## 2021-06-22 NOTE — TELEPHONE ENCOUNTER
Caller: JOSE     Relationship: SELF     Best call back number [phone]    PATIENT CALLED TO TALK TO CUAUHTEMOC KAN. PLEASE CALL BACK  THANK YOU

## 2021-06-22 NOTE — TELEPHONE ENCOUNTER
CALLED AND SPOKE WITH PATIENT AND PATIENT STATES THAT SHE ACTUALLY NEEDS TO SPEAK WITH [employer]ASHISH BELL ABOUT A REFERRAL TO A FOOD BANK AND NEEDS RESOURCES FOR FINANCIAL HELP FOR THE SUMMER. PLEASE ADVISE.

## 2021-06-23 ENCOUNTER — DOCUMENTATION (OUTPATIENT)
Dept: SOCIAL WORK | Facility: HOSPITAL | Age: 36
End: 2021-06-23

## 2021-06-23 NOTE — PROGRESS NOTES
[facility] provided [facility] gift [facility] to pt to help with food needs.  SW available for ongoing support and resource needs.

## 2021-07-01 ENCOUNTER — HOSPITAL ENCOUNTER (OUTPATIENT)
Dept: ONCOLOGY | Facility: HOSPITAL | Age: 36
Setting detail: INFUSION SERIES
Discharge: HOME | End: 2021-07-01

## 2021-07-01 ENCOUNTER — OFFICE VISIT (OUTPATIENT)
Dept: ONCOLOGY | Facility: CLINIC | Age: 36
End: 2021-07-01

## 2021-07-01 VITALS
RESPIRATION RATE: 16 BRPM | HEIGHT: 65 IN | HEART RATE: 83 BPM | OXYGEN SATURATION: 99 % | DIASTOLIC BLOOD PRESSURE: 72 MMHG | BODY MASS INDEX: 24.64 KG/M2 | TEMPERATURE: 96.9 F | WEIGHT: 147.9 LBS | SYSTOLIC BLOOD PRESSURE: 103 MMHG

## 2021-07-01 DIAGNOSIS — Z17.0 CARCINOMA OF UPPER-OUTER QUADRANT OF LEFT BREAST IN FEMALE, ESTROGEN RECEPTOR POSITIVE (HCC): Primary | ICD-10-CM

## 2021-07-01 DIAGNOSIS — Z45.2 ENCOUNTER FOR CENTRAL LINE CARE: ICD-10-CM

## 2021-07-01 DIAGNOSIS — C50.412 CARCINOMA OF UPPER-OUTER QUADRANT OF LEFT BREAST IN FEMALE, ESTROGEN RECEPTOR POSITIVE (HCC): Primary | ICD-10-CM

## 2021-07-01 LAB
ALBUMIN SERPL-MCNC: 3.6 G/DL (ref 3.5–5.2)
ALBUMIN/GLOB SERPL: 1.4 G/DL
ALP SERPL-CCNC: 118 U/L (ref 39–117)
ALT SERPL W P-5'-P-CCNC: 11 U/L (ref 1–33)
ANION GAP SERPL CALCULATED.3IONS-SCNC: 7 MMOL/L (ref 5–15)
AST SERPL-CCNC: 12 U/L (ref 1–32)
BILIRUB SERPL-MCNC: <0.2 MG/DL (ref 0–1.2)
BUN SERPL-MCNC: 10 MG/DL (ref 6–20)
BUN/CREAT SERPL: 15.9 (ref 7–25)
CALCIUM SPEC-SCNC: 8.6 MG/DL (ref 8.6–10.5)
CHLORIDE SERPL-SCNC: 110 MMOL/L (ref 98–107)
CO2 SERPL-SCNC: 24 MMOL/L (ref 22–29)
CREAT SERPL-MCNC: 0.63 MG/DL (ref 0.57–1)
ERYTHROCYTE [DISTWIDTH] IN BLOOD BY AUTOMATED COUNT: 16.8 % (ref 12.3–15.4)
GFR SERPL CREATININE-BSD FRML MDRD: 128 ML/MIN/1.73
GLOBULIN UR ELPH-MCNC: 2.5 GM/DL
GLUCOSE SERPL-MCNC: 95 MG/DL (ref 65–99)
HCT VFR BLD AUTO: 28.6 % (ref 34–46.6)
HGB BLD-MCNC: 9.6 G/DL (ref 12–15.9)
LYMPHOCYTES # BLD AUTO: 1.3 10*3/MM3 (ref 0.7–3.1)
LYMPHOCYTES NFR BLD AUTO: 11 % (ref 19.6–45.3)
MCH RBC QN AUTO: 31.2 PG (ref 26.6–33)
MCHC RBC AUTO-ENTMCNC: 33.6 G/DL (ref 31.5–35.7)
MCV RBC AUTO: 92.7 FL (ref 79–97)
MONOCYTES # BLD AUTO: 0.2 10*3/MM3 (ref 0.1–0.9)
MONOCYTES NFR BLD AUTO: 1.9 % (ref 5–12)
NEUTROPHILS NFR BLD AUTO: 10.6 10*3/MM3 (ref 1.7–7)
NEUTROPHILS NFR BLD AUTO: 87.1 % (ref 42.7–76)
PLATELET # BLD AUTO: 158 10*3/MM3 (ref 140–450)
PMV BLD AUTO: 8 FL (ref 6–12)
POTASSIUM SERPL-SCNC: 4.1 MMOL/L (ref 3.5–5.2)
PROT SERPL-MCNC: 6.1 G/DL (ref 6–8.5)
RBC # BLD AUTO: 3.08 10*6/MM3 (ref 3.77–5.28)
SODIUM SERPL-SCNC: 141 MMOL/L (ref 136–145)
WBC # BLD AUTO: 12.2 10*3/MM3 (ref 3.4–10.8)

## 2021-07-01 PROCEDURE — 85025 COMPLETE CBC W/AUTO DIFF WBC: CPT | Performed by: INTERNAL MEDICINE

## 2021-07-01 PROCEDURE — 25010000002 DOXORUBICIN PER 10 MG: Performed by: INTERNAL MEDICINE

## 2021-07-01 PROCEDURE — 96367 TX/PROPH/DG ADDL SEQ IV INF: CPT

## 2021-07-01 PROCEDURE — 96374 THER/PROPH/DIAG INJ IV PUSH: CPT

## 2021-07-01 PROCEDURE — 25010000002 PALONOSETRON 0.25 MG/5ML SOLUTION PREFILLED SYRINGE: Performed by: INTERNAL MEDICINE

## 2021-07-01 PROCEDURE — 80053 COMPREHEN METABOLIC PANEL: CPT | Performed by: INTERNAL MEDICINE

## 2021-07-01 PROCEDURE — 96413 CHEMO IV INFUSION 1 HR: CPT

## 2021-07-01 PROCEDURE — 96375 TX/PRO/DX INJ NEW DRUG ADDON: CPT

## 2021-07-01 PROCEDURE — 25010000002 HEPARIN LOCK FLUSH PER 10 UNITS: Performed by: INTERNAL MEDICINE

## 2021-07-01 PROCEDURE — 96411 CHEMO IV PUSH ADDL DRUG: CPT

## 2021-07-01 PROCEDURE — 25010000002 DEXAMETHASONE SODIUM PHOSPHATE 100 MG/10ML SOLUTION: Performed by: INTERNAL MEDICINE

## 2021-07-01 PROCEDURE — 25010000002 DEXAMETHASONE PER 1 MG: Performed by: INTERNAL MEDICINE

## 2021-07-01 PROCEDURE — 96409 CHEMO IV PUSH SNGL DRUG: CPT

## 2021-07-01 PROCEDURE — 99214 OFFICE O/P EST MOD 30 MIN: CPT | Performed by: INTERNAL MEDICINE

## 2021-07-01 PROCEDURE — 25010000002 FOSAPREPITANT PER 1 MG: Performed by: INTERNAL MEDICINE

## 2021-07-01 PROCEDURE — 25010000002 CYCLOPHOSPHAMIDE PER 100 MG: Performed by: INTERNAL MEDICINE

## 2021-07-01 RX ORDER — PALONOSETRON 0.05 MG/ML
0.25 INJECTION, SOLUTION INTRAVENOUS ONCE
Status: COMPLETED | OUTPATIENT
Start: 2021-07-01 | End: 2021-07-01

## 2021-07-01 RX ORDER — HEPARIN SODIUM (PORCINE) LOCK FLUSH IV SOLN 100 UNIT/ML 100 UNIT/ML
500 SOLUTION INTRAVENOUS AS NEEDED
Status: DISCONTINUED | OUTPATIENT
Start: 2021-07-01 | End: 2021-07-02 | Stop reason: HOSPADM

## 2021-07-01 RX ORDER — HEPARIN SODIUM (PORCINE) LOCK FLUSH IV SOLN 100 UNIT/ML 100 UNIT/ML
500 SOLUTION INTRAVENOUS AS NEEDED
Status: CANCELLED | OUTPATIENT
Start: 2021-07-01

## 2021-07-01 RX ORDER — DOXORUBICIN HYDROCHLORIDE 2 MG/ML
60 INJECTION, SOLUTION INTRAVENOUS ONCE
Status: COMPLETED | OUTPATIENT
Start: 2021-07-01 | End: 2021-07-01

## 2021-07-01 RX ADMIN — CYCLOPHOSPHAMIDE 1000 MG: 1 INJECTION, POWDER, FOR SOLUTION INTRAVENOUS; ORAL at 12:26

## 2021-07-01 RX ADMIN — SODIUM CHLORIDE 150 MG: 9 INJECTION, SOLUTION INTRAVENOUS at 10:46

## 2021-07-01 RX ADMIN — DEXAMETHASONE SODIUM PHOSPHATE 12 MG: 10 INJECTION, SOLUTION INTRAMUSCULAR; INTRAVENOUS at 10:46

## 2021-07-01 RX ADMIN — HEPARIN 500 UNITS: 100 SYRINGE at 13:04

## 2021-07-01 RX ADMIN — PALONOSETRON HYDROCHLORIDE 0.25 MG: 0.05 INJECTION, SOLUTION INTRAVENOUS at 10:46

## 2021-07-01 RX ADMIN — DOXORUBICIN HYDROCHLORIDE 104 MG: 2 INJECTION, SOLUTION INTRAVENOUS at 11:35

## 2021-07-01 NOTE — PROGRESS NOTES
PROBLEM LIST:  Oncology/Hematology History   Carcinoma of upper-outer quadrant of left breast in female, estrogen receptor positive (CMS/HCC)   4/14/2021 Cancer Staged    Staging form: Breast, AJCC 8th Edition  - Pathologic stage from 4/14/2021: Stage IB (pT3, pN1a, cM0, G2, ER+, WI+, HER2-) - Signed by Graham Herbert MD on 4/29/2021 4/29/2021 Initial Diagnosis    Carcinoma of upper-outer quadrant of left breast in female, estrogen receptor positive (CMS/HCC)     5/21/2021 -  Chemotherapy    OP BREAST AC DD DOXOrubicin / Cyclophosphamide     7/15/2021 -  Chemotherapy    OP BREAST PACLitaxel (weekly X 12)         REASON FOR VISIT: Breast cancer    HISTORY OF PRESENT ILLNESS:   36 y.o.  female presents today for follow-up of her breast cancer.  She is completed 3 cycles of dose dense Adriamycin and Cytoxan.  Seems to be tolerating it reasonably well.   No infections.  No significant issues with nausea vomiting.  No diarrhea.  Has had complete alopecia.  She had more fatigue with this round.    Past medical history, social history and family history was reviewed 07/01/21 and unchanged from prior visit.    Review of Systems:    Review of Systems   Constitutional: Negative.    HENT:  Negative.    Eyes: Negative.    Respiratory: Negative.    Cardiovascular: Negative.    Gastrointestinal: Negative.    Endocrine: Negative.    Genitourinary: Negative.     Musculoskeletal: Negative.    Skin: Negative.    Neurological: Negative.    Hematological: Negative.    Psychiatric/Behavioral: Negative.             Medications:        Current Outpatient Medications:   •  dexamethasone (DECADRON) 4 MG tablet, Take 2 tablets in the morning daily on Days 2, 3 & 4.  Take with food., Disp: 30 tablet, Rfl: 3  •  lidocaine-prilocaine (EMLA) 2.5-2.5 % cream, Apply  topically to the appropriate area as directed As Needed (45-60 minutes prior to port access.  Cover with saran/plastic wrap.)., Disp: 30 g, Rfl: 3  •  LORazepam (ATIVAN) 1  "MG tablet, Take 1 tablet by mouth Daily As Needed for Anxiety. Take one tablet as needed up to once a day for severe anxiety, Disp: 30 tablet, Rfl: 0  •  ondansetron (ZOFRAN) 8 MG tablet, Take 1 tablet by mouth 3 (Three) Times a Day As Needed for Nausea or Vomiting., Disp: 30 tablet, Rfl: 5  •  temazepam (RESTORIL) 30 MG capsule, Take 1 capsule by mouth At Night As Needed for Sleep., Disp: 30 capsule, Rfl: 0    Pain Medications             dexamethasone (DECADRON) 4 MG tablet Take 2 tablets in the morning daily on Days 2, 3 & 4.  Take with food.             ALLERGIES:  No Known Allergies      Physical Exam    VITAL SIGNS:  /72   Pulse 83   Temp 96.9 °F (36.1 °C) (Temporal)   Resp 16   Ht 165.1 cm (65\")   Wt 67.1 kg (147 lb 14.4 oz)   SpO2 99%   BMI 24.61 kg/m²     ECOG score: 0           Wt Readings from Last 3 Encounters:   07/01/21 67.1 kg (147 lb 14.4 oz)   06/18/21 65.1 kg (143 lb 8 oz)   06/17/21 65.3 kg (144 lb)       Body mass index is 24.61 kg/m². Body surface area is 1.74 meters squared.    Pain Score    07/01/21 0939   PainSc: 0-No pain           Performance Status: 0    General: well appearing, in no acute distress  HEENT: sclera anicteric, neck is supple  Lymphatics: no cervical, supraclavicular, or axillary adenopathy  Cardiovascular: regular rate and rhythm, no murmurs, rubs or gallops  Lungs: clear to auscultation bilaterally  Abdomen: soft, nontender, nondistended.  No palpable organomegaly  Extremities: no lower extremity edema  Skin: no rashes, lesions, bruising, or petechiae  Msk:  Shows no weakness of the large muscle groups  Psych: Mood is stable        RECENT LABS:    Lab Results   Component Value Date    HGB 10.7 (L) 06/17/2021    HCT 31.9 (L) 06/17/2021    MCV 92.0 06/17/2021     06/17/2021    WBC 9.30 06/17/2021    NEUTROABS 7.70 (H) 06/17/2021    LYMPHSABS 1.20 06/17/2021    MONOSABS 0.40 06/17/2021    EOSABS 0.08 11/09/2020    BASOSABS 0.01 11/09/2020       Lab Results " "  Component Value Date    GLUCOSE 109 (H) 06/17/2021    BUN 10 06/17/2021    CREATININE 0.66 06/17/2021     06/17/2021    K 4.3 06/17/2021     (H) 06/17/2021    CO2 23.0 06/17/2021    CALCIUM 9.0 06/17/2021    PROTEINTOT 5.9 (L) 06/17/2021    ALBUMIN 3.90 06/17/2021    BILITOT <0.2 06/17/2021    ALKPHOS 124 (H) 06/17/2021    AST 10 06/17/2021    ALT 9 06/17/2021       XR Chest 1 View    Result Date: 5/20/2021  No pneumothorax or other acute cardiopulmonary abnormality.  This report was finalized on 5/20/2021 12:38 PM by Khanh Prajapati.      Mammo Post Clip Placement Bilateral    Result Date: 2/12/2021  MRI guided core needle biopsy of the left breast as described above.  A post biopsy marking clip was placed. The pathology results are pending at time of dictation.  PATHOLOGY: Intermediate grade ductal carcinoma in situ with cancerization of lobules, luminal necrosis and calcifications. Breast biomarkers to be performed on alternate concurrent biopsy. Result is concordant.  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 This diagnosis following excision of a subareolar mass that was thought to be related to an abscess.  MRI demonstrates multiple areas of abnormal masslike enhancement site 2 left breast central.  COMPARISON:  Breast MRI dated 01/26/2021 as well as diagnostic mammogram dated 05/21/2020.  TECHNIQUE:  The risks and benefits of the procedure were explained to the patient and informed consent was obtained.  \"Time-out\" was performed to verify patient's identity and correct location of the breast abnormality.  After placement of an intravenous line the patient was placed prone of the MR scanner.  The left breast was positioned with mild compression within the breast biopsy coil.  A fixed fiducial marker was " placed on the skin through the guidance grid at position C4 to aid in the determination of lesion location.  An [employer] CAD system was utilized for biopsy planning.  Precontrast and postcontrast images were obtained in the axial and sagittal planes. Next, dynamic views in the axial and sagittal planes were obtained following the administration of 13 cc of gadolinium for all 4 biopsies.  The lesion to undergo biopsy was confirmed on subtraction images and lesion position, including depth, was determined.  The patient was brought out of the bore of the MR magnet.  The appropriate cube within the guidance grid was localized and the underlying skin was prepped with Betadine.  After administration of local anesthesia (1% lidocaine for superficial and 8 cc% lidocaine with epinephrine for deeper), the needle guide block was placed into the guidance grid.  An introducer and trocar were then placed into the breast from a lateral approach to the pre-determined depth .The trocar was removed and a localizing obturator was placed through the introducer.  Axial images revealed the tip of the obturator to be located at the lesion.  The patient was brought back out of the bore of the magnet.  The obturator was removed and a 10g SenoRx MRI compatible vacuum assisted core needle biopsy device was placed through the cannula to the appropriate depth.    A total of 12 samples were obtained.   A marking clip was then deployed.  The patient tolerated the procedure and there were no immediate complications.  Post-biopsy CC and ML views of the left breast were obtained for clip location. The clip appears to be accurately located based on anatomic landmarks.  IMPRESSION:  MRI guided core needle biopsy of the left breast as described above.  A post biopsy marking clip was placed. The pathology results are pending at time of dictation.  PATHOLOGY: Small focus of invasive well-differentiated ductal carcinoma, invasive carcinoma arising in a  "background of intermediate grade in situ ductal carcinoma. Area of invasive carcinoma measuring 2.5 mm. Neoplasm positive for estrogen receptor, positive progesterone receptor and negative for HER-2/cristian. Result is concordant.  RECOMMENDATION: SURGICAL AND ONCOLOGY FOLLOW-UP.  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 This diagnosis following excision of a subareolar mass that was thought to be related to an abscess MRI demonstrates multiple areas of abnormal masslike enhancement site 3 left breast lateral  COMPARISON:  Breast MRI dated 1/26/2021 as well as diagnostic mammogram dated 5/21/2020  TECHNIQUE:  The risks and benefits of the procedure were explained to the patient and informed consent was obtained.  \"Time-out\" was performed to verify patient's identity and correct location of the breast abnormality.  After placement of an intravenous line the patient was placed prone of the MR scanner.  The left breast was positioned with mild compression within the breast biopsy coil.  A fixed fiducial marker was placed on the skin through the guidance grid at position C4 to aid in the determination of lesion location.  An [employer] CAD system was utilized for biopsy planning.  Precontrast and postcontrast images were obtained in the axial and sagittal planes. Next, dynamic views in the axial and sagittal planes were obtained following the administration of 13 cc of gadolinium for all 4 biopsies .  The lesion to undergo biopsy was confirmed on subtraction images and lesion position, including depth, was determined.  The patient was brought out of the bore of the MR magnet.  The appropriate cube within the guidance grid was localized and the underlying skin was prepped with Betadine.  After administration of local anesthesia (1% lidocaine for superficial and 8% lidocaine with " "epinephrine for deeper), the needle guide block was placed into the guidance grid.  An introducer and trocar were then placed into the breast from a lateral approach to the pre-determined depth .The trocar was removed and a localizing obturator was placed through the introducer.  Axial images revealed the tip of the obturator to be located at the lesion.  The patient was brought back out of the bore of the magnet.  The obturator was removed and a 10g SenoRx MRI compatible vacuum assisted core needle biopsy device was placed through the cannula to the appropriate depth.    A total of 12 samples were obtained.   A marking clip was then deployed.  The patient tolerated the procedure and there were no immediate complications.  Post-biopsy CC and ML views of the left breast were obtained for clip location. The clip appears to be appropriately positioned based on anatomic landmarks  IMPRESSION:  MRI guided core needle biopsy of the left breast as described above.  A post biopsy marking clip was placed. The pathology results are pending at time of dictation.  PATHOLOGY: Fibrocystic change with focal sclerosing adenosis and fibrosis. Focal pseudoangiomatous stromal hyperplasia. Negative for in situ and invasive carcinoma. 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 This diagnosis following excision of a subareolar mass that was thought to be related to an abscess. MRI demonstrates multiple areas of abnormal masslike enhancement site 4 right breast upper outer quadrant.  COMPARISON:  Breast MRI dated 01/26/2021 as well as diagnostic mammogram dated 05/21/2020.  TECHNIQUE:  The risks and benefits of the procedure were explained to the patient and informed consent was obtained.  \"Time-out\" was performed to verify " patient's identity and correct location of the breast abnormality.  After placement of an intravenous line the patient was placed prone of the MR scanner.  The right breast was positioned with mild compression within the breast biopsy coil.  A fixed fiducial marker was placed on the skin through the guidance grid at position C4 to aid in the determination of lesion location.  An [employer] CAD system was utilized for biopsy planning.  Precontrast and postcontrast images were obtained in the axial and sagittal planes. Next, dynamic views in the axial and sagittal planes were obtained following the administration of 13 cc of gadolinium for all 4 biopsies.  The lesion to undergo biopsy was confirmed on subtraction images and lesion position, including depth, was determined.  The patient was brought out of the bore of the MR magnet.  The appropriate cube within the guidance grid was localized and the underlying skin was prepped with Betadine.  After administration of local anesthesia (1% lidocaine for superficial and 8 cc% lidocaine with epinephrine for deeper), the needle guide block was placed into the guidance grid.  An introducer and trocar were then placed into the breast from a lateral approach to the pre-determined depth .The trocar was removed and a localizing obturator was placed through the introducer.  Axial images revealed the tip of the obturator to be located at the lesion.  The patient was brought back out of the bore of the magnet.  The obturator was removed and a 10g SenoRx MRI compatible vacuum assisted core needle biopsy device was placed through the cannula to the appropriate depth.    A total of 12 samples were obtained.   A marking clip was then deployed.  The patient tolerated the procedure and there were no immediate complications.  Post-biopsy CC and ML views of the right breast were obtained for clip location. The clip appears to be adequately positioned based on anatomic landmarks.  IMPRESSION:   "MRI guided core needle biopsy of the right breast as described above.  A post biopsy marking clip was placed. The pathology results are pending at time of dictation.  PATHOLOGY: Fibrocystic change with adenosis and focal pseudoangiomatous stromal hyperplasia. Focal microcalcifications present. Negative for atypia.  Result is concordant.  RECOMMENDATION: 6 MONTH FOLLOW-UP RIGHT MRI. SURGICAL AND ONCOLOGY FOLLOW-UP FOR LEFT BREAST CANCER.  D:  02/12/2021 E:  02/12/2021  This report was finalized on 2/12/2021 10:33 AM by Dr. Anna Hidalgo MD.      MRI Breast Biopsy Each Add With & Without Device    Result Date: 2/12/2021  MRI guided core needle biopsy of the left breast as described above.  A post biopsy marking clip was placed. The pathology results are pending at time of dictation.  PATHOLOGY: Intermediate grade ductal carcinoma in situ with cancerization of lobules, luminal necrosis and calcifications. Breast biomarkers to be performed on alternate concurrent biopsy. Result is concordant.  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 This diagnosis following excision of a subareolar mass that was thought to be related to an abscess.  MRI demonstrates multiple areas of abnormal masslike enhancement site 2 left breast central.  COMPARISON:  Breast MRI dated 01/26/2021 as well as diagnostic mammogram dated 05/21/2020.  TECHNIQUE:  The risks and benefits of the procedure were explained to the patient and informed consent was obtained.  \"Time-out\" was performed to verify patient's identity and correct location of the breast abnormality.  After placement of an intravenous line the patient was placed prone of the MR scanner.  The left breast was positioned with mild compression within the breast biopsy coil.  A fixed fiducial marker " was placed on the skin through the guidance grid at position C4 to aid in the determination of lesion location.  An [employer] CAD system was utilized for biopsy planning.  Precontrast and postcontrast images were obtained in the axial and sagittal planes. Next, dynamic views in the axial and sagittal planes were obtained following the administration of 13 cc of gadolinium for all 4 biopsies.  The lesion to undergo biopsy was confirmed on subtraction images and lesion position, including depth, was determined.  The patient was brought out of the bore of the MR magnet.  The appropriate cube within the guidance grid was localized and the underlying skin was prepped with Betadine.  After administration of local anesthesia (1% lidocaine for superficial and 8 cc% lidocaine with epinephrine for deeper), the needle guide block was placed into the guidance grid.  An introducer and trocar were then placed into the breast from a lateral approach to the pre-determined depth .The trocar was removed and a localizing obturator was placed through the introducer.  Axial images revealed the tip of the obturator to be located at the lesion.  The patient was brought back out of the bore of the magnet.  The obturator was removed and a 10g SenoRx MRI compatible vacuum assisted core needle biopsy device was placed through the cannula to the appropriate depth.    A total of 12 samples were obtained.   A marking clip was then deployed.  The patient tolerated the procedure and there were no immediate complications.  Post-biopsy CC and ML views of the left breast were obtained for clip location. The clip appears to be accurately located based on anatomic landmarks.  IMPRESSION:  MRI guided core needle biopsy of the left breast as described above.  A post biopsy marking clip was placed. The pathology results are pending at time of dictation.  PATHOLOGY: Small focus of invasive well-differentiated ductal carcinoma, invasive carcinoma arising in a  "background of intermediate grade in situ ductal carcinoma. Area of invasive carcinoma measuring 2.5 mm. Neoplasm positive for estrogen receptor, positive progesterone receptor and negative for HER-2/cristian. Result is concordant.  RECOMMENDATION: SURGICAL AND ONCOLOGY FOLLOW-UP.  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 This diagnosis following excision of a subareolar mass that was thought to be related to an abscess MRI demonstrates multiple areas of abnormal masslike enhancement site 3 left breast lateral  COMPARISON:  Breast MRI dated 1/26/2021 as well as diagnostic mammogram dated 5/21/2020  TECHNIQUE:  The risks and benefits of the procedure were explained to the patient and informed consent was obtained.  \"Time-out\" was performed to verify patient's identity and correct location of the breast abnormality.  After placement of an intravenous line the patient was placed prone of the MR scanner.  The left breast was positioned with mild compression within the breast biopsy coil.  A fixed fiducial marker was placed on the skin through the guidance grid at position C4 to aid in the determination of lesion location.  An [employer] CAD system was utilized for biopsy planning.  Precontrast and postcontrast images were obtained in the axial and sagittal planes. Next, dynamic views in the axial and sagittal planes were obtained following the administration of 13 cc of gadolinium for all 4 biopsies .  The lesion to undergo biopsy was confirmed on subtraction images and lesion position, including depth, was determined.  The patient was brought out of the bore of the MR magnet.  The appropriate cube within the guidance grid was localized and the underlying skin was prepped with Betadine.  After administration of local anesthesia (1% lidocaine for superficial and 8% lidocaine with " patient's identity and correct location of the breast abnormality.  After placement of an intravenous line the patient was placed prone of the MR scanner.  The right breast was positioned with mild compression within the breast biopsy coil.  A fixed fiducial marker was placed on the skin through the guidance grid at position C4 to aid in the determination of lesion location.  An [employer] CAD system was utilized for biopsy planning.  Precontrast and postcontrast images were obtained in the axial and sagittal planes. Next, dynamic views in the axial and sagittal planes were obtained following the administration of 13 cc of gadolinium for all 4 biopsies.  The lesion to undergo biopsy was confirmed on subtraction images and lesion position, including depth, was determined.  The patient was brought out of the bore of the MR magnet.  The appropriate cube within the guidance grid was localized and the underlying skin was prepped with Betadine.  After administration of local anesthesia (1% lidocaine for superficial and 8 cc% lidocaine with epinephrine for deeper), the needle guide block was placed into the guidance grid.  An introducer and trocar were then placed into the breast from a lateral approach to the pre-determined depth .The trocar was removed and a localizing obturator was placed through the introducer.  Axial images revealed the tip of the obturator to be located at the lesion.  The patient was brought back out of the bore of the magnet.  The obturator was removed and a 10g SenoRx MRI compatible vacuum assisted core needle biopsy device was placed through the cannula to the appropriate depth.    A total of 12 samples were obtained.   A marking clip was then deployed.  The patient tolerated the procedure and there were no immediate complications.  Post-biopsy CC and ML views of the right breast were obtained for clip location. The clip appears to be adequately positioned based on anatomic landmarks.  IMPRESSION:   was placed on the skin through the guidance grid at position C4 to aid in the determination of lesion location.  An [employer] CAD system was utilized for biopsy planning.  Precontrast and postcontrast images were obtained in the axial and sagittal planes. Next, dynamic views in the axial and sagittal planes were obtained following the administration of 13 cc of gadolinium for all 4 biopsies.  The lesion to undergo biopsy was confirmed on subtraction images and lesion position, including depth, was determined.  The patient was brought out of the bore of the MR magnet.  The appropriate cube within the guidance grid was localized and the underlying skin was prepped with Betadine.  After administration of local anesthesia (1% lidocaine for superficial and 8 cc% lidocaine with epinephrine for deeper), the needle guide block was placed into the guidance grid.  An introducer and trocar were then placed into the breast from a lateral approach to the pre-determined depth .The trocar was removed and a localizing obturator was placed through the introducer.  Axial images revealed the tip of the obturator to be located at the lesion.  The patient was brought back out of the bore of the magnet.  The obturator was removed and a 10g SenoRx MRI compatible vacuum assisted core needle biopsy device was placed through the cannula to the appropriate depth.    A total of 12 samples were obtained.   A marking clip was then deployed.  The patient tolerated the procedure and there were no immediate complications.  Post-biopsy CC and ML views of the left breast were obtained for clip location. The clip appears to be accurately located based on anatomic landmarks.  IMPRESSION:  MRI guided core needle biopsy of the left breast as described above.  A post biopsy marking clip was placed. The pathology results are pending at time of dictation.  PATHOLOGY: Small focus of invasive well-differentiated ductal carcinoma, invasive carcinoma arising in a  "background of intermediate grade in situ ductal carcinoma. Area of invasive carcinoma measuring 2.5 mm. Neoplasm positive for estrogen receptor, positive progesterone receptor and negative for HER-2/cristian. Result is concordant.  RECOMMENDATION: SURGICAL AND ONCOLOGY FOLLOW-UP.  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 This diagnosis following excision of a subareolar mass that was thought to be related to an abscess MRI demonstrates multiple areas of abnormal masslike enhancement site 3 left breast lateral  COMPARISON:  Breast MRI dated 1/26/2021 as well as diagnostic mammogram dated 5/21/2020  TECHNIQUE:  The risks and benefits of the procedure were explained to the patient and informed consent was obtained.  \"Time-out\" was performed to verify patient's identity and correct location of the breast abnormality.  After placement of an intravenous line the patient was placed prone of the MR scanner.  The left breast was positioned with mild compression within the breast biopsy coil.  A fixed fiducial marker was placed on the skin through the guidance grid at position C4 to aid in the determination of lesion location.  An [employer] CAD system was utilized for biopsy planning.  Precontrast and postcontrast images were obtained in the axial and sagittal planes. Next, dynamic views in the axial and sagittal planes were obtained following the administration of 13 cc of gadolinium for all 4 biopsies .  The lesion to undergo biopsy was confirmed on subtraction images and lesion position, including depth, was determined.  The patient was brought out of the bore of the MR magnet.  The appropriate cube within the guidance grid was localized and the underlying skin was prepped with Betadine.  After administration of local anesthesia (1% lidocaine for superficial and 8% lidocaine with " patient's identity and correct location of the breast abnormality.  After placement of an intravenous line the patient was placed prone of the MR scanner.  The right breast was positioned with mild compression within the breast biopsy coil.  A fixed fiducial marker was placed on the skin through the guidance grid at position C4 to aid in the determination of lesion location.  An [facility] CAD system was utilized for biopsy planning.  Precontrast and postcontrast images were obtained in the axial and sagittal planes. Next, dynamic views in the axial and sagittal planes were obtained following the administration of 13 cc of gadolinium for all 4 biopsies.  The lesion to undergo biopsy was confirmed on subtraction images and lesion position, including depth, was determined.  The patient was brought out of the bore of the MR magnet.  The appropriate cube within the guidance grid was localized and the underlying skin was prepped with Betadine.  After administration of local anesthesia (1% lidocaine for superficial and 8 cc% lidocaine with epinephrine for deeper), the needle guide block was placed into the guidance grid.  An introducer and trocar were then placed into the breast from a lateral approach to the pre-determined depth .The trocar was removed and a localizing obturator was placed through the introducer.  Axial images revealed the tip of the obturator to be located at the lesion.  The patient was brought back out of the bore of the magnet.  The obturator was removed and a 10g SenoRx MRI compatible vacuum assisted core needle biopsy device was placed through the cannula to the appropriate depth.    A total of 12 samples were obtained.   A marking clip was then deployed.  The patient tolerated the procedure and there were no immediate complications.  Post-biopsy CC and ML views of the right breast were obtained for clip location. The clip appears to be adequately positioned based on anatomic landmarks.  IMPRESSION:   MRI guided core needle biopsy of the right breast as described above.  A post biopsy marking clip was placed. The pathology results are pending at time of dictation.  PATHOLOGY: Fibrocystic change with adenosis and focal pseudoangiomatous stromal hyperplasia. Focal microcalcifications present. Negative for atypia.  Result is concordant.  RECOMMENDATION: 6 MONTH FOLLOW-UP RIGHT MRI. SURGICAL AND ONCOLOGY FOLLOW-UP FOR LEFT BREAST CANCER.  D:  02/12/2021 E:  02/12/2021  This report was finalized on 2/12/2021 10:33 AM by Dr. Anna Hidalgo MD.      NM Pet Skull Base To Mid Thigh    Result Date: 5/28/2021  Negative PET-CT scan.  D:  05/28/2021 E:  05/28/2021     This report was finalized on 5/28/2021 5:15 PM by Dr. Yesenia Hernandez MD.      NM [place] Node Injection Only    Result Date: 4/14/2021  Dictation for the purposes of radiopharmaceutical usage alone with 544 uCi of Technetium 99m filtered sulfur colloid utilized in the left breast for lymphoscintigraphy and sentinel node mapping.  D:  04/14/2021 E:  04/14/2021     This report was finalized on 4/14/2021 5:32 PM by Dr. Regan Zuñiga.      MRI Breast Biopsy Initial With & Without Device    Result Date: 2/12/2021  MRI guided core needle biopsy of the left breast as described above.  A post biopsy marking clip was placed. The pathology results are pending at time of dictation.  PATHOLOGY: Intermediate grade ductal carcinoma in situ with cancerization of lobules, luminal necrosis and calcifications. Breast biomarkers to be performed on alternate concurrent biopsy. Result is concordant.  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 This diagnosis following excision of a subareolar mass that was thought to be related to an abscess.  MRI demonstrates multiple areas of abnormal  "masslike enhancement site 2 left breast central.  COMPARISON:  Breast MRI dated 01/26/2021 as well as diagnostic mammogram dated 05/21/2020.  TECHNIQUE:  The risks and benefits of the procedure were explained to the patient and informed consent was obtained.  \"Time-out\" was performed to verify patient's identity and correct location of the breast abnormality.  After placement of an intravenous line the patient was placed prone of the MR scanner.  The left breast was positioned with mild compression within the breast biopsy coil.  A fixed fiducial marker was placed on the skin through the guidance grid at position C4 to aid in the determination of lesion location.  An [employer] CAD system was utilized for biopsy planning.  Precontrast and postcontrast images were obtained in the axial and sagittal planes. Next, dynamic views in the axial and sagittal planes were obtained following the administration of 13 cc of gadolinium for all 4 biopsies.  The lesion to undergo biopsy was confirmed on subtraction images and lesion position, including depth, was determined.  The patient was brought out of the bore of the MR magnet.  The appropriate cube within the guidance grid was localized and the underlying skin was prepped with Betadine.  After administration of local anesthesia (1% lidocaine for superficial and 8 cc% lidocaine with epinephrine for deeper), the needle guide block was placed into the guidance grid.  An introducer and trocar were then placed into the breast from a lateral approach to the pre-determined depth .The trocar was removed and a localizing obturator was placed through the introducer.  Axial images revealed the tip of the obturator to be located at the lesion.  The patient was brought back out of the bore of the magnet.  The obturator was removed and a 10g NusocketoRx MRI compatible vacuum assisted core needle biopsy device was placed through the cannula to the appropriate depth.    A total of 12 samples were " "obtained.   A marking clip was then deployed.  The patient tolerated the procedure and there were no immediate complications.  Post-biopsy CC and ML views of the left breast were obtained for clip location. The clip appears to be accurately located based on anatomic landmarks.  IMPRESSION:  MRI guided core needle biopsy of the left breast as described above.  A post biopsy marking clip was placed. The pathology results are pending at time of dictation.  PATHOLOGY: Small focus of invasive well-differentiated ductal carcinoma, invasive carcinoma arising in a background of intermediate grade in situ ductal carcinoma. Area of invasive carcinoma measuring 2.5 mm. Neoplasm positive for estrogen receptor, positive progesterone receptor and negative for HER-2/cristian. Result is concordant.  RECOMMENDATION: SURGICAL AND ONCOLOGY FOLLOW-UP.  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 This diagnosis following excision of a subareolar mass that was thought to be related to an abscess MRI demonstrates multiple areas of abnormal masslike enhancement site 3 left breast lateral  COMPARISON:  Breast MRI dated 1/26/2021 as well as diagnostic mammogram dated 5/21/2020  TECHNIQUE:  The risks and benefits of the procedure were explained to the patient and informed consent was obtained.  \"Time-out\" was performed to verify patient's identity and correct location of the breast abnormality.  After placement of an intravenous line the patient was placed prone of the MR scanner.  The left breast was positioned with mild compression within the breast biopsy coil.  A fixed fiducial marker was placed on the skin through the guidance grid at position C4 to aid in the determination of lesion location.  An [employer] CAD system was utilized for biopsy planning.  Precontrast and postcontrast images were obtained in " the axial and sagittal planes. Next, dynamic views in the axial and sagittal planes were obtained following the administration of 13 cc of gadolinium for all 4 biopsies .  The lesion to undergo biopsy was confirmed on subtraction images and lesion position, including depth, was determined.  The patient was brought out of the bore of the MR magnet.  The appropriate cube within the guidance grid was localized and the underlying skin was prepped with Betadine.  After administration of local anesthesia (1% lidocaine for superficial and 8% lidocaine with epinephrine for deeper), the needle guide block was placed into the guidance grid.  An introducer and trocar were then placed into the breast from a lateral approach to the pre-determined depth .The trocar was removed and a localizing obturator was placed through the introducer.  Axial images revealed the tip of the obturator to be located at the lesion.  The patient was brought back out of the bore of the magnet.  The obturator was removed and a 10g SenoRx MRI compatible vacuum assisted core needle biopsy device was placed through the cannula to the appropriate depth.    A total of 12 samples were obtained.   A marking clip was then deployed.  The patient tolerated the procedure and there were no immediate complications.  Post-biopsy CC and ML views of the left breast were obtained for clip location. The clip appears to be appropriately positioned based on anatomic landmarks  IMPRESSION:  MRI guided core needle biopsy of the left breast as described above.  A post biopsy marking clip was placed. The pathology results are pending at time of dictation.  PATHOLOGY: Fibrocystic change with focal sclerosing adenosis and fibrosis. Focal pseudoangiomatous stromal hyperplasia. Negative for in situ and invasive carcinoma. 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 This diagnosis following excision of a subareolar mass that was thought to be related to an abscess. MRI demonstrates multiple areas of abnormal masslike enhancement site 4 right breast upper outer quadrant.  COMPARISON:  Breast MRI dated 01/26/2021 as well as diagnostic mammogram dated 05/21/2020.  TECHNIQUE:  The risks and benefits of the procedure were explained to the patient and informed consent was obtained.  \"Time-out\" was performed to verify patient's identity and correct location of the breast abnormality.  After placement of an intravenous line the patient was placed prone of the MR scanner.  The right breast was positioned with mild compression within the breast biopsy coil.  A fixed fiducial marker was placed on the skin through the guidance grid at position C4 to aid in the determination of lesion location.  An [employer] CAD system was utilized for biopsy planning.  Precontrast and postcontrast images were obtained in the axial and sagittal planes. Next, dynamic views in the axial and sagittal planes were obtained following the administration of 13 cc of gadolinium for all 4 biopsies.  The lesion to undergo biopsy was confirmed on subtraction images and lesion position, including depth, was determined.  The patient was brought out of the bore of the MR magnet.  The appropriate cube within the guidance grid was localized and the underlying skin was prepped with Betadine.  After administration of local anesthesia (1% lidocaine for superficial and 8 cc% lidocaine with epinephrine for deeper), the needle guide block was placed into the guidance grid.  An introducer and trocar were then placed into the breast from a lateral approach to the pre-determined depth .The trocar was removed and a localizing obturator was placed through the introducer.  Axial images revealed the tip of the obturator to be located at the " lesion.  The patient was brought back out of the bore of the magnet.  The obturator was removed and a 10g SenoRx MRI compatible vacuum assisted core needle biopsy device was placed through the cannula to the appropriate depth.    A total of 12 samples were obtained.   A marking clip was then deployed.  The patient tolerated the procedure and there were no immediate complications.  Post-biopsy CC and ML views of the right breast were obtained for clip location. The clip appears to be adequately positioned based on anatomic landmarks.  IMPRESSION:  MRI guided core needle biopsy of the right breast as described above.  A post biopsy marking clip was placed. The pathology results are pending at time of dictation.  PATHOLOGY: Fibrocystic change with adenosis and focal pseudoangiomatous stromal hyperplasia. Focal microcalcifications present. Negative for atypia.  Result is concordant.  RECOMMENDATION: 6 MONTH FOLLOW-UP RIGHT MRI. SURGICAL AND ONCOLOGY FOLLOW-UP FOR LEFT BREAST CANCER.  D:  02/12/2021 E:  02/12/2021  This report was finalized on 2/12/2021 10:33 AM by Dr. Anna Hidalgo MD.      MRI Breast Biopsy Initial With & Without Device    Result Date: 2/12/2021  MRI guided core needle biopsy of the left breast as described above.  A post biopsy marking clip was placed. The pathology results are pending at time of dictation.  PATHOLOGY: Intermediate grade ductal carcinoma in situ with cancerization of lobules, luminal necrosis and calcifications. Breast biomarkers to be performed on alternate concurrent biopsy. Result is concordant.  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 This diagnosis following excision of a subareolar mass that was thought to be related to an abscess.  MRI demonstrates multiple areas of abnormal  "masslike enhancement site 2 left breast central.  COMPARISON:  Breast MRI dated 01/26/2021 as well as diagnostic mammogram dated 05/21/2020.  TECHNIQUE:  The risks and benefits of the procedure were explained to the patient and informed consent was obtained.  \"Time-out\" was performed to verify patient's identity and correct location of the breast abnormality.  After placement of an intravenous line the patient was placed prone of the MR scanner.  The left breast was positioned with mild compression within the breast biopsy coil.  A fixed fiducial marker was placed on the skin through the guidance grid at position C4 to aid in the determination of lesion location.  An [employer] CAD system was utilized for biopsy planning.  Precontrast and postcontrast images were obtained in the axial and sagittal planes. Next, dynamic views in the axial and sagittal planes were obtained following the administration of 13 cc of gadolinium for all 4 biopsies.  The lesion to undergo biopsy was confirmed on subtraction images and lesion position, including depth, was determined.  The patient was brought out of the bore of the MR magnet.  The appropriate cube within the guidance grid was localized and the underlying skin was prepped with Betadine.  After administration of local anesthesia (1% lidocaine for superficial and 8 cc% lidocaine with epinephrine for deeper), the needle guide block was placed into the guidance grid.  An introducer and trocar were then placed into the breast from a lateral approach to the pre-determined depth .The trocar was removed and a localizing obturator was placed through the introducer.  Axial images revealed the tip of the obturator to be located at the lesion.  The patient was brought back out of the bore of the magnet.  The obturator was removed and a 10g [employer] MRI compatible vacuum assisted core needle biopsy device was placed through the cannula to the appropriate depth.    A total of 12 samples were " "obtained.   A marking clip was then deployed.  The patient tolerated the procedure and there were no immediate complications.  Post-biopsy CC and ML views of the left breast were obtained for clip location. The clip appears to be accurately located based on anatomic landmarks.  IMPRESSION:  MRI guided core needle biopsy of the left breast as described above.  A post biopsy marking clip was placed. The pathology results are pending at time of dictation.  PATHOLOGY: Small focus of invasive well-differentiated ductal carcinoma, invasive carcinoma arising in a background of intermediate grade in situ ductal carcinoma. Area of invasive carcinoma measuring 2.5 mm. Neoplasm positive for estrogen receptor, positive progesterone receptor and negative for HER-2/cristian. Result is concordant.  RECOMMENDATION: SURGICAL AND ONCOLOGY FOLLOW-UP.  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 This diagnosis following excision of a subareolar mass that was thought to be related to an abscess MRI demonstrates multiple areas of abnormal masslike enhancement site 3 left breast lateral  COMPARISON:  Breast MRI dated 1/26/2021 as well as diagnostic mammogram dated 5/21/2020  TECHNIQUE:  The risks and benefits of the procedure were explained to the patient and informed consent was obtained.  \"Time-out\" was performed to verify patient's identity and correct location of the breast abnormality.  After placement of an intravenous line the patient was placed prone of the MR scanner.  The left breast was positioned with mild compression within the breast biopsy coil.  A fixed fiducial marker was placed on the skin through the guidance grid at position C4 to aid in the determination of lesion location.  An [employer] CAD system was utilized for biopsy planning.  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 This diagnosis following excision of a subareolar mass that was thought to be related to an abscess. MRI demonstrates multiple areas of abnormal masslike enhancement site 4 right breast upper outer quadrant.  COMPARISON:  Breast MRI dated 01/26/2021 as well as diagnostic mammogram dated 05/21/2020.  TECHNIQUE:  The risks and benefits of the procedure were explained to the patient and informed consent was obtained.  \"Time-out\" was performed to verify patient's identity and correct location of the breast abnormality.  After placement of an intravenous line the patient was placed prone of the MR scanner.  The right breast was positioned with mild compression within the breast biopsy coil.  A fixed fiducial marker was placed on the skin through the guidance grid at position C4 to aid in the determination of lesion location.  An [employer] CAD system was utilized for biopsy planning.  Precontrast and postcontrast images were obtained in the axial and sagittal planes. Next, dynamic views in the axial and sagittal planes were obtained following the administration of 13 cc of gadolinium for all 4 biopsies.  The lesion to undergo biopsy was confirmed on subtraction images and lesion position, including depth, was determined.  The patient was brought out of the bore of the MR magnet.  The appropriate cube within the guidance grid was localized and the underlying skin was prepped with Betadine.  After administration of local anesthesia (1% lidocaine for superficial and 8 cc% lidocaine with epinephrine for deeper), the needle guide block was placed into the guidance grid.  An introducer and trocar were then placed into the breast from a lateral approach to the pre-determined depth .The trocar was removed and a localizing obturator was placed through the introducer.  Axial images revealed the tip of the obturator to be located at the " lesion.  The patient was brought back out of the bore of the magnet.  The obturator was removed and a 10g SenoRx MRI compatible vacuum assisted core needle biopsy device was placed through the cannula to the appropriate depth.    A total of 12 samples were obtained.   A marking clip was then deployed.  The patient tolerated the procedure and there were no immediate complications.  Post-biopsy CC and ML views of the right breast were obtained for clip location. The clip appears to be adequately positioned based on anatomic landmarks.  IMPRESSION:  MRI guided core needle biopsy of the right breast as described above.  A post biopsy marking clip was placed. The pathology results are pending at time of dictation.  PATHOLOGY: Fibrocystic change with adenosis and focal pseudoangiomatous stromal hyperplasia. Focal microcalcifications present. Negative for atypia.  Result is concordant.  RECOMMENDATION: 6 MONTH FOLLOW-UP RIGHT MRI. SURGICAL AND ONCOLOGY FOLLOW-UP FOR LEFT BREAST CANCER.  D:  02/12/2021 E:  02/12/2021  This report was finalized on 2/12/2021 10:33 AM by Dr. Anna Hidalgo MD.            Assessment/Plan    1.  Node positive ER/MA positive HER-2 negative breast cancer.  Continue adjuvant therapy with dose dense Adriamycin and Cytoxan.  She will complete cycle #4 with no dose reduction today.  My plan is to treat her with 4 cycles of dose dense Adriamycin and Cytoxan followed by weekly Taxol.  She will need radiotherapy after that is completed.   Plan to check on her again in 4 weeks to make sure she is doing well.        Total time of patient care on day of service including time prior to, face to face with patient, and following visit spent in reviewing records, lab results, imaging studies, discussion with patient, and documentation/charting was > 25 minutes.     Graham Herbert MD  [facility] Hematology [facility] On[facility]         No orders of the defined types were placed in this  encounter.      7/1/2021

## 2021-07-02 ENCOUNTER — HOSPITAL ENCOUNTER (OUTPATIENT)
Dept: ONCOLOGY | Facility: HOSPITAL | Age: 36
Setting detail: INFUSION SERIES
Discharge: HOME | End: 2021-07-02

## 2021-07-02 NOTE — PROGRESS NOTES
I called patient and she stated that she was just getting off work and was not going to be able to make scheduled appointment at 3:30 7/2. I in-boxed Dr. Burnham and rescheduled patient for 7/3 at 9am. Patient informed of weekend process, patient verbalized understanding. Called Iliana in Pharmacy and she will have Udencya injection available in the main pharmacy on Saturday.

## 2021-07-21 DIAGNOSIS — Z17.0 CARCINOMA OF UPPER-OUTER QUADRANT OF LEFT BREAST IN FEMALE, ESTROGEN RECEPTOR POSITIVE (HCC): Primary | ICD-10-CM

## 2021-07-21 DIAGNOSIS — C50.412 CARCINOMA OF UPPER-OUTER QUADRANT OF LEFT BREAST IN FEMALE, ESTROGEN RECEPTOR POSITIVE (HCC): Primary | ICD-10-CM

## 2021-07-21 RX ORDER — DIPHENHYDRAMINE HYDROCHLORIDE 50 MG/ML
50 INJECTION INTRAMUSCULAR; INTRAVENOUS AS NEEDED
Status: CANCELLED | OUTPATIENT
Start: 2021-08-26

## 2021-07-21 RX ORDER — SODIUM CHLORIDE 9 MG/ML
250 INJECTION, SOLUTION INTRAVENOUS ONCE
Status: CANCELLED | OUTPATIENT
Start: 2021-09-02

## 2021-07-21 RX ORDER — FAMOTIDINE 10 MG/ML
20 INJECTION, SOLUTION INTRAVENOUS ONCE
Status: CANCELLED | OUTPATIENT
Start: 2021-08-26

## 2021-07-21 RX ORDER — FAMOTIDINE 10 MG/ML
20 INJECTION, SOLUTION INTRAVENOUS AS NEEDED
Status: CANCELLED | OUTPATIENT
Start: 2021-08-26

## 2021-07-21 RX ORDER — SODIUM CHLORIDE 9 MG/ML
250 INJECTION, SOLUTION INTRAVENOUS ONCE
Status: CANCELLED | OUTPATIENT
Start: 2021-09-09

## 2021-07-21 RX ORDER — FAMOTIDINE 10 MG/ML
20 INJECTION, SOLUTION INTRAVENOUS AS NEEDED
Status: CANCELLED | OUTPATIENT
Start: 2021-09-09

## 2021-07-21 RX ORDER — FAMOTIDINE 10 MG/ML
20 INJECTION, SOLUTION INTRAVENOUS AS NEEDED
Status: CANCELLED | OUTPATIENT
Start: 2021-09-02

## 2021-07-21 RX ORDER — FAMOTIDINE 10 MG/ML
20 INJECTION, SOLUTION INTRAVENOUS ONCE
Status: CANCELLED | OUTPATIENT
Start: 2021-09-02

## 2021-07-21 RX ORDER — FAMOTIDINE 10 MG/ML
20 INJECTION, SOLUTION INTRAVENOUS ONCE
Status: CANCELLED | OUTPATIENT
Start: 2021-09-09

## 2021-07-21 RX ORDER — SODIUM CHLORIDE 9 MG/ML
250 INJECTION, SOLUTION INTRAVENOUS ONCE
Status: CANCELLED | OUTPATIENT
Start: 2021-08-26

## 2021-07-21 RX ORDER — DIPHENHYDRAMINE HYDROCHLORIDE 50 MG/ML
50 INJECTION INTRAMUSCULAR; INTRAVENOUS AS NEEDED
Status: CANCELLED | OUTPATIENT
Start: 2021-09-02

## 2021-07-21 RX ORDER — ONDANSETRON HYDROCHLORIDE 8 MG/1
8 TABLET, FILM COATED ORAL 3 TIMES DAILY PRN
Qty: 30 TABLET | Refills: 5 | Status: SHIPPED | OUTPATIENT
Start: 2021-07-21 | End: 2021-12-02

## 2021-07-21 RX ORDER — DIPHENHYDRAMINE HYDROCHLORIDE 50 MG/ML
50 INJECTION INTRAMUSCULAR; INTRAVENOUS AS NEEDED
Status: CANCELLED | OUTPATIENT
Start: 2021-09-09

## 2021-07-28 ENCOUNTER — TELEPHONE (OUTPATIENT)
Dept: ONCOLOGY | Facility: CLINIC | Age: 36
End: 2021-07-28

## 2021-07-28 NOTE — TELEPHONE ENCOUNTER
Caller: Rai Guillory    Relationship to patient: Self    Best call back number: [phone]    Chief complaint: CANC./ALINE.    Type of visit: INFUSION/FOLLOW UP 1    If rescheduling, when is the original appointment: 7/29/2021

## 2021-07-29 ENCOUNTER — APPOINTMENT (OUTPATIENT)
Dept: ONCOLOGY | Facility: HOSPITAL | Age: 36
End: 2021-07-29

## 2021-08-13 ENCOUNTER — OFFICE VISIT (OUTPATIENT)
Dept: ONCOLOGY | Facility: CLINIC | Age: 36
End: 2021-08-13

## 2021-08-13 ENCOUNTER — APPOINTMENT (OUTPATIENT)
Dept: ONCOLOGY | Facility: HOSPITAL | Age: 36
End: 2021-08-13

## 2021-08-13 ENCOUNTER — HOSPITAL ENCOUNTER (OUTPATIENT)
Dept: GENERAL RADIOLOGY | Facility: HOSPITAL | Age: 36
Discharge: HOME | End: 2021-08-13
Admitting: INTERNAL MEDICINE

## 2021-08-13 VITALS
OXYGEN SATURATION: 99 % | RESPIRATION RATE: 16 BRPM | DIASTOLIC BLOOD PRESSURE: 83 MMHG | TEMPERATURE: 96.9 F | SYSTOLIC BLOOD PRESSURE: 128 MMHG | HEART RATE: 87 BPM | BODY MASS INDEX: 22.69 KG/M2 | HEIGHT: 65 IN | WEIGHT: 136.2 LBS

## 2021-08-13 DIAGNOSIS — Z17.0 CARCINOMA OF UPPER-OUTER QUADRANT OF LEFT BREAST IN FEMALE, ESTROGEN RECEPTOR POSITIVE (HCC): ICD-10-CM

## 2021-08-13 DIAGNOSIS — C50.412 CARCINOMA OF UPPER-OUTER QUADRANT OF LEFT BREAST IN FEMALE, ESTROGEN RECEPTOR POSITIVE (HCC): ICD-10-CM

## 2021-08-13 DIAGNOSIS — C50.412 CARCINOMA OF UPPER-OUTER QUADRANT OF LEFT BREAST IN FEMALE, ESTROGEN RECEPTOR POSITIVE (HCC): Primary | ICD-10-CM

## 2021-08-13 DIAGNOSIS — Z17.0 CARCINOMA OF UPPER-OUTER QUADRANT OF LEFT BREAST IN FEMALE, ESTROGEN RECEPTOR POSITIVE (HCC): Primary | ICD-10-CM

## 2021-08-13 PROCEDURE — 71046 X-RAY EXAM CHEST 2 VIEWS: CPT

## 2021-08-13 PROCEDURE — 99214 OFFICE O/P EST MOD 30 MIN: CPT | Performed by: INTERNAL MEDICINE

## 2021-08-13 RX ORDER — DIPHENHYDRAMINE HYDROCHLORIDE 50 MG/ML
50 INJECTION INTRAMUSCULAR; INTRAVENOUS AS NEEDED
Status: CANCELLED | OUTPATIENT
Start: 2021-09-23

## 2021-08-13 RX ORDER — FAMOTIDINE 10 MG/ML
20 INJECTION, SOLUTION INTRAVENOUS ONCE
Status: CANCELLED | OUTPATIENT
Start: 2021-09-30

## 2021-08-13 RX ORDER — FAMOTIDINE 10 MG/ML
20 INJECTION, SOLUTION INTRAVENOUS ONCE
Status: CANCELLED | OUTPATIENT
Start: 2021-10-07

## 2021-08-13 RX ORDER — FAMOTIDINE 10 MG/ML
20 INJECTION, SOLUTION INTRAVENOUS ONCE
Status: CANCELLED | OUTPATIENT
Start: 2021-09-23

## 2021-08-13 RX ORDER — DIPHENHYDRAMINE HYDROCHLORIDE 50 MG/ML
50 INJECTION INTRAMUSCULAR; INTRAVENOUS AS NEEDED
Status: CANCELLED | OUTPATIENT
Start: 2021-09-30

## 2021-08-13 RX ORDER — FAMOTIDINE 10 MG/ML
20 INJECTION, SOLUTION INTRAVENOUS AS NEEDED
Status: CANCELLED | OUTPATIENT
Start: 2021-09-23

## 2021-08-13 RX ORDER — FAMOTIDINE 10 MG/ML
20 INJECTION, SOLUTION INTRAVENOUS AS NEEDED
Status: CANCELLED | OUTPATIENT
Start: 2021-09-30

## 2021-08-13 RX ORDER — SODIUM CHLORIDE 9 MG/ML
250 INJECTION, SOLUTION INTRAVENOUS ONCE
Status: CANCELLED | OUTPATIENT
Start: 2021-09-30

## 2021-08-13 RX ORDER — SODIUM CHLORIDE 9 MG/ML
250 INJECTION, SOLUTION INTRAVENOUS ONCE
Status: CANCELLED | OUTPATIENT
Start: 2021-10-07

## 2021-08-13 RX ORDER — SODIUM CHLORIDE 9 MG/ML
250 INJECTION, SOLUTION INTRAVENOUS ONCE
Status: CANCELLED | OUTPATIENT
Start: 2021-09-23

## 2021-08-13 RX ORDER — FAMOTIDINE 10 MG/ML
20 INJECTION, SOLUTION INTRAVENOUS AS NEEDED
Status: CANCELLED | OUTPATIENT
Start: 2021-10-07

## 2021-08-13 RX ORDER — DIPHENHYDRAMINE HYDROCHLORIDE 50 MG/ML
50 INJECTION INTRAMUSCULAR; INTRAVENOUS AS NEEDED
Status: CANCELLED | OUTPATIENT
Start: 2021-10-07

## 2021-08-13 NOTE — PROGRESS NOTES
PROBLEM LIST:  Oncology/Hematology History   Carcinoma of upper-outer quadrant of left breast in female, estrogen receptor positive (CMS/HCC)   4/14/2021 Cancer Staged    Staging form: Breast, AJCC 8th Edition  - Pathologic stage from 4/14/2021: Stage IB (pT3, pN1a, cM0, G2, ER+, NC+, HER2-) - Signed by Graham Herbert MD on 4/29/2021 4/29/2021 Initial Diagnosis    Carcinoma of upper-outer quadrant of left breast in female, estrogen receptor positive (CMS/HCC)     5/21/2021 - 7/15/2021 Chemotherapy    OP BREAST AC DD DOXOrubicin / Cyclophosphamide     7/22/2021 -  Chemotherapy    OP BREAST PACLitaxel (weekly X 12)         REASON FOR VISIT: Breast cancer    HISTORY OF PRESENT ILLNESS:   36 y.o.  female presents today for follow-up of her breast cancer.  She has completed 4 cycles of dose dense Adriamycin and Cytoxan.  She is currently on single agent Taxol and seems to be tolerating it well.  However she was involved in a trauma last week and she had injury to the port site which is fairly tender.  She wants to hold off till next week for her treatment.  Denies any neuropathy.  No nausea vomiting.  Otherwise seems to be doing reasonably well.    Past medical history, social history and family history was reviewed 08/13/21 and unchanged from prior visit.    Review of Systems:    Review of Systems   Constitutional: Negative.    HENT:  Negative.    Eyes: Negative.    Respiratory: Negative.    Cardiovascular: Negative.    Gastrointestinal: Negative.    Endocrine: Negative.    Genitourinary: Negative.     Musculoskeletal: Negative.    Skin: Negative.    Neurological: Negative.    Hematological: Negative.    Psychiatric/Behavioral: Negative.             Medications:        Current Outpatient Medications:   •  lidocaine-prilocaine (EMLA) 2.5-2.5 % cream, Apply  topically to the appropriate area as directed As Needed (45-60 minutes prior to port access.  Cover with saran/plastic wrap.)., Disp: 30 g, Rfl: 3  •   LORazepam (ATIVAN) 1 MG tablet, Take 1 tablet by mouth Daily As Needed for Anxiety. Take one tablet as needed up to once a day for severe anxiety, Disp: 30 tablet, Rfl: 0  •  ondansetron (ZOFRAN) 8 MG tablet, Take 1 tablet by mouth 3 (Three) Times a Day As Needed for Nausea or Vomiting., Disp: 30 tablet, Rfl: 5  •  temazepam (RESTORIL) 30 MG capsule, Take 1 capsule by mouth At Night As Needed for Sleep., Disp: 30 capsule, Rfl: 0           ALLERGIES:  No Known Allergies      Physical Exam    VITAL SIGNS:  There were no vitals taken for this visit.                Wt Readings from Last 3 Encounters:   07/01/21 67.1 kg (147 lb 14.4 oz)   06/18/21 65.1 kg (143 lb 8 oz)   06/17/21 65.3 kg (144 lb)       There is no height or weight on file to calculate BMI. There is no height or weight on file to calculate BSA.    There were no vitals filed for this visit.        Performance Status: 0    General: well appearing, in no acute distress  HEENT: sclera anicteric, neck is supple  Lymphatics: no cervical, supraclavicular, or axillary adenopathy  Cardiovascular: regular rate and rhythm, no murmurs, rubs or gallops  Lungs: clear to auscultation bilaterally  Abdomen: soft, nontender, nondistended.  No palpable organomegaly  Extremities: no lower extremity edema  Skin: no rashes, lesions, bruising, or petechiae  Msk:  Shows no weakness of the large muscle groups  Psych: Mood is stable        RECENT LABS:    Lab Results   Component Value Date    HGB 9.6 (L) 07/01/2021    HCT 28.6 (L) 07/01/2021    MCV 92.7 07/01/2021     07/01/2021    WBC 12.20 (H) 07/01/2021    NEUTROABS 10.60 (H) 07/01/2021    LYMPHSABS 1.30 07/01/2021    MONOSABS 0.20 07/01/2021    EOSABS 0.08 11/09/2020    BASOSABS 0.01 11/09/2020       Lab Results   Component Value Date    GLUCOSE 95 07/01/2021    BUN 10 07/01/2021    CREATININE 0.63 07/01/2021     07/01/2021    K 4.1 07/01/2021     (H) 07/01/2021    CO2 24.0 07/01/2021    CALCIUM 8.6  "07/01/2021    PROTEINTOT 6.1 07/01/2021    ALBUMIN 3.60 07/01/2021    BILITOT <0.2 07/01/2021    ALKPHOS 118 (H) 07/01/2021    AST 12 07/01/2021    ALT 11 07/01/2021       XR Chest 1 View    Result Date: 5/20/2021  No pneumothorax or other acute cardiopulmonary abnormality.  This report was finalized on 5/20/2021 12:38 PM by Khanh Prajapati.      Mammo Post Clip Placement Bilateral    Result Date: 2/12/2021  MRI guided core needle biopsy of the left breast as described above.  A post biopsy marking clip was placed. The pathology results are pending at time of dictation.  PATHOLOGY: Intermediate grade ductal carcinoma in situ with cancerization of lobules, luminal necrosis and calcifications. Breast biomarkers to be performed on alternate concurrent biopsy. Result is concordant.  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 This diagnosis following excision of a subareolar mass that was thought to be related to an abscess.  MRI demonstrates multiple areas of abnormal masslike enhancement site 2 left breast central.  COMPARISON:  Breast MRI dated 01/26/2021 as well as diagnostic mammogram dated 05/21/2020.  TECHNIQUE:  The risks and benefits of the procedure were explained to the patient and informed consent was obtained.  \"Time-out\" was performed to verify patient's identity and correct location of the breast abnormality.  After placement of an intravenous line the patient was placed prone of the MR scanner.  The left breast was positioned with mild compression within the breast biopsy coil.  A fixed fiducial marker was placed on the skin through the guidance grid at position C4 to aid in the determination of lesion location.  An [employer] CAD system was utilized for biopsy planning.  Precontrast and postcontrast images were " obtained in the axial and sagittal planes. Next, dynamic views in the axial and sagittal planes were obtained following the administration of 13 cc of gadolinium for all 4 biopsies.  The lesion to undergo biopsy was confirmed on subtraction images and lesion position, including depth, was determined.  The patient was brought out of the bore of the MR magnet.  The appropriate cube within the guidance grid was localized and the underlying skin was prepped with Betadine.  After administration of local anesthesia (1% lidocaine for superficial and 8 cc% lidocaine with epinephrine for deeper), the needle guide block was placed into the guidance grid.  An introducer and trocar were then placed into the breast from a lateral approach to the pre-determined depth .The trocar was removed and a localizing obturator was placed through the introducer.  Axial images revealed the tip of the obturator to be located at the lesion.  The patient was brought back out of the bore of the magnet.  The obturator was removed and a 10g SenoRx MRI compatible vacuum assisted core needle biopsy device was placed through the cannula to the appropriate depth.    A total of 12 samples were obtained.   A marking clip was then deployed.  The patient tolerated the procedure and there were no immediate complications.  Post-biopsy CC and ML views of the left breast were obtained for clip location. The clip appears to be accurately located based on anatomic landmarks.  IMPRESSION:  MRI guided core needle biopsy of the left breast as described above.  A post biopsy marking clip was placed. The pathology results are pending at time of dictation.  PATHOLOGY: Small focus of invasive well-differentiated ductal carcinoma, invasive carcinoma arising in a background of intermediate grade in situ ductal carcinoma. Area of invasive carcinoma measuring 2.5 mm. Neoplasm positive for estrogen receptor, positive progesterone receptor and negative for HER-2/cristian. Result  "is concordant.  RECOMMENDATION: SURGICAL AND ONCOLOGY FOLLOW-UP.  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 This diagnosis following excision of a subareolar mass that was thought to be related to an abscess MRI demonstrates multiple areas of abnormal masslike enhancement site 3 left breast lateral  COMPARISON:  Breast MRI dated 1/26/2021 as well as diagnostic mammogram dated 5/21/2020  TECHNIQUE:  The risks and benefits of the procedure were explained to the patient and informed consent was obtained.  \"Time-out\" was performed to verify patient's identity and correct location of the breast abnormality.  After placement of an intravenous line the patient was placed prone of the MR scanner.  The left breast was positioned with mild compression within the breast biopsy coil.  A fixed fiducial marker was placed on the skin through the guidance grid at position C4 to aid in the determination of lesion location.  An [employer] CAD system was utilized for biopsy planning.  Precontrast and postcontrast images were obtained in the axial and sagittal planes. Next, dynamic views in the axial and sagittal planes were obtained following the administration of 13 cc of gadolinium for all 4 biopsies .  The lesion to undergo biopsy was confirmed on subtraction images and lesion position, including depth, was determined.  The patient was brought out of the bore of the MR magnet.  The appropriate cube within the guidance grid was localized and the underlying skin was prepped with Betadine.  After administration of local anesthesia (1% lidocaine for superficial and 8% lidocaine with epinephrine for deeper), the needle guide block was placed into the guidance grid.  An introducer and trocar were then placed into the breast from a lateral approach to the pre-determined depth .The trocar was removed " "and a localizing obturator was placed through the introducer.  Axial images revealed the tip of the obturator to be located at the lesion.  The patient was brought back out of the bore of the magnet.  The obturator was removed and a 10g SenoRx MRI compatible vacuum assisted core needle biopsy device was placed through the cannula to the appropriate depth.    A total of 12 samples were obtained.   A marking clip was then deployed.  The patient tolerated the procedure and there were no immediate complications.  Post-biopsy CC and ML views of the left breast were obtained for clip location. The clip appears to be appropriately positioned based on anatomic landmarks  IMPRESSION:  MRI guided core needle biopsy of the left breast as described above.  A post biopsy marking clip was placed. The pathology results are pending at time of dictation.  PATHOLOGY: Fibrocystic change with focal sclerosing adenosis and fibrosis. Focal pseudoangiomatous stromal hyperplasia. Negative for in situ and invasive carcinoma. 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 This diagnosis following excision of a subareolar mass that was thought to be related to an abscess. MRI demonstrates multiple areas of abnormal masslike enhancement site 4 right breast upper outer quadrant.  COMPARISON:  Breast MRI dated 01/26/2021 as well as diagnostic mammogram dated 05/21/2020.  TECHNIQUE:  The risks and benefits of the procedure were explained to the patient and informed consent was obtained.  \"Time-out\" was performed to verify patient's identity and correct location of the breast abnormality.  After placement of an intravenous line the patient was placed prone of the MR scanner.  The right breast was positioned with mild compression within the " breast biopsy coil.  A fixed fiducial marker was placed on the skin through the guidance grid at position C4 to aid in the determination of lesion location.  An [employer] CAD system was utilized for biopsy planning.  Precontrast and postcontrast images were obtained in the axial and sagittal planes. Next, dynamic views in the axial and sagittal planes were obtained following the administration of 13 cc of gadolinium for all 4 biopsies.  The lesion to undergo biopsy was confirmed on subtraction images and lesion position, including depth, was determined.  The patient was brought out of the bore of the MR magnet.  The appropriate cube within the guidance grid was localized and the underlying skin was prepped with Betadine.  After administration of local anesthesia (1% lidocaine for superficial and 8 cc% lidocaine with epinephrine for deeper), the needle guide block was placed into the guidance grid.  An introducer and trocar were then placed into the breast from a lateral approach to the pre-determined depth .The trocar was removed and a localizing obturator was placed through the introducer.  Axial images revealed the tip of the obturator to be located at the lesion.  The patient was brought back out of the bore of the magnet.  The obturator was removed and a 10g SenoRx MRI compatible vacuum assisted core needle biopsy device was placed through the cannula to the appropriate depth.    A total of 12 samples were obtained.   A marking clip was then deployed.  The patient tolerated the procedure and there were no immediate complications.  Post-biopsy CC and ML views of the right breast were obtained for clip location. The clip appears to be adequately positioned based on anatomic landmarks.  IMPRESSION:  MRI guided core needle biopsy of the right breast as described above.  A post biopsy marking clip was placed. The pathology results are pending at time of dictation.  PATHOLOGY: Fibrocystic change with adenosis and focal  "pseudoangiomatous stromal hyperplasia. Focal microcalcifications present. Negative for atypia.  Result is concordant.  RECOMMENDATION: 6 MONTH FOLLOW-UP RIGHT MRI. SURGICAL AND ONCOLOGY FOLLOW-UP FOR LEFT BREAST CANCER.  D:  02/12/2021 E:  02/12/2021  This report was finalized on 2/12/2021 10:33 AM by Dr. Anna Hidalgo MD.      MRI Breast Biopsy Each Add With & Without Device    Result Date: 2/12/2021  MRI guided core needle biopsy of the left breast as described above.  A post biopsy marking clip was placed. The pathology results are pending at time of dictation.  PATHOLOGY: Intermediate grade ductal carcinoma in situ with cancerization of lobules, luminal necrosis and calcifications. Breast biomarkers to be performed on alternate concurrent biopsy. Result is concordant.  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 This diagnosis following excision of a subareolar mass that was thought to be related to an abscess.  MRI demonstrates multiple areas of abnormal masslike enhancement site 2 left breast central.  COMPARISON:  Breast MRI dated 01/26/2021 as well as diagnostic mammogram dated 05/21/2020.  TECHNIQUE:  The risks and benefits of the procedure were explained to the patient and informed consent was obtained.  \"Time-out\" was performed to verify patient's identity and correct location of the breast abnormality.  After placement of an intravenous line the patient was placed prone of the MR scanner.  The left breast was positioned with mild compression within the breast biopsy coil.  A fixed fiducial marker was placed on the skin through the guidance grid at position C4 to aid in the determination of lesion location.  An [employer] CAD system was utilized for biopsy planning.  Precontrast and postcontrast images were obtained in " the axial and sagittal planes. Next, dynamic views in the axial and sagittal planes were obtained following the administration of 13 cc of gadolinium for all 4 biopsies.  The lesion to undergo biopsy was confirmed on subtraction images and lesion position, including depth, was determined.  The patient was brought out of the bore of the MR magnet.  The appropriate cube within the guidance grid was localized and the underlying skin was prepped with Betadine.  After administration of local anesthesia (1% lidocaine for superficial and 8 cc% lidocaine with epinephrine for deeper), the needle guide block was placed into the guidance grid.  An introducer and trocar were then placed into the breast from a lateral approach to the pre-determined depth .The trocar was removed and a localizing obturator was placed through the introducer.  Axial images revealed the tip of the obturator to be located at the lesion.  The patient was brought back out of the bore of the magnet.  The obturator was removed and a 10g SenoRx MRI compatible vacuum assisted core needle biopsy device was placed through the cannula to the appropriate depth.    A total of 12 samples were obtained.   A marking clip was then deployed.  The patient tolerated the procedure and there were no immediate complications.  Post-biopsy CC and ML views of the left breast were obtained for clip location. The clip appears to be accurately located based on anatomic landmarks.  IMPRESSION:  MRI guided core needle biopsy of the left breast as described above.  A post biopsy marking clip was placed. The pathology results are pending at time of dictation.  PATHOLOGY: Small focus of invasive well-differentiated ductal carcinoma, invasive carcinoma arising in a background of intermediate grade in situ ductal carcinoma. Area of invasive carcinoma measuring 2.5 mm. Neoplasm positive for estrogen receptor, positive progesterone receptor and negative for HER-2/cristian. Result is  "concordant.  RECOMMENDATION: SURGICAL AND ONCOLOGY FOLLOW-UP.  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 This diagnosis following excision of a subareolar mass that was thought to be related to an abscess MRI demonstrates multiple areas of abnormal masslike enhancement site 3 left breast lateral  COMPARISON:  Breast MRI dated 1/26/2021 as well as diagnostic mammogram dated 5/21/2020  TECHNIQUE:  The risks and benefits of the procedure were explained to the patient and informed consent was obtained.  \"Time-out\" was performed to verify patient's identity and correct location of the breast abnormality.  After placement of an intravenous line the patient was placed prone of the MR scanner.  The left breast was positioned with mild compression within the breast biopsy coil.  A fixed fiducial marker was placed on the skin through the guidance grid at position C4 to aid in the determination of lesion location.  An [employer] CAD system was utilized for biopsy planning.  Precontrast and postcontrast images were obtained in the axial and sagittal planes. Next, dynamic views in the axial and sagittal planes were obtained following the administration of 13 cc of gadolinium for all 4 biopsies .  The lesion to undergo biopsy was confirmed on subtraction images and lesion position, including depth, was determined.  The patient was brought out of the bore of the MR magnet.  The appropriate cube within the guidance grid was localized and the underlying skin was prepped with Betadine.  After administration of local anesthesia (1% lidocaine for superficial and 8% lidocaine with epinephrine for deeper), the needle guide block was placed into the guidance grid.  An introducer and trocar were then placed into the breast from a lateral approach to the pre-determined depth .The trocar was removed and " "a localizing obturator was placed through the introducer.  Axial images revealed the tip of the obturator to be located at the lesion.  The patient was brought back out of the bore of the magnet.  The obturator was removed and a 10g SenoRx MRI compatible vacuum assisted core needle biopsy device was placed through the cannula to the appropriate depth.    A total of 12 samples were obtained.   A marking clip was then deployed.  The patient tolerated the procedure and there were no immediate complications.  Post-biopsy CC and ML views of the left breast were obtained for clip location. The clip appears to be appropriately positioned based on anatomic landmarks  IMPRESSION:  MRI guided core needle biopsy of the left breast as described above.  A post biopsy marking clip was placed. The pathology results are pending at time of dictation.  PATHOLOGY: Fibrocystic change with focal sclerosing adenosis and fibrosis. Focal pseudoangiomatous stromal hyperplasia. Negative for in situ and invasive carcinoma. 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 This diagnosis following excision of a subareolar mass that was thought to be related to an abscess. MRI demonstrates multiple areas of abnormal masslike enhancement site 4 right breast upper outer quadrant.  COMPARISON:  Breast MRI dated 01/26/2021 as well as diagnostic mammogram dated 05/21/2020.  TECHNIQUE:  The risks and benefits of the procedure were explained to the patient and informed consent was obtained.  \"Time-out\" was performed to verify patient's identity and correct location of the breast abnormality.  After placement of an intravenous line the patient was placed prone of the MR scanner.  The right breast was positioned with mild compression within the " breast biopsy coil.  A fixed fiducial marker was placed on the skin through the guidance grid at position C4 to aid in the determination of lesion location.  An [employer] CAD system was utilized for biopsy planning.  Precontrast and postcontrast images were obtained in the axial and sagittal planes. Next, dynamic views in the axial and sagittal planes were obtained following the administration of 13 cc of gadolinium for all 4 biopsies.  The lesion to undergo biopsy was confirmed on subtraction images and lesion position, including depth, was determined.  The patient was brought out of the bore of the MR magnet.  The appropriate cube within the guidance grid was localized and the underlying skin was prepped with Betadine.  After administration of local anesthesia (1% lidocaine for superficial and 8 cc% lidocaine with epinephrine for deeper), the needle guide block was placed into the guidance grid.  An introducer and trocar were then placed into the breast from a lateral approach to the pre-determined depth .The trocar was removed and a localizing obturator was placed through the introducer.  Axial images revealed the tip of the obturator to be located at the lesion.  The patient was brought back out of the bore of the magnet.  The obturator was removed and a 10g SenoRx MRI compatible vacuum assisted core needle biopsy device was placed through the cannula to the appropriate depth.    A total of 12 samples were obtained.   A marking clip was then deployed.  The patient tolerated the procedure and there were no immediate complications.  Post-biopsy CC and ML views of the right breast were obtained for clip location. The clip appears to be adequately positioned based on anatomic landmarks.  IMPRESSION:  MRI guided core needle biopsy of the right breast as described above.  A post biopsy marking clip was placed. The pathology results are pending at time of dictation.  PATHOLOGY: Fibrocystic change with adenosis and focal  "pseudoangiomatous stromal hyperplasia. Focal microcalcifications present. Negative for atypia.  Result is concordant.  RECOMMENDATION: 6 MONTH FOLLOW-UP RIGHT MRI. SURGICAL AND ONCOLOGY FOLLOW-UP FOR LEFT BREAST CANCER.  D:  02/12/2021 E:  02/12/2021  This report was finalized on 2/12/2021 10:33 AM by Dr. Anna Hidalgo MD.      MRI Breast Biopsy Each Add With & Without Device    Result Date: 2/12/2021  MRI guided core needle biopsy of the left breast as described above.  A post biopsy marking clip was placed. The pathology results are pending at time of dictation.  PATHOLOGY: Intermediate grade ductal carcinoma in situ with cancerization of lobules, luminal necrosis and calcifications. Breast biomarkers to be performed on alternate concurrent biopsy. Result is concordant.  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 This diagnosis following excision of a subareolar mass that was thought to be related to an abscess.  MRI demonstrates multiple areas of abnormal masslike enhancement site 2 left breast central.  COMPARISON:  Breast MRI dated 01/26/2021 as well as diagnostic mammogram dated 05/21/2020.  TECHNIQUE:  The risks and benefits of the procedure were explained to the patient and informed consent was obtained.  \"Time-out\" was performed to verify patient's identity and correct location of the breast abnormality.  After placement of an intravenous line the patient was placed prone of the MR scanner.  The left breast was positioned with mild compression within the breast biopsy coil.  A fixed fiducial marker was placed on the skin through the guidance grid at position C4 to aid in the determination of lesion location.  An [employer] CAD system was utilized for biopsy planning.  Precontrast and postcontrast images were obtained in " "concordant.  RECOMMENDATION: SURGICAL AND ONCOLOGY FOLLOW-UP.  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 This diagnosis following excision of a subareolar mass that was thought to be related to an abscess MRI demonstrates multiple areas of abnormal masslike enhancement site 3 left breast lateral  COMPARISON:  Breast MRI dated 1/26/2021 as well as diagnostic mammogram dated 5/21/2020  TECHNIQUE:  The risks and benefits of the procedure were explained to the patient and informed consent was obtained.  \"Time-out\" was performed to verify patient's identity and correct location of the breast abnormality.  After placement of an intravenous line the patient was placed prone of the MR scanner.  The left breast was positioned with mild compression within the breast biopsy coil.  A fixed fiducial marker was placed on the skin through the guidance grid at position C4 to aid in the determination of lesion location.  An [employer] CAD system was utilized for biopsy planning.  Precontrast and postcontrast images were obtained in the axial and sagittal planes. Next, dynamic views in the axial and sagittal planes were obtained following the administration of 13 cc of gadolinium for all 4 biopsies .  The lesion to undergo biopsy was confirmed on subtraction images and lesion position, including depth, was determined.  The patient was brought out of the bore of the MR magnet.  The appropriate cube within the guidance grid was localized and the underlying skin was prepped with Betadine.  After administration of local anesthesia (1% lidocaine for superficial and 8% lidocaine with epinephrine for deeper), the needle guide block was placed into the guidance grid.  An introducer and trocar were then placed into the breast from a lateral approach to the pre-determined depth .The trocar was removed and " breast biopsy coil.  A fixed fiducial marker was placed on the skin through the guidance grid at position C4 to aid in the determination of lesion location.  An [employer] CAD system was utilized for biopsy planning.  Precontrast and postcontrast images were obtained in the axial and sagittal planes. Next, dynamic views in the axial and sagittal planes were obtained following the administration of 13 cc of gadolinium for all 4 biopsies.  The lesion to undergo biopsy was confirmed on subtraction images and lesion position, including depth, was determined.  The patient was brought out of the bore of the MR magnet.  The appropriate cube within the guidance grid was localized and the underlying skin was prepped with Betadine.  After administration of local anesthesia (1% lidocaine for superficial and 8 cc% lidocaine with epinephrine for deeper), the needle guide block was placed into the guidance grid.  An introducer and trocar were then placed into the breast from a lateral approach to the pre-determined depth .The trocar was removed and a localizing obturator was placed through the introducer.  Axial images revealed the tip of the obturator to be located at the lesion.  The patient was brought back out of the bore of the magnet.  The obturator was removed and a 10g SenoRx MRI compatible vacuum assisted core needle biopsy device was placed through the cannula to the appropriate depth.    A total of 12 samples were obtained.   A marking clip was then deployed.  The patient tolerated the procedure and there were no immediate complications.  Post-biopsy CC and ML views of the right breast were obtained for clip location. The clip appears to be adequately positioned based on anatomic landmarks.  IMPRESSION:  MRI guided core needle biopsy of the right breast as described above.  A post biopsy marking clip was placed. The pathology results are pending at time of dictation.  PATHOLOGY: Fibrocystic change with adenosis and focal  pseudoangiomatous stromal hyperplasia. Focal microcalcifications present. Negative for atypia.  Result is concordant.  RECOMMENDATION: 6 MONTH FOLLOW-UP RIGHT MRI. SURGICAL AND ONCOLOGY FOLLOW-UP FOR LEFT BREAST CANCER.  D:  02/12/2021 E:  02/12/2021  This report was finalized on 2/12/2021 10:33 AM by Dr. Anna Hidalgo MD.      NM Pet Skull Base To Mid Thigh    Result Date: 5/28/2021  Negative PET-CT scan.  D:  05/28/2021 E:  05/28/2021     This report was finalized on 5/28/2021 5:15 PM by Dr. Yesenia Hernandez MD.      NM Filion Node Injection Only    Result Date: 4/14/2021  Dictation for the purposes of radiopharmaceutical usage alone with 544 uCi of Technetium 99m filtered sulfur colloid utilized in the left breast for lymphoscintigraphy and sentinel node mapping.  D:  04/14/2021 E:  04/14/2021     This report was finalized on 4/14/2021 5:32 PM by Dr. Regan Zuñiga.      MRI Breast Biopsy Initial With & Without Device    Result Date: 2/12/2021  MRI guided core needle biopsy of the left breast as described above.  A post biopsy marking clip was placed. The pathology results are pending at time of dictation.  PATHOLOGY: Intermediate grade ductal carcinoma in situ with cancerization of lobules, luminal necrosis and calcifications. Breast biomarkers to be performed on alternate concurrent biopsy. Result is concordant.  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 This diagnosis following excision of a subareolar mass that was thought to be related to an abscess.  MRI demonstrates multiple areas of abnormal masslike enhancement site 2 left breast central.  COMPARISON:  Breast MRI dated 01/26/2021 as well as diagnostic mammogram dated 05/21/2020.  TECHNIQUE:  The risks and benefits of the procedure were explained to the  "patient and informed consent was obtained.  \"Time-out\" was performed to verify patient's identity and correct location of the breast abnormality.  After placement of an intravenous line the patient was placed prone of the MR scanner.  The left breast was positioned with mild compression within the breast biopsy coil.  A fixed fiducial marker was placed on the skin through the guidance grid at position C4 to aid in the determination of lesion location.  An [employer] CAD system was utilized for biopsy planning.  Precontrast and postcontrast images were obtained in the axial and sagittal planes. Next, dynamic views in the axial and sagittal planes were obtained following the administration of 13 cc of gadolinium for all 4 biopsies.  The lesion to undergo biopsy was confirmed on subtraction images and lesion position, including depth, was determined.  The patient was brought out of the bore of the MR magnet.  The appropriate cube within the guidance grid was localized and the underlying skin was prepped with Betadine.  After administration of local anesthesia (1% lidocaine for superficial and 8 cc% lidocaine with epinephrine for deeper), the needle guide block was placed into the guidance grid.  An introducer and trocar were then placed into the breast from a lateral approach to the pre-determined depth .The trocar was removed and a localizing obturator was placed through the introducer.  Axial images revealed the tip of the obturator to be located at the lesion.  The patient was brought back out of the bore of the magnet.  The obturator was removed and a 10g SenoRx MRI compatible vacuum assisted core needle biopsy device was placed through the cannula to the appropriate depth.    A total of 12 samples were obtained.   A marking clip was then deployed.  The patient tolerated the procedure and there were no immediate complications.  Post-biopsy CC and ML views of the left breast were obtained for clip location. The clip " "appears to be accurately located based on anatomic landmarks.  IMPRESSION:  MRI guided core needle biopsy of the left breast as described above.  A post biopsy marking clip was placed. The pathology results are pending at time of dictation.  PATHOLOGY: Small focus of invasive well-differentiated ductal carcinoma, invasive carcinoma arising in a background of intermediate grade in situ ductal carcinoma. Area of invasive carcinoma measuring 2.5 mm. Neoplasm positive for estrogen receptor, positive progesterone receptor and negative for HER-2/cristian. Result is concordant.  RECOMMENDATION: SURGICAL AND ONCOLOGY FOLLOW-UP.  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 This diagnosis following excision of a subareolar mass that was thought to be related to an abscess MRI demonstrates multiple areas of abnormal masslike enhancement site 3 left breast lateral  COMPARISON:  Breast MRI dated 1/26/2021 as well as diagnostic mammogram dated 5/21/2020  TECHNIQUE:  The risks and benefits of the procedure were explained to the patient and informed consent was obtained.  \"Time-out\" was performed to verify patient's identity and correct location of the breast abnormality.  After placement of an intravenous line the patient was placed prone of the MR scanner.  The left breast was positioned with mild compression within the breast biopsy coil.  A fixed fiducial marker was placed on the skin through the guidance grid at position C4 to aid in the determination of lesion location.  An [employer] CAD system was utilized for biopsy planning.  Precontrast and postcontrast images were obtained in the axial and sagittal planes. Next, dynamic views in the axial and sagittal planes were obtained following the administration of 13 cc of gadolinium for all 4 biopsies .  The lesion to undergo biopsy was confirmed on " subtraction images and lesion position, including depth, was determined.  The patient was brought out of the bore of the MR magnet.  The appropriate cube within the guidance grid was localized and the underlying skin was prepped with Betadine.  After administration of local anesthesia (1% lidocaine for superficial and 8% lidocaine with epinephrine for deeper), the needle guide block was placed into the guidance grid.  An introducer and trocar were then placed into the breast from a lateral approach to the pre-determined depth .The trocar was removed and a localizing obturator was placed through the introducer.  Axial images revealed the tip of the obturator to be located at the lesion.  The patient was brought back out of the bore of the magnet.  The obturator was removed and a 10g ScovilleoRx MRI compatible vacuum assisted core needle biopsy device was placed through the cannula to the appropriate depth.    A total of 12 samples were obtained.   A marking clip was then deployed.  The patient tolerated the procedure and there were no immediate complications.  Post-biopsy CC and ML views of the left breast were obtained for clip location. The clip appears to be appropriately positioned based on anatomic landmarks  IMPRESSION:  MRI guided core needle biopsy of the left breast as described above.  A post biopsy marking clip was placed. The pathology results are pending at time of dictation.  PATHOLOGY: Fibrocystic change with focal sclerosing adenosis and fibrosis. Focal pseudoangiomatous stromal hyperplasia. Negative for in situ and invasive carcinoma. 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 This diagnosis following excision of a subareolar mass that was thought to be related to an abscess. MRI demonstrates  "multiple areas of abnormal masslike enhancement site 4 right breast upper outer quadrant.  COMPARISON:  Breast MRI dated 01/26/2021 as well as diagnostic mammogram dated 05/21/2020.  TECHNIQUE:  The risks and benefits of the procedure were explained to the patient and informed consent was obtained.  \"Time-out\" was performed to verify patient's identity and correct location of the breast abnormality.  After placement of an intravenous line the patient was placed prone of the MR scanner.  The right breast was positioned with mild compression within the breast biopsy coil.  A fixed fiducial marker was placed on the skin through the guidance grid at position C4 to aid in the determination of lesion location.  An [employer] CAD system was utilized for biopsy planning.  Precontrast and postcontrast images were obtained in the axial and sagittal planes. Next, dynamic views in the axial and sagittal planes were obtained following the administration of 13 cc of gadolinium for all 4 biopsies.  The lesion to undergo biopsy was confirmed on subtraction images and lesion position, including depth, was determined.  The patient was brought out of the bore of the MR magnet.  The appropriate cube within the guidance grid was localized and the underlying skin was prepped with Betadine.  After administration of local anesthesia (1% lidocaine for superficial and 8 cc% lidocaine with epinephrine for deeper), the needle guide block was placed into the guidance grid.  An introducer and trocar were then placed into the breast from a lateral approach to the pre-determined depth .The trocar was removed and a localizing obturator was placed through the introducer.  Axial images revealed the tip of the obturator to be located at the lesion.  The patient was brought back out of the bore of the magnet.  The obturator was removed and a 10g SenoRx MRI compatible vacuum assisted core needle biopsy device was placed through the cannula to the appropriate " depth.    A total of 12 samples were obtained.   A marking clip was then deployed.  The patient tolerated the procedure and there were no immediate complications.  Post-biopsy CC and ML views of the right breast were obtained for clip location. The clip appears to be adequately positioned based on anatomic landmarks.  IMPRESSION:  MRI guided core needle biopsy of the right breast as described above.  A post biopsy marking clip was placed. The pathology results are pending at time of dictation.  PATHOLOGY: Fibrocystic change with adenosis and focal pseudoangiomatous stromal hyperplasia. Focal microcalcifications present. Negative for atypia.  Result is concordant.  RECOMMENDATION: 6 MONTH FOLLOW-UP RIGHT MRI. SURGICAL AND ONCOLOGY FOLLOW-UP FOR LEFT BREAST CANCER.  D:  02/12/2021 E:  02/12/2021  This report was finalized on 2/12/2021 10:33 AM by Dr. Anna Hidalgo MD.      MRI Breast Biopsy Initial With & Without Device    Result Date: 2/12/2021  MRI guided core needle biopsy of the left breast as described above.  A post biopsy marking clip was placed. The pathology results are pending at time of dictation.  PATHOLOGY: Intermediate grade ductal carcinoma in situ with cancerization of lobules, luminal necrosis and calcifications. Breast biomarkers to be performed on alternate concurrent biopsy. Result is concordant.  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 This diagnosis following excision of a subareolar mass that was thought to be related to an abscess.  MRI demonstrates multiple areas of abnormal masslike enhancement site 2 left breast central.  COMPARISON:  Breast MRI dated 01/26/2021 as well as diagnostic mammogram dated 05/21/2020.  TECHNIQUE:  The risks and benefits of the procedure were explained to the patient and  "informed consent was obtained.  \"Time-out\" was performed to verify patient's identity and correct location of the breast abnormality.  After placement of an intravenous line the patient was placed prone of the MR scanner.  The left breast was positioned with mild compression within the breast biopsy coil.  A fixed fiducial marker was placed on the skin through the guidance grid at position C4 to aid in the determination of lesion location.  An [employer] CAD system was utilized for biopsy planning.  Precontrast and postcontrast images were obtained in the axial and sagittal planes. Next, dynamic views in the axial and sagittal planes were obtained following the administration of 13 cc of gadolinium for all 4 biopsies.  The lesion to undergo biopsy was confirmed on subtraction images and lesion position, including depth, was determined.  The patient was brought out of the bore of the MR magnet.  The appropriate cube within the guidance grid was localized and the underlying skin was prepped with Betadine.  After administration of local anesthesia (1% lidocaine for superficial and 8 cc% lidocaine with epinephrine for deeper), the needle guide block was placed into the guidance grid.  An introducer and trocar were then placed into the breast from a lateral approach to the pre-determined depth .The trocar was removed and a localizing obturator was placed through the introducer.  Axial images revealed the tip of the obturator to be located at the lesion.  The patient was brought back out of the bore of the magnet.  The obturator was removed and a 10g SenoRx MRI compatible vacuum assisted core needle biopsy device was placed through the cannula to the appropriate depth.    A total of 12 samples were obtained.   A marking clip was then deployed.  The patient tolerated the procedure and there were no immediate complications.  Post-biopsy CC and ML views of the left breast were obtained for clip location. The clip appears to be " "accurately located based on anatomic landmarks.  IMPRESSION:  MRI guided core needle biopsy of the left breast as described above.  A post biopsy marking clip was placed. The pathology results are pending at time of dictation.  PATHOLOGY: Small focus of invasive well-differentiated ductal carcinoma, invasive carcinoma arising in a background of intermediate grade in situ ductal carcinoma. Area of invasive carcinoma measuring 2.5 mm. Neoplasm positive for estrogen receptor, positive progesterone receptor and negative for HER-2/cristian. Result is concordant.  RECOMMENDATION: SURGICAL AND ONCOLOGY FOLLOW-UP.  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 This diagnosis following excision of a subareolar mass that was thought to be related to an abscess MRI demonstrates multiple areas of abnormal masslike enhancement site 3 left breast lateral  COMPARISON:  Breast MRI dated 1/26/2021 as well as diagnostic mammogram dated 5/21/2020  TECHNIQUE:  The risks and benefits of the procedure were explained to the patient and informed consent was obtained.  \"Time-out\" was performed to verify patient's identity and correct location of the breast abnormality.  After placement of an intravenous line the patient was placed prone of the MR scanner.  The left breast was positioned with mild compression within the breast biopsy coil.  A fixed fiducial marker was placed on the skin through the guidance grid at position C4 to aid in the determination of lesion location.  An [employer] CAD system was utilized for biopsy planning.  Precontrast and postcontrast images were obtained in the axial and sagittal planes. Next, dynamic views in the axial and sagittal planes were obtained following the administration of 13 cc of gadolinium for all 4 biopsies .  The lesion to undergo biopsy was confirmed on subtraction " images and lesion position, including depth, was determined.  The patient was brought out of the bore of the MR magnet.  The appropriate cube within the guidance grid was localized and the underlying skin was prepped with Betadine.  After administration of local anesthesia (1% lidocaine for superficial and 8% lidocaine with epinephrine for deeper), the needle guide block was placed into the guidance grid.  An introducer and trocar were then placed into the breast from a lateral approach to the pre-determined depth .The trocar was removed and a localizing obturator was placed through the introducer.  Axial images revealed the tip of the obturator to be located at the lesion.  The patient was brought back out of the bore of the magnet.  The obturator was removed and a 10g Vengo LabsoRx MRI compatible vacuum assisted core needle biopsy device was placed through the cannula to the appropriate depth.    A total of 12 samples were obtained.   A marking clip was then deployed.  The patient tolerated the procedure and there were no immediate complications.  Post-biopsy CC and ML views of the left breast were obtained for clip location. The clip appears to be appropriately positioned based on anatomic landmarks  IMPRESSION:  MRI guided core needle biopsy of the left breast as described above.  A post biopsy marking clip was placed. The pathology results are pending at time of dictation.  PATHOLOGY: Fibrocystic change with focal sclerosing adenosis and fibrosis. Focal pseudoangiomatous stromal hyperplasia. Negative for in situ and invasive carcinoma. 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 This diagnosis following excision of a subareolar mass that was thought to be related to an abscess. MRI demonstrates multiple  "areas of abnormal masslike enhancement site 4 right breast upper outer quadrant.  COMPARISON:  Breast MRI dated 01/26/2021 as well as diagnostic mammogram dated 05/21/2020.  TECHNIQUE:  The risks and benefits of the procedure were explained to the patient and informed consent was obtained.  \"Time-out\" was performed to verify patient's identity and correct location of the breast abnormality.  After placement of an intravenous line the patient was placed prone of the MR scanner.  The right breast was positioned with mild compression within the breast biopsy coil.  A fixed fiducial marker was placed on the skin through the guidance grid at position C4 to aid in the determination of lesion location.  An [employer] CAD system was utilized for biopsy planning.  Precontrast and postcontrast images were obtained in the axial and sagittal planes. Next, dynamic views in the axial and sagittal planes were obtained following the administration of 13 cc of gadolinium for all 4 biopsies.  The lesion to undergo biopsy was confirmed on subtraction images and lesion position, including depth, was determined.  The patient was brought out of the bore of the MR magnet.  The appropriate cube within the guidance grid was localized and the underlying skin was prepped with Betadine.  After administration of local anesthesia (1% lidocaine for superficial and 8 cc% lidocaine with epinephrine for deeper), the needle guide block was placed into the guidance grid.  An introducer and trocar were then placed into the breast from a lateral approach to the pre-determined depth .The trocar was removed and a localizing obturator was placed through the introducer.  Axial images revealed the tip of the obturator to be located at the lesion.  The patient was brought back out of the bore of the magnet.  The obturator was removed and a 10g SenoRx MRI compatible vacuum assisted core needle biopsy device was placed through the cannula to the appropriate depth.   "  A total of 12 samples were obtained.   A marking clip was then deployed.  The patient tolerated the procedure and there were no immediate complications.  Post-biopsy CC and ML views of the right breast were obtained for clip location. The clip appears to be adequately positioned based on anatomic landmarks.  IMPRESSION:  MRI guided core needle biopsy of the right breast as described above.  A post biopsy marking clip was placed. The pathology results are pending at time of dictation.  PATHOLOGY: Fibrocystic change with adenosis and focal pseudoangiomatous stromal hyperplasia. Focal microcalcifications present. Negative for atypia.  Result is concordant.  RECOMMENDATION: 6 MONTH FOLLOW-UP RIGHT MRI. SURGICAL AND ONCOLOGY FOLLOW-UP FOR LEFT BREAST CANCER.  D:  02/12/2021 E:  02/12/2021  This report was finalized on 2/12/2021 10:33 AM by Dr. Anna Hidalgo MD.            Assessment/Plan    1.  Node positive ER/ME positive HER-2 negative breast cancer.  Think it is reasonable to hold off on Taxol for 1 week.  I am going to get an x-ray just to make sure the port site is intact.  She is seeing Dr. Cortes next week.  I am going to resume her Taxol next Thursday.  No dose reduction with her treatment.  Plan for weekly cycles of Taxol.      Total time of patient care on day of service including time prior to, face to face with patient, and following visit spent in reviewing records, lab results, imaging studies, discussion with patient, and documentation/charting was > 25 minutes.     Graham Herbert MD  [facility] Hematology and Oncology         No orders of the defined types were placed in this encounter.      8/13/2021

## 2021-08-19 ENCOUNTER — APPOINTMENT (OUTPATIENT)
Dept: ONCOLOGY | Facility: HOSPITAL | Age: 36
End: 2021-08-19

## 2021-08-26 ENCOUNTER — HOSPITAL ENCOUNTER (OUTPATIENT)
Dept: ONCOLOGY | Facility: HOSPITAL | Age: 36
Setting detail: INFUSION SERIES
Discharge: HOME | End: 2021-08-26

## 2021-08-26 VITALS
WEIGHT: 134 LBS | BODY MASS INDEX: 22.33 KG/M2 | TEMPERATURE: 97 F | HEIGHT: 65 IN | DIASTOLIC BLOOD PRESSURE: 92 MMHG | HEART RATE: 71 BPM | SYSTOLIC BLOOD PRESSURE: 120 MMHG | RESPIRATION RATE: 16 BRPM

## 2021-08-26 DIAGNOSIS — C50.412 CARCINOMA OF UPPER-OUTER QUADRANT OF LEFT BREAST IN FEMALE, ESTROGEN RECEPTOR POSITIVE (HCC): Primary | ICD-10-CM

## 2021-08-26 DIAGNOSIS — Z17.0 CARCINOMA OF UPPER-OUTER QUADRANT OF LEFT BREAST IN FEMALE, ESTROGEN RECEPTOR POSITIVE (HCC): Primary | ICD-10-CM

## 2021-08-26 DIAGNOSIS — Z45.2 ENCOUNTER FOR CENTRAL LINE CARE: ICD-10-CM

## 2021-08-26 LAB
ALBUMIN SERPL-MCNC: 4.3 G/DL (ref 3.5–5.2)
ALBUMIN/GLOB SERPL: 1.6 G/DL
ALP SERPL-CCNC: 97 U/L (ref 39–117)
ALT SERPL W P-5'-P-CCNC: 11 U/L (ref 1–33)
ANION GAP SERPL CALCULATED.3IONS-SCNC: 11 MMOL/L (ref 5–15)
AST SERPL-CCNC: 16 U/L (ref 1–32)
BILIRUB SERPL-MCNC: 0.6 MG/DL (ref 0–1.2)
BUN SERPL-MCNC: 10 MG/DL (ref 6–20)
BUN/CREAT SERPL: 15.6 (ref 7–25)
CALCIUM SPEC-SCNC: 9.2 MG/DL (ref 8.6–10.5)
CHLORIDE SERPL-SCNC: 102 MMOL/L (ref 98–107)
CO2 SERPL-SCNC: 24 MMOL/L (ref 22–29)
CREAT SERPL-MCNC: 0.64 MG/DL (ref 0.57–1)
ERYTHROCYTE [DISTWIDTH] IN BLOOD BY AUTOMATED COUNT: 14.8 % (ref 12.3–15.4)
GFR SERPL CREATININE-BSD FRML MDRD: 126 ML/MIN/1.73
GLOBULIN UR ELPH-MCNC: 2.7 GM/DL
GLUCOSE SERPL-MCNC: 98 MG/DL (ref 65–99)
HCG INTACT+B SERPL-ACNC: 1.38 MIU/ML
HCT VFR BLD AUTO: 41.6 % (ref 34–46.6)
HGB BLD-MCNC: 13.7 G/DL (ref 12–15.9)
LYMPHOCYTES # BLD AUTO: 1.1 10*3/MM3 (ref 0.7–3.1)
LYMPHOCYTES NFR BLD AUTO: 33.4 % (ref 19.6–45.3)
MCH RBC QN AUTO: 32.3 PG (ref 26.6–33)
MCHC RBC AUTO-ENTMCNC: 32.8 G/DL (ref 31.5–35.7)
MCV RBC AUTO: 98.5 FL (ref 79–97)
MONOCYTES # BLD AUTO: 0.1 10*3/MM3 (ref 0.1–0.9)
MONOCYTES NFR BLD AUTO: 2.5 % (ref 5–12)
NEUTROPHILS NFR BLD AUTO: 2.2 10*3/MM3 (ref 1.7–7)
NEUTROPHILS NFR BLD AUTO: 64.1 % (ref 42.7–76)
PLATELET # BLD AUTO: 194 10*3/MM3 (ref 140–450)
PMV BLD AUTO: 8.5 FL (ref 6–12)
POTASSIUM SERPL-SCNC: 3.7 MMOL/L (ref 3.5–5.2)
PROT SERPL-MCNC: 7 G/DL (ref 6–8.5)
RBC # BLD AUTO: 4.22 10*6/MM3 (ref 3.77–5.28)
SODIUM SERPL-SCNC: 137 MMOL/L (ref 136–145)
WBC # BLD AUTO: 3.4 10*3/MM3 (ref 3.4–10.8)

## 2021-08-26 PROCEDURE — 80053 COMPREHEN METABOLIC PANEL: CPT | Performed by: INTERNAL MEDICINE

## 2021-08-26 PROCEDURE — 85025 COMPLETE CBC W/AUTO DIFF WBC: CPT | Performed by: INTERNAL MEDICINE

## 2021-08-26 PROCEDURE — 25010000002 DEXAMETHASONE SODIUM PHOSPHATE 100 MG/10ML SOLUTION: Performed by: INTERNAL MEDICINE

## 2021-08-26 PROCEDURE — 96413 CHEMO IV INFUSION 1 HR: CPT

## 2021-08-26 PROCEDURE — 96376 TX/PRO/DX INJ SAME DRUG ADON: CPT

## 2021-08-26 PROCEDURE — 96375 TX/PRO/DX INJ NEW DRUG ADDON: CPT

## 2021-08-26 PROCEDURE — 25010000002 DIPHENHYDRAMINE PER 50 MG: Performed by: INTERNAL MEDICINE

## 2021-08-26 PROCEDURE — 25010000002 HEPARIN LOCK FLUSH PER 10 UNITS: Performed by: INTERNAL MEDICINE

## 2021-08-26 PROCEDURE — 25010000002 PACLITAXEL PER 1 MG: Performed by: INTERNAL MEDICINE

## 2021-08-26 PROCEDURE — 84702 CHORIONIC GONADOTROPIN TEST: CPT | Performed by: INTERNAL MEDICINE

## 2021-08-26 RX ORDER — SODIUM CHLORIDE 9 MG/ML
250 INJECTION, SOLUTION INTRAVENOUS ONCE
Status: COMPLETED | OUTPATIENT
Start: 2021-08-26 | End: 2021-08-26

## 2021-08-26 RX ORDER — HEPARIN SODIUM (PORCINE) LOCK FLUSH IV SOLN 100 UNIT/ML 100 UNIT/ML
500 SOLUTION INTRAVENOUS AS NEEDED
Status: CANCELLED | OUTPATIENT
Start: 2021-08-26

## 2021-08-26 RX ORDER — HEPARIN SODIUM (PORCINE) LOCK FLUSH IV SOLN 100 UNIT/ML 100 UNIT/ML
500 SOLUTION INTRAVENOUS AS NEEDED
Status: DISCONTINUED | OUTPATIENT
Start: 2021-08-26 | End: 2021-08-27 | Stop reason: HOSPADM

## 2021-08-26 RX ORDER — FAMOTIDINE 10 MG/ML
20 INJECTION, SOLUTION INTRAVENOUS ONCE
Status: COMPLETED | OUTPATIENT
Start: 2021-08-26 | End: 2021-08-26

## 2021-08-26 RX ADMIN — DEXAMETHASONE SODIUM PHOSPHATE 12 MG: 10 INJECTION, SOLUTION INTRAMUSCULAR; INTRAVENOUS at 13:18

## 2021-08-26 RX ADMIN — FAMOTIDINE 20 MG: 10 INJECTION, SOLUTION INTRAVENOUS at 13:15

## 2021-08-26 RX ADMIN — DIPHENHYDRAMINE HYDROCHLORIDE 50 MG: 50 INJECTION INTRAMUSCULAR; INTRAVENOUS at 13:17

## 2021-08-26 RX ADMIN — HEPARIN 500 UNITS: 100 SYRINGE at 15:19

## 2021-08-26 RX ADMIN — SODIUM CHLORIDE 250 ML: 9 INJECTION, SOLUTION INTRAVENOUS at 13:15

## 2021-08-26 RX ADMIN — PACLITAXEL 140 MG: 6 INJECTION, SOLUTION INTRAVENOUS at 14:15

## 2021-09-02 ENCOUNTER — HOSPITAL ENCOUNTER (OUTPATIENT)
Dept: ONCOLOGY | Facility: HOSPITAL | Age: 36
Setting detail: INFUSION SERIES
Discharge: HOME | End: 2021-09-02

## 2021-09-02 VITALS
DIASTOLIC BLOOD PRESSURE: 88 MMHG | BODY MASS INDEX: 21.83 KG/M2 | WEIGHT: 131 LBS | SYSTOLIC BLOOD PRESSURE: 116 MMHG | HEART RATE: 75 BPM | HEIGHT: 65 IN | RESPIRATION RATE: 16 BRPM | TEMPERATURE: 97.9 F

## 2021-09-02 DIAGNOSIS — C50.412 CARCINOMA OF UPPER-OUTER QUADRANT OF LEFT BREAST IN FEMALE, ESTROGEN RECEPTOR POSITIVE (HCC): Primary | ICD-10-CM

## 2021-09-02 DIAGNOSIS — Z17.0 CARCINOMA OF UPPER-OUTER QUADRANT OF LEFT BREAST IN FEMALE, ESTROGEN RECEPTOR POSITIVE (HCC): Primary | ICD-10-CM

## 2021-09-02 DIAGNOSIS — Z45.2 ENCOUNTER FOR CENTRAL LINE CARE: ICD-10-CM

## 2021-09-02 LAB
ALBUMIN SERPL-MCNC: 4.2 G/DL (ref 3.5–5.2)
ALBUMIN/GLOB SERPL: 1.5 G/DL
ALP SERPL-CCNC: 96 U/L (ref 39–117)
ALT SERPL W P-5'-P-CCNC: 11 U/L (ref 1–33)
ANION GAP SERPL CALCULATED.3IONS-SCNC: 8 MMOL/L (ref 5–15)
AST SERPL-CCNC: 15 U/L (ref 1–32)
BILIRUB SERPL-MCNC: 0.4 MG/DL (ref 0–1.2)
BUN SERPL-MCNC: 8 MG/DL (ref 6–20)
BUN/CREAT SERPL: 11 (ref 7–25)
CALCIUM SPEC-SCNC: 9.1 MG/DL (ref 8.6–10.5)
CHLORIDE SERPL-SCNC: 104 MMOL/L (ref 98–107)
CO2 SERPL-SCNC: 27 MMOL/L (ref 22–29)
CREAT SERPL-MCNC: 0.73 MG/DL (ref 0.57–1)
ERYTHROCYTE [DISTWIDTH] IN BLOOD BY AUTOMATED COUNT: 13.6 % (ref 12.3–15.4)
GFR SERPL CREATININE-BSD FRML MDRD: 108 ML/MIN/1.73
GLOBULIN UR ELPH-MCNC: 2.8 GM/DL
GLUCOSE SERPL-MCNC: 105 MG/DL (ref 65–99)
HCT VFR BLD AUTO: 36.6 % (ref 34–46.6)
HGB BLD-MCNC: 12.4 G/DL (ref 12–15.9)
LYMPHOCYTES # BLD AUTO: 1 10*3/MM3 (ref 0.7–3.1)
LYMPHOCYTES NFR BLD AUTO: 30.4 % (ref 19.6–45.3)
MCH RBC QN AUTO: 33.3 PG (ref 26.6–33)
MCHC RBC AUTO-ENTMCNC: 33.8 G/DL (ref 31.5–35.7)
MCV RBC AUTO: 98.5 FL (ref 79–97)
MONOCYTES # BLD AUTO: 0.2 10*3/MM3 (ref 0.1–0.9)
MONOCYTES NFR BLD AUTO: 6.1 % (ref 5–12)
NEUTROPHILS NFR BLD AUTO: 2 10*3/MM3 (ref 1.7–7)
NEUTROPHILS NFR BLD AUTO: 63.5 % (ref 42.7–76)
PLATELET # BLD AUTO: 194 10*3/MM3 (ref 140–450)
PMV BLD AUTO: 7.6 FL (ref 6–12)
POTASSIUM SERPL-SCNC: 3.3 MMOL/L (ref 3.5–5.2)
PROT SERPL-MCNC: 7 G/DL (ref 6–8.5)
RBC # BLD AUTO: 3.71 10*6/MM3 (ref 3.77–5.28)
SODIUM SERPL-SCNC: 139 MMOL/L (ref 136–145)
WBC # BLD AUTO: 3.2 10*3/MM3 (ref 3.4–10.8)

## 2021-09-02 PROCEDURE — 25010000002 DEXAMETHASONE SODIUM PHOSPHATE 100 MG/10ML SOLUTION: Performed by: INTERNAL MEDICINE

## 2021-09-02 PROCEDURE — 85025 COMPLETE CBC W/AUTO DIFF WBC: CPT | Performed by: INTERNAL MEDICINE

## 2021-09-02 PROCEDURE — 25010000002 HEPARIN LOCK FLUSH PER 10 UNITS: Performed by: INTERNAL MEDICINE

## 2021-09-02 PROCEDURE — 96413 CHEMO IV INFUSION 1 HR: CPT

## 2021-09-02 PROCEDURE — 25010000002 DIPHENHYDRAMINE PER 50 MG: Performed by: INTERNAL MEDICINE

## 2021-09-02 PROCEDURE — 25010000002 PACLITAXEL PER 1 MG: Performed by: INTERNAL MEDICINE

## 2021-09-02 PROCEDURE — 96375 TX/PRO/DX INJ NEW DRUG ADDON: CPT

## 2021-09-02 PROCEDURE — 80053 COMPREHEN METABOLIC PANEL: CPT | Performed by: INTERNAL MEDICINE

## 2021-09-02 RX ORDER — HEPARIN SODIUM (PORCINE) LOCK FLUSH IV SOLN 100 UNIT/ML 100 UNIT/ML
500 SOLUTION INTRAVENOUS AS NEEDED
Status: CANCELLED | OUTPATIENT
Start: 2021-09-02

## 2021-09-02 RX ORDER — HEPARIN SODIUM (PORCINE) LOCK FLUSH IV SOLN 100 UNIT/ML 100 UNIT/ML
500 SOLUTION INTRAVENOUS AS NEEDED
Status: DISCONTINUED | OUTPATIENT
Start: 2021-09-02 | End: 2021-09-03 | Stop reason: HOSPADM

## 2021-09-02 RX ORDER — SODIUM CHLORIDE 9 MG/ML
250 INJECTION, SOLUTION INTRAVENOUS ONCE
Status: COMPLETED | OUTPATIENT
Start: 2021-09-02 | End: 2021-09-02

## 2021-09-02 RX ORDER — FAMOTIDINE 10 MG/ML
20 INJECTION, SOLUTION INTRAVENOUS ONCE
Status: COMPLETED | OUTPATIENT
Start: 2021-09-02 | End: 2021-09-02

## 2021-09-02 RX ADMIN — SODIUM CHLORIDE 250 ML: 9 INJECTION, SOLUTION INTRAVENOUS at 10:20

## 2021-09-02 RX ADMIN — PACLITAXEL 140 MG: 6 INJECTION, SOLUTION INTRAVENOUS at 11:10

## 2021-09-02 RX ADMIN — DEXAMETHASONE SODIUM PHOSPHATE 12 MG: 10 INJECTION, SOLUTION INTRAMUSCULAR; INTRAVENOUS at 10:24

## 2021-09-02 RX ADMIN — HEPARIN 500 UNITS: 100 SYRINGE at 12:13

## 2021-09-02 RX ADMIN — DIPHENHYDRAMINE HYDROCHLORIDE 25 MG: 50 INJECTION INTRAMUSCULAR; INTRAVENOUS at 10:24

## 2021-09-02 RX ADMIN — FAMOTIDINE 20 MG: 10 INJECTION, SOLUTION INTRAVENOUS at 10:21

## 2021-09-09 ENCOUNTER — OFFICE VISIT (OUTPATIENT)
Dept: ONCOLOGY | Facility: CLINIC | Age: 36
End: 2021-09-09

## 2021-09-09 ENCOUNTER — HOSPITAL ENCOUNTER (OUTPATIENT)
Dept: ONCOLOGY | Facility: HOSPITAL | Age: 36
Setting detail: INFUSION SERIES
Discharge: HOME | End: 2021-09-09

## 2021-09-09 VITALS
TEMPERATURE: 97.7 F | HEIGHT: 65 IN | SYSTOLIC BLOOD PRESSURE: 108 MMHG | HEART RATE: 98 BPM | WEIGHT: 129.7 LBS | RESPIRATION RATE: 16 BRPM | DIASTOLIC BLOOD PRESSURE: 75 MMHG | BODY MASS INDEX: 21.61 KG/M2 | OXYGEN SATURATION: 100 %

## 2021-09-09 VITALS — DIASTOLIC BLOOD PRESSURE: 68 MMHG | SYSTOLIC BLOOD PRESSURE: 102 MMHG | HEART RATE: 73 BPM

## 2021-09-09 DIAGNOSIS — Z45.2 ENCOUNTER FOR CENTRAL LINE CARE: ICD-10-CM

## 2021-09-09 DIAGNOSIS — Z17.0 CARCINOMA OF UPPER-OUTER QUADRANT OF LEFT BREAST IN FEMALE, ESTROGEN RECEPTOR POSITIVE (HCC): Primary | ICD-10-CM

## 2021-09-09 DIAGNOSIS — C50.412 CARCINOMA OF UPPER-OUTER QUADRANT OF LEFT BREAST IN FEMALE, ESTROGEN RECEPTOR POSITIVE (HCC): Primary | ICD-10-CM

## 2021-09-09 LAB
ALBUMIN SERPL-MCNC: 4.4 G/DL (ref 3.5–5.2)
ALBUMIN/GLOB SERPL: 1.6 G/DL
ALP SERPL-CCNC: 90 U/L (ref 39–117)
ALT SERPL W P-5'-P-CCNC: 28 U/L (ref 1–33)
ANION GAP SERPL CALCULATED.3IONS-SCNC: 13 MMOL/L (ref 5–15)
AST SERPL-CCNC: 28 U/L (ref 1–32)
BILIRUB SERPL-MCNC: 0.2 MG/DL (ref 0–1.2)
BUN SERPL-MCNC: 9 MG/DL (ref 6–20)
BUN/CREAT SERPL: 14.1 (ref 7–25)
CALCIUM SPEC-SCNC: 9.3 MG/DL (ref 8.6–10.5)
CHLORIDE SERPL-SCNC: 106 MMOL/L (ref 98–107)
CO2 SERPL-SCNC: 23 MMOL/L (ref 22–29)
CREAT SERPL-MCNC: 0.64 MG/DL (ref 0.57–1)
ERYTHROCYTE [DISTWIDTH] IN BLOOD BY AUTOMATED COUNT: 13.5 % (ref 12.3–15.4)
GFR SERPL CREATININE-BSD FRML MDRD: 126 ML/MIN/1.73
GLOBULIN UR ELPH-MCNC: 2.7 GM/DL
GLUCOSE SERPL-MCNC: 109 MG/DL (ref 65–99)
HCT VFR BLD AUTO: 37 % (ref 34–46.6)
HGB BLD-MCNC: 12.3 G/DL (ref 12–15.9)
LYMPHOCYTES # BLD AUTO: 0.9 10*3/MM3 (ref 0.7–3.1)
LYMPHOCYTES NFR BLD AUTO: 25.8 % (ref 19.6–45.3)
MCH RBC QN AUTO: 33.1 PG (ref 26.6–33)
MCHC RBC AUTO-ENTMCNC: 33.3 G/DL (ref 31.5–35.7)
MCV RBC AUTO: 99.4 FL (ref 79–97)
MONOCYTES # BLD AUTO: 0.3 10*3/MM3 (ref 0.1–0.9)
MONOCYTES NFR BLD AUTO: 7.4 % (ref 5–12)
NEUTROPHILS NFR BLD AUTO: 2.3 10*3/MM3 (ref 1.7–7)
NEUTROPHILS NFR BLD AUTO: 66.8 % (ref 42.7–76)
PLATELET # BLD AUTO: 235 10*3/MM3 (ref 140–450)
PMV BLD AUTO: 7.7 FL (ref 6–12)
POTASSIUM SERPL-SCNC: 3.6 MMOL/L (ref 3.5–5.2)
PROT SERPL-MCNC: 7.1 G/DL (ref 6–8.5)
RBC # BLD AUTO: 3.72 10*6/MM3 (ref 3.77–5.28)
SODIUM SERPL-SCNC: 142 MMOL/L (ref 136–145)
WBC # BLD AUTO: 3.5 10*3/MM3 (ref 3.4–10.8)

## 2021-09-09 PROCEDURE — 25010000002 HEPARIN LOCK FLUSH PER 10 UNITS: Performed by: INTERNAL MEDICINE

## 2021-09-09 PROCEDURE — 25010000002 DEXAMETHASONE SODIUM PHOSPHATE 100 MG/10ML SOLUTION: Performed by: INTERNAL MEDICINE

## 2021-09-09 PROCEDURE — 25010000002 DIPHENHYDRAMINE PER 50 MG: Performed by: INTERNAL MEDICINE

## 2021-09-09 PROCEDURE — 96375 TX/PRO/DX INJ NEW DRUG ADDON: CPT

## 2021-09-09 PROCEDURE — 96413 CHEMO IV INFUSION 1 HR: CPT

## 2021-09-09 PROCEDURE — 96376 TX/PRO/DX INJ SAME DRUG ADON: CPT

## 2021-09-09 PROCEDURE — 25010000002 PACLITAXEL PER 1 MG: Performed by: INTERNAL MEDICINE

## 2021-09-09 PROCEDURE — 85025 COMPLETE CBC W/AUTO DIFF WBC: CPT | Performed by: INTERNAL MEDICINE

## 2021-09-09 PROCEDURE — 99214 OFFICE O/P EST MOD 30 MIN: CPT | Performed by: INTERNAL MEDICINE

## 2021-09-09 PROCEDURE — 80053 COMPREHEN METABOLIC PANEL: CPT | Performed by: INTERNAL MEDICINE

## 2021-09-09 RX ORDER — SODIUM CHLORIDE 9 MG/ML
250 INJECTION, SOLUTION INTRAVENOUS ONCE
Status: COMPLETED | OUTPATIENT
Start: 2021-09-09 | End: 2021-09-09

## 2021-09-09 RX ORDER — HEPARIN SODIUM (PORCINE) LOCK FLUSH IV SOLN 100 UNIT/ML 100 UNIT/ML
500 SOLUTION INTRAVENOUS AS NEEDED
Status: CANCELLED | OUTPATIENT
Start: 2021-09-09

## 2021-09-09 RX ORDER — FAMOTIDINE 10 MG/ML
20 INJECTION, SOLUTION INTRAVENOUS ONCE
Status: COMPLETED | OUTPATIENT
Start: 2021-09-09 | End: 2021-09-09

## 2021-09-09 RX ORDER — HEPARIN SODIUM (PORCINE) LOCK FLUSH IV SOLN 100 UNIT/ML 100 UNIT/ML
500 SOLUTION INTRAVENOUS AS NEEDED
Status: DISCONTINUED | OUTPATIENT
Start: 2021-09-09 | End: 2021-09-10 | Stop reason: HOSPADM

## 2021-09-09 RX ADMIN — FAMOTIDINE 20 MG: 10 INJECTION, SOLUTION INTRAVENOUS at 09:32

## 2021-09-09 RX ADMIN — DEXAMETHASONE SODIUM PHOSPHATE 12 MG: 10 INJECTION, SOLUTION INTRAMUSCULAR; INTRAVENOUS at 09:34

## 2021-09-09 RX ADMIN — DIPHENHYDRAMINE HYDROCHLORIDE 25 MG: 50 INJECTION INTRAMUSCULAR; INTRAVENOUS at 09:34

## 2021-09-09 RX ADMIN — PACLITAXEL 140 MG: 6 INJECTION, SOLUTION INTRAVENOUS at 10:18

## 2021-09-09 RX ADMIN — SODIUM CHLORIDE 250 ML: 9 INJECTION, SOLUTION INTRAVENOUS at 09:32

## 2021-09-09 RX ADMIN — HEPARIN SODIUM (PORCINE) LOCK FLUSH IV SOLN 100 UNIT/ML 500 UNITS: 100 SOLUTION at 11:23

## 2021-09-09 NOTE — PROGRESS NOTES
PROBLEM LIST:  Oncology/Hematology History   Carcinoma of upper-outer quadrant of left breast in female, estrogen receptor positive (CMS/HCC)   4/14/2021 Cancer Staged    Staging form: Breast, AJCC 8th Edition  - Pathologic stage from 4/14/2021: Stage IB (pT3, pN1a, cM0, G2, ER+, SD+, HER2-) - Signed by Graham Herbert MD on 4/29/2021 4/29/2021 Initial Diagnosis    Carcinoma of upper-outer quadrant of left breast in female, estrogen receptor positive (CMS/HCC)     5/21/2021 - 7/15/2021 Chemotherapy    OP BREAST AC DD DOXOrubicin / Cyclophosphamide     8/26/2021 -  Chemotherapy    OP BREAST PACLitaxel (weekly X 12)         REASON FOR VISIT: Breast cancer    HISTORY OF PRESENT ILLNESS:   36 y.o.  female presents today for follow-up of her breast cancer.  She has completed 4 cycles of dose dense Adriamycin and Cytoxan.  She is currently on single agent Taxol and seems to be tolerating it well.  She is having a fair bit of headaches.  Denies any infections.  Fatigue is an issue.    Past medical history, social history and family history was reviewed 09/09/21 and unchanged from prior visit.    Review of Systems:    Review of Systems   Constitutional: Negative.    Eyes: Negative.    Respiratory: Negative.    Cardiovascular: Negative.    Gastrointestinal: Negative.    Endocrine: Negative.    Genitourinary: Negative.     Musculoskeletal: Negative.    Skin: Negative.    Neurological: Positive for headaches.   Hematological: Negative.    Psychiatric/Behavioral: Negative.             Medications:        Current Outpatient Medications:   •  lidocaine-prilocaine (EMLA) 2.5-2.5 % cream, Apply  topically to the appropriate area as directed As Needed (45-60 minutes prior to port access.  Cover with saran/plastic wrap.)., Disp: 30 g, Rfl: 3  •  LORazepam (ATIVAN) 1 MG tablet, Take 1 tablet by mouth Daily As Needed for Anxiety. Take one tablet as needed up to once a day for severe anxiety, Disp: 30 tablet, Rfl: 0  •   "ondansetron (ZOFRAN) 8 MG tablet, Take 1 tablet by mouth 3 (Three) Times a Day As Needed for Nausea or Vomiting., Disp: 30 tablet, Rfl: 5  •  temazepam (RESTORIL) 30 MG capsule, Take 1 capsule by mouth At Night As Needed for Sleep., Disp: 30 capsule, Rfl: 0           ALLERGIES:  No Known Allergies      Physical Exam    VITAL SIGNS:  /75   Pulse 98   Temp 97.7 °F (36.5 °C) (Temporal)   Resp 16   Ht 165.1 cm (65\")   Wt 58.8 kg (129 lb 11.2 oz)   SpO2 100%   BMI 21.58 kg/m²     ECOG score: 0           Wt Readings from Last 3 Encounters:   09/09/21 58.8 kg (129 lb 11.2 oz)   09/02/21 59.4 kg (131 lb)   08/26/21 60.8 kg (134 lb)       Body mass index is 21.58 kg/m². Body surface area is 1.65 meters squared.    Pain Score    09/09/21 0836   PainSc: 0-No pain           Performance Status: 0    General: well appearing, in no acute distress  HEENT: sclera anicteric, neck is supple  Lymphatics: no cervical, supraclavicular, or axillary adenopathy  Cardiovascular: regular rate and rhythm, no murmurs, rubs or gallops  Lungs: clear to auscultation bilaterally  Abdomen: soft, nontender, nondistended.  No palpable organomegaly  Extremities: no lower extremity edema  Skin: no rashes, lesions, bruising, or petechiae  Msk:  Shows no weakness of the large muscle groups  Psych: Mood is stable        RECENT LABS:    Lab Results   Component Value Date    HGB 12.4 09/02/2021    HCT 36.6 09/02/2021    MCV 98.5 (H) 09/02/2021     09/02/2021    WBC 3.20 (L) 09/02/2021    NEUTROABS 2.00 09/02/2021    LYMPHSABS 1.00 09/02/2021    MONOSABS 0.20 09/02/2021    EOSABS 0.08 11/09/2020    BASOSABS 0.01 11/09/2020       Lab Results   Component Value Date    GLUCOSE 105 (H) 09/02/2021    BUN 8 09/02/2021    CREATININE 0.73 09/02/2021     09/02/2021    K 3.3 (L) 09/02/2021     09/02/2021    CO2 27.0 09/02/2021    CALCIUM 9.1 09/02/2021    PROTEINTOT 7.0 09/02/2021    ALBUMIN 4.20 09/02/2021    BILITOT 0.4 09/02/2021    " ALKPHOS 96 09/02/2021    AST 15 09/02/2021    ALT 11 09/02/2021           NM Pet Skull Base To Mid Thigh    Result Date: 5/28/2021  Negative PET-CT scan.  D:  05/28/2021 E:  05/28/2021     This report was finalized on 5/28/2021 5:15 PM by Dr. Yesenia Hernandez MD.        Assessment/Plan    1.  Node positive ER/SC positive HER-2 negative breast cancer.  Resume Taxol with no dose changes.  She is having some headaches which is concerning.  Likely related to sinus disease.  I am going to get a CT of her head to make sure I am not missing anything there.  She will try to complete 12 weekly cycles of Taxol.  I am going to check on her in 3 weeks to make sure she is doing well.    Total time of patient care on day of service including time prior to, face to face with patient, and following visit spent in reviewing records, lab results, imaging studies, discussion with patient, and documentation/charting was > 25 minutes.     Graham Herbert MD  [facility] Hematology and Oncology    Return on: 09/30/21  Return in (Approximately): 3 weeks, Schedule with next infusion    Orders Placed This Encounter   Procedures   • CT Head With Contrast       9/9/2021

## 2021-09-16 ENCOUNTER — APPOINTMENT (OUTPATIENT)
Dept: ONCOLOGY | Facility: HOSPITAL | Age: 36
End: 2021-09-16

## 2021-09-23 ENCOUNTER — HOSPITAL ENCOUNTER (OUTPATIENT)
Dept: ONCOLOGY | Facility: HOSPITAL | Age: 36
Setting detail: INFUSION SERIES
Discharge: HOME | End: 2021-09-23

## 2021-09-23 VITALS
HEIGHT: 65 IN | SYSTOLIC BLOOD PRESSURE: 105 MMHG | DIASTOLIC BLOOD PRESSURE: 74 MMHG | BODY MASS INDEX: 21.99 KG/M2 | HEART RATE: 70 BPM | WEIGHT: 132 LBS | RESPIRATION RATE: 18 BRPM | TEMPERATURE: 96.3 F

## 2021-09-23 DIAGNOSIS — Z45.2 ENCOUNTER FOR CENTRAL LINE CARE: Primary | ICD-10-CM

## 2021-09-23 DIAGNOSIS — C50.412 CARCINOMA OF UPPER-OUTER QUADRANT OF LEFT BREAST IN FEMALE, ESTROGEN RECEPTOR POSITIVE (HCC): ICD-10-CM

## 2021-09-23 DIAGNOSIS — Z17.0 CARCINOMA OF UPPER-OUTER QUADRANT OF LEFT BREAST IN FEMALE, ESTROGEN RECEPTOR POSITIVE (HCC): ICD-10-CM

## 2021-09-23 LAB
ALBUMIN SERPL-MCNC: 4 G/DL (ref 3.5–5.2)
ALBUMIN/GLOB SERPL: 1.6 G/DL
ALP SERPL-CCNC: 89 U/L (ref 39–117)
ALT SERPL W P-5'-P-CCNC: 9 U/L (ref 1–33)
ANION GAP SERPL CALCULATED.3IONS-SCNC: 9 MMOL/L (ref 5–15)
AST SERPL-CCNC: 12 U/L (ref 1–32)
BILIRUB SERPL-MCNC: 0.2 MG/DL (ref 0–1.2)
BUN SERPL-MCNC: 11 MG/DL (ref 6–20)
BUN/CREAT SERPL: 15.1 (ref 7–25)
CALCIUM SPEC-SCNC: 8.7 MG/DL (ref 8.6–10.5)
CHLORIDE SERPL-SCNC: 106 MMOL/L (ref 98–107)
CO2 SERPL-SCNC: 25 MMOL/L (ref 22–29)
CREAT SERPL-MCNC: 0.73 MG/DL (ref 0.57–1)
ERYTHROCYTE [DISTWIDTH] IN BLOOD BY AUTOMATED COUNT: 13.6 % (ref 12.3–15.4)
GFR SERPL CREATININE-BSD FRML MDRD: 108 ML/MIN/1.73
GLOBULIN UR ELPH-MCNC: 2.5 GM/DL
GLUCOSE SERPL-MCNC: 123 MG/DL (ref 65–99)
HCT VFR BLD AUTO: 36.7 % (ref 34–46.6)
HGB BLD-MCNC: 12.1 G/DL (ref 12–15.9)
LYMPHOCYTES # BLD AUTO: 1 10*3/MM3 (ref 0.7–3.1)
LYMPHOCYTES NFR BLD AUTO: 28 % (ref 19.6–45.3)
MCH RBC QN AUTO: 32.8 PG (ref 26.6–33)
MCHC RBC AUTO-ENTMCNC: 32.8 G/DL (ref 31.5–35.7)
MCV RBC AUTO: 99.9 FL (ref 79–97)
MONOCYTES # BLD AUTO: 0.3 10*3/MM3 (ref 0.1–0.9)
MONOCYTES NFR BLD AUTO: 8 % (ref 5–12)
NEUTROPHILS NFR BLD AUTO: 2.3 10*3/MM3 (ref 1.7–7)
NEUTROPHILS NFR BLD AUTO: 64 % (ref 42.7–76)
PLATELET # BLD AUTO: 228 10*3/MM3 (ref 140–450)
PMV BLD AUTO: 7.6 FL (ref 6–12)
POTASSIUM SERPL-SCNC: 3.8 MMOL/L (ref 3.5–5.2)
PROT SERPL-MCNC: 6.5 G/DL (ref 6–8.5)
RBC # BLD AUTO: 3.68 10*6/MM3 (ref 3.77–5.28)
SODIUM SERPL-SCNC: 140 MMOL/L (ref 136–145)
WBC # BLD AUTO: 3.6 10*3/MM3 (ref 3.4–10.8)

## 2021-09-23 PROCEDURE — 25010000002 PACLITAXEL PER 1 MG: Performed by: INTERNAL MEDICINE

## 2021-09-23 PROCEDURE — 80053 COMPREHEN METABOLIC PANEL: CPT | Performed by: INTERNAL MEDICINE

## 2021-09-23 PROCEDURE — 25010000002 HEPARIN LOCK FLUSH PER 10 UNITS: Performed by: INTERNAL MEDICINE

## 2021-09-23 PROCEDURE — 25010000002 DEXAMETHASONE SODIUM PHOSPHATE 100 MG/10ML SOLUTION: Performed by: INTERNAL MEDICINE

## 2021-09-23 PROCEDURE — 85025 COMPLETE CBC W/AUTO DIFF WBC: CPT | Performed by: INTERNAL MEDICINE

## 2021-09-23 PROCEDURE — 25010000002 DIPHENHYDRAMINE PER 50 MG: Performed by: INTERNAL MEDICINE

## 2021-09-23 PROCEDURE — 96413 CHEMO IV INFUSION 1 HR: CPT

## 2021-09-23 PROCEDURE — 96375 TX/PRO/DX INJ NEW DRUG ADDON: CPT

## 2021-09-23 RX ORDER — FAMOTIDINE 10 MG/ML
20 INJECTION, SOLUTION INTRAVENOUS ONCE
Status: COMPLETED | OUTPATIENT
Start: 2021-09-23 | End: 2021-09-23

## 2021-09-23 RX ORDER — HEPARIN SODIUM (PORCINE) LOCK FLUSH IV SOLN 100 UNIT/ML 100 UNIT/ML
500 SOLUTION INTRAVENOUS AS NEEDED
Status: CANCELLED | OUTPATIENT
Start: 2021-09-23

## 2021-09-23 RX ORDER — HEPARIN SODIUM (PORCINE) LOCK FLUSH IV SOLN 100 UNIT/ML 100 UNIT/ML
500 SOLUTION INTRAVENOUS AS NEEDED
Status: DISCONTINUED | OUTPATIENT
Start: 2021-09-23 | End: 2021-09-24 | Stop reason: HOSPADM

## 2021-09-23 RX ORDER — SODIUM CHLORIDE 9 MG/ML
250 INJECTION, SOLUTION INTRAVENOUS ONCE
Status: COMPLETED | OUTPATIENT
Start: 2021-09-23 | End: 2021-09-23

## 2021-09-23 RX ADMIN — PACLITAXEL 140 MG: 6 INJECTION, SOLUTION INTRAVENOUS at 13:16

## 2021-09-23 RX ADMIN — FAMOTIDINE 20 MG: 10 INJECTION, SOLUTION INTRAVENOUS at 12:21

## 2021-09-23 RX ADMIN — DIPHENHYDRAMINE HYDROCHLORIDE 25 MG: 50 INJECTION INTRAMUSCULAR; INTRAVENOUS at 12:24

## 2021-09-23 RX ADMIN — HEPARIN 500 UNITS: 100 SYRINGE at 14:19

## 2021-09-23 RX ADMIN — DEXAMETHASONE SODIUM PHOSPHATE 12 MG: 10 INJECTION, SOLUTION INTRAMUSCULAR; INTRAVENOUS at 12:24

## 2021-09-23 RX ADMIN — SODIUM CHLORIDE 250 ML: 9 INJECTION, SOLUTION INTRAVENOUS at 12:23

## 2021-09-30 ENCOUNTER — OFFICE VISIT (OUTPATIENT)
Dept: ONCOLOGY | Facility: CLINIC | Age: 36
End: 2021-09-30

## 2021-09-30 ENCOUNTER — HOSPITAL ENCOUNTER (OUTPATIENT)
Dept: ONCOLOGY | Facility: HOSPITAL | Age: 36
Setting detail: INFUSION SERIES
Discharge: HOME | End: 2021-09-30

## 2021-09-30 VITALS
DIASTOLIC BLOOD PRESSURE: 80 MMHG | TEMPERATURE: 98.4 F | RESPIRATION RATE: 16 BRPM | HEIGHT: 65 IN | HEART RATE: 83 BPM | BODY MASS INDEX: 22.21 KG/M2 | WEIGHT: 133.3 LBS | SYSTOLIC BLOOD PRESSURE: 131 MMHG

## 2021-09-30 VITALS — SYSTOLIC BLOOD PRESSURE: 120 MMHG | DIASTOLIC BLOOD PRESSURE: 89 MMHG | HEART RATE: 64 BPM

## 2021-09-30 DIAGNOSIS — C50.412 CARCINOMA OF UPPER-OUTER QUADRANT OF LEFT BREAST IN FEMALE, ESTROGEN RECEPTOR POSITIVE (HCC): Primary | ICD-10-CM

## 2021-09-30 DIAGNOSIS — Z17.0 CARCINOMA OF UPPER-OUTER QUADRANT OF LEFT BREAST IN FEMALE, ESTROGEN RECEPTOR POSITIVE (HCC): Primary | ICD-10-CM

## 2021-09-30 DIAGNOSIS — C50.412 CARCINOMA OF UPPER-OUTER QUADRANT OF LEFT BREAST IN FEMALE, ESTROGEN RECEPTOR POSITIVE (HCC): ICD-10-CM

## 2021-09-30 DIAGNOSIS — Z17.0 CARCINOMA OF UPPER-OUTER QUADRANT OF LEFT BREAST IN FEMALE, ESTROGEN RECEPTOR POSITIVE (HCC): ICD-10-CM

## 2021-09-30 DIAGNOSIS — Z45.2 ENCOUNTER FOR CENTRAL LINE CARE: Primary | ICD-10-CM

## 2021-09-30 LAB
ALBUMIN SERPL-MCNC: 4.1 G/DL (ref 3.5–5.2)
ALBUMIN/GLOB SERPL: 1.5 G/DL
ALP SERPL-CCNC: 88 U/L (ref 39–117)
ALT SERPL W P-5'-P-CCNC: 13 U/L (ref 1–33)
ANION GAP SERPL CALCULATED.3IONS-SCNC: 8 MMOL/L (ref 5–15)
AST SERPL-CCNC: 18 U/L (ref 1–32)
BILIRUB SERPL-MCNC: 0.2 MG/DL (ref 0–1.2)
BUN SERPL-MCNC: 14 MG/DL (ref 6–20)
BUN/CREAT SERPL: 19.2 (ref 7–25)
CALCIUM SPEC-SCNC: 8.7 MG/DL (ref 8.6–10.5)
CHLORIDE SERPL-SCNC: 104 MMOL/L (ref 98–107)
CO2 SERPL-SCNC: 25 MMOL/L (ref 22–29)
CREAT SERPL-MCNC: 0.73 MG/DL (ref 0.57–1)
ERYTHROCYTE [DISTWIDTH] IN BLOOD BY AUTOMATED COUNT: 13.6 % (ref 12.3–15.4)
GFR SERPL CREATININE-BSD FRML MDRD: 108 ML/MIN/1.73
GLOBULIN UR ELPH-MCNC: 2.7 GM/DL
GLUCOSE SERPL-MCNC: 103 MG/DL (ref 65–99)
HCT VFR BLD AUTO: 36.4 % (ref 34–46.6)
HGB BLD-MCNC: 12.3 G/DL (ref 12–15.9)
LYMPHOCYTES # BLD AUTO: 1 10*3/MM3 (ref 0.7–3.1)
LYMPHOCYTES NFR BLD AUTO: 17.2 % (ref 19.6–45.3)
MCH RBC QN AUTO: 33.4 PG (ref 26.6–33)
MCHC RBC AUTO-ENTMCNC: 33.7 G/DL (ref 31.5–35.7)
MCV RBC AUTO: 99.1 FL (ref 79–97)
MONOCYTES # BLD AUTO: 0.1 10*3/MM3 (ref 0.1–0.9)
MONOCYTES NFR BLD AUTO: 2.6 % (ref 5–12)
NEUTROPHILS NFR BLD AUTO: 4.6 10*3/MM3 (ref 1.7–7)
NEUTROPHILS NFR BLD AUTO: 80.2 % (ref 42.7–76)
PLATELET # BLD AUTO: 220 10*3/MM3 (ref 140–450)
PMV BLD AUTO: 8.1 FL (ref 6–12)
POTASSIUM SERPL-SCNC: 4.1 MMOL/L (ref 3.5–5.2)
PROT SERPL-MCNC: 6.8 G/DL (ref 6–8.5)
RBC # BLD AUTO: 3.67 10*6/MM3 (ref 3.77–5.28)
SODIUM SERPL-SCNC: 137 MMOL/L (ref 136–145)
WBC # BLD AUTO: 5.7 10*3/MM3 (ref 3.4–10.8)

## 2021-09-30 PROCEDURE — 80053 COMPREHEN METABOLIC PANEL: CPT | Performed by: INTERNAL MEDICINE

## 2021-09-30 PROCEDURE — 25010000002 DEXAMETHASONE SODIUM PHOSPHATE 100 MG/10ML SOLUTION: Performed by: INTERNAL MEDICINE

## 2021-09-30 PROCEDURE — 25010000002 DIPHENHYDRAMINE PER 50 MG: Performed by: INTERNAL MEDICINE

## 2021-09-30 PROCEDURE — 85025 COMPLETE CBC W/AUTO DIFF WBC: CPT | Performed by: INTERNAL MEDICINE

## 2021-09-30 PROCEDURE — 25010000002 PACLITAXEL PER 1 MG: Performed by: INTERNAL MEDICINE

## 2021-09-30 PROCEDURE — 99214 OFFICE O/P EST MOD 30 MIN: CPT | Performed by: INTERNAL MEDICINE

## 2021-09-30 PROCEDURE — 25010000002 HEPARIN LOCK FLUSH PER 10 UNITS: Performed by: INTERNAL MEDICINE

## 2021-09-30 PROCEDURE — 96413 CHEMO IV INFUSION 1 HR: CPT

## 2021-09-30 PROCEDURE — 96375 TX/PRO/DX INJ NEW DRUG ADDON: CPT

## 2021-09-30 RX ORDER — SODIUM CHLORIDE 9 MG/ML
250 INJECTION, SOLUTION INTRAVENOUS ONCE
Status: CANCELLED | OUTPATIENT
Start: 2021-10-21

## 2021-09-30 RX ORDER — FAMOTIDINE 10 MG/ML
20 INJECTION, SOLUTION INTRAVENOUS AS NEEDED
Status: CANCELLED | OUTPATIENT
Start: 2021-10-21

## 2021-09-30 RX ORDER — SODIUM CHLORIDE 9 MG/ML
250 INJECTION, SOLUTION INTRAVENOUS ONCE
Status: COMPLETED | OUTPATIENT
Start: 2021-09-30 | End: 2021-09-30

## 2021-09-30 RX ORDER — FAMOTIDINE 10 MG/ML
20 INJECTION, SOLUTION INTRAVENOUS ONCE
Status: CANCELLED | OUTPATIENT
Start: 2021-10-21

## 2021-09-30 RX ORDER — FAMOTIDINE 10 MG/ML
20 INJECTION, SOLUTION INTRAVENOUS ONCE
Status: COMPLETED | OUTPATIENT
Start: 2021-09-30 | End: 2021-09-30

## 2021-09-30 RX ORDER — HEPARIN SODIUM (PORCINE) LOCK FLUSH IV SOLN 100 UNIT/ML 100 UNIT/ML
500 SOLUTION INTRAVENOUS AS NEEDED
Status: CANCELLED | OUTPATIENT
Start: 2021-09-30

## 2021-09-30 RX ORDER — DIPHENHYDRAMINE HYDROCHLORIDE 50 MG/ML
50 INJECTION INTRAMUSCULAR; INTRAVENOUS AS NEEDED
Status: CANCELLED | OUTPATIENT
Start: 2021-10-21

## 2021-09-30 RX ORDER — HEPARIN SODIUM (PORCINE) LOCK FLUSH IV SOLN 100 UNIT/ML 100 UNIT/ML
500 SOLUTION INTRAVENOUS AS NEEDED
Status: DISCONTINUED | OUTPATIENT
Start: 2021-09-30 | End: 2021-10-01 | Stop reason: HOSPADM

## 2021-09-30 RX ADMIN — HEPARIN 500 UNITS: 100 SYRINGE at 12:01

## 2021-09-30 RX ADMIN — DIPHENHYDRAMINE HYDROCHLORIDE 25 MG: 50 INJECTION INTRAMUSCULAR; INTRAVENOUS at 09:43

## 2021-09-30 RX ADMIN — SODIUM CHLORIDE 250 ML: 9 INJECTION, SOLUTION INTRAVENOUS at 09:35

## 2021-09-30 RX ADMIN — SODIUM CHLORIDE 140 MG: 9 INJECTION, SOLUTION INTRAVENOUS at 10:45

## 2021-09-30 RX ADMIN — DEXAMETHASONE SODIUM PHOSPHATE 12 MG: 10 INJECTION, SOLUTION INTRAMUSCULAR; INTRAVENOUS at 09:58

## 2021-09-30 RX ADMIN — FAMOTIDINE 20 MG: 10 INJECTION, SOLUTION INTRAVENOUS at 09:39

## 2021-09-30 NOTE — PROGRESS NOTES
PROBLEM LIST:  Oncology/Hematology History   Carcinoma of upper-outer quadrant of left breast in female, estrogen receptor positive (CMS/HCC)   4/14/2021 Cancer Staged    Staging form: Breast, AJCC 8th Edition  - Pathologic stage from 4/14/2021: Stage IB (pT3, pN1a, cM0, G2, ER+, MN+, HER2-) - Signed by Graham Herbert MD on 4/29/2021 4/29/2021 Initial Diagnosis    Carcinoma of upper-outer quadrant of left breast in female, estrogen receptor positive (CMS/HCC)     5/21/2021 - 7/15/2021 Chemotherapy    OP BREAST AC DD DOXOrubicin / Cyclophosphamide     8/26/2021 -  Chemotherapy    OP BREAST PACLitaxel (weekly X 12)         REASON FOR VISIT: Breast cancer    HISTORY OF PRESENT ILLNESS:   36 y.o.  female presents today for follow-up of her breast cancer.  She has completed 4 cycles of dose dense Adriamycin and Cytoxan.  She is currently on single agent Taxol and seems to be tolerating it well.  She has completed 3 cycles of 12.   Denies any infections.  Fatigue is an issue.  She has considerable discoloration of her feet and hands.  She is also developing mild neuropathy in her lower extremities.  Fairly early for this.  We will have to keep a close eye on it.    Past medical history, social history and family history was reviewed 09/30/21 and unchanged from prior visit.    Review of Systems:    Review of Systems   Constitutional: Negative.    Eyes: Negative.    Respiratory: Negative.    Cardiovascular: Negative.    Gastrointestinal: Negative.    Endocrine: Negative.    Genitourinary: Negative.     Musculoskeletal: Negative.    Skin: Negative.    Neurological: Positive for headaches.   Hematological: Negative.    Psychiatric/Behavioral: Negative.             Medications:        Current Outpatient Medications:   •  lidocaine-prilocaine (EMLA) 2.5-2.5 % cream, Apply  topically to the appropriate area as directed As Needed (45-60 minutes prior to port access.  Cover with saran/plastic wrap.)., Disp: 30 g, Rfl:  3  •  LORazepam (ATIVAN) 1 MG tablet, Take 1 tablet by mouth Daily As Needed for Anxiety. Take one tablet as needed up to once a day for severe anxiety, Disp: 30 tablet, Rfl: 0  •  ondansetron (ZOFRAN) 8 MG tablet, Take 1 tablet by mouth 3 (Three) Times a Day As Needed for Nausea or Vomiting., Disp: 30 tablet, Rfl: 5  •  temazepam (RESTORIL) 30 MG capsule, Take 1 capsule by mouth At Night As Needed for Sleep., Disp: 30 capsule, Rfl: 0           ALLERGIES:  No Known Allergies      Physical Exam    VITAL SIGNS:  There were no vitals taken for this visit.                Wt Readings from Last 3 Encounters:   09/23/21 59.9 kg (132 lb)   09/09/21 58.8 kg (129 lb 11.2 oz)   09/02/21 59.4 kg (131 lb)       There is no height or weight on file to calculate BMI. There is no height or weight on file to calculate BSA.    There were no vitals filed for this visit.        Performance Status: 0    General: well appearing, in no acute distress  HEENT: sclera anicteric, neck is supple  Lymphatics: no cervical, supraclavicular, or axillary adenopathy  Cardiovascular: regular rate and rhythm, no murmurs, rubs or gallops  Lungs: clear to auscultation bilaterally  Abdomen: soft, nontender, nondistended.  No palpable organomegaly  Extremities: no lower extremity edema  Skin: no rashes, lesions, bruising, or petechiae  Msk:  Shows no weakness of the large muscle groups  Psych: Mood is stable        RECENT LABS:    Lab Results   Component Value Date    HGB 12.1 09/23/2021    HCT 36.7 09/23/2021    MCV 99.9 (H) 09/23/2021     09/23/2021    WBC 3.60 09/23/2021    NEUTROABS 2.30 09/23/2021    LYMPHSABS 1.00 09/23/2021    MONOSABS 0.30 09/23/2021    EOSABS 0.08 11/09/2020    BASOSABS 0.01 11/09/2020       Lab Results   Component Value Date    GLUCOSE 123 (H) 09/23/2021    BUN 11 09/23/2021    CREATININE 0.73 09/23/2021     09/23/2021    K 3.8 09/23/2021     09/23/2021    CO2 25.0 09/23/2021    CALCIUM 8.7 09/23/2021     PROTEINTOT 6.5 09/23/2021    ALBUMIN 4.00 09/23/2021    BILITOT 0.2 09/23/2021    ALKPHOS 89 09/23/2021    AST 12 09/23/2021    ALT 9 09/23/2021           NM Pet Skull Base To Mid Thigh    Result Date: 5/28/2021  Negative PET-CT scan.  D:  05/28/2021 E:  05/28/2021     This report was finalized on 5/28/2021 5:15 PM by Dr. Yesenia Hernandez MD.        Assessment/Plan    1.  Node positive ER/TN positive HER-2 negative breast cancer.  She is starting to have mild neuropathy.  For now I am going to continue Taxol with no dose changes.  She will try to complete 12 weekly cycles of Taxol however if her neuropathy worsens we will discontinue her treatments..  I am going to check on her in 3 weeks to make sure she is doing well.    Total time of patient care on day of service including time prior to, face to face with patient, and following visit spent in reviewing records, lab results, imaging studies, discussion with patient, and documentation/charting was > 25 minutes.     Graham Herbert MD  [facility] Hematology and Oncology    Return on: 10/21/21  Return in (Approximately): Schedule with next infusion, 3 weeks    Orders Placed This Encounter   Procedures   • Comprehensive metabolic panel   • CBC and Differential       9/30/2021

## 2021-10-01 ENCOUNTER — HOSPITAL ENCOUNTER (OUTPATIENT)
Dept: CT IMAGING | Facility: HOSPITAL | Age: 36
End: 2021-10-01

## 2021-10-07 ENCOUNTER — HOSPITAL ENCOUNTER (OUTPATIENT)
Dept: ONCOLOGY | Facility: HOSPITAL | Age: 36
Setting detail: INFUSION SERIES
Discharge: HOME | End: 2021-10-07

## 2021-10-07 VITALS
TEMPERATURE: 96.7 F | WEIGHT: 132.4 LBS | RESPIRATION RATE: 18 BRPM | BODY MASS INDEX: 22.06 KG/M2 | SYSTOLIC BLOOD PRESSURE: 95 MMHG | HEIGHT: 65 IN | DIASTOLIC BLOOD PRESSURE: 61 MMHG | HEART RATE: 80 BPM

## 2021-10-07 DIAGNOSIS — Z45.2 ENCOUNTER FOR CENTRAL LINE CARE: ICD-10-CM

## 2021-10-07 DIAGNOSIS — Z17.0 CARCINOMA OF UPPER-OUTER QUADRANT OF LEFT BREAST IN FEMALE, ESTROGEN RECEPTOR POSITIVE (HCC): Primary | ICD-10-CM

## 2021-10-07 DIAGNOSIS — C50.412 CARCINOMA OF UPPER-OUTER QUADRANT OF LEFT BREAST IN FEMALE, ESTROGEN RECEPTOR POSITIVE (HCC): Primary | ICD-10-CM

## 2021-10-07 LAB
ALBUMIN SERPL-MCNC: 4.4 G/DL (ref 3.5–5.2)
ALBUMIN/GLOB SERPL: 1.6 G/DL
ALP SERPL-CCNC: 91 U/L (ref 39–117)
ALT SERPL W P-5'-P-CCNC: 15 U/L (ref 1–33)
ANION GAP SERPL CALCULATED.3IONS-SCNC: 13 MMOL/L (ref 5–15)
AST SERPL-CCNC: 17 U/L (ref 1–32)
BILIRUB SERPL-MCNC: 0.2 MG/DL (ref 0–1.2)
BUN SERPL-MCNC: 9 MG/DL (ref 6–20)
BUN/CREAT SERPL: 12.9 (ref 7–25)
CALCIUM SPEC-SCNC: 8.8 MG/DL (ref 8.6–10.5)
CHLORIDE SERPL-SCNC: 107 MMOL/L (ref 98–107)
CO2 SERPL-SCNC: 22 MMOL/L (ref 22–29)
CREAT SERPL-MCNC: 0.7 MG/DL (ref 0.57–1)
ERYTHROCYTE [DISTWIDTH] IN BLOOD BY AUTOMATED COUNT: 13.8 % (ref 12.3–15.4)
GFR SERPL CREATININE-BSD FRML MDRD: 114 ML/MIN/1.73
GLOBULIN UR ELPH-MCNC: 2.7 GM/DL
GLUCOSE SERPL-MCNC: 83 MG/DL (ref 65–99)
HCT VFR BLD AUTO: 37.2 % (ref 34–46.6)
HGB BLD-MCNC: 12.1 G/DL (ref 12–15.9)
LYMPHOCYTES # BLD AUTO: 1.1 10*3/MM3 (ref 0.7–3.1)
LYMPHOCYTES NFR BLD AUTO: 34.5 % (ref 19.6–45.3)
MCH RBC QN AUTO: 32.5 PG (ref 26.6–33)
MCHC RBC AUTO-ENTMCNC: 32.7 G/DL (ref 31.5–35.7)
MCV RBC AUTO: 99.3 FL (ref 79–97)
MONOCYTES # BLD AUTO: 0.2 10*3/MM3 (ref 0.1–0.9)
MONOCYTES NFR BLD AUTO: 6 % (ref 5–12)
NEUTROPHILS NFR BLD AUTO: 1.9 10*3/MM3 (ref 1.7–7)
NEUTROPHILS NFR BLD AUTO: 59.5 % (ref 42.7–76)
PLATELET # BLD AUTO: 258 10*3/MM3 (ref 140–450)
PMV BLD AUTO: 7.8 FL (ref 6–12)
POTASSIUM SERPL-SCNC: 3.9 MMOL/L (ref 3.5–5.2)
PROT SERPL-MCNC: 7.1 G/DL (ref 6–8.5)
RBC # BLD AUTO: 3.74 10*6/MM3 (ref 3.77–5.28)
SODIUM SERPL-SCNC: 142 MMOL/L (ref 136–145)
WBC # BLD AUTO: 3.2 10*3/MM3 (ref 3.4–10.8)

## 2021-10-07 PROCEDURE — 80053 COMPREHEN METABOLIC PANEL: CPT | Performed by: INTERNAL MEDICINE

## 2021-10-07 PROCEDURE — 25010000002 DEXAMETHASONE SODIUM PHOSPHATE 100 MG/10ML SOLUTION: Performed by: INTERNAL MEDICINE

## 2021-10-07 PROCEDURE — 85025 COMPLETE CBC W/AUTO DIFF WBC: CPT | Performed by: INTERNAL MEDICINE

## 2021-10-07 PROCEDURE — 96413 CHEMO IV INFUSION 1 HR: CPT

## 2021-10-07 PROCEDURE — 96367 TX/PROPH/DG ADDL SEQ IV INF: CPT

## 2021-10-07 PROCEDURE — 25010000002 DIPHENHYDRAMINE PER 50 MG: Performed by: INTERNAL MEDICINE

## 2021-10-07 PROCEDURE — 25010000002 PACLITAXEL PER 1 MG: Performed by: INTERNAL MEDICINE

## 2021-10-07 PROCEDURE — 25010000002 HEPARIN LOCK FLUSH PER 10 UNITS: Performed by: INTERNAL MEDICINE

## 2021-10-07 PROCEDURE — 96375 TX/PRO/DX INJ NEW DRUG ADDON: CPT

## 2021-10-07 RX ORDER — HEPARIN SODIUM (PORCINE) LOCK FLUSH IV SOLN 100 UNIT/ML 100 UNIT/ML
500 SOLUTION INTRAVENOUS AS NEEDED
Status: DISCONTINUED | OUTPATIENT
Start: 2021-10-07 | End: 2021-10-08 | Stop reason: HOSPADM

## 2021-10-07 RX ORDER — FAMOTIDINE 10 MG/ML
20 INJECTION, SOLUTION INTRAVENOUS ONCE
Status: COMPLETED | OUTPATIENT
Start: 2021-10-07 | End: 2021-10-07

## 2021-10-07 RX ORDER — HEPARIN SODIUM (PORCINE) LOCK FLUSH IV SOLN 100 UNIT/ML 100 UNIT/ML
500 SOLUTION INTRAVENOUS AS NEEDED
Status: CANCELLED | OUTPATIENT
Start: 2021-10-07

## 2021-10-07 RX ORDER — SODIUM CHLORIDE 9 MG/ML
250 INJECTION, SOLUTION INTRAVENOUS ONCE
Status: COMPLETED | OUTPATIENT
Start: 2021-10-07 | End: 2021-10-07

## 2021-10-07 RX ADMIN — DIPHENHYDRAMINE HYDROCHLORIDE 25 MG: 50 INJECTION INTRAMUSCULAR; INTRAVENOUS at 08:52

## 2021-10-07 RX ADMIN — SODIUM CHLORIDE 140 MG: 9 INJECTION, SOLUTION INTRAVENOUS at 09:46

## 2021-10-07 RX ADMIN — FAMOTIDINE 20 MG: 10 INJECTION, SOLUTION INTRAVENOUS at 08:51

## 2021-10-07 RX ADMIN — HEPARIN 500 UNITS: 100 SYRINGE at 10:53

## 2021-10-07 RX ADMIN — SODIUM CHLORIDE 250 ML: 9 INJECTION, SOLUTION INTRAVENOUS at 08:51

## 2021-10-07 RX ADMIN — DEXAMETHASONE SODIUM PHOSPHATE 12 MG: 10 INJECTION, SOLUTION INTRAMUSCULAR; INTRAVENOUS at 08:52

## 2021-10-21 ENCOUNTER — HOSPITAL ENCOUNTER (OUTPATIENT)
Dept: ONCOLOGY | Facility: HOSPITAL | Age: 36
Setting detail: INFUSION SERIES
Discharge: HOME | End: 2021-10-21

## 2021-10-21 ENCOUNTER — OFFICE VISIT (OUTPATIENT)
Dept: ONCOLOGY | Facility: CLINIC | Age: 36
End: 2021-10-21

## 2021-10-21 VITALS
WEIGHT: 132.7 LBS | BODY MASS INDEX: 22.11 KG/M2 | OXYGEN SATURATION: 98 % | DIASTOLIC BLOOD PRESSURE: 85 MMHG | HEART RATE: 81 BPM | SYSTOLIC BLOOD PRESSURE: 109 MMHG | TEMPERATURE: 97.7 F | HEIGHT: 65 IN

## 2021-10-21 VITALS — DIASTOLIC BLOOD PRESSURE: 82 MMHG | HEART RATE: 65 BPM | SYSTOLIC BLOOD PRESSURE: 118 MMHG | RESPIRATION RATE: 16 BRPM

## 2021-10-21 DIAGNOSIS — Z17.0 CARCINOMA OF UPPER-OUTER QUADRANT OF LEFT BREAST IN FEMALE, ESTROGEN RECEPTOR POSITIVE (HCC): ICD-10-CM

## 2021-10-21 DIAGNOSIS — Z45.2 ENCOUNTER FOR CENTRAL LINE CARE: Primary | ICD-10-CM

## 2021-10-21 DIAGNOSIS — C50.412 CARCINOMA OF UPPER-OUTER QUADRANT OF LEFT BREAST IN FEMALE, ESTROGEN RECEPTOR POSITIVE (HCC): Primary | ICD-10-CM

## 2021-10-21 DIAGNOSIS — Z17.0 CARCINOMA OF UPPER-OUTER QUADRANT OF LEFT BREAST IN FEMALE, ESTROGEN RECEPTOR POSITIVE (HCC): Primary | ICD-10-CM

## 2021-10-21 DIAGNOSIS — C50.412 CARCINOMA OF UPPER-OUTER QUADRANT OF LEFT BREAST IN FEMALE, ESTROGEN RECEPTOR POSITIVE (HCC): ICD-10-CM

## 2021-10-21 LAB
ALBUMIN SERPL-MCNC: 4.2 G/DL (ref 3.5–5.2)
ALBUMIN/GLOB SERPL: 1.7 G/DL
ALP SERPL-CCNC: 91 U/L (ref 39–117)
ALT SERPL W P-5'-P-CCNC: 13 U/L (ref 1–33)
ANION GAP SERPL CALCULATED.3IONS-SCNC: 11 MMOL/L (ref 5–15)
AST SERPL-CCNC: 14 U/L (ref 1–32)
BILIRUB SERPL-MCNC: <0.2 MG/DL (ref 0–1.2)
BUN SERPL-MCNC: 9 MG/DL (ref 6–20)
BUN/CREAT SERPL: 12.7 (ref 7–25)
CALCIUM SPEC-SCNC: 8.9 MG/DL (ref 8.6–10.5)
CHLORIDE SERPL-SCNC: 107 MMOL/L (ref 98–107)
CO2 SERPL-SCNC: 22 MMOL/L (ref 22–29)
CREAT SERPL-MCNC: 0.71 MG/DL (ref 0.57–1)
ERYTHROCYTE [DISTWIDTH] IN BLOOD BY AUTOMATED COUNT: 14.3 % (ref 12.3–15.4)
GFR SERPL CREATININE-BSD FRML MDRD: 112 ML/MIN/1.73
GLOBULIN UR ELPH-MCNC: 2.5 GM/DL
GLUCOSE SERPL-MCNC: 104 MG/DL (ref 65–99)
HCT VFR BLD AUTO: 38.2 % (ref 34–46.6)
HGB BLD-MCNC: 12.7 G/DL (ref 12–15.9)
LYMPHOCYTES # BLD AUTO: 1 10*3/MM3 (ref 0.7–3.1)
LYMPHOCYTES NFR BLD AUTO: 20.3 % (ref 19.6–45.3)
MCH RBC QN AUTO: 32.5 PG (ref 26.6–33)
MCHC RBC AUTO-ENTMCNC: 33.1 G/DL (ref 31.5–35.7)
MCV RBC AUTO: 98 FL (ref 79–97)
MONOCYTES # BLD AUTO: 0.2 10*3/MM3 (ref 0.1–0.9)
MONOCYTES NFR BLD AUTO: 4.9 % (ref 5–12)
NEUTROPHILS NFR BLD AUTO: 3.5 10*3/MM3 (ref 1.7–7)
NEUTROPHILS NFR BLD AUTO: 74.8 % (ref 42.7–76)
PLATELET # BLD AUTO: 210 10*3/MM3 (ref 140–450)
PMV BLD AUTO: 7.8 FL (ref 6–12)
POTASSIUM SERPL-SCNC: 4 MMOL/L (ref 3.5–5.2)
PROT SERPL-MCNC: 6.7 G/DL (ref 6–8.5)
RBC # BLD AUTO: 3.9 10*6/MM3 (ref 3.77–5.28)
SODIUM SERPL-SCNC: 140 MMOL/L (ref 136–145)
WBC # BLD AUTO: 4.7 10*3/MM3 (ref 3.4–10.8)

## 2021-10-21 PROCEDURE — 25010000002 HEPARIN LOCK FLUSH PER 10 UNITS: Performed by: INTERNAL MEDICINE

## 2021-10-21 PROCEDURE — 99214 OFFICE O/P EST MOD 30 MIN: CPT | Performed by: INTERNAL MEDICINE

## 2021-10-21 PROCEDURE — 96375 TX/PRO/DX INJ NEW DRUG ADDON: CPT

## 2021-10-21 PROCEDURE — 25010000002 DEXAMETHASONE SODIUM PHOSPHATE 100 MG/10ML SOLUTION: Performed by: INTERNAL MEDICINE

## 2021-10-21 PROCEDURE — 80053 COMPREHEN METABOLIC PANEL: CPT | Performed by: INTERNAL MEDICINE

## 2021-10-21 PROCEDURE — 85025 COMPLETE CBC W/AUTO DIFF WBC: CPT | Performed by: INTERNAL MEDICINE

## 2021-10-21 PROCEDURE — 25010000002 DIPHENHYDRAMINE PER 50 MG: Performed by: INTERNAL MEDICINE

## 2021-10-21 PROCEDURE — 25010000002 PACLITAXEL PER 1 MG: Performed by: INTERNAL MEDICINE

## 2021-10-21 PROCEDURE — 96413 CHEMO IV INFUSION 1 HR: CPT

## 2021-10-21 RX ORDER — DIPHENHYDRAMINE HYDROCHLORIDE 50 MG/ML
50 INJECTION INTRAMUSCULAR; INTRAVENOUS AS NEEDED
Status: CANCELLED | OUTPATIENT
Start: 2021-10-28

## 2021-10-21 RX ORDER — DIPHENHYDRAMINE HYDROCHLORIDE 50 MG/ML
50 INJECTION INTRAMUSCULAR; INTRAVENOUS AS NEEDED
Status: CANCELLED | OUTPATIENT
Start: 2021-11-04

## 2021-10-21 RX ORDER — HEPARIN SODIUM (PORCINE) LOCK FLUSH IV SOLN 100 UNIT/ML 100 UNIT/ML
500 SOLUTION INTRAVENOUS AS NEEDED
Status: DISCONTINUED | OUTPATIENT
Start: 2021-10-21 | End: 2021-10-22 | Stop reason: HOSPADM

## 2021-10-21 RX ORDER — SODIUM CHLORIDE 9 MG/ML
250 INJECTION, SOLUTION INTRAVENOUS ONCE
Status: COMPLETED | OUTPATIENT
Start: 2021-10-21 | End: 2021-10-21

## 2021-10-21 RX ORDER — FAMOTIDINE 10 MG/ML
20 INJECTION, SOLUTION INTRAVENOUS AS NEEDED
Status: CANCELLED | OUTPATIENT
Start: 2021-10-28

## 2021-10-21 RX ORDER — FAMOTIDINE 10 MG/ML
20 INJECTION, SOLUTION INTRAVENOUS ONCE
Status: CANCELLED | OUTPATIENT
Start: 2021-11-04

## 2021-10-21 RX ORDER — SODIUM CHLORIDE 9 MG/ML
250 INJECTION, SOLUTION INTRAVENOUS ONCE
Status: CANCELLED | OUTPATIENT
Start: 2021-11-04

## 2021-10-21 RX ORDER — FAMOTIDINE 10 MG/ML
20 INJECTION, SOLUTION INTRAVENOUS AS NEEDED
Status: CANCELLED | OUTPATIENT
Start: 2021-11-04

## 2021-10-21 RX ORDER — FAMOTIDINE 10 MG/ML
20 INJECTION, SOLUTION INTRAVENOUS ONCE
Status: CANCELLED | OUTPATIENT
Start: 2021-10-28

## 2021-10-21 RX ORDER — FAMOTIDINE 10 MG/ML
20 INJECTION, SOLUTION INTRAVENOUS ONCE
Status: COMPLETED | OUTPATIENT
Start: 2021-10-21 | End: 2021-10-21

## 2021-10-21 RX ORDER — HEPARIN SODIUM (PORCINE) LOCK FLUSH IV SOLN 100 UNIT/ML 100 UNIT/ML
500 SOLUTION INTRAVENOUS AS NEEDED
Status: CANCELLED | OUTPATIENT
Start: 2021-10-21

## 2021-10-21 RX ORDER — SODIUM CHLORIDE 9 MG/ML
250 INJECTION, SOLUTION INTRAVENOUS ONCE
Status: CANCELLED | OUTPATIENT
Start: 2021-10-28

## 2021-10-21 RX ADMIN — DEXAMETHASONE SODIUM PHOSPHATE 12 MG: 10 INJECTION, SOLUTION INTRAMUSCULAR; INTRAVENOUS at 10:05

## 2021-10-21 RX ADMIN — HEPARIN 500 UNITS: 100 SYRINGE at 12:02

## 2021-10-21 RX ADMIN — DIPHENHYDRAMINE HYDROCHLORIDE 25 MG: 50 INJECTION INTRAMUSCULAR; INTRAVENOUS at 10:08

## 2021-10-21 RX ADMIN — FAMOTIDINE 20 MG: 10 INJECTION, SOLUTION INTRAVENOUS at 10:03

## 2021-10-21 RX ADMIN — SODIUM CHLORIDE 120 MG: 9 INJECTION, SOLUTION INTRAVENOUS at 10:54

## 2021-10-21 RX ADMIN — SODIUM CHLORIDE 250 ML: 9 INJECTION, SOLUTION INTRAVENOUS at 10:00

## 2021-10-21 NOTE — PROGRESS NOTES
PROBLEM LIST:  Oncology/Hematology History   Carcinoma of upper-outer quadrant of left breast in female, estrogen receptor positive (HCC)   4/14/2021 Cancer Staged    Staging form: Breast, AJCC 8th Edition  - Pathologic stage from 4/14/2021: Stage IB (pT3, pN1a, cM0, G2, ER+, MO+, HER2-) - Signed by Graham Herbert MD on 4/29/2021 4/29/2021 Initial Diagnosis    Carcinoma of upper-outer quadrant of left breast in female, estrogen receptor positive (CMS/HCC)     5/21/2021 - 7/15/2021 Chemotherapy    OP BREAST AC DD DOXOrubicin / Cyclophosphamide     8/26/2021 -  Chemotherapy    OP BREAST PACLitaxel (weekly X 12)         REASON FOR VISIT: Breast cancer    HISTORY OF PRESENT ILLNESS:   36 y.o.  female presents today for follow-up of her breast cancer.  She has completed 4 cycles of dose dense Adriamycin and Cytoxan.  She is currently on single agent Taxol and seems to be tolerating it well.  She has completed 6 cycles of 12.   Denies any infections.  Fatigue is an issue.  She is starting to develop neuropathy in her feet.      Past medical history, social history and family history was reviewed 10/21/21 and unchanged from prior visit.    Review of Systems:    Review of Systems   Constitutional: Negative.    Eyes: Negative.    Respiratory: Negative.    Cardiovascular: Negative.    Gastrointestinal: Negative.    Endocrine: Negative.    Genitourinary: Negative.     Musculoskeletal: Negative.    Skin: Negative.    Neurological: Positive for headaches and numbness.   Hematological: Negative.    Psychiatric/Behavioral: Negative.             Medications:        Current Outpatient Medications:   •  lidocaine-prilocaine (EMLA) 2.5-2.5 % cream, Apply  topically to the appropriate area as directed As Needed (45-60 minutes prior to port access.  Cover with saran/plastic wrap.)., Disp: 30 g, Rfl: 3  •  LORazepam (ATIVAN) 1 MG tablet, Take 1 tablet by mouth Daily As Needed for Anxiety. Take one tablet as needed up to once a  "day for severe anxiety, Disp: 30 tablet, Rfl: 0  •  ondansetron (ZOFRAN) 8 MG tablet, Take 1 tablet by mouth 3 (Three) Times a Day As Needed for Nausea or Vomiting., Disp: 30 tablet, Rfl: 5  •  temazepam (RESTORIL) 30 MG capsule, Take 1 capsule by mouth At Night As Needed for Sleep., Disp: 30 capsule, Rfl: 0           ALLERGIES:  No Known Allergies      Physical Exam    VITAL SIGNS:  /85   Pulse 81   Temp 97.7 °F (36.5 °C) (Temporal)   Ht 165.1 cm (65\")   Wt 60.2 kg (132 lb 11.2 oz)   SpO2 98%   BMI 22.08 kg/m²     ECOG score: 0           Wt Readings from Last 3 Encounters:   10/21/21 60.2 kg (132 lb 11.2 oz)   10/07/21 60.1 kg (132 lb 6.4 oz)   09/30/21 60.5 kg (133 lb 4.8 oz)       Body mass index is 22.08 kg/m². Body surface area is 1.66 meters squared.    Pain Score    10/21/21 0903   PainSc: 0-No pain           Performance Status: 0    General: well appearing, in no acute distress  HEENT: sclera anicteric, neck is supple  Lymphatics: no cervical, supraclavicular, or axillary adenopathy  Cardiovascular: regular rate and rhythm, no murmurs, rubs or gallops  Lungs: clear to auscultation bilaterally  Abdomen: soft, nontender, nondistended.  No palpable organomegaly  Extremities: no lower extremity edema  Skin: no rashes, lesions, bruising, or petechiae  Msk:  Shows no weakness of the large muscle groups  Psych: Mood is stable        RECENT LABS:    Lab Results   Component Value Date    HGB 12.1 10/07/2021    HCT 37.2 10/07/2021    MCV 99.3 (H) 10/07/2021     10/07/2021    WBC 3.20 (L) 10/07/2021    NEUTROABS 1.90 10/07/2021    LYMPHSABS 1.10 10/07/2021    MONOSABS 0.20 10/07/2021    EOSABS 0.08 11/09/2020    BASOSABS 0.01 11/09/2020       Lab Results   Component Value Date    GLUCOSE 83 10/07/2021    BUN 9 10/07/2021    CREATININE 0.70 10/07/2021     10/07/2021    K 3.9 10/07/2021     10/07/2021    CO2 22.0 10/07/2021    CALCIUM 8.8 10/07/2021    PROTEINTOT 7.1 10/07/2021    ALBUMIN " 4.40 10/07/2021    BILITOT 0.2 10/07/2021    ALKPHOS 91 10/07/2021    AST 17 10/07/2021    ALT 15 10/07/2021           NM Pet Skull Base To Mid Thigh    Result Date: 5/28/2021  Negative PET-CT scan.  D:  05/28/2021 E:  05/28/2021     This report was finalized on 5/28/2021 5:15 PM by Dr. Yesenia Hernandez MD.        Assessment/Plan    1.  Node positive ER/IL positive HER-2 negative breast cancer.  She is starting to have worsening neuropathy.  I am going to dose reduce her Taxol to 70 mg/m².  She will try to complete 12 weekly cycles of Taxol however if her neuropathy worsens we will discontinue her treatments..  I am going to check on her in 3 weeks to make sure she is doing well.    Total time of patient care on day of service including time prior to, face to face with patient, and following visit spent in reviewing records, lab results, imaging studies, discussion with patient, and documentation/charting was > 25 minutes.     Graham Herbert MD  [facility] Hematology and Oncology    Return in (Approximately): 3 weeks, Schedule with next infusion    Orders Placed This Encounter   Procedures   • Comprehensive metabolic panel   • Comprehensive metabolic panel   • CBC and Differential   • CBC and Differential       10/21/2021

## 2021-10-28 ENCOUNTER — HOSPITAL ENCOUNTER (OUTPATIENT)
Dept: ONCOLOGY | Facility: HOSPITAL | Age: 36
Setting detail: INFUSION SERIES
Discharge: HOME | End: 2021-10-28

## 2021-10-28 VITALS
RESPIRATION RATE: 18 BRPM | WEIGHT: 130 LBS | DIASTOLIC BLOOD PRESSURE: 70 MMHG | BODY MASS INDEX: 21.66 KG/M2 | SYSTOLIC BLOOD PRESSURE: 119 MMHG | TEMPERATURE: 96.7 F | HEART RATE: 76 BPM | HEIGHT: 65 IN

## 2021-10-28 DIAGNOSIS — C50.412 CARCINOMA OF UPPER-OUTER QUADRANT OF LEFT BREAST IN FEMALE, ESTROGEN RECEPTOR POSITIVE (HCC): Primary | ICD-10-CM

## 2021-10-28 DIAGNOSIS — Z17.0 CARCINOMA OF UPPER-OUTER QUADRANT OF LEFT BREAST IN FEMALE, ESTROGEN RECEPTOR POSITIVE (HCC): Primary | ICD-10-CM

## 2021-10-28 DIAGNOSIS — Z45.2 ENCOUNTER FOR CENTRAL LINE CARE: ICD-10-CM

## 2021-10-28 LAB
ALBUMIN SERPL-MCNC: 4.5 G/DL (ref 3.5–5.2)
ALBUMIN/GLOB SERPL: 2 G/DL
ALP SERPL-CCNC: 94 U/L (ref 39–117)
ALT SERPL W P-5'-P-CCNC: 16 U/L (ref 1–33)
ANION GAP SERPL CALCULATED.3IONS-SCNC: 11 MMOL/L (ref 5–15)
AST SERPL-CCNC: 19 U/L (ref 1–32)
BILIRUB SERPL-MCNC: 0.2 MG/DL (ref 0–1.2)
BUN SERPL-MCNC: 11 MG/DL (ref 6–20)
BUN/CREAT SERPL: 15.1 (ref 7–25)
CALCIUM SPEC-SCNC: 9.2 MG/DL (ref 8.6–10.5)
CHLORIDE SERPL-SCNC: 105 MMOL/L (ref 98–107)
CO2 SERPL-SCNC: 24 MMOL/L (ref 22–29)
CREAT SERPL-MCNC: 0.73 MG/DL (ref 0.57–1)
ERYTHROCYTE [DISTWIDTH] IN BLOOD BY AUTOMATED COUNT: 15 % (ref 12.3–15.4)
GFR SERPL CREATININE-BSD FRML MDRD: 108 ML/MIN/1.73
GLOBULIN UR ELPH-MCNC: 2.3 GM/DL
GLUCOSE SERPL-MCNC: 99 MG/DL (ref 65–99)
HCT VFR BLD AUTO: 38.6 % (ref 34–46.6)
HGB BLD-MCNC: 12.8 G/DL (ref 12–15.9)
LYMPHOCYTES # BLD AUTO: 1.4 10*3/MM3 (ref 0.7–3.1)
LYMPHOCYTES NFR BLD AUTO: 29.7 % (ref 19.6–45.3)
MCH RBC QN AUTO: 32.2 PG (ref 26.6–33)
MCHC RBC AUTO-ENTMCNC: 33.3 G/DL (ref 31.5–35.7)
MCV RBC AUTO: 96.7 FL (ref 79–97)
MONOCYTES # BLD AUTO: 0.2 10*3/MM3 (ref 0.1–0.9)
MONOCYTES NFR BLD AUTO: 4.5 % (ref 5–12)
NEUTROPHILS NFR BLD AUTO: 3.1 10*3/MM3 (ref 1.7–7)
NEUTROPHILS NFR BLD AUTO: 65.8 % (ref 42.7–76)
PLATELET # BLD AUTO: 263 10*3/MM3 (ref 140–450)
PMV BLD AUTO: 7.8 FL (ref 6–12)
POTASSIUM SERPL-SCNC: 4.3 MMOL/L (ref 3.5–5.2)
PROT SERPL-MCNC: 6.8 G/DL (ref 6–8.5)
RBC # BLD AUTO: 3.99 10*6/MM3 (ref 3.77–5.28)
SODIUM SERPL-SCNC: 140 MMOL/L (ref 136–145)
WBC # BLD AUTO: 4.7 10*3/MM3 (ref 3.4–10.8)

## 2021-10-28 PROCEDURE — 25010000002 DEXAMETHASONE SODIUM PHOSPHATE 100 MG/10ML SOLUTION: Performed by: INTERNAL MEDICINE

## 2021-10-28 PROCEDURE — 96413 CHEMO IV INFUSION 1 HR: CPT

## 2021-10-28 PROCEDURE — 96375 TX/PRO/DX INJ NEW DRUG ADDON: CPT

## 2021-10-28 PROCEDURE — 85025 COMPLETE CBC W/AUTO DIFF WBC: CPT | Performed by: INTERNAL MEDICINE

## 2021-10-28 PROCEDURE — 25010000002 PACLITAXEL PER 1 MG: Performed by: INTERNAL MEDICINE

## 2021-10-28 PROCEDURE — 25010000002 HEPARIN LOCK FLUSH PER 10 UNITS: Performed by: INTERNAL MEDICINE

## 2021-10-28 PROCEDURE — 80053 COMPREHEN METABOLIC PANEL: CPT | Performed by: INTERNAL MEDICINE

## 2021-10-28 PROCEDURE — 25010000002 DIPHENHYDRAMINE PER 50 MG: Performed by: INTERNAL MEDICINE

## 2021-10-28 PROCEDURE — 96376 TX/PRO/DX INJ SAME DRUG ADON: CPT

## 2021-10-28 RX ORDER — FAMOTIDINE 10 MG/ML
20 INJECTION, SOLUTION INTRAVENOUS ONCE
Status: COMPLETED | OUTPATIENT
Start: 2021-10-28 | End: 2021-10-28

## 2021-10-28 RX ORDER — SODIUM CHLORIDE 9 MG/ML
250 INJECTION, SOLUTION INTRAVENOUS ONCE
Status: COMPLETED | OUTPATIENT
Start: 2021-10-28 | End: 2021-10-28

## 2021-10-28 RX ORDER — ZOLPIDEM TARTRATE 5 MG/1
TABLET ORAL
Qty: 45 TABLET | Refills: 4 | Status: SHIPPED | OUTPATIENT
Start: 2021-10-28 | End: 2022-06-02

## 2021-10-28 RX ORDER — HEPARIN SODIUM (PORCINE) LOCK FLUSH IV SOLN 100 UNIT/ML 100 UNIT/ML
500 SOLUTION INTRAVENOUS AS NEEDED
Status: CANCELLED | OUTPATIENT
Start: 2021-10-28

## 2021-10-28 RX ORDER — HEPARIN SODIUM (PORCINE) LOCK FLUSH IV SOLN 100 UNIT/ML 100 UNIT/ML
500 SOLUTION INTRAVENOUS AS NEEDED
Status: DISCONTINUED | OUTPATIENT
Start: 2021-10-28 | End: 2021-10-29 | Stop reason: HOSPADM

## 2021-10-28 RX ADMIN — FAMOTIDINE 20 MG: 10 INJECTION, SOLUTION INTRAVENOUS at 12:18

## 2021-10-28 RX ADMIN — DIPHENHYDRAMINE HYDROCHLORIDE 25 MG: 50 INJECTION INTRAMUSCULAR; INTRAVENOUS at 12:20

## 2021-10-28 RX ADMIN — SODIUM CHLORIDE 250 ML: 9 INJECTION, SOLUTION INTRAVENOUS at 12:18

## 2021-10-28 RX ADMIN — HEPARIN 500 UNITS: 100 SYRINGE at 14:02

## 2021-10-28 RX ADMIN — DEXAMETHASONE SODIUM PHOSPHATE 12 MG: 10 INJECTION, SOLUTION INTRAMUSCULAR; INTRAVENOUS at 12:20

## 2021-10-28 RX ADMIN — SODIUM CHLORIDE 120 MG: 9 INJECTION, SOLUTION INTRAVENOUS at 13:01

## 2021-11-04 ENCOUNTER — OFFICE VISIT (OUTPATIENT)
Dept: ONCOLOGY | Facility: CLINIC | Age: 36
End: 2021-11-04

## 2021-11-04 ENCOUNTER — HOSPITAL ENCOUNTER (OUTPATIENT)
Dept: ONCOLOGY | Facility: HOSPITAL | Age: 36
Setting detail: INFUSION SERIES
Discharge: HOME | End: 2021-11-04

## 2021-11-04 VITALS
HEART RATE: 107 BPM | BODY MASS INDEX: 21.48 KG/M2 | OXYGEN SATURATION: 99 % | SYSTOLIC BLOOD PRESSURE: 99 MMHG | TEMPERATURE: 96.8 F | WEIGHT: 128.9 LBS | RESPIRATION RATE: 16 BRPM | HEIGHT: 65 IN | DIASTOLIC BLOOD PRESSURE: 74 MMHG

## 2021-11-04 VITALS — DIASTOLIC BLOOD PRESSURE: 60 MMHG | HEART RATE: 80 BPM | SYSTOLIC BLOOD PRESSURE: 91 MMHG

## 2021-11-04 DIAGNOSIS — Z45.2 ENCOUNTER FOR CENTRAL LINE CARE: Primary | ICD-10-CM

## 2021-11-04 DIAGNOSIS — Z17.0 CARCINOMA OF UPPER-OUTER QUADRANT OF LEFT BREAST IN FEMALE, ESTROGEN RECEPTOR POSITIVE (HCC): ICD-10-CM

## 2021-11-04 DIAGNOSIS — C50.412 CARCINOMA OF UPPER-OUTER QUADRANT OF LEFT BREAST IN FEMALE, ESTROGEN RECEPTOR POSITIVE (HCC): ICD-10-CM

## 2021-11-04 DIAGNOSIS — Z17.0 CARCINOMA OF UPPER-OUTER QUADRANT OF LEFT BREAST IN FEMALE, ESTROGEN RECEPTOR POSITIVE (HCC): Primary | ICD-10-CM

## 2021-11-04 DIAGNOSIS — C50.412 CARCINOMA OF UPPER-OUTER QUADRANT OF LEFT BREAST IN FEMALE, ESTROGEN RECEPTOR POSITIVE (HCC): Primary | ICD-10-CM

## 2021-11-04 LAB
ALBUMIN SERPL-MCNC: 4.5 G/DL (ref 3.5–5.2)
ALBUMIN/GLOB SERPL: 1.7 G/DL
ALP SERPL-CCNC: 93 U/L (ref 39–117)
ALT SERPL W P-5'-P-CCNC: 17 U/L (ref 1–33)
ANION GAP SERPL CALCULATED.3IONS-SCNC: 16 MMOL/L (ref 5–15)
AST SERPL-CCNC: 18 U/L (ref 1–32)
BILIRUB SERPL-MCNC: 0.4 MG/DL (ref 0–1.2)
BUN SERPL-MCNC: 10 MG/DL (ref 6–20)
BUN/CREAT SERPL: 14.7 (ref 7–25)
CALCIUM SPEC-SCNC: 9.1 MG/DL (ref 8.6–10.5)
CHLORIDE SERPL-SCNC: 104 MMOL/L (ref 98–107)
CO2 SERPL-SCNC: 20 MMOL/L (ref 22–29)
CREAT SERPL-MCNC: 0.68 MG/DL (ref 0.57–1)
ERYTHROCYTE [DISTWIDTH] IN BLOOD BY AUTOMATED COUNT: 15.4 % (ref 12.3–15.4)
GFR SERPL CREATININE-BSD FRML MDRD: 117 ML/MIN/1.73
GLOBULIN UR ELPH-MCNC: 2.6 GM/DL
GLUCOSE SERPL-MCNC: 70 MG/DL (ref 65–99)
HCT VFR BLD AUTO: 39.1 % (ref 34–46.6)
HGB BLD-MCNC: 13 G/DL (ref 12–15.9)
LYMPHOCYTES # BLD AUTO: 1.2 10*3/MM3 (ref 0.7–3.1)
LYMPHOCYTES NFR BLD AUTO: 32.7 % (ref 19.6–45.3)
MCH RBC QN AUTO: 32.2 PG (ref 26.6–33)
MCHC RBC AUTO-ENTMCNC: 33.3 G/DL (ref 31.5–35.7)
MCV RBC AUTO: 96.6 FL (ref 79–97)
MONOCYTES # BLD AUTO: 0.2 10*3/MM3 (ref 0.1–0.9)
MONOCYTES NFR BLD AUTO: 5.1 % (ref 5–12)
NEUTROPHILS NFR BLD AUTO: 2.4 10*3/MM3 (ref 1.7–7)
NEUTROPHILS NFR BLD AUTO: 62.2 % (ref 42.7–76)
PLATELET # BLD AUTO: 259 10*3/MM3 (ref 140–450)
PMV BLD AUTO: 8.1 FL (ref 6–12)
POTASSIUM SERPL-SCNC: 3.9 MMOL/L (ref 3.5–5.2)
PROT SERPL-MCNC: 7.1 G/DL (ref 6–8.5)
RBC # BLD AUTO: 4.05 10*6/MM3 (ref 3.77–5.28)
SODIUM SERPL-SCNC: 140 MMOL/L (ref 136–145)
WBC # BLD AUTO: 3.8 10*3/MM3 (ref 3.4–10.8)

## 2021-11-04 PROCEDURE — 85025 COMPLETE CBC W/AUTO DIFF WBC: CPT | Performed by: INTERNAL MEDICINE

## 2021-11-04 PROCEDURE — 25010000002 DIPHENHYDRAMINE PER 50 MG: Performed by: INTERNAL MEDICINE

## 2021-11-04 PROCEDURE — 96375 TX/PRO/DX INJ NEW DRUG ADDON: CPT

## 2021-11-04 PROCEDURE — 99214 OFFICE O/P EST MOD 30 MIN: CPT | Performed by: INTERNAL MEDICINE

## 2021-11-04 PROCEDURE — 25010000002 PACLITAXEL PER 1 MG: Performed by: INTERNAL MEDICINE

## 2021-11-04 PROCEDURE — 25010000002 DEXAMETHASONE SODIUM PHOSPHATE 100 MG/10ML SOLUTION: Performed by: INTERNAL MEDICINE

## 2021-11-04 PROCEDURE — 25010000002 HEPARIN LOCK FLUSH PER 10 UNITS: Performed by: INTERNAL MEDICINE

## 2021-11-04 PROCEDURE — 96413 CHEMO IV INFUSION 1 HR: CPT

## 2021-11-04 PROCEDURE — 80053 COMPREHEN METABOLIC PANEL: CPT | Performed by: INTERNAL MEDICINE

## 2021-11-04 RX ORDER — FAMOTIDINE 10 MG/ML
20 INJECTION, SOLUTION INTRAVENOUS ONCE
Status: CANCELLED | OUTPATIENT
Start: 2021-12-02

## 2021-11-04 RX ORDER — FAMOTIDINE 10 MG/ML
20 INJECTION, SOLUTION INTRAVENOUS ONCE
Status: COMPLETED | OUTPATIENT
Start: 2021-11-04 | End: 2021-11-04

## 2021-11-04 RX ORDER — FAMOTIDINE 10 MG/ML
20 INJECTION, SOLUTION INTRAVENOUS ONCE
Status: CANCELLED | OUTPATIENT
Start: 2021-11-11

## 2021-11-04 RX ORDER — HEPARIN SODIUM (PORCINE) LOCK FLUSH IV SOLN 100 UNIT/ML 100 UNIT/ML
500 SOLUTION INTRAVENOUS AS NEEDED
Status: DISCONTINUED | OUTPATIENT
Start: 2021-11-04 | End: 2021-11-05 | Stop reason: HOSPADM

## 2021-11-04 RX ORDER — SODIUM CHLORIDE 9 MG/ML
250 INJECTION, SOLUTION INTRAVENOUS ONCE
Status: CANCELLED | OUTPATIENT
Start: 2021-11-11

## 2021-11-04 RX ORDER — DIPHENHYDRAMINE HYDROCHLORIDE 50 MG/ML
50 INJECTION INTRAMUSCULAR; INTRAVENOUS AS NEEDED
Status: CANCELLED | OUTPATIENT
Start: 2021-11-11

## 2021-11-04 RX ORDER — SODIUM CHLORIDE 9 MG/ML
250 INJECTION, SOLUTION INTRAVENOUS ONCE
Status: COMPLETED | OUTPATIENT
Start: 2021-11-04 | End: 2021-11-04

## 2021-11-04 RX ORDER — FAMOTIDINE 10 MG/ML
20 INJECTION, SOLUTION INTRAVENOUS AS NEEDED
Status: CANCELLED | OUTPATIENT
Start: 2021-12-02

## 2021-11-04 RX ORDER — SODIUM CHLORIDE 9 MG/ML
250 INJECTION, SOLUTION INTRAVENOUS ONCE
Status: CANCELLED | OUTPATIENT
Start: 2021-12-02

## 2021-11-04 RX ORDER — DIPHENHYDRAMINE HYDROCHLORIDE 50 MG/ML
50 INJECTION INTRAMUSCULAR; INTRAVENOUS AS NEEDED
Status: CANCELLED | OUTPATIENT
Start: 2021-12-02

## 2021-11-04 RX ORDER — HEPARIN SODIUM (PORCINE) LOCK FLUSH IV SOLN 100 UNIT/ML 100 UNIT/ML
500 SOLUTION INTRAVENOUS AS NEEDED
Status: CANCELLED | OUTPATIENT
Start: 2021-11-04

## 2021-11-04 RX ORDER — FAMOTIDINE 10 MG/ML
20 INJECTION, SOLUTION INTRAVENOUS AS NEEDED
Status: CANCELLED | OUTPATIENT
Start: 2021-11-11

## 2021-11-04 RX ADMIN — SODIUM CHLORIDE 250 ML: 9 INJECTION, SOLUTION INTRAVENOUS at 10:20

## 2021-11-04 RX ADMIN — FAMOTIDINE 20 MG: 10 INJECTION, SOLUTION INTRAVENOUS at 10:23

## 2021-11-04 RX ADMIN — DIPHENHYDRAMINE HYDROCHLORIDE 25 MG: 50 INJECTION INTRAMUSCULAR; INTRAVENOUS at 10:27

## 2021-11-04 RX ADMIN — DEXAMETHASONE SODIUM PHOSPHATE 12 MG: 10 INJECTION, SOLUTION INTRAMUSCULAR; INTRAVENOUS at 10:25

## 2021-11-04 RX ADMIN — HEPARIN 500 UNITS: 100 SYRINGE at 12:28

## 2021-11-04 RX ADMIN — SODIUM CHLORIDE 120 MG: 9 INJECTION, SOLUTION INTRAVENOUS at 11:20

## 2021-11-04 NOTE — PROGRESS NOTES
PROBLEM LIST:  Oncology/Hematology History   Carcinoma of upper-outer quadrant of left breast in female, estrogen receptor positive (HCC)   4/14/2021 Cancer Staged    Staging form: Breast, AJCC 8th Edition  - Pathologic stage from 4/14/2021: Stage IB (pT3, pN1a, cM0, G2, ER+, NV+, HER2-) - Signed by Graham Herbert MD on 4/29/2021 4/29/2021 Initial Diagnosis    Carcinoma of upper-outer quadrant of left breast in female, estrogen receptor positive (CMS/HCC)     5/21/2021 - 7/15/2021 Chemotherapy    OP BREAST AC DD DOXOrubicin / Cyclophosphamide     8/26/2021 -  Chemotherapy    OP BREAST PACLitaxel (weekly X 12)         REASON FOR VISIT: Breast cancer    HISTORY OF PRESENT ILLNESS:   36 y.o.  female presents today for follow-up of her breast cancer.  She has completed 4 cycles of dose dense Adriamycin and Cytoxan.  She is currently on single agent Taxol and seems to be tolerating it well.  She has completed 8 cycles of 12.   Denies any infections.  Fatigue is an issue.  She has numbness in her feet.  Denies any significant pain.  She does has a hard time being on her feet for long periods of time.  She works as a .  Sites fairly difficult for her.    Past medical history, social history and family history was reviewed 11/04/21 and unchanged from prior visit.    Review of Systems:    Review of Systems   Constitutional: Negative.    Eyes: Negative.    Respiratory: Negative.    Cardiovascular: Negative.    Gastrointestinal: Negative.    Endocrine: Negative.    Genitourinary: Negative.     Musculoskeletal: Negative.    Skin: Negative.    Neurological: Positive for numbness.   Hematological: Negative.    Psychiatric/Behavioral: Negative.             Medications:        Current Outpatient Medications:   •  lidocaine-prilocaine (EMLA) 2.5-2.5 % cream, Apply  topically to the appropriate area as directed As Needed (45-60 minutes prior to port access.  Cover with saran/plastic wrap.)., Disp: 30 g, Rfl: 3  •   "LORazepam (ATIVAN) 1 MG tablet, Take 1 tablet by mouth Daily As Needed for Anxiety. Take one tablet as needed up to once a day for severe anxiety, Disp: 30 tablet, Rfl: 0  •  ondansetron (ZOFRAN) 8 MG tablet, Take 1 tablet by mouth 3 (Three) Times a Day As Needed for Nausea or Vomiting., Disp: 30 tablet, Rfl: 5  •  temazepam (RESTORIL) 30 MG capsule, Take 1 capsule by mouth At Night As Needed for Sleep., Disp: 30 capsule, Rfl: 0  •  zolpidem (AMBIEN) 5 MG tablet, May take 1-2 tabs as needed for Sleep, Disp: 45 tablet, Rfl: 4           ALLERGIES:  No Known Allergies      Physical Exam    VITAL SIGNS:  BP 99/74   Pulse 107   Temp 96.8 °F (36 °C) (Temporal)   Resp 16   Ht 165.1 cm (65\")   Wt 58.5 kg (128 lb 14.4 oz)   SpO2 99%   BMI 21.45 kg/m²     ECOG score: 0           Wt Readings from Last 3 Encounters:   11/04/21 58.5 kg (128 lb 14.4 oz)   10/28/21 59 kg (130 lb)   10/21/21 60.2 kg (132 lb 11.2 oz)       Body mass index is 21.45 kg/m². Body surface area is 1.64 meters squared.    Pain Score    11/04/21 0929   PainSc: 0-No pain           Performance Status: 0    General: well appearing, in no acute distress  HEENT: sclera anicteric, neck is supple  Lymphatics: no cervical, supraclavicular, or axillary adenopathy  Cardiovascular: regular rate and rhythm, no murmurs, rubs or gallops  Lungs: clear to auscultation bilaterally  Abdomen: soft, nontender, nondistended.  No palpable organomegaly  Extremities: no lower extremity edema  Skin: no rashes, lesions, bruising, or petechiae  Msk:  Shows no weakness of the large muscle groups  Psych: Mood is stable        RECENT LABS:    Lab Results   Component Value Date    HGB 12.8 10/28/2021    HCT 38.6 10/28/2021    MCV 96.7 10/28/2021     10/28/2021    WBC 4.70 10/28/2021    NEUTROABS 3.10 10/28/2021    LYMPHSABS 1.40 10/28/2021    MONOSABS 0.20 10/28/2021    EOSABS 0.08 11/09/2020    BASOSABS 0.01 11/09/2020       Lab Results   Component Value Date    GLUCOSE 99 " 10/28/2021    BUN 11 10/28/2021    CREATININE 0.73 10/28/2021     10/28/2021    K 4.3 10/28/2021     10/28/2021    CO2 24.0 10/28/2021    CALCIUM 9.2 10/28/2021    PROTEINTOT 6.8 10/28/2021    ALBUMIN 4.50 10/28/2021    BILITOT 0.2 10/28/2021    ALKPHOS 94 10/28/2021    AST 19 10/28/2021    ALT 16 10/28/2021           NM Pet Skull Base To Mid Thigh    Result Date: 5/28/2021  Negative PET-CT scan.  D:  05/28/2021 E:  05/28/2021     This report was finalized on 5/28/2021 5:15 PM by Dr. Yesenia Hernandez MD.        Assessment/Plan    1.  Node positive ER/ME positive HER-2 negative breast cancer.  Continue Taxol at  70 mg/m².  After chemotherapy my plan is to refer her for radiation and subsequent hormone blockade.  She has node positive disease at time of surgery.  She also had a little over a 5 cm mass at time of surgery.    2.  Chemotherapy-induced neuropathy.  It still fairly mild at this time.  Going to try to get her through 12 cycles without significant worsening of this condition.    Total time of patient care on day of service including time prior to, face to face with patient, and following visit spent in reviewing records, lab results, imaging studies, discussion with patient, and documentation/charting was > 25 minutes.     Graham Herbert MD  [facility] Hematology and Oncology    Return on: 12/02/21  Return in (Approximately): 1 month    Orders Placed This Encounter   Procedures   • Comprehensive metabolic panel   • Comprehensive metabolic panel   • CBC and Differential   • CBC and Differential       11/4/2021

## 2021-11-11 ENCOUNTER — HOSPITAL ENCOUNTER (OUTPATIENT)
Dept: ONCOLOGY | Facility: HOSPITAL | Age: 36
Setting detail: INFUSION SERIES
Discharge: HOME | End: 2021-11-11

## 2021-11-11 VITALS
HEIGHT: 65 IN | WEIGHT: 134 LBS | TEMPERATURE: 96.9 F | RESPIRATION RATE: 18 BRPM | DIASTOLIC BLOOD PRESSURE: 68 MMHG | BODY MASS INDEX: 22.33 KG/M2 | HEART RATE: 88 BPM | SYSTOLIC BLOOD PRESSURE: 121 MMHG

## 2021-11-11 DIAGNOSIS — Z45.2 ENCOUNTER FOR CENTRAL LINE CARE: Primary | ICD-10-CM

## 2021-11-11 DIAGNOSIS — Z17.0 CARCINOMA OF UPPER-OUTER QUADRANT OF LEFT BREAST IN FEMALE, ESTROGEN RECEPTOR POSITIVE (HCC): ICD-10-CM

## 2021-11-11 DIAGNOSIS — C50.412 CARCINOMA OF UPPER-OUTER QUADRANT OF LEFT BREAST IN FEMALE, ESTROGEN RECEPTOR POSITIVE (HCC): ICD-10-CM

## 2021-11-11 LAB
ALBUMIN SERPL-MCNC: 4 G/DL (ref 3.5–5.2)
ALBUMIN/GLOB SERPL: 1.7 G/DL
ALP SERPL-CCNC: 81 U/L (ref 39–117)
ALT SERPL W P-5'-P-CCNC: 15 U/L (ref 1–33)
ANION GAP SERPL CALCULATED.3IONS-SCNC: 11 MMOL/L (ref 5–15)
AST SERPL-CCNC: 15 U/L (ref 1–32)
BILIRUB SERPL-MCNC: 0.2 MG/DL (ref 0–1.2)
BUN SERPL-MCNC: 15 MG/DL (ref 6–20)
BUN/CREAT SERPL: 21.1 (ref 7–25)
CALCIUM SPEC-SCNC: 8.5 MG/DL (ref 8.6–10.5)
CHLORIDE SERPL-SCNC: 104 MMOL/L (ref 98–107)
CO2 SERPL-SCNC: 22 MMOL/L (ref 22–29)
CREAT SERPL-MCNC: 0.71 MG/DL (ref 0.57–1)
ERYTHROCYTE [DISTWIDTH] IN BLOOD BY AUTOMATED COUNT: 15.5 % (ref 12.3–15.4)
GFR SERPL CREATININE-BSD FRML MDRD: 112 ML/MIN/1.73
GLOBULIN UR ELPH-MCNC: 2.4 GM/DL
GLUCOSE SERPL-MCNC: 129 MG/DL (ref 65–99)
HCT VFR BLD AUTO: 34.4 % (ref 34–46.6)
HGB BLD-MCNC: 11.4 G/DL (ref 12–15.9)
LYMPHOCYTES # BLD AUTO: 0.9 10*3/MM3 (ref 0.7–3.1)
LYMPHOCYTES NFR BLD AUTO: 20 % (ref 19.6–45.3)
MCH RBC QN AUTO: 32.4 PG (ref 26.6–33)
MCHC RBC AUTO-ENTMCNC: 33.3 G/DL (ref 31.5–35.7)
MCV RBC AUTO: 97.4 FL (ref 79–97)
MONOCYTES # BLD AUTO: 0.3 10*3/MM3 (ref 0.1–0.9)
MONOCYTES NFR BLD AUTO: 6.1 % (ref 5–12)
NEUTROPHILS NFR BLD AUTO: 3.3 10*3/MM3 (ref 1.7–7)
NEUTROPHILS NFR BLD AUTO: 73.9 % (ref 42.7–76)
PLATELET # BLD AUTO: 249 10*3/MM3 (ref 140–450)
PMV BLD AUTO: 8.2 FL (ref 6–12)
POTASSIUM SERPL-SCNC: 3.9 MMOL/L (ref 3.5–5.2)
PROT SERPL-MCNC: 6.4 G/DL (ref 6–8.5)
RBC # BLD AUTO: 3.53 10*6/MM3 (ref 3.77–5.28)
SODIUM SERPL-SCNC: 137 MMOL/L (ref 136–145)
WBC # BLD AUTO: 4.4 10*3/MM3 (ref 3.4–10.8)

## 2021-11-11 PROCEDURE — 96375 TX/PRO/DX INJ NEW DRUG ADDON: CPT

## 2021-11-11 PROCEDURE — 25010000002 PACLITAXEL PER 1 MG: Performed by: INTERNAL MEDICINE

## 2021-11-11 PROCEDURE — 80053 COMPREHEN METABOLIC PANEL: CPT | Performed by: INTERNAL MEDICINE

## 2021-11-11 PROCEDURE — 25010000002 DIPHENHYDRAMINE PER 50 MG: Performed by: INTERNAL MEDICINE

## 2021-11-11 PROCEDURE — 25010000002 HEPARIN LOCK FLUSH PER 10 UNITS: Performed by: INTERNAL MEDICINE

## 2021-11-11 PROCEDURE — 25010000002 DEXAMETHASONE SODIUM PHOSPHATE 100 MG/10ML SOLUTION: Performed by: INTERNAL MEDICINE

## 2021-11-11 PROCEDURE — 96413 CHEMO IV INFUSION 1 HR: CPT

## 2021-11-11 PROCEDURE — 85025 COMPLETE CBC W/AUTO DIFF WBC: CPT | Performed by: INTERNAL MEDICINE

## 2021-11-11 RX ORDER — SODIUM CHLORIDE 9 MG/ML
250 INJECTION, SOLUTION INTRAVENOUS ONCE
Status: COMPLETED | OUTPATIENT
Start: 2021-11-11 | End: 2021-11-11

## 2021-11-11 RX ORDER — HEPARIN SODIUM (PORCINE) LOCK FLUSH IV SOLN 100 UNIT/ML 100 UNIT/ML
500 SOLUTION INTRAVENOUS AS NEEDED
Status: CANCELLED | OUTPATIENT
Start: 2021-11-11

## 2021-11-11 RX ORDER — FAMOTIDINE 10 MG/ML
20 INJECTION, SOLUTION INTRAVENOUS ONCE
Status: COMPLETED | OUTPATIENT
Start: 2021-11-11 | End: 2021-11-11

## 2021-11-11 RX ORDER — HEPARIN SODIUM (PORCINE) LOCK FLUSH IV SOLN 100 UNIT/ML 100 UNIT/ML
500 SOLUTION INTRAVENOUS AS NEEDED
Status: DISCONTINUED | OUTPATIENT
Start: 2021-11-11 | End: 2021-11-12 | Stop reason: HOSPADM

## 2021-11-11 RX ADMIN — SODIUM CHLORIDE 120 MG: 9 INJECTION, SOLUTION INTRAVENOUS at 09:15

## 2021-11-11 RX ADMIN — SODIUM CHLORIDE 250 ML: 9 INJECTION, SOLUTION INTRAVENOUS at 08:48

## 2021-11-11 RX ADMIN — HEPARIN 500 UNITS: 100 SYRINGE at 10:22

## 2021-11-11 RX ADMIN — FAMOTIDINE 20 MG: 10 INJECTION, SOLUTION INTRAVENOUS at 08:48

## 2021-11-11 RX ADMIN — DEXAMETHASONE SODIUM PHOSPHATE 12 MG: 10 INJECTION, SOLUTION INTRAMUSCULAR; INTRAVENOUS at 08:49

## 2021-11-11 RX ADMIN — DIPHENHYDRAMINE HYDROCHLORIDE 25 MG: 50 INJECTION INTRAMUSCULAR; INTRAVENOUS at 08:48

## 2021-11-18 ENCOUNTER — APPOINTMENT (OUTPATIENT)
Dept: ONCOLOGY | Facility: HOSPITAL | Age: 36
End: 2021-11-18

## 2021-12-02 ENCOUNTER — OFFICE VISIT (OUTPATIENT)
Dept: ONCOLOGY | Facility: CLINIC | Age: 36
End: 2021-12-02

## 2021-12-02 ENCOUNTER — HOSPITAL ENCOUNTER (OUTPATIENT)
Dept: ONCOLOGY | Facility: HOSPITAL | Age: 36
Setting detail: INFUSION SERIES
Discharge: HOME | End: 2021-12-02

## 2021-12-02 VITALS
DIASTOLIC BLOOD PRESSURE: 76 MMHG | HEART RATE: 94 BPM | TEMPERATURE: 97.3 F | HEIGHT: 65 IN | WEIGHT: 131.2 LBS | BODY MASS INDEX: 21.86 KG/M2 | SYSTOLIC BLOOD PRESSURE: 109 MMHG | RESPIRATION RATE: 16 BRPM

## 2021-12-02 VITALS — DIASTOLIC BLOOD PRESSURE: 79 MMHG | HEART RATE: 69 BPM | SYSTOLIC BLOOD PRESSURE: 107 MMHG

## 2021-12-02 DIAGNOSIS — Z17.0 CARCINOMA OF UPPER-OUTER QUADRANT OF LEFT BREAST IN FEMALE, ESTROGEN RECEPTOR POSITIVE (HCC): ICD-10-CM

## 2021-12-02 DIAGNOSIS — C50.412 CARCINOMA OF UPPER-OUTER QUADRANT OF LEFT BREAST IN FEMALE, ESTROGEN RECEPTOR POSITIVE (HCC): Primary | ICD-10-CM

## 2021-12-02 DIAGNOSIS — Z17.0 CARCINOMA OF UPPER-OUTER QUADRANT OF LEFT BREAST IN FEMALE, ESTROGEN RECEPTOR POSITIVE (HCC): Primary | ICD-10-CM

## 2021-12-02 DIAGNOSIS — Z45.2 ENCOUNTER FOR CENTRAL LINE CARE: Primary | ICD-10-CM

## 2021-12-02 DIAGNOSIS — C50.412 CARCINOMA OF UPPER-OUTER QUADRANT OF LEFT BREAST IN FEMALE, ESTROGEN RECEPTOR POSITIVE (HCC): ICD-10-CM

## 2021-12-02 LAB
ALBUMIN SERPL-MCNC: 4.1 G/DL (ref 3.5–5.2)
ALBUMIN/GLOB SERPL: 1.5 G/DL
ALP SERPL-CCNC: 96 U/L (ref 39–117)
ALT SERPL W P-5'-P-CCNC: 10 U/L (ref 1–33)
ANION GAP SERPL CALCULATED.3IONS-SCNC: 11 MMOL/L (ref 5–15)
AST SERPL-CCNC: 15 U/L (ref 1–32)
BILIRUB SERPL-MCNC: 0.4 MG/DL (ref 0–1.2)
BUN SERPL-MCNC: 10 MG/DL (ref 6–20)
BUN/CREAT SERPL: 14.5 (ref 7–25)
CALCIUM SPEC-SCNC: 9.3 MG/DL (ref 8.6–10.5)
CHLORIDE SERPL-SCNC: 105 MMOL/L (ref 98–107)
CO2 SERPL-SCNC: 24 MMOL/L (ref 22–29)
CREAT SERPL-MCNC: 0.69 MG/DL (ref 0.57–1)
ERYTHROCYTE [DISTWIDTH] IN BLOOD BY AUTOMATED COUNT: 16.8 % (ref 12.3–15.4)
GFR SERPL CREATININE-BSD FRML MDRD: 115 ML/MIN/1.73
GLOBULIN UR ELPH-MCNC: 2.7 GM/DL
GLUCOSE SERPL-MCNC: 111 MG/DL (ref 65–99)
HCT VFR BLD AUTO: 38.6 % (ref 34–46.6)
HGB BLD-MCNC: 12.8 G/DL (ref 12–15.9)
LYMPHOCYTES # BLD AUTO: 1.1 10*3/MM3 (ref 0.7–3.1)
LYMPHOCYTES NFR BLD AUTO: 24.7 % (ref 19.6–45.3)
MCH RBC QN AUTO: 32.5 PG (ref 26.6–33)
MCHC RBC AUTO-ENTMCNC: 33.1 G/DL (ref 31.5–35.7)
MCV RBC AUTO: 98 FL (ref 79–97)
MONOCYTES # BLD AUTO: 0.4 10*3/MM3 (ref 0.1–0.9)
MONOCYTES NFR BLD AUTO: 8.2 % (ref 5–12)
NEUTROPHILS NFR BLD AUTO: 3 10*3/MM3 (ref 1.7–7)
NEUTROPHILS NFR BLD AUTO: 67.1 % (ref 42.7–76)
PLATELET # BLD AUTO: 247 10*3/MM3 (ref 140–450)
PMV BLD AUTO: 8.1 FL (ref 6–12)
POTASSIUM SERPL-SCNC: 3.9 MMOL/L (ref 3.5–5.2)
PROT SERPL-MCNC: 6.8 G/DL (ref 6–8.5)
RBC # BLD AUTO: 3.94 10*6/MM3 (ref 3.77–5.28)
SODIUM SERPL-SCNC: 140 MMOL/L (ref 136–145)
WBC NRBC COR # BLD: 4.5 10*3/MM3 (ref 3.4–10.8)

## 2021-12-02 PROCEDURE — 25010000002 DEXAMETHASONE PER 1 MG: Performed by: INTERNAL MEDICINE

## 2021-12-02 PROCEDURE — 25010000002 DIPHENHYDRAMINE PER 50 MG: Performed by: INTERNAL MEDICINE

## 2021-12-02 PROCEDURE — 25010000002 PACLITAXEL PER 1 MG: Performed by: INTERNAL MEDICINE

## 2021-12-02 PROCEDURE — 96413 CHEMO IV INFUSION 1 HR: CPT

## 2021-12-02 PROCEDURE — 25010000002 DEXAMETHASONE SODIUM PHOSPHATE 100 MG/10ML SOLUTION: Performed by: INTERNAL MEDICINE

## 2021-12-02 PROCEDURE — 99214 OFFICE O/P EST MOD 30 MIN: CPT | Performed by: INTERNAL MEDICINE

## 2021-12-02 PROCEDURE — 96375 TX/PRO/DX INJ NEW DRUG ADDON: CPT

## 2021-12-02 PROCEDURE — 85025 COMPLETE CBC W/AUTO DIFF WBC: CPT | Performed by: INTERNAL MEDICINE

## 2021-12-02 PROCEDURE — 25010000002 HEPARIN LOCK FLUSH PER 10 UNITS: Performed by: INTERNAL MEDICINE

## 2021-12-02 PROCEDURE — 80053 COMPREHEN METABOLIC PANEL: CPT | Performed by: INTERNAL MEDICINE

## 2021-12-02 RX ORDER — SODIUM CHLORIDE 9 MG/ML
250 INJECTION, SOLUTION INTRAVENOUS ONCE
Status: COMPLETED | OUTPATIENT
Start: 2021-12-02 | End: 2021-12-02

## 2021-12-02 RX ORDER — HEPARIN SODIUM (PORCINE) LOCK FLUSH IV SOLN 100 UNIT/ML 100 UNIT/ML
500 SOLUTION INTRAVENOUS AS NEEDED
Status: CANCELLED | OUTPATIENT
Start: 2021-12-02

## 2021-12-02 RX ORDER — DIPHENHYDRAMINE HYDROCHLORIDE 50 MG/ML
50 INJECTION INTRAMUSCULAR; INTRAVENOUS AS NEEDED
Status: CANCELLED | OUTPATIENT
Start: 2021-12-09

## 2021-12-02 RX ORDER — HEPARIN SODIUM (PORCINE) LOCK FLUSH IV SOLN 100 UNIT/ML 100 UNIT/ML
500 SOLUTION INTRAVENOUS AS NEEDED
Status: DISCONTINUED | OUTPATIENT
Start: 2021-12-02 | End: 2021-12-03 | Stop reason: HOSPADM

## 2021-12-02 RX ORDER — FAMOTIDINE 10 MG/ML
20 INJECTION, SOLUTION INTRAVENOUS ONCE
Status: COMPLETED | OUTPATIENT
Start: 2021-12-02 | End: 2021-12-02

## 2021-12-02 RX ORDER — FAMOTIDINE 10 MG/ML
20 INJECTION, SOLUTION INTRAVENOUS ONCE
Status: CANCELLED | OUTPATIENT
Start: 2021-12-09

## 2021-12-02 RX ORDER — SODIUM CHLORIDE 9 MG/ML
250 INJECTION, SOLUTION INTRAVENOUS ONCE
Status: CANCELLED | OUTPATIENT
Start: 2021-12-09

## 2021-12-02 RX ORDER — FAMOTIDINE 10 MG/ML
20 INJECTION, SOLUTION INTRAVENOUS AS NEEDED
Status: CANCELLED | OUTPATIENT
Start: 2021-12-09

## 2021-12-02 RX ADMIN — PACLITAXEL 85 MG: 6 INJECTION, SOLUTION INTRAVENOUS at 11:00

## 2021-12-02 RX ADMIN — SODIUM CHLORIDE 250 ML: 9 INJECTION, SOLUTION INTRAVENOUS at 10:18

## 2021-12-02 RX ADMIN — DIPHENHYDRAMINE HYDROCHLORIDE 25 MG: 50 INJECTION INTRAMUSCULAR; INTRAVENOUS at 10:18

## 2021-12-02 RX ADMIN — FAMOTIDINE 20 MG: 10 INJECTION, SOLUTION INTRAVENOUS at 10:18

## 2021-12-02 RX ADMIN — HEPARIN 500 UNITS: 100 SYRINGE at 12:05

## 2021-12-02 RX ADMIN — DEXAMETHASONE SODIUM PHOSPHATE 12 MG: 10 INJECTION, SOLUTION INTRAMUSCULAR; INTRAVENOUS at 10:18

## 2021-12-02 NOTE — PROGRESS NOTES
PROBLEM LIST:  Oncology/Hematology History   Carcinoma of upper-outer quadrant of left breast in female, estrogen receptor positive (HCC)   4/14/2021 Cancer Staged    Staging form: Breast, AJCC 8th Edition  - Pathologic stage from 4/14/2021: Stage IB (pT3, pN1a, cM0, G2, ER+, HI+, HER2-) - Signed by Graham Herbert MD on 4/29/2021 4/29/2021 Initial Diagnosis    Carcinoma of upper-outer quadrant of left breast in female, estrogen receptor positive (CMS/HCC)     5/21/2021 - 7/15/2021 Chemotherapy    OP BREAST AC DD DOXOrubicin / Cyclophosphamide     8/26/2021 -  Chemotherapy    OP BREAST PACLitaxel (weekly X 12)         REASON FOR VISIT: Breast cancer    HISTORY OF PRESENT ILLNESS:   36 y.o.  female presents today for follow-up of her breast cancer.  She has completed 4 cycles of dose dense Adriamycin and Cytoxan.  She is currently on single agent Taxol and seems to be tolerating it well.  She has completed 10 cycles of 12.  She is starting to have significant neuropathy in her feet.  She is having a heart difficult time standing on her feet for multiple hours.  She is got only 2 more treatments left and she wants to proceed with that.    Past medical history, social history and family history was reviewed 12/02/21 and unchanged from prior visit.    Review of Systems:    Review of Systems   Constitutional: Negative.    Eyes: Negative.    Respiratory: Negative.    Cardiovascular: Negative.    Gastrointestinal: Negative.    Endocrine: Negative.    Genitourinary: Negative.     Musculoskeletal: Negative.    Skin: Negative.    Neurological: Positive for numbness.   Hematological: Negative.    Psychiatric/Behavioral: Negative.             Medications:        Current Outpatient Medications:   •  lidocaine-prilocaine (EMLA) 2.5-2.5 % cream, Apply  topically to the appropriate area as directed As Needed (45-60 minutes prior to port access.  Cover with saran/plastic wrap.)., Disp: 30 g, Rfl: 3  •  zolpidem (AMBIEN) 5  "MG tablet, May take 1-2 tabs as needed for Sleep, Disp: 45 tablet, Rfl: 4  No current facility-administered medications for this visit.    Facility-Administered Medications Ordered in Other Visits:   •  heparin injection 500 Units, 500 Units, Intravenous, PRN, Graham Herbert MD           ALLERGIES:  No Known Allergies      Physical Exam    VITAL SIGNS:  /76   Pulse 94   Temp 97.3 °F (36.3 °C) (Temporal)   Resp 16   Ht 165.1 cm (65\")   Wt 59.5 kg (131 lb 3.2 oz)   BMI 21.83 kg/m²     ECOG score: 0           Wt Readings from Last 3 Encounters:   12/02/21 59.5 kg (131 lb 3.2 oz)   11/11/21 60.8 kg (134 lb)   11/04/21 58.5 kg (128 lb 14.4 oz)       Body mass index is 21.83 kg/m². Body surface area is 1.65 meters squared.    Pain Score    12/02/21 0855   PainSc:   7   PainLoc: Foot  Comment: Bilateral           Performance Status: 0    General: well appearing, in no acute distress  HEENT: sclera anicteric, neck is supple  Lymphatics: no cervical, supraclavicular, or axillary adenopathy  Cardiovascular: regular rate and rhythm, no murmurs, rubs or gallops  Lungs: clear to auscultation bilaterally  Abdomen: soft, nontender, nondistended.  No palpable organomegaly  Extremities: no lower extremity edema  Skin: no rashes, lesions, bruising, or petechiae  Msk:  Shows no weakness of the large muscle groups  Psych: Mood is stable        RECENT LABS:    Lab Results   Component Value Date    HGB 11.4 (L) 11/11/2021    HCT 34.4 11/11/2021    MCV 97.4 (H) 11/11/2021     11/11/2021    WBC 4.40 11/11/2021    NEUTROABS 3.30 11/11/2021    LYMPHSABS 0.90 11/11/2021    MONOSABS 0.30 11/11/2021    EOSABS 0.08 11/09/2020    BASOSABS 0.01 11/09/2020       Lab Results   Component Value Date    GLUCOSE 129 (H) 11/11/2021    BUN 15 11/11/2021    CREATININE 0.71 11/11/2021     11/11/2021    K 3.9 11/11/2021     11/11/2021    CO2 22.0 11/11/2021    CALCIUM 8.5 (L) 11/11/2021    PROTEINTOT 6.4 11/11/2021    " ALBUMIN 4.00 11/11/2021    BILITOT 0.2 11/11/2021    ALKPHOS 81 11/11/2021    AST 15 11/11/2021    ALT 15 11/11/2021           NM Pet Skull Base To Mid Thigh    Result Date: 5/28/2021  Negative PET-CT scan.  D:  05/28/2021 E:  05/28/2021     This report was finalized on 5/28/2021 5:15 PM by Dr. Yesenia Hernandez MD.        Assessment/Plan    1.  Node positive ER/[place] positive HER-2 negative breast cancer.  I am going to dose reduce her Taxol to 50 mg/m² for her last 2 doses.  I have given her time off for 2 weeks to stay off her feet to see if it helps alleviate some of the discomfort.  I have referred her to radiation for post chemotherapy radiotherapy.  I will also referred her to surgery to removal of the port.  She will also be on hormone blockade after completion of radiotherapy.    2.  Chemotherapy-induced neuropathy.  This is my major concern for now.  We will see how she does with completion of chemotherapy.    Total time of patient care on day of service including time prior to, face to face with patient, and following visit spent in reviewing records, lab results, imaging studies, discussion with patient, and documentation/charting was > 35 minutes.     Graham Herbert MD  [facility]    Return on: 12/16/21  Return in (Approximately): 3 weeks    Orders Placed This Encounter   Procedures   • Comprehensive metabolic panel   • Ambulatory Referral to Dom Surg for Port-a-Cath   • Ambulatory Referral to Radiation Oncology   • CBC and Differential       12/2/2021

## 2021-12-09 ENCOUNTER — HOSPITAL ENCOUNTER (OUTPATIENT)
Dept: ONCOLOGY | Facility: HOSPITAL | Age: 36
Setting detail: INFUSION SERIES
Discharge: HOME | End: 2021-12-09

## 2021-12-09 ENCOUNTER — DOCUMENTATION (OUTPATIENT)
Dept: SOCIAL WORK | Facility: HOSPITAL | Age: 36
End: 2021-12-09

## 2021-12-09 VITALS
WEIGHT: 129.8 LBS | RESPIRATION RATE: 16 BRPM | BODY MASS INDEX: 21.6 KG/M2 | SYSTOLIC BLOOD PRESSURE: 118 MMHG | HEART RATE: 81 BPM | TEMPERATURE: 98.6 F | DIASTOLIC BLOOD PRESSURE: 77 MMHG

## 2021-12-09 DIAGNOSIS — C50.412 CARCINOMA OF UPPER-OUTER QUADRANT OF LEFT BREAST IN FEMALE, ESTROGEN RECEPTOR POSITIVE (HCC): Primary | ICD-10-CM

## 2021-12-09 DIAGNOSIS — Z45.2 ENCOUNTER FOR CENTRAL LINE CARE: ICD-10-CM

## 2021-12-09 DIAGNOSIS — Z17.0 CARCINOMA OF UPPER-OUTER QUADRANT OF LEFT BREAST IN FEMALE, ESTROGEN RECEPTOR POSITIVE (HCC): Primary | ICD-10-CM

## 2021-12-09 LAB
ALBUMIN SERPL-MCNC: 4.5 G/DL (ref 3.5–5.2)
ALBUMIN/GLOB SERPL: 1.5 G/DL
ALP SERPL-CCNC: 100 U/L (ref 39–117)
ALT SERPL W P-5'-P-CCNC: 18 U/L (ref 1–33)
ANION GAP SERPL CALCULATED.3IONS-SCNC: 14 MMOL/L (ref 5–15)
AST SERPL-CCNC: 24 U/L (ref 1–32)
BILIRUB SERPL-MCNC: 0.3 MG/DL (ref 0–1.2)
BUN SERPL-MCNC: 10 MG/DL (ref 6–20)
BUN/CREAT SERPL: 17.5 (ref 7–25)
CALCIUM SPEC-SCNC: 9.3 MG/DL (ref 8.6–10.5)
CHLORIDE SERPL-SCNC: 102 MMOL/L (ref 98–107)
CO2 SERPL-SCNC: 21 MMOL/L (ref 22–29)
CREAT SERPL-MCNC: 0.57 MG/DL (ref 0.57–1)
ERYTHROCYTE [DISTWIDTH] IN BLOOD BY AUTOMATED COUNT: 16.2 % (ref 12.3–15.4)
GFR SERPL CREATININE-BSD FRML MDRD: 144 ML/MIN/1.73
GLOBULIN UR ELPH-MCNC: 3.1 GM/DL
GLUCOSE SERPL-MCNC: 90 MG/DL (ref 65–99)
HCT VFR BLD AUTO: 39.9 % (ref 34–46.6)
HGB BLD-MCNC: 13.5 G/DL (ref 12–15.9)
LYMPHOCYTES # BLD AUTO: 1.3 10*3/MM3 (ref 0.7–3.1)
LYMPHOCYTES NFR BLD AUTO: 19.5 % (ref 19.6–45.3)
MCH RBC QN AUTO: 33.1 PG (ref 26.6–33)
MCHC RBC AUTO-ENTMCNC: 33.8 G/DL (ref 31.5–35.7)
MCV RBC AUTO: 98 FL (ref 79–97)
MONOCYTES # BLD AUTO: 0.4 10*3/MM3 (ref 0.1–0.9)
MONOCYTES NFR BLD AUTO: 6.1 % (ref 5–12)
NEUTROPHILS NFR BLD AUTO: 5.1 10*3/MM3 (ref 1.7–7)
NEUTROPHILS NFR BLD AUTO: 74.4 % (ref 42.7–76)
PLATELET # BLD AUTO: 295 10*3/MM3 (ref 140–450)
PMV BLD AUTO: 7.9 FL (ref 6–12)
POTASSIUM SERPL-SCNC: 3.9 MMOL/L (ref 3.5–5.2)
PROT SERPL-MCNC: 7.6 G/DL (ref 6–8.5)
RBC # BLD AUTO: 4.07 10*6/MM3 (ref 3.77–5.28)
SODIUM SERPL-SCNC: 137 MMOL/L (ref 136–145)
WBC NRBC COR # BLD: 6.9 10*3/MM3 (ref 3.4–10.8)

## 2021-12-09 PROCEDURE — 25010000002 DEXAMETHASONE SODIUM PHOSPHATE 100 MG/10ML SOLUTION: Performed by: INTERNAL MEDICINE

## 2021-12-09 PROCEDURE — 25010000002 HEPARIN LOCK FLUSH PER 10 UNITS: Performed by: INTERNAL MEDICINE

## 2021-12-09 PROCEDURE — 25010000002 DIPHENHYDRAMINE PER 50 MG: Performed by: INTERNAL MEDICINE

## 2021-12-09 PROCEDURE — 96375 TX/PRO/DX INJ NEW DRUG ADDON: CPT

## 2021-12-09 PROCEDURE — 85025 COMPLETE CBC W/AUTO DIFF WBC: CPT | Performed by: INTERNAL MEDICINE

## 2021-12-09 PROCEDURE — 80053 COMPREHEN METABOLIC PANEL: CPT | Performed by: INTERNAL MEDICINE

## 2021-12-09 PROCEDURE — 96413 CHEMO IV INFUSION 1 HR: CPT

## 2021-12-09 PROCEDURE — 25010000002 PACLITAXEL PER 1 MG: Performed by: INTERNAL MEDICINE

## 2021-12-09 RX ORDER — HEPARIN SODIUM (PORCINE) LOCK FLUSH IV SOLN 100 UNIT/ML 100 UNIT/ML
500 SOLUTION INTRAVENOUS AS NEEDED
OUTPATIENT
Start: 2021-12-09

## 2021-12-09 RX ORDER — SODIUM CHLORIDE 9 MG/ML
250 INJECTION, SOLUTION INTRAVENOUS ONCE
Status: COMPLETED | OUTPATIENT
Start: 2021-12-09 | End: 2021-12-09

## 2021-12-09 RX ORDER — HEPARIN SODIUM (PORCINE) LOCK FLUSH IV SOLN 100 UNIT/ML 100 UNIT/ML
500 SOLUTION INTRAVENOUS AS NEEDED
Status: DISCONTINUED | OUTPATIENT
Start: 2021-12-09 | End: 2021-12-10 | Stop reason: HOSPADM

## 2021-12-09 RX ORDER — FAMOTIDINE 10 MG/ML
20 INJECTION, SOLUTION INTRAVENOUS ONCE
Status: COMPLETED | OUTPATIENT
Start: 2021-12-09 | End: 2021-12-09

## 2021-12-09 RX ADMIN — DIPHENHYDRAMINE HYDROCHLORIDE 25 MG: 50 INJECTION INTRAMUSCULAR; INTRAVENOUS at 08:48

## 2021-12-09 RX ADMIN — FAMOTIDINE 20 MG: 10 INJECTION, SOLUTION INTRAVENOUS at 08:47

## 2021-12-09 RX ADMIN — PACLITAXEL 85 MG: 6 INJECTION, SOLUTION INTRAVENOUS at 09:52

## 2021-12-09 RX ADMIN — SODIUM CHLORIDE 250 ML: 9 INJECTION, SOLUTION INTRAVENOUS at 08:47

## 2021-12-09 RX ADMIN — DEXAMETHASONE SODIUM PHOSPHATE 12 MG: 10 INJECTION, SOLUTION INTRAMUSCULAR; INTRAVENOUS at 08:47

## 2021-12-09 RX ADMIN — HEPARIN 500 UNITS: 100 SYRINGE at 11:09

## 2021-12-10 NOTE — PROGRESS NOTES
SW met with pt during her infusion to assist with financial needs.  Pt is off work for two weeks due to severe neuropathy and she is concerned about providing for her family.  SW provided $45 in [employer] gift cards and a $15 gas card.  SW also will forward her utility bill to some community resource organizations to see if she can get some assistance with them.  Pt was very appreciative.  SW available for ongoing support and resource needs.

## 2021-12-13 ENCOUNTER — DOCUMENTATION (OUTPATIENT)
Dept: SOCIAL WORK | Facility: HOSPITAL | Age: 36
End: 2021-12-13

## 2021-12-13 NOTE — PROGRESS NOTES
JED contact the [employer] to request assistance with pt utility bill.  She owes over $400.  JED was able to obtain a $400 [employer] gift card for pt and she was beyond thrilled and thankful.  SW available for ongoing support and resource needs.

## 2021-12-15 ENCOUNTER — HOSPITAL ENCOUNTER (OUTPATIENT)
Dept: RADIATION ONCOLOGY | Facility: HOSPITAL | Age: 36
Setting detail: RADIATION/ONCOLOGY SERIES
Discharge: HOME | End: 2021-12-15

## 2021-12-16 ENCOUNTER — OFFICE VISIT (OUTPATIENT)
Dept: ONCOLOGY | Facility: CLINIC | Age: 36
End: 2021-12-16

## 2021-12-16 VITALS
WEIGHT: 130.1 LBS | HEIGHT: 65 IN | BODY MASS INDEX: 21.67 KG/M2 | HEART RATE: 98 BPM | TEMPERATURE: 97.1 F | SYSTOLIC BLOOD PRESSURE: 112 MMHG | DIASTOLIC BLOOD PRESSURE: 75 MMHG | RESPIRATION RATE: 16 BRPM

## 2021-12-16 DIAGNOSIS — Z17.0 CARCINOMA OF UPPER-OUTER QUADRANT OF LEFT BREAST IN FEMALE, ESTROGEN RECEPTOR POSITIVE (HCC): Primary | ICD-10-CM

## 2021-12-16 DIAGNOSIS — C50.412 CARCINOMA OF UPPER-OUTER QUADRANT OF LEFT BREAST IN FEMALE, ESTROGEN RECEPTOR POSITIVE (HCC): Primary | ICD-10-CM

## 2021-12-16 PROCEDURE — 99214 OFFICE O/P EST MOD 30 MIN: CPT | Performed by: INTERNAL MEDICINE

## 2021-12-16 RX ORDER — LIDOCAINE AND PRILOCAINE 25; 25 MG/G; MG/G
CREAM TOPICAL AS NEEDED
Qty: 30 G | Refills: 3 | Status: SHIPPED | OUTPATIENT
Start: 2021-12-16 | End: 2022-03-07

## 2021-12-16 RX ORDER — TAMOXIFEN CITRATE 20 MG/1
20 TABLET ORAL DAILY
Qty: 90 TABLET | Refills: 3 | Status: SHIPPED | OUTPATIENT
Start: 2021-12-16 | End: 2022-03-16

## 2021-12-16 NOTE — PROGRESS NOTES
PROBLEM LIST:  Oncology/Hematology History   Carcinoma of upper-outer quadrant of left breast in female, estrogen receptor positive (HCC)   4/14/2021 Cancer Staged    Staging form: Breast, AJCC 8th Edition  - Pathologic stage from 4/14/2021: Stage IB (pT3, pN1a, cM0, G2, ER+, [place]+, HER2-) - Signed by Graham Herbert MD on 4/29/2021 4/29/2021 Initial Diagnosis    Carcinoma of upper-outer quadrant of left breast in female, estrogen receptor positive (CMS/HCC)     5/21/2021 - 7/15/2021 Chemotherapy    OP BREAST AC DD DOXOrubicin / Cyclophosphamide     8/26/2021 -  Chemotherapy    OP BREAST PACLitaxel (weekly X 12)         REASON FOR VISIT: Breast cancer    HISTORY OF PRESENT ILLNESS:   36 y.o.  female presents today for follow-up of her breast cancer.  She has completed dose dense AC followed by weekly Taxol.  Clinically having some issues with neuropathy in her feet.  But overall she is doing reasonably well.  She is also having hot flashes.  Suspect she is in menopause at this point.  Denies any recent infections..    Past medical history, social history and family history was reviewed 12/16/21 and unchanged from prior visit.    Review of Systems:    Review of Systems   Constitutional: Negative.    HENT:  Negative.    Eyes: Negative.    Respiratory: Negative.    Cardiovascular: Negative.    Gastrointestinal: Negative.    Endocrine: Negative.    Genitourinary: Negative.     Musculoskeletal: Negative.    Skin: Negative.    Neurological: Positive for numbness.   Hematological: Negative.    Psychiatric/Behavioral: Negative.             Medications:        Current Outpatient Medications:   •  zolpidem (AMBIEN) 5 MG tablet, May take 1-2 tabs as needed for Sleep, Disp: 45 tablet, Rfl: 4  •  tamoxifen (NOLVADEX) 20 MG chemo tablet, Take 1 tablet by mouth Daily for 90 days., Disp: 90 tablet, Rfl: 3           ALLERGIES:  No Known Allergies      Physical Exam    VITAL SIGNS:  /75   Pulse 98   Temp 97.1 °F (36.2  "°C) (Temporal)   Resp 16   Ht 165.1 cm (65\")   Wt 59 kg (130 lb 1.6 oz)   BMI 21.65 kg/m²                 Wt Readings from Last 3 Encounters:   12/16/21 59 kg (130 lb 1.6 oz)   12/09/21 58.9 kg (129 lb 12.8 oz)   12/02/21 59.5 kg (131 lb 3.2 oz)       Body mass index is 21.65 kg/m². Body surface area is 1.65 meters squared.    Pain Score    12/16/21 1327   PainSc: 0-No pain           Performance Status: 0    General: well appearing, in no acute distress  HEENT: sclera anicteric, neck is supple  Lymphatics: no cervical, supraclavicular, or axillary adenopathy  Cardiovascular: regular rate and rhythm, no murmurs, rubs or gallops  Lungs: clear to auscultation bilaterally  Abdomen: soft, nontender, nondistended.  No palpable organomegaly  Extremities: no lower extremity edema  Skin: no rashes, lesions, bruising, or petechiae  Msk:  Shows no weakness of the large muscle groups  Psych: Mood is stable        RECENT LABS:    Lab Results   Component Value Date    HGB 13.5 12/09/2021    HCT 39.9 12/09/2021    MCV 98.0 (H) 12/09/2021     12/09/2021    WBC 6.90 12/09/2021    NEUTROABS 5.10 12/09/2021    LYMPHSABS 1.30 12/09/2021    MONOSABS 0.40 12/09/2021    EOSABS 0.08 11/09/2020    BASOSABS 0.01 11/09/2020       Lab Results   Component Value Date    GLUCOSE 90 12/09/2021    BUN 10 12/09/2021    CREATININE 0.57 12/09/2021     12/09/2021    K 3.9 12/09/2021     12/09/2021    CO2 21.0 (L) 12/09/2021    CALCIUM 9.3 12/09/2021    PROTEINTOT 7.6 12/09/2021    ALBUMIN 4.50 12/09/2021    BILITOT 0.3 12/09/2021    ALKPHOS 100 12/09/2021    AST 24 12/09/2021    ALT 18 12/09/2021           NM Pet Skull Base To Mid Thigh    Result Date: 5/28/2021  Negative PET-CT scan.  D:  05/28/2021 E:  05/28/2021     This report was finalized on 5/28/2021 5:15 PM by Dr. Yesenia Hernandez MD.        Assessment/Plan    1.  Node positive ER/[place] positive HER-2 negative breast cancer.  She has completed adjuvant chemotherapy.  I am " going to refer her for radiotherapy and then place her on tamoxifen.  She will see me in 3 months to make sure she is doing well.    2.  Chemotherapy-induced neuropathy.  This has not worsened significantly.  She will use lidocaine topical to see if it helps with pain.    Total time of patient care on day of service including time prior to, face to face with patient, and following visit spent in reviewing records, lab results, imaging studies, discussion with patient, and documentation/charting was > 31 minutes.     Graham Herbert MD  [facility] Hematology and Oncology    Return in (Approximately): 3 months    Orders Placed This Encounter   Procedures   • Ambulatory Referral to Radiation Oncology   • Ambulatory Referral to Dom Surg for Port-a-Cath       12/16/2021

## 2021-12-17 ENCOUNTER — TRANSCRIBE ORDERS (OUTPATIENT)
Dept: ADMINISTRATIVE | Facility: HOSPITAL | Age: 36
End: 2021-12-17

## 2021-12-17 DIAGNOSIS — Z11.59 ENCOUNTER FOR SCREENING FOR VIRAL DISEASE: Primary | ICD-10-CM

## 2021-12-29 PROCEDURE — 87186 SC STD MICRODIL/AGAR DIL: CPT | Performed by: NURSE PRACTITIONER

## 2021-12-29 PROCEDURE — 87086 URINE CULTURE/COLONY COUNT: CPT | Performed by: NURSE PRACTITIONER

## 2022-01-12 ENCOUNTER — TELEPHONE (OUTPATIENT)
Dept: ONCOLOGY | Facility: CLINIC | Age: 37
End: 2022-01-12

## 2022-01-12 NOTE — TELEPHONE ENCOUNTER
Pt reports feeling nauseous and would like Dr. Burnham to order a prescription. Pt reports feeling dehydrated. Pt is not running a fever and does not have a cough. Advised pt, Jorge can follow up on this.

## 2022-01-12 NOTE — TELEPHONE ENCOUNTER
Called patient back. At this time patient reporting that she has been vomiting for the last 24 hours. However she hasn't vomited since yesterday is hoping to feel better. Discussing with her that she may need to follow up with [facility] clinic to find out if she has any thing going on besides a virus if her vomiting starts back. Patient stating she understood.

## 2022-01-24 ENCOUNTER — LAB (OUTPATIENT)
Dept: PREADMISSION TESTING | Facility: HOSPITAL | Age: 37
End: 2022-01-24

## 2022-01-24 DIAGNOSIS — Z11.59 ENCOUNTER FOR SCREENING FOR VIRAL DISEASE: ICD-10-CM

## 2022-01-24 LAB — SARS-COV-2 RNA PNL SPEC NAA+PROBE: NOT DETECTED

## 2022-01-24 PROCEDURE — C9803 HOPD COVID-19 SPEC COLLECT: HCPCS

## 2022-01-24 PROCEDURE — U0004 COV-19 TEST NON-CDC HGH THRU: HCPCS

## 2022-02-15 ENCOUNTER — TELEPHONE (OUTPATIENT)
Dept: ONCOLOGY | Facility: CLINIC | Age: 37
End: 2022-02-15

## 2022-02-15 NOTE — TELEPHONE ENCOUNTER
Caller: Rai Guillory    Relationship: Self      What was the call regarding: CALLING REGARDING SUPPOSED BE GETTING APPT WITH RAD ON[facility], BUT HAD NOT HEARD ANYTHING. STATED DR BLACK HAD PUT IN REFERRAL.     IN CHART REFERRAL WAS PUT IN 12/16, AND APPT WAS SCHEDULED 12/15, WITH NATHALIE BLANTON FOR CONSULT, PT DID NOT SHOW.         LIZZETH TRANSFERRED RAI TO [facility] IN [facility] RADIATION ONCOLOGY TO FURTHER ASSIST.

## 2022-02-22 ENCOUNTER — TELEPHONE (OUTPATIENT)
Dept: RADIATION ONCOLOGY | Facility: HOSPITAL | Age: 37
End: 2022-02-22

## 2022-02-22 NOTE — TELEPHONE ENCOUNTER
Radiation Oncology appointment and outside film reminder-Spoke with patient she verbalized understanding on importance of bringing outside films to consult-MELVIN Gilmore

## 2022-02-23 ENCOUNTER — TELEPHONE (OUTPATIENT)
Dept: SOCIAL WORK | Facility: HOSPITAL | Age: 37
End: 2022-02-23

## 2022-02-23 NOTE — TELEPHONE ENCOUNTER
SW received a phone call from pt requesting a ride home tomorrow after her appointment in radiation.  SW instructed pt to call tomorrow after her appointment is over and a Lyft ride will be ordered for her.  SW available for ongoing support and resource needs.

## 2022-02-24 ENCOUNTER — HOSPITAL ENCOUNTER (OUTPATIENT)
Dept: RADIATION ONCOLOGY | Facility: HOSPITAL | Age: 37
Setting detail: RADIATION/ONCOLOGY SERIES
Discharge: HOME | End: 2022-02-24

## 2022-02-28 ENCOUNTER — TELEPHONE (OUTPATIENT)
Dept: RADIATION ONCOLOGY | Facility: HOSPITAL | Age: 37
End: 2022-02-28

## 2022-03-07 ENCOUNTER — OFFICE VISIT (OUTPATIENT)
Dept: RADIATION ONCOLOGY | Facility: HOSPITAL | Age: 37
End: 2022-03-07

## 2022-03-07 ENCOUNTER — HOSPITAL ENCOUNTER (OUTPATIENT)
Dept: RADIATION ONCOLOGY | Facility: HOSPITAL | Age: 37
Discharge: HOME | End: 2022-03-07

## 2022-03-07 ENCOUNTER — HOSPITAL ENCOUNTER (OUTPATIENT)
Dept: RADIATION ONCOLOGY | Facility: HOSPITAL | Age: 37
Setting detail: RADIATION/ONCOLOGY SERIES
Discharge: HOME | End: 2022-03-07

## 2022-03-07 VITALS
BODY MASS INDEX: 22.01 KG/M2 | OXYGEN SATURATION: 98 % | HEIGHT: 65 IN | TEMPERATURE: 97.2 F | WEIGHT: 132.1 LBS | HEART RATE: 70 BPM | DIASTOLIC BLOOD PRESSURE: 71 MMHG | RESPIRATION RATE: 16 BRPM | SYSTOLIC BLOOD PRESSURE: 109 MMHG

## 2022-03-07 DIAGNOSIS — Z85.3 HISTORY OF LEFT BREAST CANCER: ICD-10-CM

## 2022-03-07 DIAGNOSIS — Z85.3 HISTORY OF LEFT BREAST CANCER: Primary | ICD-10-CM

## 2022-03-07 DIAGNOSIS — C50.412 CARCINOMA OF UPPER-OUTER QUADRANT OF LEFT BREAST IN FEMALE, ESTROGEN RECEPTOR POSITIVE: ICD-10-CM

## 2022-03-07 DIAGNOSIS — Z17.0 CARCINOMA OF UPPER-OUTER QUADRANT OF LEFT BREAST IN FEMALE, ESTROGEN RECEPTOR POSITIVE: ICD-10-CM

## 2022-03-07 LAB — B-HCG UR QL: NEGATIVE

## 2022-03-07 PROCEDURE — 81025 URINE PREGNANCY TEST: CPT | Performed by: RADIOLOGY

## 2022-03-07 PROCEDURE — 77333 RADIATION TREATMENT AID(S): CPT | Performed by: RADIOLOGY

## 2022-03-07 PROCEDURE — 77290 THER RAD SIMULAJ FIELD CPLX: CPT | Performed by: RADIOLOGY

## 2022-03-07 PROCEDURE — G0463 HOSPITAL OUTPT CLINIC VISIT: HCPCS

## 2022-03-07 NOTE — PROGRESS NOTES
CONSULTATION NOTE    NAME:      Rai Guillory  :                                                          1985  DATE OF CONSULTATION:                       22  REQUESTING PHYSICIAN:                   Graham Herbert MD  REASON FOR CONSULTATION:           Further evaluation and management of the patient's adenocarcinoma of the left breast for consideration of adjuvant radiotherapy treatments.  Carcinoma of upper-outer quadrant of left breast in female, estrogen receptor positive (HCC)  Staging form: Breast, AJCC 8th Edition  - Pathologic stage from 2021: Stage IB (pT3, pN1a, cM0, G2, ER+, NE+, HER2-) - Signed by Graham Herbert MD on 2021           BRIEF HISTORY:  Rai Guillory  is a very pleasant 36 y.o. female  who had recurrent mastitis.  And the patient underwent left breast excisional biopsy/lumpectomy in 2020.  Patient was found to have Tis.  Patient then had MRI scan 2021 that showed the cavity from an excisional biopsy in the left breast.  The patient had evidence of some abscess enhancing lesion measuring 3.7 cm in the central component of the left breast.  Patient then had an ultrasound MRI guided biopsy on 2/10/2021.  This was positive for ductal carcinoma ER/NE positive HER-2 negative In the central region of the breast.  Patient also had a biopsy in the upper inner quadrant which is positive for DCIS.  Patient had a left lateral biopsy which was negative for cancer.  The patient has a biopsy in the right breast which was negative for cancer.  2021 the patient had a right-sided simple mastectomy which was negative for evidence of cancer P  The patient had a left simple mastectomy and left sentinel lymph node biopsy.  Patient's lesion was 5 cm in size and was positive for invasive ductal carcinoma with DCIS.  The patient's invasive cancer was grade 2 the patient's DCIS was grade 3.  Patient had 1 of 5 sentinel lymph nodes  involved with macro metastases.  The patient also had additional 2 lymph nodes involved with micrometastases.  The patient's margins were widely negative.  Patient had no evidence of LVSI.  Patient's final stage was PT2N1A SLN.  The patient had a PET scan on 5/20/2021 that showed no evidence of residual or distant disease.  The patient was started on chemotherapy with Dr. Herbert.  She completed AC followed by T chemotherapy.  The patient was referred test back in December however the patient has some difficulty keeping her appointments with us and presents today for her radiation consult.  The patient ports that she does not have motor vehicle at this time.  She does report that she is in process of obtaining a motor vehicle in the near future.  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  Patient reports that she has no evidence of swelling by return her left arm.  She reports that she has good mobility in her left arm or shoulder.      BMI:  Body mass index is 21.98 kg/m².      Social History     Substance and Sexual Activity   Alcohol Use Yes   • Alcohol/week: 2.0 standard drinks   • Types: 2 Shots of liquor per week    Comment: once or twice a wk       No Known Allergies    Social History     Tobacco Use   • Smoking status: Former Smoker     Types: Cigarettes   • Smokeless tobacco: Never Used   Vaping Use   • Vaping Use: Never used   Substance Use Topics   • Alcohol use: Yes     Alcohol/week: 2.0 standard drinks     Types: 2 Shots of liquor per week     Comment: once or twice a wk   • Drug use: Yes     Types: Marijuana     Comment: Almost every day          Past Medical History:   Diagnosis Date   • Cancer (HCC)     Breast    • Carcinoma of upper-outer quadrant of left breast in female, estrogen receptor positive (HCC) 4/29/2021   • History of abnormal cervical Pap smear    • Mastitis    • Ovarian cyst    • STD  "exposure     Chlamydia and GC   • Tattoos     x 3 above waist        family history includes Alcohol abuse in her father; Breast cancer (age of onset: 23) in her paternal cousin; Diabetes in her father; Heart disease in her father; Hypertension in her father; No Known Problems in her mother.     Past Surgical History:   Procedure Laterality Date   • BREAST SURGERY Left 12/2020    Lumpectomy    • DIAGNOSTIC LAPAROSCOPY  03/29/2018    Endometrial ablation   • FOOT SURGERY  09/2017   • KNEE ACL RECONSTRUCTION  06/2007   • MASTECTOMY W/ SENTINEL NODE BIOPSY Bilateral 4/14/2021    Procedure: MASTECTOMY BILATERAL WITH LEFT SENTINEL NODE BIOPSY;  Surgeon: Pankaj Cortes MD;  Location: Replaced by [facility];  Service: General;  Laterality: Bilateral;   • TUBAL ABDOMINAL LIGATION  11/2013        Review of Systems   Musculoskeletal:        Pain in hands and feet   Neurological: Positive for headaches.   Psychiatric/Behavioral: Positive for sleep disturbance.    A full 14 point review of systems was performed and was negative except as noted in the HPI.         Objective   VITAL SIGNS:   Vitals:    03/07/22 0847   BP: 109/71   Pulse: 70   Resp: 16   Temp: 97.2 °F (36.2 °C)   SpO2: 98%  Comment: RA   Weight: 59.9 kg (132 lb 1.6 oz)   Height: 165.1 cm (65\")   PainSc: 0-No pain        KPS       90%    Physical Exam  Vitals and nursing note reviewed.   Constitutional:       Appearance: She is well-developed.   HENT:      Head: Normocephalic and atraumatic.   Eyes:      Conjunctiva/sclera: Conjunctivae normal.      Pupils: Pupils are equal, round, and reactive to light.   Neck:      Comments: No obviously enlarged cervical or supraclavicular LAD.  Cardiovascular:      Comments: Patient well perfused. Non cyanotic. No prominent JVD. No pedal edema  Pulmonary:      Effort: Pulmonary effort is normal.      Breath sounds: Normal breath sounds. No stridor. No wheezing.   Abdominal:      General: There is no distension.      Palpations: Abdomen is " soft.   Musculoskeletal:         General: Normal range of motion.      Cervical back: Normal range of motion and neck supple.      Comments: Patient moves all extremities spontaneously.    Skin:     General: Skin is warm and dry.      Comments: The chest wall is well-healed.  Patient has no evidence of residual tumor on her left chest wall.  Patient's arm mobility is very good.  No evidence of lymphedema  No palpable adenopathy in the left or right axilla or supraclavicular fossa.   Neurological:      Mental Status: She is alert and oriented to person, place, and time.      Comments: Coordination intact.   Psychiatric:         Behavior: Behavior normal.         Thought Content: Thought content normal.         Judgment: Judgment normal.              The following portions of the patient's history were reviewed and updated as appropriate: allergies, current medications, past family history, past medical history, past social history, past surgical history and problem list.       HISTORY: 35-year-old patient who presents for further evaluation of left  areolar pain. The patient was treated for an abscess of the left areolar  skin in January 2020. Her symptoms significantly improved with  antibiotic treatment. Findings consistent with an abscess were noted on  chest CT imaging dated 01/05/2020. The patient has a history of  recurrent left breast abscesses noted on outside ultrasound imaging  performed in 2010 and 2014. She has no personal or family history of  breast cancer.     TECHNIQUE: Bilateral breast low dose full field digital breast  tomosynthesis CC and MLO imaging was performed with 2D and 3D  acquisitions. 2-D/3-D bilateral MLO focal compression, left ML, and  laterally exaggerated right CC focal compression views were also  obtained. Furthermore, sonographic imaging was performed of bilateral  superior breasts and left subareolar region.     COMPARISON: [facility] chest CT dated  01/05/2020.  [facility] mammogram dated 01/31/2014. [facility] left breast ultrasound studies dated 01/31/2014 and 09/28/2010.     FINDINGS: The breast tissue is heterogeneously dense, which may obscure  small masses. The fibroglandular pattern is stable. Bilateral superior  and right lateral asymmetries improved on additional mammographic views.  There are no suspicious masses, areas of architectural distortion,  worrisome calcifications, or other secondary signs of malignancy.     Focused graphic imaging of the right superior breast demonstrates  unremarkable fatty and fibroglandular tissue. There are no solid masses,  cysts, or focal areas of shadowing.     Focused sonographic imaging of the left superior breast and left  subareolar region demonstrates unremarkable fibroglandular tissue. There  is minimal thickening of the areolar skin consistent with the patient's  recent abscess. No significant fluid collection is identified.     IMPRESSION:  No findings suspicious for malignancy on bilateral  mammographic imaging and bilateral ultrasound imaging.     RECOMMENDATION: Annual screening mammography at age 40 and clinical  followup of left breast pain.     BI-RADS CATEGORY 1, NEGATIVE.     CAD was utilized.     The standard false-negative rate of mammography is between 10% and 25%.   Complex patterns or increased breast density will markedly elevate the  false-negative rate of mammography.       At our facility, a triangular marker is positioned over a palpable area  of concern indicated by the patient. A [place] marker is placed over a  visible skin lesion. A linear marker indicates a scar.     A results letter, in lay terminology, will be given to the patient at  the conclusion of the exam.     This report was finalized on 5/21/2020 3:59 PM by Dr. Laurence Bob MD.     EXAMINATION:  BILATERAL BREAST MRI     HISTORY: 35-year-old female with a new diagnosis of intermediate grade  ductal  carcinoma in situ tumor size 1.6 cm in greatest dimension, no  invasive carcinoma, with margins of resection positive ,ER and IA  receptors positive. This diagnosis following excision of a subareolar  mass thought to be related to abscess. The patient has been worked up  with diagnostic mammography and ultrasound in May 2020 with a history  noted that the patient had been treated for recurrent left breast  abscesses dating back to 2010.     TECHNIQUE:  MRI was performed on a 1.5 Qian magnet utilizing an 8  channel [place] breast coil.  Pre-contrast spin-echo T1 weighted and  T2 weighted sequences were obtained in the axial plane.  Routine dynamic  images were performed following the administration of 13 ml of  Multihance contrast.  Four postcontrast runs were obtained. No contrast  complications occurred.  Delayed high resolution post contrast T1  weighted sagittal images were also obtained.  A CAD system ([employer]) was  utilized for data analysis.       COMPARISON: No prior MRIs available for comparison, comparison is made  to diagnostic mammography dated 05/21/2020 as well as 01/31/2014.     FINDINGS: There is normal vascular enhancement and marked background  enhancement. Marked background enhancement can obscure visualization of  abnormal areas of enhancement or enhancing masses.     LEFT BREAST: The left breast is noted to be larger than the right  breast. There is increased vascularity involving the left breast in  comparison to the right breast. Skin thickening is also present  involving the anterior left breast along with enhancement. Note should  be made that left breast anterior skin and nipple thickening has been  present on left mammograms dating back to 2014. There is a 3 cm fluid  collection at 12:00 in the anterior/middle depth left breast consistent  with the excisional biopsy cavity. There is extensive enhancement  involving the left breast surrounding the excisional biopsy cavity,  extending  anteriorly to involve the nipple areolar complex and skin. In  the upper outer quadrant of the left breast posteriorly is an irregular  area of enhancement best visualized on axial subtracted image 40  demonstrating washout kinetics on kinetic analysis. On the same image in  the upper inner quadrant at middle depth is a 3.4 cm area of clumped  enhancement demonstrating washout kinetics on kinetic analysis. At 12:00  centrally within the left breast clumped nodular enhancement is  identified on axial subtracted image 56 with plateau kinetics on kinetic  analysis. At the level of the nipple best visualized on axial subtracted  image 74 is clumped enhancement measuring 3.6 cm noted just inferior and  lateral to the excisional biopsy cavity.     RIGHT BREAST: There is marked background enhancement. The most  suspicious area of enhancement is identified in the upper outer quadrant  of the right breast anteriorly best visualized on axial subtracted image  50.     No axillary adenopathy is visualized. There is limited visualization of  the axilla on this study.     IMPRESSION:  Biopsy-proven DCIS with margins positive in a patient with a  history of recurrent abscess formation. The extensive enhancement  described in the left breast may be related to inflammation, DCIS or  fibrocystic changes. In order to establish extent of disease 3 site left  MRI guided biopsy is suggested first for the clumped enhancement in the  upper inner quadrant at middle depth on axial subtracted image 40, the  second area at 12:00 on axial subtracted image 54 where there is a large  area of nodular enhancement in the central breast measuring 3.7 cm. The  third targeted biopsy for the clumped enhancement identified in the  lateral breast inferior and lateral to the excisional biopsy cavity on  axial image 74.      As we have no prior MRIs available for comparison I would suggest a one  site MRI guided biopsy of the enhancement in the upper outer  quadrant of  the right breast anteriorly best visualized on axial subtracted image  48.     RECOMMENDATIONS: 3 site MRI guided biopsy of the left breast, one site  MRI guided biopsy of the right breast.     BI-RADS CATEGORY: 4 SUSPICIOUS ABNORMALITY BOTH BREASTS     D:  01/26/2021  E:  01/26/2021     This report was finalized on 1/27/2021 8:37 AM by Dr. Anna Hidalgo MD.      EXAMINATION: NM PET, SKULL BASE TO MID THIGH-05/28/2021:     INDICATION: Node positive breast cancer; C50.412-Malignant neoplasm of  upper-outer quadrant of left female breast; Z17.0-Estrogen receptor  positive status (ER+), staging breast cancer.     TECHNIQUE: With fasting blood glucose level of 105 mg/dL, a total of  12.66 mCi of FDG was administered via the right antecubital vein.  Following appropriate delay, PET-CT imaging was obtained from the skull  vertex to the mid thighs bilaterally and fused multiplanar images were  reconstructed. The CT scan is an unenhanced study and used for reference  to the PET scan only and should not be considered a diagnostic CT scan.  PET-CT imaging was reviewed in the axial, coronal, and sagittal planes  as well as within the cine mode.     The radiation dose reduction device was turned on as low as reasonably  achievable for each scan per ALARA protocol.     COMPARISON: Initial exam for treatment with no prior studies available  for comparison.     FINDINGS: There is normal variant activity seen within the oropharynx  and muscles of phonation. Normal variant activity identified in the  region of the vocal cords. No evidence of uptake of tracer to suggest  evidence of metastatic disease within the neck. There is low-level  activity identified diffusely throughout the bony skeleton. Findings  suggesting treatment such as chemotherapy. There is no evidence of  abnormal activity within the chest. Port-A-Catheter is identified on the  right. The lung fields are grossly clear. Normal variant activity  seen  within the heart. Within the abdomen and pelvis there is normal variant  activity seen within the GI and  tract. No evidence of hypermetabolic  activity to suggest evidence of malignancy or metastatic disease. Upper  thighs are unremarkable.     IMPRESSION:  Negative PET-CT scan.     D:  05/28/2021  E:  05/28/2021            This report was finalized on 5/28/2021 5:15 PM by Dr. Yesenia Hernandez MD.  Assessment      IMPRESSION:     The patient is a very pleasant 36-year-old female with a left central breast ER/WY positive HER-2 negative PT2N1A adenocarcinoma status post resection and sentinel lymph node biopsy followed by adjuvant chemotherapy.  The patient did have a very large lesion greater than 5 cm and positive lymph node as well as some micro mets as well.  This pleasant patient's young age serve as good indicators the patient would benefit from adjuvant radiotherapy.  Recommend adjuvant radiotherapy in the setting to a dose of 50.4 Gray at 1.8 Gray per day to the chest wall, IMs, and supraclavicular fossa.  I would also recommend a boost to the scar on the chest wall given the size of the lesion.  I would recommend GAVIN H given her age and the left-sided nature of her tumor.  We discussed risk and benefits of this today including cardiac toxicity, dermatitis, lymphedema, shoulder mobility issues, pneumonitis, esophagitis.    The patient reports that she is not interested in breast reconstruction.  Also addressed with her that if if she does ultimately want breast reconstruction that in the future may be more difficult to have that performed with adding radiotherapy.  Patient is interested in proceeding with radiation.  We discussed the process and the typical course of radiotherapy and how it is delivered.  She will need daily treatments.  The patient ports that she is between motor vehicles at this time and will hopefully have 1 obtained in 2 weeks or so.      The patient has already been delayed  significantly.  I recommend the patient report for CT simulation today to expedite care.      Greater than 1 hour was spent preparing for and coordinating this visit. >50% of the time was spent in direct face to face conversation with the patient teaching, answering question, and providing explanations regarding the patient's case.  The decision to treat the patient with radiation is a complex one and carries the risk of long-term side effects and complications.  The patient's malignancy represents a complicated life threatening condition that requires complex multidisciplinary management for treatment and followup.      RECOMMENDATIONS:    PT2N1A left central breast ER/VT positive HER-2 negative adenocarcinoma  -Status postmastectomy 4/14/2021 with Dr. Cortes  Status post AC followed by T chemotherapy with Dr. Herbert  -5 cm primary, young age, positive sentinel node, 2 nodes positive for micrometastasis out of 5  -Recommend adjuvant radiotherapy  -Recommend 50.4 Parker to the chest wall and supraclavicular fossa followed by boost to total dose of 54 Gray with electrons to the scar.  -Recommend GAVIN H given left side  -Recommend treating undissected axilla, supraclavicular fossa and internal mammary nodes  -Recommend CT simulation today given the patient's social situation, and expedite her care    Social distress  -Patient in between cars at this time  -We will need considerable social work assistance  -Would like to wait 2 weeks to start radiotherapy treatments in order to obtain a new motor vehicle.       Polo Pop MD        Errors in dictation may reflect use of voice recognition software and not all errors in transcription may have been detected prior to signing.

## 2022-03-14 PROCEDURE — 77295 3-D RADIOTHERAPY PLAN: CPT | Performed by: RADIOLOGY

## 2022-03-14 PROCEDURE — 77300 RADIATION THERAPY DOSE PLAN: CPT | Performed by: RADIOLOGY

## 2022-03-14 PROCEDURE — 77334 RADIATION TREATMENT AID(S): CPT | Performed by: RADIOLOGY

## 2022-03-22 ENCOUNTER — HOSPITAL ENCOUNTER (OUTPATIENT)
Dept: RADIATION ONCOLOGY | Facility: HOSPITAL | Age: 37
Discharge: HOME | End: 2022-03-22

## 2022-03-22 ENCOUNTER — NURSE NAVIGATOR (OUTPATIENT)
Dept: ONCOLOGY | Facility: CLINIC | Age: 37
End: 2022-03-22

## 2022-03-22 VITALS — WEIGHT: 134 LBS | BODY MASS INDEX: 22.3 KG/M2

## 2022-03-22 PROCEDURE — 77280 THER RAD SIMULAJ FIELD SMPL: CPT | Performed by: RADIOLOGY

## 2022-03-22 PROCEDURE — 77387 GUIDANCE FOR RADJ TX DLVR: CPT | Performed by: RADIOLOGY

## 2022-03-22 NOTE — PROGRESS NOTES
Amarilys in Radiation called and said that patient needed a Lyft ride home for 10 minutes. I called Marilyn Perez and she was working on this. AG

## 2022-03-23 ENCOUNTER — HOSPITAL ENCOUNTER (OUTPATIENT)
Dept: RADIATION ONCOLOGY | Facility: HOSPITAL | Age: 37
Discharge: HOME | End: 2022-03-23

## 2022-03-23 PROCEDURE — 77387 GUIDANCE FOR RADJ TX DLVR: CPT | Performed by: RADIOLOGY

## 2022-03-23 PROCEDURE — 77412 RADIATION TX DELIVERY LVL 3: CPT | Performed by: RADIOLOGY

## 2022-03-24 ENCOUNTER — HOSPITAL ENCOUNTER (OUTPATIENT)
Dept: RADIATION ONCOLOGY | Facility: HOSPITAL | Age: 37
Discharge: HOME | End: 2022-03-24

## 2022-03-24 PROCEDURE — 77412 RADIATION TX DELIVERY LVL 3: CPT | Performed by: RADIOLOGY

## 2022-03-24 PROCEDURE — 77387 GUIDANCE FOR RADJ TX DLVR: CPT | Performed by: RADIOLOGY

## 2022-03-25 ENCOUNTER — TELEPHONE (OUTPATIENT)
Dept: ONCOLOGY | Facility: CLINIC | Age: 37
End: 2022-03-25

## 2022-03-25 PROCEDURE — 77336 RADIATION PHYSICS CONSULT: CPT | Performed by: RADIOLOGY

## 2022-03-25 NOTE — TELEPHONE ENCOUNTER
I called and spoke with patient and advised her that I got her message and we will discuss with Dr. Burnham on Monday who we can ask about who he would want to refer her to.   I advised her that his nurse will call her back on Monday.

## 2022-03-25 NOTE — TELEPHONE ENCOUNTER
Caller: Rai Guillory    Relationship: Self    Best call back number: [phone]    What is the best time to reach you: ASAP    Who are you requesting to speak with (clinical staff, provider,  specific staff member): DR. EVANS    Do you know the name of the person who called: RAI    What was the call regarding: PATIENT CALLED FROM  IN TEARS AND VERY UPSET WITH THE RADIATION TEAM, STATES SHE CAN NO LONGER WORK WITH THEM AS THEY ARE SO COLD & HAVE NO EMPATHY.  SHE WOULD LIKE TO SWITCH HER RADIATION TRMTS. ELSEWHERE.  SHE STATED THE INFUSION GROUP WAS GREAT.      Do you require a callback: YES, PLEASE

## 2022-03-30 ENCOUNTER — TELEPHONE (OUTPATIENT)
Dept: ONCOLOGY | Facility: CLINIC | Age: 37
End: 2022-03-30

## 2022-05-02 ENCOUNTER — HOSPITAL ENCOUNTER (OUTPATIENT)
Dept: RADIATION ONCOLOGY | Facility: HOSPITAL | Age: 37
Setting detail: RADIATION/ONCOLOGY SERIES
Discharge: HOME | End: 2022-05-02

## 2022-06-02 PROCEDURE — U0004 COV-19 TEST NON-CDC HGH THRU: HCPCS | Performed by: NURSE PRACTITIONER

## 2022-06-16 ENCOUNTER — TELEPHONE (OUTPATIENT)
Dept: RADIATION ONCOLOGY | Facility: HOSPITAL | Age: 37
End: 2022-06-16

## 2022-06-16 NOTE — TELEPHONE ENCOUNTER
DATE OF COMPLETION: 3/24/2022  DIAGNOSIS: Breast cancer    REFERRING: Dr. Burnham        Dear Dr. Tej Guillory,Rai Gonzlaes completed radiation therapy today.      BACKGROUND: Rai Guillory is a very pleasant female with breast cancer.  The patient attempted to receive adjuvant radiation treatments.    Treatment Summary     Dates of Therapy: 3/23/2022-3/24/2022  Treatment Site: Left chest wall and supra clavicle  Dose: 3.6 Gy in 2 fractions of 1.8 Gy each  Of the plan fractionation of 54 Parker in 30 treatments.  Technique: 3D    Treatment Course and Tolerance: Mrs. Guillory was noncompliant with treatments.  She unfortunately did not want to continue her treatments in our department.  We offered to refer the patient to a different facility or to help make accommodations for her.  She discontinued her treatments with us and has been lost to follow-up.    The initial follow up visit will be in 1 month.    Rai Guillory knows to call if any problems or concerns develop in the meantime.     Electronically signed by: Polo Pop MD                    Cc: Christy Stauffer APRN

## 2022-06-27 ENCOUNTER — TELEPHONE (OUTPATIENT)
Dept: ONCOLOGY | Facility: CLINIC | Age: 37
End: 2022-06-27

## 2022-06-27 NOTE — TELEPHONE ENCOUNTER
Caller: JOSE    Relationship to patient: SELF     Best call back number: [phone]    Patient is needing: TO REQUEST CALL FROM  IN BREAST CANCER DEPT.

## 2022-06-28 ENCOUNTER — TELEPHONE (OUTPATIENT)
Dept: SOCIAL WORK | Facility: HOSPITAL | Age: 37
End: 2022-06-28

## 2022-06-28 NOTE — TELEPHONE ENCOUNTER
SW called pt.  She requested a food basket.  SW placed a referral to [facility] on her behalf and she can pick it up on Thursday at 5:00pm.  Pt notified and agreeable.

## 2022-07-16 NOTE — TELEPHONE ENCOUNTER
I advised Pt to try the brat diet as well as some vitmain b6, Pt states she had no pain just very nausea.  
Provider Name  Dr Cuellar  Reason for Call  Had surgery on Thursday 3/29/18, still very nauseous,   Pharmacy Name  [facility] on [address]  Call Back Number  [phone]  
PAST MEDICAL HISTORY:  Anemia secondary to renal failure     Ataxia     CAD (coronary artery disease)     CRF (chronic renal failure), unspecified stage     Depression     Diabetes mellitus II     Dialysis patient     ESRD on dialysis     Falls     h/o Anxiety attack     h/o Hepatitis A 1969 currently resolved, no symptoms    h/o Myocardial infarct 2007     h/o Smoking quitted 3/2012    HTN (hypertension)     HTN (hypertension)     Murmur, cardiac     Osteomyelitis     PAD (peripheral artery disease)

## 2022-08-17 PROCEDURE — U0004 COV-19 TEST NON-CDC HGH THRU: HCPCS | Performed by: NURSE PRACTITIONER

## 2022-08-29 PROCEDURE — U0004 COV-19 TEST NON-CDC HGH THRU: HCPCS | Performed by: NURSE PRACTITIONER

## 2022-12-05 ENCOUNTER — OFFICE VISIT (OUTPATIENT)
Dept: FAMILY MEDICINE CLINIC | Facility: CLINIC | Age: 37
End: 2022-12-05

## 2022-12-05 VITALS
TEMPERATURE: 97.8 F | RESPIRATION RATE: 20 BRPM | OXYGEN SATURATION: 99 % | BODY MASS INDEX: 22.56 KG/M2 | HEART RATE: 95 BPM | HEIGHT: 65 IN | WEIGHT: 135.4 LBS | DIASTOLIC BLOOD PRESSURE: 72 MMHG | SYSTOLIC BLOOD PRESSURE: 114 MMHG

## 2022-12-05 DIAGNOSIS — R52 PAIN: ICD-10-CM

## 2022-12-05 DIAGNOSIS — L02.214 ABSCESS OF GROIN, LEFT: Primary | ICD-10-CM

## 2022-12-05 PROCEDURE — 99213 OFFICE O/P EST LOW 20 MIN: CPT | Performed by: NURSE PRACTITIONER

## 2022-12-05 RX ORDER — IBUPROFEN 800 MG/1
800 TABLET ORAL EVERY 8 HOURS PRN
Qty: 90 TABLET | Refills: 0 | Status: SHIPPED | OUTPATIENT
Start: 2022-12-05 | End: 2023-01-03

## 2022-12-05 RX ORDER — SULFAMETHOXAZOLE AND TRIMETHOPRIM 800; 160 MG/1; MG/1
1 TABLET ORAL 2 TIMES DAILY
Qty: 6 TABLET | Refills: 0 | Status: SHIPPED | OUTPATIENT
Start: 2022-12-05 | End: 2022-12-08

## 2022-12-05 NOTE — PROGRESS NOTES
"Chief Complaint  Abscess (Pt has an ingrown hair close to her panty line. )    Subjective          Rai Guillory presents to [facility]  History of Present Illness  Patient is a 37-year-old female.  She is here for complaint of a left groin \"infected hair\".  She states it has been there for 4 to 5 days.  Worsening.  She denies any fever or chills. She's had recurrent infection to the same area.   She is declining to have the area lanced. Discussed using warm compresses to help the area \"head up so it will drain on it's own\". She is agreeable.   Will add antibiotic and topical mupirocin ointment tid.   Follow up if no improvement.   Use antibacterial soap to clean the area..     Abscess  This is a new problem. The current episode started in the past 7 days. The problem occurs constantly. The problem has been gradually worsening. The symptoms are aggravated by bending and walking. She has tried NSAIDs for the symptoms. The treatment provided mild relief.       The following portions of the patient's history were reviewed and updated as appropriate: allergies, current medications, past family history, past medical history, past social history, past surgical history and problem list.    Review of Systems   Constitutional: Positive for activity change.   HENT: Negative.    Respiratory: Negative.    Cardiovascular: Negative.    Gastrointestinal: Negative.    Musculoskeletal: Negative.    Skin: Positive for color change.        Left groin area abscess    Neurological: Negative.    Psychiatric/Behavioral: Negative.          Objective   Vital Signs:   /72   Pulse 95   Temp 97.8 °F (36.6 °C) (Temporal)   Resp 20   Ht 165.1 cm (65\")   Wt 61.4 kg (135 lb 6.4 oz)   SpO2 99%   BMI 22.53 kg/m²    BMI is within normal parameters. No other follow-up for BMI required.      PHQ-2/9 Depression Screening  PHQ-9 Total Score: 0    TARA-7 Anxiety Screening  TARA-7   "           Physical Exam  Vitals reviewed. Chaperone present: patient declined a chaperone    HENT:      Nose: Nose normal.      Mouth/Throat:      Mouth: Mucous membranes are moist.   Cardiovascular:      Rate and Rhythm: Normal rate and regular rhythm.      Pulses: Normal pulses.   Abdominal:      Palpations: Abdomen is soft.   Skin:     Capillary Refill: Capillary refill takes less than 2 seconds.      Findings: Erythema present.             Comments: Left groin firm area with a white head.   She is declining allowing the site to be lanced at this time.   She would like to start on an antibiotic and apply warm moist compresses to her head on up so it will open on its own. She states this is a recurrence to the same site.        Neurological:      Mental Status: She is alert and oriented to person, place, and time.   Psychiatric:         Mood and Affect: Mood normal.         Behavior: Behavior normal.         Thought Content: Thought content normal.         Judgment: Judgment normal.        Result Review :                  Assessment and Plan    Diagnoses and all orders for this visit:    1. Abscess of groin, left (Primary)  -     sulfamethoxazole-trimethoprim (BACTRIM DS,SEPTRA DS) 800-160 MG per tablet; Take 1 tablet by mouth 2 (Two) Times a Day for 3 days.  Dispense: 6 tablet; Refill: 0  -     mupirocin (BACTROBAN) 2 % ointment; Apply 1 application topically to the appropriate area as directed 3 (Three) Times a Day.  Dispense: 1 each; Refill: 0    2. Pain  -     ibuprofen (ADVIL,MOTRIN) 800 MG tablet; Take 1 tablet by mouth Every 8 (Eight) Hours As Needed for Mild Pain.  Dispense: 90 tablet; Refill: 0    discussed using warm compresses to help the abscess to head on up and open on its own. She is declining lancing the sight.   Prn ibuprofen for pain   Bactrim BID x 10 days, mupirocin ointment tid   Ibuprofen prn for pain   Off work today.   Abscess vs hidradenitis suppurativa - this is a recurrence to the same  site.   Follow up if no improvement or fever.       Follow Up   Return if symptoms worsen or fail to improve.  Patient was given instructions and counseling regarding her condition or for health maintenance advice. Please see specific information pulled into the AVS if appropriate.

## 2022-12-31 DIAGNOSIS — R52 PAIN: ICD-10-CM

## 2023-01-03 RX ORDER — IBUPROFEN 800 MG/1
TABLET ORAL
Qty: 90 TABLET | Refills: 0 | Status: SHIPPED | OUTPATIENT
Start: 2023-01-03

## 2023-02-07 DIAGNOSIS — Z17.0 CARCINOMA OF UPPER-OUTER QUADRANT OF LEFT BREAST IN FEMALE, ESTROGEN RECEPTOR POSITIVE: Primary | ICD-10-CM

## 2023-02-07 DIAGNOSIS — C50.412 CARCINOMA OF UPPER-OUTER QUADRANT OF LEFT BREAST IN FEMALE, ESTROGEN RECEPTOR POSITIVE: Primary | ICD-10-CM

## 2023-02-10 ENCOUNTER — OFFICE VISIT (OUTPATIENT)
Dept: ONCOLOGY | Facility: CLINIC | Age: 38
End: 2023-02-10
Payer: COMMERCIAL

## 2023-02-10 ENCOUNTER — LAB (OUTPATIENT)
Dept: LAB | Facility: HOSPITAL | Age: 38
End: 2023-02-10
Payer: COMMERCIAL

## 2023-02-10 VITALS
BODY MASS INDEX: 22.53 KG/M2 | OXYGEN SATURATION: 99 % | HEIGHT: 65 IN | HEART RATE: 104 BPM | SYSTOLIC BLOOD PRESSURE: 123 MMHG | TEMPERATURE: 97.8 F | DIASTOLIC BLOOD PRESSURE: 90 MMHG | RESPIRATION RATE: 16 BRPM

## 2023-02-10 DIAGNOSIS — C50.412 CARCINOMA OF UPPER-OUTER QUADRANT OF LEFT BREAST IN FEMALE, ESTROGEN RECEPTOR POSITIVE: Primary | ICD-10-CM

## 2023-02-10 DIAGNOSIS — Z17.0 CARCINOMA OF UPPER-OUTER QUADRANT OF LEFT BREAST IN FEMALE, ESTROGEN RECEPTOR POSITIVE: ICD-10-CM

## 2023-02-10 DIAGNOSIS — C50.412 CARCINOMA OF UPPER-OUTER QUADRANT OF LEFT BREAST IN FEMALE, ESTROGEN RECEPTOR POSITIVE: ICD-10-CM

## 2023-02-10 DIAGNOSIS — Z17.0 CARCINOMA OF UPPER-OUTER QUADRANT OF LEFT BREAST IN FEMALE, ESTROGEN RECEPTOR POSITIVE: Primary | ICD-10-CM

## 2023-02-10 LAB
ALBUMIN SERPL-MCNC: 4.6 G/DL (ref 3.5–5.2)
ALBUMIN/GLOB SERPL: 1.7 G/DL
ALP SERPL-CCNC: 124 U/L (ref 39–117)
ALT SERPL W P-5'-P-CCNC: 23 U/L (ref 1–33)
ANION GAP SERPL CALCULATED.3IONS-SCNC: 11 MMOL/L (ref 5–15)
AST SERPL-CCNC: 26 U/L (ref 1–32)
BASOPHILS # BLD AUTO: 0.01 10*3/MM3 (ref 0–0.2)
BASOPHILS NFR BLD AUTO: 0.2 % (ref 0–1.5)
BILIRUB SERPL-MCNC: 0.3 MG/DL (ref 0–1.2)
BUN SERPL-MCNC: 10 MG/DL (ref 6–20)
BUN/CREAT SERPL: 17.5 (ref 7–25)
CALCIUM SPEC-SCNC: 8.3 MG/DL (ref 8.6–10.5)
CHLORIDE SERPL-SCNC: 107 MMOL/L (ref 98–107)
CO2 SERPL-SCNC: 24 MMOL/L (ref 22–29)
CREAT SERPL-MCNC: 0.57 MG/DL (ref 0.57–1)
DEPRECATED RDW RBC AUTO: 47.3 FL (ref 37–54)
EGFRCR SERPLBLD CKD-EPI 2021: 118.7 ML/MIN/1.73
EOSINOPHIL # BLD AUTO: 0.05 10*3/MM3 (ref 0–0.4)
EOSINOPHIL NFR BLD AUTO: 0.8 % (ref 0.3–6.2)
ERYTHROCYTE [DISTWIDTH] IN BLOOD BY AUTOMATED COUNT: 13.2 % (ref 12.3–15.4)
GLOBULIN UR ELPH-MCNC: 2.7 GM/DL
GLUCOSE SERPL-MCNC: 79 MG/DL (ref 65–99)
HCT VFR BLD AUTO: 41.4 % (ref 34–46.6)
HGB BLD-MCNC: 13.7 G/DL (ref 12–15.9)
IMM GRANULOCYTES # BLD AUTO: 0 10*3/MM3 (ref 0–0.05)
IMM GRANULOCYTES NFR BLD AUTO: 0 % (ref 0–0.5)
LYMPHOCYTES # BLD AUTO: 1.85 10*3/MM3 (ref 0.7–3.1)
LYMPHOCYTES NFR BLD AUTO: 31 % (ref 19.6–45.3)
MCH RBC QN AUTO: 31.7 PG (ref 26.6–33)
MCHC RBC AUTO-ENTMCNC: 33.1 G/DL (ref 31.5–35.7)
MCV RBC AUTO: 95.8 FL (ref 79–97)
MONOCYTES # BLD AUTO: 0.35 10*3/MM3 (ref 0.1–0.9)
MONOCYTES NFR BLD AUTO: 5.9 % (ref 5–12)
NEUTROPHILS NFR BLD AUTO: 3.7 10*3/MM3 (ref 1.7–7)
NEUTROPHILS NFR BLD AUTO: 62.1 % (ref 42.7–76)
PLATELET # BLD AUTO: 279 10*3/MM3 (ref 140–450)
PMV BLD AUTO: 9.6 FL (ref 6–12)
POTASSIUM SERPL-SCNC: 4 MMOL/L (ref 3.5–5.2)
PROT SERPL-MCNC: 7.3 G/DL (ref 6–8.5)
RBC # BLD AUTO: 4.32 10*6/MM3 (ref 3.77–5.28)
SODIUM SERPL-SCNC: 142 MMOL/L (ref 136–145)
WBC NRBC COR # BLD: 5.96 10*3/MM3 (ref 3.4–10.8)

## 2023-02-10 PROCEDURE — 36415 COLL VENOUS BLD VENIPUNCTURE: CPT

## 2023-02-10 PROCEDURE — 99214 OFFICE O/P EST MOD 30 MIN: CPT | Performed by: INTERNAL MEDICINE

## 2023-02-10 PROCEDURE — 85025 COMPLETE CBC W/AUTO DIFF WBC: CPT

## 2023-02-10 PROCEDURE — 80053 COMPREHEN METABOLIC PANEL: CPT

## 2023-02-10 RX ORDER — TAMOXIFEN CITRATE 20 MG/1
20 TABLET ORAL DAILY
Qty: 90 TABLET | Refills: 3 | Status: SHIPPED | OUTPATIENT
Start: 2023-02-10

## 2023-02-10 NOTE — PROGRESS NOTES
PROBLEM LIST:  Oncology/Hematology History   Carcinoma of upper-outer quadrant of left breast in female, estrogen receptor positive (HCC)   4/14/2021 Cancer Staged    Staging form: Breast, AJCC 8th Edition  - Pathologic stage from 4/14/2021: Stage IB (pT3, pN1a, cM0, G2, ER+, OH+, HER2-) - Signed by Graham Herbert MD on 4/29/2021 4/29/2021 Initial Diagnosis    Carcinoma of upper-outer quadrant of left breast in female, estrogen receptor positive (CMS/HCC)     5/21/2021 - 7/15/2021 Chemotherapy    OP BREAST AC DD DOXOrubicin / Cyclophosphamide     8/26/2021 - 12/9/2021 Chemotherapy    OP BREAST PACLitaxel (weekly X 12)     3/23/2022 - 3/24/2022 Radiation    Radiation OncologyTreatment Course:  Rai Guillory received 360 cGy in 2 fractions to left chest wall and supraclavicular nodes via External Beam Radiation - EBRT.  Patient decided to stop coming after two treatments.         REASON FOR VISIT: Breast cancer    HISTORY OF PRESENT ILLNESS:   37 y.o.  female presents today for follow-up of her breast cancer.  She has completed dose dense AC followed by weekly Taxol.  And then completed involved field radiotherapy and was placed on tamoxifen.  She has been on tamoxifen for the last 1 year.  She lost her insurance so failed to follow-up with any of us.  She presents today fairly distraught.  She is profoundly depressed and anxious.  She is having a lot of issues with depression at this time.  She is also having chronic headaches and back pain that seems to have occurred.  She is treating some of her anxiety with THC.    Past medical history, social history and family history was reviewed 02/10/23 and unchanged from prior visit.    Review of Systems:    Review of Systems   Constitutional: Negative.    HENT:  Negative.    Eyes: Negative.    Respiratory: Negative.    Cardiovascular: Negative.    Gastrointestinal: Negative.    Endocrine: Negative.    Genitourinary: Negative.     Musculoskeletal:  "Negative.    Skin: Negative.    Neurological: Positive for numbness.   Hematological: Negative.    Psychiatric/Behavioral: Negative.             Medications:        Current Outpatient Medications:   •  ibuprofen (ADVIL,MOTRIN) 800 MG tablet, TAKE ONE TABLET BY MOUTH EVERY 8 HOURS AS NEEDED FOR MILD PAIN, Disp: 90 tablet, Rfl: 0  •  mupirocin (BACTROBAN) 2 % ointment, Apply 1 application topically to the appropriate area as directed 3 (Three) Times a Day., Disp: 1 each, Rfl: 0  •  tamoxifen (NOLVADEX) 20 MG chemo tablet, Take 1 tablet by mouth Daily., Disp: 90 tablet, Rfl: 3    Pain Medications             ibuprofen (ADVIL,MOTRIN) 800 MG tablet TAKE ONE TABLET BY MOUTH EVERY 8 HOURS AS NEEDED FOR MILD PAIN             ALLERGIES:  No Known Allergies      Physical Exam    VITAL SIGNS:  /90   Pulse 104   Temp 97.8 °F (36.6 °C) (Infrared)   Resp 16   Ht 165.1 cm (65\")   SpO2 99%   BMI 22.53 kg/m²     ECOG score: 0           Wt Readings from Last 3 Encounters:   12/05/22 61.4 kg (135 lb 6.4 oz)   08/29/22 59 kg (130 lb)   08/17/22 61.2 kg (135 lb)       Body mass index is 22.53 kg/m². Body surface area is 1.68 meters squared.    Pain Score    02/10/23 1017   PainSc: 0-No pain           Performance Status: 0    General: Distraught appearing, crying  HEENT: sclera anicteric, neck is supple  Lymphatics: no cervical, supraclavicular, or axillary adenopathy  Chest exam reveals no nodularity.  Bilateral mastectomies in place  Lungs: clear to auscultation bilaterally  Abdomen: soft, nontender, nondistended.  No palpable organomegaly  Extremities: no lower extremity edema  Skin: no rashes, lesions, bruising, or petechiae  Msk:  Shows no weakness of the large muscle groups  Psych: Depressed.        RECENT LABS:    Lab Results   Component Value Date    HGB 13.7 02/10/2023    HCT 41.4 02/10/2023    MCV 95.8 02/10/2023     02/10/2023    WBC 5.96 02/10/2023    NEUTROABS 3.70 02/10/2023    LYMPHSABS 1.85 02/10/2023 "    MONOSABS 0.35 02/10/2023    EOSABS 0.05 02/10/2023    BASOSABS 0.01 02/10/2023       Lab Results   Component Value Date    GLUCOSE 79 02/10/2023    BUN 10 02/10/2023    CREATININE 0.57 02/10/2023     02/10/2023    K 4.0 02/10/2023     02/10/2023    CO2 24.0 02/10/2023    CALCIUM 8.3 (L) 02/10/2023    PROTEINTOT 7.3 02/10/2023    ALBUMIN 4.6 02/10/2023    BILITOT 0.3 02/10/2023    ALKPHOS 124 (H) 02/10/2023    AST 26 02/10/2023    ALT 23 02/10/2023           NM Pet Skull Base To Mid Thigh    Result Date: 5/28/2021  Negative PET-CT scan.  D:  05/28/2021 E:  05/28/2021     This report was finalized on 5/28/2021 5:15 PM by Dr. Yesenia Hernandez MD.        Assessment/Plan    1.  Node positive ER/NC positive HER-2 negative breast cancer.  Concern for recurrent disease with some symptoms.  Will check CT of the head chest abdomen pelvis to make sure she does not have anything ongoing.  Continue tamoxifen for now with no changes.    2.  Chemotherapy-induced neuropathy.  This remains an issue but stable.    3.  Severe depression and anxiety.  She does not want to use medication.  We will ask her to see Rosa Isela Hansen to see if she can help her.         Graham Herbert MD  [facility] Hematology and Oncology         Orders Placed This Encounter   Procedures   • CT Abdomen Pelvis With Contrast   • CT Chest With Contrast Diagnostic   • CT Head With Contrast   • Ambulatory Referral to Psychiatry       2/10/2023

## 2023-02-21 ENCOUNTER — HOSPITAL ENCOUNTER (OUTPATIENT)
Dept: CT IMAGING | Facility: HOSPITAL | Age: 38
Discharge: HOME | End: 2023-02-21
Admitting: INTERNAL MEDICINE
Payer: COMMERCIAL

## 2023-02-21 DIAGNOSIS — C50.412 CARCINOMA OF UPPER-OUTER QUADRANT OF LEFT BREAST IN FEMALE, ESTROGEN RECEPTOR POSITIVE: ICD-10-CM

## 2023-02-21 DIAGNOSIS — Z17.0 CARCINOMA OF UPPER-OUTER QUADRANT OF LEFT BREAST IN FEMALE, ESTROGEN RECEPTOR POSITIVE: ICD-10-CM

## 2023-02-21 PROCEDURE — 74177 CT ABD & PELVIS W/CONTRAST: CPT

## 2023-02-21 PROCEDURE — 71260 CT THORAX DX C+: CPT

## 2023-02-21 PROCEDURE — 70460 CT HEAD/BRAIN W/DYE: CPT

## 2023-02-21 PROCEDURE — 25010000002 IOPAMIDOL 61 % SOLUTION: Performed by: INTERNAL MEDICINE

## 2023-02-21 RX ADMIN — IOPAMIDOL 90 ML: 612 INJECTION, SOLUTION INTRAVENOUS at 16:40

## 2023-02-22 ENCOUNTER — LAB (OUTPATIENT)
Dept: LAB | Facility: HOSPITAL | Age: 38
End: 2023-02-22
Payer: COMMERCIAL

## 2023-02-22 ENCOUNTER — OFFICE VISIT (OUTPATIENT)
Dept: ONCOLOGY | Facility: CLINIC | Age: 38
End: 2023-02-22
Payer: COMMERCIAL

## 2023-02-22 ENCOUNTER — OFFICE VISIT (OUTPATIENT)
Dept: PSYCHIATRY | Facility: CLINIC | Age: 38
End: 2023-02-22
Payer: COMMERCIAL

## 2023-02-22 VITALS
SYSTOLIC BLOOD PRESSURE: 122 MMHG | WEIGHT: 134 LBS | HEART RATE: 82 BPM | RESPIRATION RATE: 16 BRPM | HEIGHT: 65 IN | TEMPERATURE: 96.9 F | OXYGEN SATURATION: 98 % | BODY MASS INDEX: 22.33 KG/M2 | DIASTOLIC BLOOD PRESSURE: 90 MMHG

## 2023-02-22 DIAGNOSIS — N30.90 CYSTITIS: ICD-10-CM

## 2023-02-22 DIAGNOSIS — N30.90 CYSTITIS: Primary | ICD-10-CM

## 2023-02-22 DIAGNOSIS — F33.1 MODERATE EPISODE OF RECURRENT MAJOR DEPRESSIVE DISORDER: Primary | ICD-10-CM

## 2023-02-22 DIAGNOSIS — G47.9 SLEEP DISTURBANCE: ICD-10-CM

## 2023-02-22 DIAGNOSIS — F41.9 ANXIETY DISORDER, UNSPECIFIED TYPE: ICD-10-CM

## 2023-02-22 LAB
BACTERIA UR QL AUTO: ABNORMAL /HPF
BILIRUB UR QL STRIP: NEGATIVE
CLARITY UR: ABNORMAL
COLOR UR: YELLOW
GLUCOSE UR STRIP-MCNC: NEGATIVE MG/DL
HGB UR QL STRIP.AUTO: ABNORMAL
HYALINE CASTS UR QL AUTO: ABNORMAL /LPF
KETONES UR QL STRIP: NEGATIVE
LEUKOCYTE ESTERASE UR QL STRIP.AUTO: ABNORMAL
NITRITE UR QL STRIP: NEGATIVE
PH UR STRIP.AUTO: 6 [PH] (ref 5–8)
PROT UR QL STRIP: ABNORMAL
RBC # UR STRIP: ABNORMAL /HPF
REF LAB TEST METHOD: ABNORMAL
SP GR UR STRIP: 1.02 (ref 1–1.03)
SQUAMOUS #/AREA URNS HPF: ABNORMAL /HPF
UROBILINOGEN UR QL STRIP: ABNORMAL
WBC # UR STRIP: ABNORMAL /HPF

## 2023-02-22 PROCEDURE — 90833 PSYTX W PT W E/M 30 MIN: CPT | Performed by: NURSE PRACTITIONER

## 2023-02-22 PROCEDURE — 87086 URINE CULTURE/COLONY COUNT: CPT

## 2023-02-22 PROCEDURE — 99214 OFFICE O/P EST MOD 30 MIN: CPT | Performed by: INTERNAL MEDICINE

## 2023-02-22 PROCEDURE — 87088 URINE BACTERIA CULTURE: CPT

## 2023-02-22 PROCEDURE — 99213 OFFICE O/P EST LOW 20 MIN: CPT | Performed by: NURSE PRACTITIONER

## 2023-02-22 PROCEDURE — 87186 SC STD MICRODIL/AGAR DIL: CPT

## 2023-02-22 PROCEDURE — 81001 URINALYSIS AUTO W/SCOPE: CPT

## 2023-02-22 RX ORDER — VENLAFAXINE HYDROCHLORIDE 37.5 MG/1
37.5 CAPSULE, EXTENDED RELEASE ORAL DAILY
Qty: 30 CAPSULE | Refills: 1 | Status: SHIPPED | OUTPATIENT
Start: 2023-02-22 | End: 2023-03-08 | Stop reason: SDUPTHER

## 2023-02-22 RX ORDER — TRAZODONE HYDROCHLORIDE 50 MG/1
TABLET ORAL
Qty: 60 TABLET | Refills: 2 | Status: SHIPPED | OUTPATIENT
Start: 2023-02-22

## 2023-02-22 NOTE — PROGRESS NOTES
PROBLEM LIST:  Oncology/Hematology History   Carcinoma of upper-outer quadrant of left breast in female, estrogen receptor positive (HCC)   2/10/2021 Initial Diagnosis    Carcinoma of upper-outer quadrant of left breast in female, estrogen receptor positive (CMS/HCC)     4/14/2021 Cancer Staged    Staging form: Breast, AJCC 8th Edition  - Pathologic stage from 4/14/2021: Stage IB (pT3, pN1a, cM0, G2, ER+, OR+, HER2-) - Signed by Graham Herbert MD on 4/29/2021 4/14/2021 Surgery    Surgery       Procedure:  Bilateral mastectomies    Lab Results   Component Value Date    FINALDX  04/14/2021     COMMENT: Dr. Cortes advised and confirmed the lymph nodes are removed from the left.  Changes are noted in bold.    1. BREAST, RIGHT, SIMPLE MASTECTOMY:  Benign breast parenchyma with fibrocystic change, adenosis, and pseudoangiomatous stromal hyperplasia.  Negative for atypical ductal hyperplasia, DCIS, or invasive carcinoma.  All margins benign.  Skin and nipple with no significant histopathologic abnormalities.    2. BREAST, LEFT, SIMPLE MASTECTOMY:   Invasive ductal carcinoma, at least 5 cm in greatest dimension, see comment   Associated high grade DCIS with comedonecrosis   See CAP template below for further details    3.  LEFT SENTINEL LYMPH NODES, EXCISION:   Metastatic ductal carcinoma present in 1 of 5 lymph nodes (1/5)    INVASIVE CARCINOMA OF THE BREAST    CLINICAL:   Clinical History: Prior history of breast cancer    SPECIMEN:   Procedure: Total mastectomy  Specimen Laterality: Left    TUMOR:   Tumor Site: Invasive Carcinoma:   - Central    Clock Position of Tumor Site: 1 o'clock, 12 o'clock  Histologic Type: Invasive carcinoma of no special type (ductal, not otherwise specified)  Glandular (Acinar) / Tubular Differentiation: Score 2  Nuclear Pleomorphism: Score 3  Mitotic Rate: Score 2 (4-7 mitoses per mm2)  Overall Grade: Grade 2 (scores of 6 or 7)  Tumor Size: 50 mm, see comment  Tumor Focality: Single  "focus of invasive carcinoma  Ductal Carcinoma In Situ (DCIS):   - DCIS is present in specimen    Size (Extent) of DCIS: 80 mm    Architectural Patterns: [place], Cribriform    Nuclear Grade: Grade III (high)    Necrosis: Present, central (expansive \"comedo\" necrosis)  Lobular Carcinoma In Situ (LCIS): No LCIS in specimen  Accessory Findings:   Lymphovascular Invasion: Not identified  Dermal Lymphovascular Invasion: Not identified  Microcalcifications: Not identified  Treatment Effect: No known presurgical therapy    MARGINS:   Invasive Carcionma Margins:   - Uninvolved by invasive carcinoma    Distance from Closest Margin in Millimeters (mm): Distance is > 10 Millimeters (mm)  DCIS Margins:   - Uninvolved by DCIS    Distance of DCIS from Closest Margin in Millimeters (mm): Distance is > 10 Millimeters (mm)    LYMPH NODES:   Number of Lymph Nodes with Macrometastases (> 2 mm): 1  Number of Lymph Nodes with Micrometastases (> 0.2 mm to 2 mm and / or > 200 cells): 0  Size of Largest Metastatic Deposit in Millimeters (mm): 9 Millimeters (mm)  Extranodal Extension: Not identified  Number of Lymph Nodes Examined: 5  Number of [place] Nodes Examined: 5    PATHOLOGIC STAGE CLASSIFICATION (pTNM, AJCC 8th Edition):   Note: Reporting of pT, pN, and (when applicable) pM categories is based on information available to the pathologist at the time the report is issued.  As per the AJCC (Chapter 1, 8th Ed.) it is the managing physician's responsibility to establish the final pathologic stage based upon all pertinent information, including but potentially not limited to this pathology report.  Primary Tumor (Invasive Carcinoma) (pT): pT2  Regional Lymph Nodes (pN):   Modifier: (sn): Only sentinel node(s) evaluated.  If 6 or more nodes (sentinel or nonsentinel) are removed, this modifier should not be used.  Category (pN): pN1a               5/21/2021 - 7/15/2021 Chemotherapy    OP BREAST AC DD DOXOrubicin / Cyclophosphamide   "   8/26/2021 - 12/9/2021 Chemotherapy    OP BREAST PACLitaxel (weekly X 12)     1/3/2022 -  Hormonal Therapy    Tamoxifen     3/23/2022 - 3/24/2022 Radiation    Radiation OncologyTreatment Course:  Rai Guillory received 360 cGy in 2 fractions to left chest wall and supraclavicular nodes via External Beam Radiation - EBRT.  Patient decided to stop coming after two treatments.         REASON FOR VISIT: Breast cancer    HISTORY OF PRESENT ILLNESS:   37 y.o.  female presents today for follow-up of her breast cancer.  She has completed dose dense AC followed by weekly Taxol.  And then completed involved field radiotherapy and was placed on tamoxifen.  She has been on tamoxifen for the last 1 year.  She was having a lot of symptoms concerning for metastatic disease.  This was also causing a fair bit of anxiety.  So we did CAT scans of her chest abdomen pelvis.    Past medical history, social history and family history was reviewed 02/22/23 and unchanged from prior visit.    Review of Systems:    Review of Systems   Constitutional: Negative.    HENT:  Negative.    Eyes: Negative.    Respiratory: Negative.    Cardiovascular: Negative.    Gastrointestinal: Negative.    Endocrine: Negative.    Genitourinary: Negative.     Musculoskeletal: Negative.    Skin: Negative.    Neurological: Positive for numbness.   Hematological: Negative.    Psychiatric/Behavioral: Negative.             Medications:        Current Outpatient Medications:   •  ibuprofen (ADVIL,MOTRIN) 800 MG tablet, TAKE ONE TABLET BY MOUTH EVERY 8 HOURS AS NEEDED FOR MILD PAIN, Disp: 90 tablet, Rfl: 0  •  mupirocin (BACTROBAN) 2 % ointment, Apply 1 application topically to the appropriate area as directed 3 (Three) Times a Day., Disp: 1 each, Rfl: 0  •  tamoxifen (NOLVADEX) 20 MG chemo tablet, Take 1 tablet by mouth Daily., Disp: 90 tablet, Rfl: 3  •  traZODone (DESYREL) 50 MG tablet, Take one to two tablets at bedtime for sleep as needed, Disp: 60  "tablet, Rfl: 2  •  venlafaxine XR (EFFEXOR-XR) 37.5 MG 24 hr capsule, Take 1 capsule by mouth Daily., Disp: 30 capsule, Rfl: 1  No current facility-administered medications for this visit.    Pain Medications             ibuprofen (ADVIL,MOTRIN) 800 MG tablet TAKE ONE TABLET BY MOUTH EVERY 8 HOURS AS NEEDED FOR MILD PAIN    traZODone (DESYREL) 50 MG tablet Take one to two tablets at bedtime for sleep as needed    venlafaxine XR (EFFEXOR-XR) 37.5 MG 24 hr capsule Take 1 capsule by mouth Daily.             ALLERGIES:  No Known Allergies      Physical Exam    VITAL SIGNS:  /90 Comment: Right Wrist  Pulse 82   Temp 96.9 °F (36.1 °C) (Infrared)   Resp 16   Ht 165.1 cm (65\")   Wt 60.8 kg (134 lb)   SpO2 98% Comment: RA  BMI 22.30 kg/m²     ECOG score: 0           Wt Readings from Last 3 Encounters:   02/22/23 60.8 kg (134 lb)   12/05/22 61.4 kg (135 lb 6.4 oz)   08/29/22 59 kg (130 lb)       Body mass index is 22.3 kg/m². Body surface area is 1.67 meters squared.    Pain Score    02/22/23 1520   PainSc: 0-No pain           Performance Status: 0    General: Distraught appearing, crying  HEENT: sclera anicteric, neck is supple  Lymphatics: no cervical, supraclavicular, or axillary adenopathy  Chest exam reveals no nodularity.  Bilateral mastectomies in place  Lungs: clear to auscultation bilaterally  Abdomen: soft, nontender, nondistended.  No palpable organomegaly  Extremities: no lower extremity edema  Skin: no rashes, lesions, bruising, or petechiae  Msk:  Shows no weakness of the large muscle groups  Psych: Depressed.        RECENT LABS:    Lab Results   Component Value Date    HGB 13.7 02/10/2023    HCT 41.4 02/10/2023    MCV 95.8 02/10/2023     02/10/2023    WBC 5.96 02/10/2023    NEUTROABS 3.70 02/10/2023    LYMPHSABS 1.85 02/10/2023    MONOSABS 0.35 02/10/2023    EOSABS 0.05 02/10/2023    JOYCE 0.01 02/10/2023       Lab Results   Component Value Date    GLUCOSE 79 02/10/2023    BUN 10 " 02/10/2023    CREATININE 0.57 02/10/2023     02/10/2023    K 4.0 02/10/2023     02/10/2023    CO2 24.0 02/10/2023    CALCIUM 8.3 (L) 02/10/2023    PROTEINTOT 7.3 02/10/2023    ALBUMIN 4.6 02/10/2023    BILITOT 0.3 02/10/2023    ALKPHOS 124 (H) 02/10/2023    AST 26 02/10/2023    ALT 23 02/10/2023           CT Head With Contrast    Result Date: 2/21/2023  Impression: Normal contrast-enhanced CT of the head. There is no evidence of intracranial hemorrhage, mass, mass effect or abnormal enhancement. Electronically Signed: Mc Prajapati  2/21/2023 4:48 PM EST  Workstation ID: [id number]    CT Chest With Contrast Diagnostic    Result Date: 2/21/2023  Impression: 1. No evidence of metastatic disease within the chest, abdomen, or pelvis. 2. Thickening of the urinary bladder wall which could be due to cystitis. I would recommend clinical correlation. 3. Right ovarian follicular cyst. 4. Multiple additional findings as noted above. Electronically Signed: Jordi Donnelly  2/21/2023 5:03 PM EST  Workstation ID: [id number]    CT Abdomen Pelvis With Contrast    Result Date: 2/21/2023  Impression: 1. No evidence of metastatic disease within the chest, abdomen, or pelvis. 2. Thickening of the urinary bladder wall which could be due to cystitis. I would recommend clinical correlation. 3. Right ovarian follicular cyst. 4. Multiple additional findings as noted above. Electronically Signed: Jordi Donnelly  2/21/2023 5:03 PM EST  Workstation ID: [id number]        Assessment/Plan    1.  Node positive ER/DE positive HER-2 negative breast cancer.  I reviewed her scans with her today.  No sign of recurrent disease.  She does have some cystitis but otherwise nothing else going on.  For now continue tamoxifen  with no changes.    2.  Chemotherapy-induced neuropathy.  This remains an issue but stable.    3.  Severe depression and anxiety.  She saw Dr. Hansen today.  Starting her on Effexor and trazodone.         Graham Herbert MD  [facility]  [facility] Hematology and Oncology         No orders of the defined types were placed in this encounter.      2/22/2023

## 2023-02-22 NOTE — PROGRESS NOTES
"  Subjective   Rai Guillory is a 37 y.o. female breast cancer survivor who who presents in person for medication management and therapy. She has not been seen since May 2021 by this provider. She reports she had lost her health insurance and hadn't seen Dr. Burnham her medical oncologist for a year. She reports having stayed on Tamoxifen daily but ran out in January. She got it renewed when saw Dr. Burnham this month. She had a CT scan yesterday and sees Dr. Burnham in F/U this afternoon for results.     TIME IN:859  TIME OUT:945    Chief Complaint: anxiety, depression, poor sleep    History of Present Illness Patient presents The patient reports the following symptoms of generalized anxiety: constant anxiety/worry, on edge, difficulty concentrating, mind goes blank, irritability and anger, muscle tension and sleep disturbance. The symptoms have been present for at least 3 month(s) and have caused impairment in important areas of functioning. The patient reports depressive symptoms including depressed mood, crying spells, insomnia, decreased appetite, anhedonia, feelings of guilt, feelings of hopelessness, feelings of helplessness, feelings of worthlessness, low energy, difficulty concentrating and psychomotor agitation, present on most days for the past 3 month(s)  and have caused impairment in important areas of functioning. Depression rated 8/10 with 10 being the worst. She denies SI/HI or AVH. She reports not sleeping well at all for past 3 weeks. Reports falls asleep most nights but awake in two hours and can't fall asleep for rest of night. Or she'll not fall asleep until 4 am but has to get up at 6:30a to get her girls ready and to school. She denies panic attacks. Has hot flashes \"oh I'll get those several times a day\". She reports a friend her age  this past November and it has triggered fears of dying from cancer and then worries about her daughters. Patient has N/V on some days like today . Denies " "rina, PTSD, OCD or panic. Denies illicit drugs except THC sometimes for anxiety. Denies alcohol, or tobacco use. She reports good support from her long term boyfriend and he is good with her 3 daughters. Patient walked out on her job because felt to irritable and angry. \"I get that way very agitated  with others even my family\". Feels like her daughters are ungrateful, boyfriend wants her to be just \"back to normal\" and same with her mother and sisters.  Denies adverse effects from medications.   (Scales based on 0 - 10 with 10 being the worst)    Therapy:  Start Time:0915    Stop Time: 0945    (30 ) minutes was spent for psychotherapy. Assisted patient in processing patient's TARA, MDD. Acknowledged and normalized patient's thoughts, feelings, and concerns. Utilized cognitive behavioral therapy to assist the patient in recognizing more appropriate coping mechanisms when she becomes agitated/anxious/sad, tearful which are proven effective in reducing the severity of frequency of symptoms.     CLINICAL MANUEVERING/INTERVENTION:   Patient talked about current stressors, primarily having a difficult time dealing with depression and anxiety and lack of sleep. Venting of frustrations was conducted. Feelings were processed and validated, both negative and positive. Flushing out worries and concerns was conducted in order to diminish emotional tension. Processing fears of cancer recurrence was conducted. Ways in which patient may take stress off herself in a purposeful manner was discussed.Utilized motivational interviewing techniques including complex reflections to attempt to assist the patient and focusing on the positive and to maintain and encourage calm outlook. Venting of concerns regarding getting another job. Has health insurance through [employer]. Patient reports she likes to read, go to [employer] clubs with her boyfriend and live music. Enjoys trips to [place] usually to see her mother and sisters.  The " patient expressed gratitude for today's session and said that counseling helps her feel better.      The following portions of the patient's history were reviewed and updated as appropriate: allergies, current medications, past family history, past medical history, past social history, past surgical history and problem list.    Review of Systems  A 14 point review of systems was performed and is negative except as noted above.    Objective   Physical Exam  not currently breastfeeding.    No Known Allergies    Current Medications:   Current Outpatient Medications   Medication Sig Dispense Refill   • ibuprofen (ADVIL,MOTRIN) 800 MG tablet TAKE ONE TABLET BY MOUTH EVERY 8 HOURS AS NEEDED FOR MILD PAIN 90 tablet 0   • mupirocin (BACTROBAN) 2 % ointment Apply 1 application topically to the appropriate area as directed 3 (Three) Times a Day. 1 each 0   • tamoxifen (NOLVADEX) 20 MG chemo tablet Take 1 tablet by mouth Daily. 90 tablet 3   • traZODone (DESYREL) 50 MG tablet Take one to two tablets at bedtime for sleep as needed 60 tablet 2   • venlafaxine XR (EFFEXOR-XR) 37.5 MG 24 hr capsule Take 1 capsule by mouth Daily. 30 capsule 1     No current facility-administered medications for this visit.       Lab Results:  Lab on 02/10/2023   Component Date Value Ref Range Status   • Glucose 02/10/2023 79  65 - 99 mg/dL Final   • BUN 02/10/2023 10  6 - 20 mg/dL Final   • Creatinine 02/10/2023 0.57  0.57 - 1.00 mg/dL Final   • Sodium 02/10/2023 142  136 - 145 mmol/L Final   • Potassium 02/10/2023 4.0  3.5 - 5.2 mmol/L Final   • Chloride 02/10/2023 107  98 - 107 mmol/L Final   • CO2 02/10/2023 24.0  22.0 - 29.0 mmol/L Final   • Calcium 02/10/2023 8.3 (L)  8.6 - 10.5 mg/dL Final   • Total Protein 02/10/2023 7.3  6.0 - 8.5 g/dL Final   • Albumin 02/10/2023 4.6  3.5 - 5.2 g/dL Final   • ALT (SGPT) 02/10/2023 23  1 - 33 U/L Final   • AST (SGOT) 02/10/2023 26  1 - 32 U/L Final   • Alkaline Phosphatase 02/10/2023 124 (H)  39 - 117 U/L  Final   • Total Bilirubin 02/10/2023 0.3  0.0 - 1.2 mg/dL Final   • Globulin 02/10/2023 2.7  gm/dL Final    Calculated Result   • A/G Ratio 02/10/2023 1.7  g/dL Final   • BUN/Creatinine Ratio 02/10/2023 17.5  7.0 - 25.0 Final   • Anion Gap 02/10/2023 11.0  5.0 - 15.0 mmol/L Final   • eGFR 02/10/2023 118.7  >60.0 mL/min/1.73 Final   • WBC 02/10/2023 5.96  3.40 - 10.80 10*3/mm3 Final   • RBC 02/10/2023 4.32  3.77 - 5.28 10*6/mm3 Final   • Hemoglobin 02/10/2023 13.7  12.0 - 15.9 g/dL Final   • Hematocrit 02/10/2023 41.4  34.0 - 46.6 % Final   • MCV 02/10/2023 95.8  79.0 - 97.0 fL Final   • MCH 02/10/2023 31.7  26.6 - 33.0 pg Final   • MCHC 02/10/2023 33.1  31.5 - 35.7 g/dL Final   • RDW 02/10/2023 13.2  12.3 - 15.4 % Final   • RDW-SD 02/10/2023 47.3  37.0 - 54.0 fl Final   • MPV 02/10/2023 9.6  6.0 - 12.0 fL Final   • Platelets 02/10/2023 279  140 - 450 10*3/mm3 Final   • Neutrophil % 02/10/2023 62.1  42.7 - 76.0 % Final   • Lymphocyte % 02/10/2023 31.0  19.6 - 45.3 % Final   • Monocyte % 02/10/2023 5.9  5.0 - 12.0 % Final   • Eosinophil % 02/10/2023 0.8  0.3 - 6.2 % Final   • Basophil % 02/10/2023 0.2  0.0 - 1.5 % Final   • Immature Grans % 02/10/2023 0.0  0.0 - 0.5 % Final   • Neutrophils, Absolute 02/10/2023 3.70  1.70 - 7.00 10*3/mm3 Final   • Lymphocytes, Absolute 02/10/2023 1.85  0.70 - 3.10 10*3/mm3 Final   • Monocytes, Absolute 02/10/2023 0.35  0.10 - 0.90 10*3/mm3 Final   • Eosinophils, Absolute 02/10/2023 0.05  0.00 - 0.40 10*3/mm3 Final   • Basophils, Absolute 02/10/2023 0.01  0.00 - 0.20 10*3/mm3 Final   • Immature Grans, Absolute 02/10/2023 0.00  0.00 - 0.05 10*3/mm3 Final   Admission on 08/29/2022, Discharged on 08/29/2022   Component Date Value Ref Range Status   • Rapid Influenza A Ag 08/29/2022 Negative  Negative Final   • Rapid Influenza B Ag 08/29/2022 Negative  Negative Final   • Internal Control 08/29/2022 Passed  Passed Final   • Lot Number 08/29/2022 [id number]   Final   • Expiration Date 08/29/2022  10 21 23   Final   • SARS-CoV-2 MARGARET 08/29/2022 Detected (C)  Not Detected Final   • Rapid Strep A Screen 08/29/2022 Negative  Negative, VALID, INVALID, Not Performed Final   • Internal Control 08/29/2022 Passed  Passed Final   • Lot Number 08/29/2022 [id number]   Final   • Expiration Date 08/29/2022 11 10 24   Final       TARA-7:    Over the last two weeks, how often have you been bothered by the following problems?  Feeling nervous, anxious or on edge: Nearly every day  Not being able to stop or control worrying: Nearly every day  Worrying too much about different things: Nearly every day  Trouble Relaxing: Several days  Being so restless that it is hard to sit still: Several days  Becoming easily annoyed or irritable: Nearly every day  Feeling afraid as if something awful might happen: More than half the days  TARA 7 Total Score: 16  If you checked any problems, how difficult have these problems made it for you to do your work, take care of things at home, or get along with other people: Very difficult  0-4: Minimal anxiety  5-9: Mild anxiety  10-14: Moderate anxiety  15-21: Severe anxiety      PHQ-9:  PHQ-2/PHQ-9 Depression Screening 2/22/2023   Retired PHQ-9 Total Score -   Retired Total Score -   Little Interest or Pleasure in Doing Things 3-->nearly every day   Feeling Down, Depressed or Hopeless 3-->nearly every day   Trouble Falling or Staying Asleep, or Sleeping Too Much 3-->nearly every day   Feeling Tired or Having Little Energy 2-->more than half the days   Poor Appetite or Overeating 1-->several days   Feeling Bad about Yourself - or that You are a Failure or Have Let Yourself or Your Family Down 2-->more than half the days   Trouble Concentrating on Things, Such as Reading the Newspaper or Watching Television 2-->more than half the days   Moving or Speaking So Slowly that Other People Could Have Noticed? Or the Opposite - Being So Fidgety 1-->several days   Thoughts that You Would be Better Off Dead or of  Hurting Yourself in Some Way 0-->not at all   PHQ-9: Brief Depression Severity Measure Score 17   If You Checked Off Any Problems, How Difficult Have These Problems Made It For You to Do Your Work, Take Care of Things at Home, or Get Along with Other People? very difficult      5-9: Minimal symptoms  10-14: Major depression mild  15-19: Major depression moderate  Greater then 20: Major depression severe         Appearance: WNL  Hygiene:   good  Cooperation:  Cooperative  Eye Contact:  fair  Psychomotor Behavior:  Psychomotor  agitation/retardation, No EPS, No motor tics  Mood:  Depressed anxious   Affect:  tearful  Hopelessness: some  Speech:  Normal  Thought Process:  Linear  Thought Content:  Normal  Concentration: Normal   Suicidal: denies  Homicidal:  None  Hallucinations:  None  Delusion:  None  Memory:  Intact  Orientation:  Person, Place, Time and Situation  Reliability:  good  Insight:  Fair  Judgement: good  Impulse Control: good  Estimated Intelligence: average range    VIJI REVIEWED NO RED FLAGS    Assessment & Plan   Diagnoses and all orders for this visit:    1. Moderate episode of recurrent major depressive disorder (HCC) (Primary)    2. Anxiety disorder, unspecified type    3. Sleep disturbance    Other orders  -     traZODone (DESYREL) 50 MG tablet; Take one to two tablets at bedtime for sleep as needed  Dispense: 60 tablet; Refill: 2  -     venlafaxine XR (EFFEXOR-XR) 37.5 MG 24 hr capsule; Take 1 capsule by mouth Daily.  Dispense: 30 capsule; Refill: 1        PLAN: Discussed medication management along with healthy coping skills, exercise ie walking, music, relaxation techniques, mindfulness    ADD venlafaxine XR 37.5mg daily for menopausal symptoms, depression and anxiety  ADD trazodone 50 mg 1-2 at hs for sleeplessness    We discussed risks, benefits, and side effects of the above medications and the patient was agreeable with the plan. Patient was educated on the importance of compliance with  treatment and follow-up appointments.     Provide Cognitive Behavioral Therapy and Solution Focused Therapy to improve functioning, maintain stability, and avoid decompensation and the need for higher level of care.    Counseled patient regarding multimodal approach with encouragement of healthy nutrition, healthy sleep, regular physical mobility, social involvement, counseling, and medication compliance.     Assisted patient in identifying risk factors which would indicate the need for higher level of care including thoughts to harm self or others and/or self-harming behavior and encouraged patient to contact this office, call 911, or present to the nearest emergency room should any of these events occur. Discussed crisis intervention services and means to access.  Patient adamantly and convincingly denies current suicidal or homicidal ideation or perceptual disturbance.    Treatment Plan: stabilize mood, patient will stay out of psychiatric hospital and be at optimal level of functioning with therapy and take all medication as prescribed. Patient verbalized  understanding and agreement to plan.    Instructed to call for questions or concerns and return early if necessary.     Greater than 50% time was spent in coordination of care, and counseling the patient regarding current assessment, symptoms, plan of care going forward, supportive therapy.  Answered any questions patient had regarding medications and plan of care.    Return in about 2 weeks (around 3/8/2023).

## 2023-02-23 DIAGNOSIS — N30.90 CYSTITIS: Primary | ICD-10-CM

## 2023-02-24 ENCOUNTER — TELEPHONE (OUTPATIENT)
Dept: ONCOLOGY | Facility: CLINIC | Age: 38
End: 2023-02-24
Payer: COMMERCIAL

## 2023-02-24 LAB — BACTERIA SPEC AEROBE CULT: ABNORMAL

## 2023-02-24 RX ORDER — CIPROFLOXACIN 500 MG/1
500 TABLET, FILM COATED ORAL 2 TIMES DAILY
Qty: 14 TABLET | Refills: 0 | Status: SHIPPED | OUTPATIENT
Start: 2023-02-24

## 2023-02-24 NOTE — TELEPHONE ENCOUNTER
Called patient updating her on urine culture showing susceptibility to Cipro. Informing her that cipro is being sent in and that she can get that at [facility]. Also informing her that Dr Burnham had nurse sent a referral to urology. Patient stating she understood plan.

## 2023-03-08 ENCOUNTER — OFFICE VISIT (OUTPATIENT)
Dept: PSYCHIATRY | Facility: CLINIC | Age: 38
End: 2023-03-08
Payer: COMMERCIAL

## 2023-03-08 DIAGNOSIS — F41.1 GENERALIZED ANXIETY DISORDER: ICD-10-CM

## 2023-03-08 DIAGNOSIS — G47.9 SLEEP DISTURBANCE: ICD-10-CM

## 2023-03-08 DIAGNOSIS — F33.1 MODERATE EPISODE OF RECURRENT MAJOR DEPRESSIVE DISORDER: Primary | ICD-10-CM

## 2023-03-08 PROCEDURE — 1159F MED LIST DOCD IN RCRD: CPT | Performed by: NURSE PRACTITIONER

## 2023-03-08 PROCEDURE — 99443 PR PHYS/QHP TELEPHONE EVALUATION 21-30 MIN: CPT | Performed by: NURSE PRACTITIONER

## 2023-03-08 PROCEDURE — 1160F RVW MEDS BY RX/DR IN RCRD: CPT | Performed by: NURSE PRACTITIONER

## 2023-03-08 RX ORDER — VENLAFAXINE HYDROCHLORIDE 75 MG/1
75 CAPSULE, EXTENDED RELEASE ORAL DAILY
Qty: 30 CAPSULE | Refills: 1 | Status: SHIPPED | OUTPATIENT
Start: 2023-03-08 | End: 2023-03-29 | Stop reason: SDUPTHER

## 2023-03-08 NOTE — PROGRESS NOTES
"  Subjective   Rai Guillory is a 37 y.o. female who is here today for medication management follow up. You have chosen to receive care through a telephone visit. Do you consent to use a telephone visit for your medical care today? Yes   Diagnosed 2/2021 with Left breast cancer treated by Dr. Alfonso medical oncologist, Dr. Cortes surgeon for bilateral mastectomies with Left SLN bx, Dr. Pop Rad Onc. 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     TIME IN:1110  TIME OUT: 1135    I spent 25  minutes in patient care: reviewing records prior to the visit, assessing the patient, entering orders and documentation.    PATIENT AT: THEIR PLACE OF RESIDENCE    PROVIDER AT:   [facility]  [address] , [address]  [place] [address]        Chief Complaint: MDD, TARA, sleep disturbance    History of Present Illness Patient reports tolerating both trazodone and venlafaxine XR 37.5mg without side effects. However not sleeping much better, she states that is her fault because she has been staying up late because \"kids have been out of school this past week then this week power was out so they were home\". \"so I'm not on a schedule trying to get good rest\".  She reports she hasn't felt any difference in mood from venlafaxine still anxious and depressed, denies SI/HI .  Imaging of chest and abd were negative for metastasis of breast cancer.    (Scales based on 0 - 10 with 10 being the worst)  .      The following portions of the patient's history were reviewed and updated as appropriate: allergies, current medications, past family history, past medical history, past social history, past surgical history and problem list.    Review of Systems  A 14 point review of systems was performed and is negative except as noted above.    Objective   Physical Exam  not currently breastfeeding.    No Known Allergies    Current Medications:   Current Outpatient " Medications   Medication Sig Dispense Refill   • venlafaxine XR (EFFEXOR-XR) 75 MG 24 hr capsule Take 1 capsule by mouth Daily. 30 capsule 1   • ciprofloxacin (CIPRO) 500 MG tablet Take 1 tablet by mouth 2 (Two) Times a Day. 14 tablet 0   • ibuprofen (ADVIL,MOTRIN) 800 MG tablet TAKE ONE TABLET BY MOUTH EVERY 8 HOURS AS NEEDED FOR MILD PAIN 90 tablet 0   • mupirocin (BACTROBAN) 2 % ointment Apply 1 application topically to the appropriate area as directed 3 (Three) Times a Day. 1 each 0   • tamoxifen (NOLVADEX) 20 MG chemo tablet Take 1 tablet by mouth Daily. 90 tablet 3   • traZODone (DESYREL) 50 MG tablet Take one to two tablets at bedtime for sleep as needed 60 tablet 2     No current facility-administered medications for this visit.       PHQ-9:  PHQ-2/PHQ-9 Depression Screening 2/22/2023   Retired PHQ-9 Total Score -   Retired Total Score -   Little Interest or Pleasure in Doing Things 3-->nearly every day   Feeling Down, Depressed or Hopeless 3-->nearly every day   Trouble Falling or Staying Asleep, or Sleeping Too Much 3-->nearly every day   Feeling Tired or Having Little Energy 2-->more than half the days   Poor Appetite or Overeating 1-->several days   Feeling Bad about Yourself - or that You are a Failure or Have Let Yourself or Your Family Down 2-->more than half the days   Trouble Concentrating on Things, Such as Reading the Newspaper or Watching Television 2-->more than half the days   Moving or Speaking So Slowly that Other People Could Have Noticed? Or the Opposite - Being So Fidgety 1-->several days   Thoughts that You Would be Better Off Dead or of Hurting Yourself in Some Way 0-->not at all   PHQ-9: Brief Depression Severity Measure Score 17   If You Checked Off Any Problems, How Difficult Have These Problems Made It For You to Do Your Work, Take Care of Things at Home, or Get Along with Other People? very difficult      5-9: Minimal symptoms  10-14: Major depression mild  15-19: Major depression  moderate  Greater then 20: Major depression severe    Appearance: na  Hygiene:  REPORTS good  Cooperation:  Cooperative  Eye Contact:  na  Psychomotor Behavior:  denies psychomotor agitation/retardation, No EPS, No motor tics  Mood:  depressed  Affect:  na  Hopelessness: Denies  Speech:  Normal  Thought Process:  Linear  Thought Content:  Normal  Concentration: Normal   Suicidal: denies  Homicidal:  None  Hallucinations:  None  Delusion:  None  Memory:  Intact  Orientation:  Person, Place, Time and Situation  Reliability:  good  Insight:  Fair  Judgement: good  Impulse Control: good  Estimated Intelligence: average range    VIJI REVIEWED NO RED FLAGS    Assessment & Plan   Diagnoses and all orders for this visit:    1. Moderate episode of recurrent major depressive disorder (HCC) (Primary)    2. Generalized anxiety disorder    3. Sleep disturbance    Other orders  -     venlafaxine XR (EFFEXOR-XR) 75 MG 24 hr capsule; Take 1 capsule by mouth Daily.  Dispense: 30 capsule; Refill: 1      PLAN: increase venlafaxine XR to 75 mg daily for depression and anxiety  Cont trazodone 50 mg nightly as needed for sleep     We discussed risks, benefits, and side effects of the above medications and the patient was agreeable with the plan. Patient was educated on the importance of compliance with treatment and follow-up appointments.     Reviewed sleep hygiene and importance of quality sleep for mood stability.     Counseled patient regarding multimodal approach with encouragement of healthy nutrition, healthy sleep, regular physical mobility, social involvement, counseling, and medication compliance.     Assisted patient in identifying risk factors which would indicate the need for higher level of care including thoughts to harm self or others and/or self-harming behavior and encouraged patient to contact this office, call 911, or present to the nearest emergency room should any of these events occur. Discussed crisis  intervention services and means to access.  Patient adamantly and convincingly denies current suicidal or homicidal ideation or perceptual disturbance.    Treatment Plan: stabilize mood, patient will stay out of psychiatric hospital and be at optimal level of functioning with therapy and take all medication as prescribed. Patient verbalized  understanding and agreement to plan.    Instructed to call for questions or concerns and return early if necessary.     Greater than 50% time was spent in coordination of care, and counseling the patient regarding current assessment, symptoms, plan of care going forward, supportive therapy.  Answered any questions patient had regarding medications and plan of care.    Return in about 3 weeks (around 3/29/2023).

## 2023-03-29 ENCOUNTER — OFFICE VISIT (OUTPATIENT)
Dept: PSYCHIATRY | Facility: CLINIC | Age: 38
End: 2023-03-29
Payer: COMMERCIAL

## 2023-03-29 DIAGNOSIS — F33.1 MODERATE EPISODE OF RECURRENT MAJOR DEPRESSIVE DISORDER: Primary | ICD-10-CM

## 2023-03-29 DIAGNOSIS — G47.9 SLEEP DISTURBANCE: ICD-10-CM

## 2023-03-29 DIAGNOSIS — F41.1 GENERALIZED ANXIETY DISORDER: ICD-10-CM

## 2023-03-29 PROCEDURE — 1159F MED LIST DOCD IN RCRD: CPT | Performed by: NURSE PRACTITIONER

## 2023-03-29 PROCEDURE — 99442 PR PHYS/QHP TELEPHONE EVALUATION 11-20 MIN: CPT | Performed by: NURSE PRACTITIONER

## 2023-03-29 PROCEDURE — 1160F RVW MEDS BY RX/DR IN RCRD: CPT | Performed by: NURSE PRACTITIONER

## 2023-03-29 RX ORDER — VENLAFAXINE HYDROCHLORIDE 75 MG/1
75 CAPSULE, EXTENDED RELEASE ORAL DAILY
Qty: 30 CAPSULE | Refills: 5 | Status: SHIPPED | OUTPATIENT
Start: 2023-03-29

## 2023-03-29 NOTE — PROGRESS NOTES
"  Subjective   Rai Guillory is a 37 y.o. female who is here today for medication management follow up.  You have chosen to receive care through a telephone visit. Do you consent to use a telephone visit for your medical care today? Yes   Diagnosed 2/2021 with Left breast cancer treated by Dr. Alfonso medical oncologist, Dr. Cortes surgeon for bilateral mastectomies with Left SLN bx, Dr. Pop Rad Onc. 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        TIME IN: 4:04 p  TIME OUT: 4:15p    I spent 11 minutes in patient care: reviewing records prior to the visit, assessing the patient, entering orders and documentation.    PATIENT AT: THEIR PLACE OF RESIDENCE    PROVIDER AT:   [facility]  [address], [address]  [place] [address]        Chief Complaint:  MDD, recurrent mod, TARA, sleep disturbance    History of Present Illness Patient presents via telephone reporting she is feeling less agitated, irritable, has improved motivation and interest, energy. She has a new full time job and likes it. For sleep Will take trazodone 50 mg  But reports still awakens in middle of night and can't fall back to sleep easily. Trazodone 100 mg she reports works \"but then I feel too tired in mornings\". Discussed trying 1.5 tablets (75mg ) total maybe \"sweet spot\" for sleep disturbance. May need sleep study to discuss with her PCP.   Denies adverse effects from medications. TARA and MDD under 5 on scale  (Scales based on 0 - 10 with 10 being the worst)        The following portions of the patient's history were reviewed and updated as appropriate: allergies, current medications, past family history, past medical history, past social history, past surgical history and problem list.    Review of Systems  A 14 point review of systems was performed and is negative except as noted above.    Objective   Physical Exam  not currently breastfeeding.    No " Known Allergies    Current Medications:   Current Outpatient Medications   Medication Sig Dispense Refill   • venlafaxine XR (EFFEXOR-XR) 75 MG 24 hr capsule Take 1 capsule by mouth Daily. 30 capsule 5   • ciprofloxacin (CIPRO) 500 MG tablet Take 1 tablet by mouth 2 (Two) Times a Day. 14 tablet 0   • ibuprofen (ADVIL,MOTRIN) 800 MG tablet TAKE ONE TABLET BY MOUTH EVERY 8 HOURS AS NEEDED FOR MILD PAIN 90 tablet 0   • mupirocin (BACTROBAN) 2 % ointment Apply 1 application topically to the appropriate area as directed 3 (Three) Times a Day. 1 each 0   • tamoxifen (NOLVADEX) 20 MG chemo tablet Take 1 tablet by mouth Daily. 90 tablet 3   • traZODone (DESYREL) 50 MG tablet Take one to two tablets at bedtime for sleep as needed 60 tablet 2     No current facility-administered medications for this visit.           Appearance: na  Hygiene:  REPORTS good  Cooperation:  Cooperative  Eye Contact:  na  Psychomotor Behavior:  denies psychomotor agitation/retardation, No EPS, No motor tics  Mood:  within normal limits  Affect:  na  Hopelessness: Denies  Speech:  Normal  Thought Process:  Linear  Thought Content:  Normal  Concentration: Normal   Suicidal: denies  Homicidal:  None  Hallucinations:  None  Delusion:  None  Memory:  Intact  Orientation:  Person, Place, Time and Situation  Reliability:  good  Insight:  Fair  Judgement: good  Impulse Control: good  Estimated Intelligence: average range    VIJI REVIEWED NO RED FLAGS    Assessment & Plan   Diagnoses and all orders for this visit:    1. Moderate episode of recurrent major depressive disorder (HCC) (Primary)    2. Generalized anxiety disorder    3. Sleep disturbance    Other orders  -     venlafaxine XR (EFFEXOR-XR) 75 MG 24 hr capsule; Take 1 capsule by mouth Daily.  Dispense: 30 capsule; Refill: 5        PLAN:  Improvement in mood  Cont venlafaxine XR 75 mg daily for MDD/TARA  Cont trazodone 50 mg take 1.5 tablets at hs for sleep     We discussed risks, benefits, and side  effects of the above medications and the patient was agreeable with the plan. Patient was educated on the importance of compliance with treatment and follow-up appointments.     Counseled patient regarding multimodal approach with encouragement of healthy nutrition, healthy sleep, regular physical mobility, social involvement, counseling, and medication compliance.     Assisted patient in identifying risk factors which would indicate the need for higher level of care including thoughts to harm self or others and/or self-harming behavior and encouraged patient to contact this office, call 911, or present to the nearest emergency room should any of these events occur. Discussed crisis intervention services and means to access.  Patient adamantly and convincingly denies current suicidal or homicidal ideation or perceptual disturbance.    Treatment Plan: stabilize mood, patient will stay out of psychiatric hospital and be at optimal level of functioning with therapy and take all medication as prescribed. Patient verbalized  understanding and agreement to plan.    Instructed to call for questions or concerns and return early if necessary.     Greater than 50% time was spent in coordination of care, and counseling the patient regarding current assessment, symptoms, plan of care going forward, supportive therapy.  Answered any questions patient had regarding medications and plan of care.    Return in about 12 weeks (around 6/21/2023).

## 2023-07-06 NOTE — PROGRESS NOTES
Subjective   Rai Guillory is a 34 y.o. female.   Chief Complaint   Patient presents with   • walk in     bodyaches, fevers, sore thoat, sinus pressure, cough, yellowish drainage      URI    This is a new problem. The current episode started in the past 7 days. The problem has been rapidly worsening. The maximum temperature recorded prior to her arrival was 100.4 - 100.9 F. Associated symptoms include congestion, coughing, headaches, rhinorrhea, sneezing and a sore throat. Pertinent negatives include no abdominal pain, chest pain, diarrhea, dysuria, ear pain, joint pain, joint swelling, nausea, neck pain, plugged ear sensation, rash, swollen glands, vomiting or wheezing. She has tried acetaminophen for the symptoms. The treatment provided mild relief.      Patient is here with her young daughters. All having similar symptoms.   The following portions of the patient's history were reviewed and updated as appropriate: allergies, current medications, past family history, past medical history, past social history, past surgical history and problem list.    Review of Systems   Constitutional: Positive for activity change, appetite change, chills and fatigue.   HENT: Positive for congestion, rhinorrhea, sneezing and sore throat. Negative for ear pain.    Respiratory: Positive for cough. Negative for wheezing.    Cardiovascular: Negative for chest pain.   Gastrointestinal: Negative for abdominal pain, diarrhea, nausea and vomiting.   Genitourinary: Negative for dysuria.   Musculoskeletal: Positive for myalgias. Negative for joint pain and neck pain.   Skin: Negative.  Negative for rash.   Allergic/Immunologic: Positive for environmental allergies.   Neurological: Positive for headaches.     Past Surgical History:   Procedure Laterality Date   • DIAGNOSTIC LAPAROSCOPY  03/29/2018    Endometrial ablation   • FOOT SURGERY  09/2017   • KNEE ACL RECONSTRUCTION  06/2007   • TUBAL ABDOMINAL LIGATION  11/2013     Past Medical  "History:   Diagnosis Date   • History of abnormal cervical Pap smear    • Mastitis    • Ovarian cyst    • STD exposure     Chlamydia and GC       Objective   No Known Allergies  Visit Vitals  /84   Pulse 108   Temp 99.9 °F (37.7 °C)   Resp 16   Ht 165.1 cm (65\")   Wt 58.3 kg (128 lb 9.6 oz)   SpO2 94%   BMI 21.40 kg/m²       Physical Exam   Constitutional: Vital signs are normal. She appears well-developed and well-nourished. She is cooperative. She appears ill.   HENT:   Right Ear: Hearing, tympanic membrane, external ear and ear canal normal.   Left Ear: Hearing, tympanic membrane, external ear and ear canal normal.   Nose: Mucosal edema present. Right sinus exhibits no maxillary sinus tenderness and no frontal sinus tenderness. Left sinus exhibits no maxillary sinus tenderness and no frontal sinus tenderness.   Mouth/Throat: Uvula is midline and mucous membranes are normal. Posterior oropharyngeal erythema present. Tonsils are 2+ on the right. Tonsils are 2+ on the left. No tonsillar exudate.   Cardiovascular: Regular rhythm, S1 normal, normal heart sounds and normal pulses. Tachycardia present.   Neurological: She is alert.   Skin: Skin is warm, dry and intact. Capillary refill takes less than 2 seconds.   Vitals reviewed.      Assessment/Plan   Rai was seen today for walk in.    Diagnoses and all orders for this visit:    Influenza A  -     brompheniramine-pseudoephedrine-DM (BROMFED DM) 30-2-10 MG/5ML syrup; Take 10 mL by mouth Every 6 (Six) Hours As Needed for Congestion, Cough or Allergies for up to 10 days.  -     oseltamivir (TAMIFLU) 75 MG capsule; Take 1 capsule by mouth 2 (Two) Times a Day for 5 days.    Flu-like symptoms  -     POC Influenza A / B  -     POC Rapid Strep A    Other orders  -     ibuprofen (ADVIL,MOTRIN) 600 MG tablet; Take 1 tablet by mouth Every 8 (Eight) Hours As Needed for Mild Pain  or Fever.      POCT Influenza A/B: A Positive   B: Negative   POCT rapid strep A: Negative  " "    Gargle warm salt water 1/4 teaspoon salt in 4 oz.warm water twice daily as needed.   Change toothbrush in 1 week   Avoid dairy products - this can increase your congestion.     Follow up with your PCP as needed.      See influenza patient instructions          Patient Instructions   Influenza, Adult  Influenza, more commonly known as \"the flu,\" is a viral infection that mainly affects the respiratory tract. The respiratory tract includes organs that help you breathe, such as the lungs, nose, and throat. The flu causes many symptoms similar to the common cold along with high fever and body aches.  The flu spreads easily from person to person (is contagious). Getting a flu shot (influenza vaccination) every year is the best way to prevent the flu.  What are the causes?  This condition is caused by the influenza virus. You can get the virus by:  · Breathing in droplets that are in the air from an infected person's cough or sneeze.  · Touching something that has been exposed to the virus (has been contaminated) and then touching your mouth, nose, or eyes.  What increases the risk?  The following factors may make you more likely to get the flu:  · Not washing or sanitizing your hands often.  · Having close contact with many people during cold and flu season.  · Touching your mouth, eyes, or nose without first washing or sanitizing your hands.  · Not getting a yearly (annual) flu shot.  You may have a higher risk for the flu, including serious problems such as a lung infection (pneumonia), if you:  · Are older than 65.  · Are pregnant.  · Have a weakened disease-fighting system (immune system). You may have a weakened immune system if you:  ? Have HIV or AIDS.  ? Are undergoing chemotherapy.  ? Are taking medicines that reduce (suppress) the activity of your immune system.  · Have a long-term (chronic) illness, such as heart disease, kidney disease, diabetes, or lung disease.  · Have a liver disorder.  · Are severely " overweight (morbidly obese).  · Have anemia. This is a condition that affects your red blood cells.  · Have asthma.  What are the signs or symptoms?  Symptoms of this condition usually begin suddenly and last 4-14 days. They may include:  · Fever and chills.  · Headaches, body aches, or muscle aches.  · Sore throat.  · Cough.  · Runny or stuffy (congested) nose.  · Chest discomfort.  · Poor appetite.  · Weakness or fatigue.  · Dizziness.  · Nausea or vomiting.  How is this diagnosed?  This condition may be diagnosed based on:  · Your symptoms and medical history.  · A physical exam.  · Swabbing your nose or throat and testing the fluid for the influenza virus.  How is this treated?  If the flu is diagnosed early, you can be treated with medicine that can help reduce how severe the illness is and how long it lasts (antiviral medicine). This may be given by mouth (orally) or through an IV.  Taking care of yourself at home can help relieve symptoms. Your health care provider may recommend:  · Taking over-the-counter medicines.  · Drinking plenty of fluids.  In many cases, the flu goes away on its own. If you have severe symptoms or complications, you may be treated in a hospital.  Follow these instructions at home:  Activity  · Rest as needed and get plenty of sleep.  · Stay home from work or school as told by your health care provider. Unless you are visiting your health care provider, avoid leaving home until your fever has been gone for 24 hours without taking medicine.  Eating and drinking  · Take an oral rehydration solution (ORS). This is a drink that is sold at pharmacies and retail stores.  · Drink enough fluid to keep your urine pale yellow.  · Drink clear fluids in small amounts as you are able. Clear fluids include water, ice chips, diluted fruit juice, and low-calorie sports drinks.  · Eat bland, easy-to-digest foods in small amounts as you are able. These foods include bananas, applesauce, rice, lean  "meats, toast, and crackers.  · Avoid drinking fluids that contain a lot of sugar or caffeine, such as energy drinks, regular sports drinks, and soda.  · Avoid alcohol.  · Avoid spicy or fatty foods.  General instructions         · Take over-the-counter and prescription medicines only as told by your health care provider.  · Use a cool mist humidifier to add humidity to the air in your home. This can make it easier to breathe.  · Cover your mouth and nose when you cough or sneeze.  · Wash your hands with soap and water often, especially after you cough or sneeze. If soap and water are not available, use alcohol-based hand .  · Keep all follow-up visits as told by your health care provider. This is important.  How is this prevented?    · Get an annual flu shot. You may get the flu shot in late summer, fall, or winter. Ask your health care provider when you should get your flu shot.  · Avoid contact with people who are sick during cold and flu season. This is generally fall and winter.  Contact a health care provider if:  · You develop new symptoms.  · You have:  ? Chest pain.  ? Diarrhea.  ? A fever.  · Your cough gets worse.  · You produce more mucus.  · You feel nauseous or you vomit.  Get help right away if:  · You develop shortness of breath or difficulty breathing.  · Your skin or nails turn a bluish color.  · You have severe pain or stiffness in your neck.  · You develop a sudden headache or sudden pain in your face or ear.  · You cannot eat or drink without vomiting.  Summary  · Influenza, more commonly known as \"the flu,\" is a viral infection that primarily affects your respiratory tract.  · Symptoms of the flu usually begin suddenly and last 4-14 days.  · Getting an annual flu shot is the best way to prevent getting the flu.  · Stay home from work or school as told by your health care provider. Unless you are visiting your health care provider, avoid leaving home until your fever has been gone for 24 " hours without taking medicine.  · Keep all follow-up visits as told by your health care provider. This is important.  This information is not intended to replace advice given to you by your health care provider. Make sure you discuss any questions you have with your health care provider.  Document Released: 12/15/2001 Document Revised: 06/05/2019 Document Reviewed: 06/05/2019  [employer] Patient Education © 2019 [employer]        Jane Wong, APRN        No

## 2023-07-31 ENCOUNTER — TELEPHONE (OUTPATIENT)
Dept: FAMILY MEDICINE CLINIC | Facility: CLINIC | Age: 38
End: 2023-07-31
Payer: COMMERCIAL

## 2023-08-16 ENCOUNTER — OFFICE VISIT (OUTPATIENT)
Dept: ONCOLOGY | Facility: CLINIC | Age: 38
End: 2023-08-16
Payer: COMMERCIAL

## 2023-08-16 VITALS
OXYGEN SATURATION: 98 % | WEIGHT: 141 LBS | HEIGHT: 65 IN | RESPIRATION RATE: 18 BRPM | TEMPERATURE: 97.1 F | DIASTOLIC BLOOD PRESSURE: 85 MMHG | SYSTOLIC BLOOD PRESSURE: 125 MMHG | BODY MASS INDEX: 23.49 KG/M2 | HEART RATE: 88 BPM

## 2023-08-16 DIAGNOSIS — Z17.0 CARCINOMA OF UPPER-OUTER QUADRANT OF LEFT BREAST IN FEMALE, ESTROGEN RECEPTOR POSITIVE: Primary | ICD-10-CM

## 2023-08-16 DIAGNOSIS — C50.412 CARCINOMA OF UPPER-OUTER QUADRANT OF LEFT BREAST IN FEMALE, ESTROGEN RECEPTOR POSITIVE: Primary | ICD-10-CM

## 2023-08-16 PROCEDURE — 99214 OFFICE O/P EST MOD 30 MIN: CPT | Performed by: INTERNAL MEDICINE

## 2023-08-16 PROCEDURE — 1126F AMNT PAIN NOTED NONE PRSNT: CPT | Performed by: INTERNAL MEDICINE

## 2023-08-16 NOTE — PROGRESS NOTES
PROBLEM LIST:  Oncology/Hematology History   Carcinoma of upper-outer quadrant of left breast in female, estrogen receptor positive   2/10/2021 Initial Diagnosis    Carcinoma of upper-outer quadrant of left breast in female, estrogen receptor positive (CMS/HCC)     4/14/2021 Cancer Staged    Staging form: Breast, AJCC 8th Edition  - Pathologic stage from 4/14/2021: Stage IB (pT3, pN1a, cM0, G2, ER+, WA+, HER2-) - Signed by Graham Herbert MD on 4/29/2021 4/14/2021 Surgery    Surgery       Procedure:  Bilateral mastectomies    Lab Results   Component Value Date    FINALDX  04/14/2021     COMMENT: Dr. Cortes advised and confirmed the lymph nodes are removed from the left.  Changes are noted in bold.    1. BREAST, RIGHT, SIMPLE MASTECTOMY:  Benign breast parenchyma with fibrocystic change, adenosis, and pseudoangiomatous stromal hyperplasia.  Negative for atypical ductal hyperplasia, DCIS, or invasive carcinoma.  All margins benign.  Skin and nipple with no significant histopathologic abnormalities.    2. BREAST, LEFT, SIMPLE MASTECTOMY:   Invasive ductal carcinoma, at least 5 cm in greatest dimension, see comment   Associated high grade DCIS with comedonecrosis   See CAP template below for further details    3.  LEFT SENTINEL LYMPH NODES, EXCISION:   Metastatic ductal carcinoma present in 1 of 5 lymph nodes (1/5)    INVASIVE CARCINOMA OF THE BREAST    CLINICAL:   Clinical History: Prior history of breast cancer    SPECIMEN:   Procedure: Total mastectomy  Specimen Laterality: Left    TUMOR:   Tumor Site: Invasive Carcinoma:   - Central    Clock Position of Tumor Site: 1 o'clock, 12 o'clock  Histologic Type: Invasive carcinoma of no special type (ductal, not otherwise specified)  Glandular (Acinar) / Tubular Differentiation: Score 2  Nuclear Pleomorphism: Score 3  Mitotic Rate: Score 2 (4-7 mitoses per mm2)  Overall Grade: Grade 2 (scores of 6 or 7)  Tumor Size: 50 mm, see comment  Tumor Focality: Single focus  "of invasive carcinoma  Ductal Carcinoma In Situ (DCIS):   - DCIS is present in specimen    Size (Extent) of DCIS: 80 mm    Architectural Patterns: Comedo, Cribriform    Nuclear Grade: Grade III (high)    Necrosis: Present, central (expansive \"comedo\" necrosis)  Lobular Carcinoma In Situ (LCIS): No LCIS in specimen  Accessory Findings:   Lymphovascular Invasion: Not identified  Dermal Lymphovascular Invasion: Not identified  Microcalcifications: Not identified  Treatment Effect: No known presurgical therapy    MARGINS:   Invasive Carcionma Margins:   - Uninvolved by invasive carcinoma    Distance from Closest Margin in Millimeters (mm): Distance is > 10 Millimeters (mm)  DCIS Margins:   - Uninvolved by DCIS    Distance of DCIS from Closest Margin in Millimeters (mm): Distance is > 10 Millimeters (mm)    LYMPH NODES:   Number of Lymph Nodes with Macrometastases (> 2 mm): 1  Number of Lymph Nodes with Micrometastases (> 0.2 mm to 2 mm and / or > 200 cells): 0  Size of Largest Metastatic Deposit in Millimeters (mm): 9 Millimeters (mm)  Extranodal Extension: Not identified  Number of Lymph Nodes Examined: 5  Number of Mobile Nodes Examined: 5    PATHOLOGIC STAGE CLASSIFICATION (pTNM, AJCC 8th Edition):   Note: Reporting of pT, pN, and (when applicable) pM categories is based on information available to the pathologist at the time the report is issued.  As per the AJCC (Chapter 1, 8th Ed.) it is the managing physician's responsibility to establish the final pathologic stage based upon all pertinent information, including but potentially not limited to this pathology report.  Primary Tumor (Invasive Carcinoma) (pT): pT2  Regional Lymph Nodes (pN):   Modifier: (sn): Only sentinel node(s) evaluated.  If 6 or more nodes (sentinel or nonsentinel) are removed, this modifier should not be used.  Category (pN): pN1a               5/21/2021 - 7/15/2021 Chemotherapy    OP BREAST AC DD DOXOrubicin / Cyclophosphamide     8/26/2021 " - 12/9/2021 Chemotherapy    OP BREAST PACLitaxel (weekly X 12)     1/3/2022 -  Hormonal Therapy    Tamoxifen     3/23/2022 - 3/24/2022 Radiation    Radiation OncologyTreatment Course:  Rai Guillory received 360 cGy in 2 fractions to left chest wall and supraclavicular nodes via External Beam Radiation - EBRT.  Patient decided to stop coming after two treatments.         REASON FOR VISIT: Breast cancer    HISTORY OF PRESENT ILLNESS:   38 y.o.  female presents today for follow-up of her breast cancer.  She has completed dose dense AC followed by weekly Taxol.  And then completed involved field radiotherapy and was placed on tamoxifen.  She has been on tamoxifen for the last year and a half.  She is doing reasonably well for now.  Denies any issues with nausea vomiting.  No diarrhea.  No issues with pain.  She does have numbness in her lower feet.    Past medical history, social history and family history was reviewed 08/16/23 and unchanged from prior visit.    Review of Systems:    Review of Systems   Constitutional: Negative.    HENT:  Negative.     Eyes: Negative.    Respiratory: Negative.     Cardiovascular: Negative.    Gastrointestinal: Negative.    Endocrine: Negative.    Genitourinary: Negative.     Musculoskeletal: Negative.    Skin: Negative.    Neurological:  Positive for numbness.   Hematological: Negative.    Psychiatric/Behavioral: Negative.            Medications:        Current Outpatient Medications:     amitriptyline (ELAVIL) 10 MG tablet, Take 1 tablet by mouth Every Night., Disp: 30 tablet, Rfl: 2    tamoxifen (NOLVADEX) 20 MG chemo tablet, Take 1 tablet by mouth Daily., Disp: 90 tablet, Rfl: 3    venlafaxine XR (EFFEXOR-XR) 37.5 MG 24 hr capsule, Take 1 capsule by mouth Daily., Disp: 30 capsule, Rfl: 2    Pain Medications               amitriptyline (ELAVIL) 10 MG tablet Take 1 tablet by mouth Every Night.    venlafaxine XR (EFFEXOR-XR) 37.5 MG 24 hr capsule Take 1 capsule by mouth  "Daily.               ALLERGIES:  No Known Allergies      Physical Exam    VITAL SIGNS:  /85 Comment: Right Wrist  Pulse 88   Temp 97.1 øF (36.2 øC) (Infrared)   Resp 18   Ht 165.1 cm (65\")   Wt 64 kg (141 lb)   SpO2 98% Comment: RA  BMI 23.46 kg/mý     ECOG score: 0           Wt Readings from Last 3 Encounters:   08/16/23 64 kg (141 lb)   05/30/23 60.8 kg (134 lb)   02/22/23 60.8 kg (134 lb)       Body mass index is 23.46 kg/mý. Body surface area is 1.71 meters squared.    Pain Score    08/16/23 0853   PainSc: 0-No pain           Performance Status: 0    General: Distraught appearing, crying  HEENT: sclera anicteric, neck is supple  Lymphatics: no cervical, supraclavicular, or axillary adenopathy  Chest exam reveals no nodularity.  Bilateral mastectomies in place  Lungs: clear to auscultation bilaterally  Abdomen: soft, nontender, nondistended.  No palpable organomegaly  Extremities: no lower extremity edema  Skin: no rashes, lesions, bruising, or petechiae  Msk:  Shows no weakness of the large muscle groups  Psych: Depressed.        RECENT LABS:    Lab Results   Component Value Date    HGB 13.7 02/10/2023    HCT 41.4 02/10/2023    MCV 95.8 02/10/2023     02/10/2023    WBC 5.96 02/10/2023    NEUTROABS 3.70 02/10/2023    LYMPHSABS 1.85 02/10/2023    MONOSABS 0.35 02/10/2023    EOSABS 0.05 02/10/2023    BASOSABS 0.01 02/10/2023       Lab Results   Component Value Date    GLUCOSE 79 02/10/2023    BUN 10 02/10/2023    CREATININE 0.57 02/10/2023     02/10/2023    K 4.0 02/10/2023     02/10/2023    CO2 24.0 02/10/2023    CALCIUM 8.3 (L) 02/10/2023    PROTEINTOT 7.3 02/10/2023    ALBUMIN 4.6 02/10/2023    BILITOT 0.3 02/10/2023    ALKPHOS 124 (H) 02/10/2023    AST 26 02/10/2023    ALT 23 02/10/2023           CT Head With Contrast    Result Date: 2/21/2023  Impression: Normal contrast-enhanced CT of the head. There is no evidence of intracranial hemorrhage, mass, mass effect or abnormal " enhancement. Electronically Signed: Mc Prajapati  2/21/2023 4:48 PM EST  Workstation ID: [id number]    CT Chest With Contrast Diagnostic    Result Date: 2/21/2023  Impression: 1. No evidence of metastatic disease within the chest, abdomen, or pelvis. 2. Thickening of the urinary bladder wall which could be due to cystitis. I would recommend clinical correlation. 3. Right ovarian follicular cyst. 4. Multiple additional findings as noted above. Electronically Signed: Jordi Andrzej  2/21/2023 5:03 PM EST  Workstation ID: [id number]    CT Abdomen Pelvis With Contrast    Result Date: 2/21/2023  Impression: 1. No evidence of metastatic disease within the chest, abdomen, or pelvis. 2. Thickening of the urinary bladder wall which could be due to cystitis. I would recommend clinical correlation. 3. Right ovarian follicular cyst. 4. Multiple additional findings as noted above. Electronically Signed: Jordi Andrzej  2/21/2023 5:03 PM EST  Workstation ID: [id number]        Assessment/Plan    1.  Node positive ER/RI positive HER-2 negative breast cancer.  No clinical sign of recurrent disease.   For now continue tamoxifen  with no changes.  Plan for 10 years of therapy.  I am considering possible oophorectomy and switching her to aromatase inhibitors.    2.  Chemotherapy-induced neuropathy.  This remains an issue but stable.    3.  Severe depression and anxiety.  She saw Dr. Hansen today.  on Effexor          Graham Herbert MD  [facility] On[facility]         No orders of the defined types were placed in this encounter.      8/16/2023

## 2023-10-13 RX ORDER — AMITRIPTYLINE HYDROCHLORIDE 10 MG/1
10 TABLET, FILM COATED ORAL NIGHTLY
Qty: 90 TABLET | Refills: 3 | Status: SHIPPED | OUTPATIENT
Start: 2023-10-13

## 2023-11-03 ENCOUNTER — OFFICE VISIT (OUTPATIENT)
Dept: FAMILY MEDICINE CLINIC | Facility: CLINIC | Age: 38
End: 2023-11-03
Payer: COMMERCIAL

## 2023-11-03 ENCOUNTER — LAB (OUTPATIENT)
Dept: LAB | Facility: HOSPITAL | Age: 38
End: 2023-11-03
Payer: COMMERCIAL

## 2023-11-03 VITALS
SYSTOLIC BLOOD PRESSURE: 118 MMHG | HEART RATE: 108 BPM | DIASTOLIC BLOOD PRESSURE: 72 MMHG | BODY MASS INDEX: 24.06 KG/M2 | OXYGEN SATURATION: 98 % | WEIGHT: 144.4 LBS | HEIGHT: 65 IN | TEMPERATURE: 97.5 F

## 2023-11-03 DIAGNOSIS — G62.9 NEUROPATHY: Primary | ICD-10-CM

## 2023-11-03 DIAGNOSIS — Z13.29 SCREENING FOR THYROID DISORDER: ICD-10-CM

## 2023-11-03 DIAGNOSIS — M79.9 SOFT TISSUE LESION OF FOOT: ICD-10-CM

## 2023-11-03 DIAGNOSIS — R79.89 LOW VITAMIN D LEVEL: ICD-10-CM

## 2023-11-03 DIAGNOSIS — E53.8 LOW FOLATE: ICD-10-CM

## 2023-11-03 DIAGNOSIS — M79.672 BILATERAL FOOT PAIN: ICD-10-CM

## 2023-11-03 DIAGNOSIS — Z13.220 SCREENING FOR LIPID DISORDERS: ICD-10-CM

## 2023-11-03 DIAGNOSIS — G62.9 NEUROPATHY: ICD-10-CM

## 2023-11-03 DIAGNOSIS — E61.1 LOW IRON: ICD-10-CM

## 2023-11-03 DIAGNOSIS — Q66.50 CONGENITAL PES PLANUS, UNSPECIFIED LATERALITY: ICD-10-CM

## 2023-11-03 DIAGNOSIS — M21.41 PES PLANUS OF BOTH FEET: ICD-10-CM

## 2023-11-03 DIAGNOSIS — M21.42 PES PLANUS OF BOTH FEET: ICD-10-CM

## 2023-11-03 DIAGNOSIS — M79.671 BILATERAL FOOT PAIN: ICD-10-CM

## 2023-11-03 LAB
25(OH)D3 SERPL-MCNC: 8.4 NG/ML (ref 30–100)
BASOPHILS # BLD AUTO: 0.02 10*3/MM3 (ref 0–0.2)
BASOPHILS NFR BLD AUTO: 0.4 % (ref 0–1.5)
CHOLEST SERPL-MCNC: 171 MG/DL (ref 0–200)
DEPRECATED RDW RBC AUTO: 44.3 FL (ref 37–54)
EOSINOPHIL # BLD AUTO: 0.13 10*3/MM3 (ref 0–0.4)
EOSINOPHIL NFR BLD AUTO: 2.3 % (ref 0.3–6.2)
ERYTHROCYTE [DISTWIDTH] IN BLOOD BY AUTOMATED COUNT: 13 % (ref 12.3–15.4)
FOLATE SERPL-MCNC: 3.82 NG/ML (ref 4.78–24.2)
HBA1C MFR BLD: 5.6 % (ref 4.8–5.6)
HCT VFR BLD AUTO: 43.6 % (ref 34–46.6)
HDLC SERPL-MCNC: 85 MG/DL (ref 40–60)
HGB BLD-MCNC: 14.7 G/DL (ref 12–15.9)
IMM GRANULOCYTES # BLD AUTO: 0.01 10*3/MM3 (ref 0–0.05)
IMM GRANULOCYTES NFR BLD AUTO: 0.2 % (ref 0–0.5)
IRON 24H UR-MRATE: 53 MCG/DL (ref 37–145)
IRON SATN MFR SERPL: 13 % (ref 20–50)
LDLC SERPL CALC-MCNC: 71 MG/DL (ref 0–100)
LDLC/HDLC SERPL: 0.82 {RATIO}
LYMPHOCYTES # BLD AUTO: 1.58 10*3/MM3 (ref 0.7–3.1)
LYMPHOCYTES NFR BLD AUTO: 28.3 % (ref 19.6–45.3)
MCH RBC QN AUTO: 31.7 PG (ref 26.6–33)
MCHC RBC AUTO-ENTMCNC: 33.7 G/DL (ref 31.5–35.7)
MCV RBC AUTO: 94.2 FL (ref 79–97)
MONOCYTES # BLD AUTO: 0.52 10*3/MM3 (ref 0.1–0.9)
MONOCYTES NFR BLD AUTO: 9.3 % (ref 5–12)
NEUTROPHILS NFR BLD AUTO: 3.33 10*3/MM3 (ref 1.7–7)
NEUTROPHILS NFR BLD AUTO: 59.5 % (ref 42.7–76)
NRBC BLD AUTO-RTO: 0 /100 WBC (ref 0–0.2)
PLATELET # BLD AUTO: 294 10*3/MM3 (ref 140–450)
PMV BLD AUTO: 10.6 FL (ref 6–12)
RBC # BLD AUTO: 4.63 10*6/MM3 (ref 3.77–5.28)
TIBC SERPL-MCNC: 416 MCG/DL (ref 298–536)
TRANSFERRIN SERPL-MCNC: 279 MG/DL (ref 200–360)
TRIGL SERPL-MCNC: 81 MG/DL (ref 0–150)
TSH SERPL DL<=0.05 MIU/L-ACNC: 1.17 UIU/ML (ref 0.27–4.2)
VIT B12 BLD-MCNC: 611 PG/ML (ref 211–946)
VLDLC SERPL-MCNC: 15 MG/DL (ref 5–40)
WBC NRBC COR # BLD: 5.59 10*3/MM3 (ref 3.4–10.8)

## 2023-11-03 PROCEDURE — 85025 COMPLETE CBC W/AUTO DIFF WBC: CPT

## 2023-11-03 PROCEDURE — 84443 ASSAY THYROID STIM HORMONE: CPT | Performed by: NURSE PRACTITIONER

## 2023-11-03 PROCEDURE — 83036 HEMOGLOBIN GLYCOSYLATED A1C: CPT

## 2023-11-03 PROCEDURE — 83540 ASSAY OF IRON: CPT

## 2023-11-03 PROCEDURE — 82306 VITAMIN D 25 HYDROXY: CPT

## 2023-11-03 PROCEDURE — 99214 OFFICE O/P EST MOD 30 MIN: CPT | Performed by: NURSE PRACTITIONER

## 2023-11-03 PROCEDURE — 36415 COLL VENOUS BLD VENIPUNCTURE: CPT

## 2023-11-03 PROCEDURE — 82607 VITAMIN B-12: CPT

## 2023-11-03 PROCEDURE — 84466 ASSAY OF TRANSFERRIN: CPT

## 2023-11-03 PROCEDURE — 1160F RVW MEDS BY RX/DR IN RCRD: CPT | Performed by: NURSE PRACTITIONER

## 2023-11-03 PROCEDURE — 82746 ASSAY OF FOLIC ACID SERUM: CPT

## 2023-11-03 PROCEDURE — 80061 LIPID PANEL: CPT

## 2023-11-03 PROCEDURE — 1159F MED LIST DOCD IN RCRD: CPT | Performed by: NURSE PRACTITIONER

## 2023-11-03 RX ORDER — TAMOXIFEN CITRATE 20 MG/1
20 TABLET ORAL DAILY
Qty: 90 TABLET | Refills: 3 | Status: SHIPPED | OUTPATIENT
Start: 2023-11-03

## 2023-11-03 RX ORDER — VENLAFAXINE HYDROCHLORIDE 75 MG/1
75 CAPSULE, EXTENDED RELEASE ORAL DAILY
Qty: 90 CAPSULE | Refills: 1 | Status: SHIPPED | OUTPATIENT
Start: 2023-11-03

## 2023-11-03 NOTE — PROGRESS NOTES
"Chief Complaint  Foot Pain (She believes she has neuropathy due to chemo. She would like to see a podiatrist for the problem) and Chemotherapy (Refill on tamoxafin )    Subjective          Rai Guillory presents to [facility]  History of Present Illness  Patient is a 38-year-old female.  She is here for complaint of bilateral foot pain.  She is status post chemotherapy for breast cancer.  She is complaining of bilateral feet neuropathy pain. She has callous on both feet same spot. She has bilateral bunions  She states she saw a podiatrist some years ago. \"They shaved them off, and I've had surgery on both my feet. \"     Discussed referral back to see a podiatrist. She is agreeable. Will check labs to see if any of her neuropathy is related to vitamin deficiency.         Foot Pain  This is a chronic problem. The problem has been gradually worsening. The symptoms are aggravated by walking. She has tried acetaminophen for the symptoms. The treatment provided mild relief.       The following portions of the patient's history were reviewed and updated as appropriate: allergies, current medications, past family history, past medical history, past social history, past surgical history and problem list.    Review of Systems   Constitutional: Negative.    HENT: Negative.     Cardiovascular: Negative.    Gastrointestinal: Negative.    Skin: Negative.    Neurological:         Numbness and tingling bilateral feet.      Hematological: Negative.    Psychiatric/Behavioral: Negative.           Objective   Vital Signs:   /72 (BP Location: Right arm, Patient Position: Sitting, Cuff Size: Adult)   Pulse 108   Temp 97.5 °F (36.4 °C) (Infrared)   Ht 165.1 cm (65\")   Wt 65.5 kg (144 lb 6.4 oz)   SpO2 98%   BMI 24.03 kg/m²    BMI is within normal parameters. No other follow-up for BMI required.      PHQ-2/9 Depression Screening  PHQ-9 Total Score:      TARA-7 Anxiety " Screening  TARA-7             Physical Exam  Vitals reviewed.   Cardiovascular:      Rate and Rhythm: Normal rate.      Pulses: Normal pulses.   Musculoskeletal:      Right foot: Bunion present.      Left foot: Bunion present.        Feet:    Feet:      Comments: Bilateral foot pad thickening /callous -   Miss shapen feet - she has bilateral    Flat feet   Skin:     General: Skin is warm and dry.      Capillary Refill: Capillary refill takes less than 2 seconds.   Neurological:      Mental Status: She is alert and oriented to person, place, and time.   Psychiatric:         Mood and Affect: Mood normal.         Behavior: Behavior normal.        Result Review :                 Assessment and Plan    Diagnoses and all orders for this visit:    1. Neuropathy (Primary)  -     venlafaxine XR (EFFEXOR-XR) 75 MG 24 hr capsule; Take 1 capsule by mouth Daily.  Dispense: 90 capsule; Refill: 1  -     CBC & Differential; Future  -     Hemoglobin A1c; Future  -     Vitamin D,25-Hydroxy; Future  -     Vitamin B12; Future  -     Folate; Future  -     Iron Profile; Future  -     Ambulatory Referral to Podiatry    2. Bilateral foot pain  -     Ambulatory Referral to Podiatry    3. Soft tissue lesion of foot  -     Ambulatory Referral to Podiatry    4. Screening for lipid disorders  -     Lipid Panel; Future    5. Screening for thyroid disorder  -     TSH Rfx On Abnormal To Free T4    6. Congenital pes planus, unspecified laterality    7. Pes planus of both feet    Discussed medication for neuropathy symptoms. Will start on effexor   Checking labs     Referral to podiatry - has seen Dr. Faizan Michael in 2018. She would like to see him again.         Follow Up   Return in about 4 weeks (around 12/1/2023) for Annual.  Patient was given instructions and counseling regarding her condition or for health maintenance advice. Please see specific information pulled into the AVS if appropriate.

## 2023-11-04 PROBLEM — E61.1 LOW IRON: Status: ACTIVE | Noted: 2023-11-04

## 2023-11-04 PROBLEM — R79.89 LOW VITAMIN D LEVEL: Status: ACTIVE | Noted: 2023-11-04

## 2023-11-04 PROBLEM — E53.8 LOW FOLATE: Status: ACTIVE | Noted: 2023-11-04

## 2023-11-04 RX ORDER — FOLIC ACID 1 MG/1
1 TABLET ORAL DAILY
Qty: 90 TABLET | Refills: 3 | Status: SHIPPED | OUTPATIENT
Start: 2023-11-04

## 2023-11-04 RX ORDER — FERROUS SULFATE 325(65) MG
325 TABLET ORAL
Qty: 90 TABLET | Refills: 3 | Status: SHIPPED | OUTPATIENT
Start: 2023-11-04

## 2023-11-08 ENCOUNTER — TELEPHONE (OUTPATIENT)
Dept: FAMILY MEDICINE CLINIC | Facility: CLINIC | Age: 38
End: 2023-11-08
Payer: COMMERCIAL

## 2023-11-08 NOTE — TELEPHONE ENCOUNTER
Hub staff attempted to follow warm transfer process and was unsuccessful     Caller: Rai Guillory    Relationship to patient: Self    Best call back number: [phone]    Patient is needing: CALL PATIENT REGARDING HER LAB RESULTS. THERE IS A MESSAGE THERE BUT I CANNOT READ IT DUE TO IT NOT STATING HUB TO RELAY.

## 2023-11-08 NOTE — TELEPHONE ENCOUNTER
Hub and front can read      Per Provider:  Please call patient regarding her abnormal labs.  Her folate is low.  Take folate 1 mg daily.  Her iron level is low. Take 325 mg daily  with some type of vitamin C.  Your vitamin D is very low. Will order cholecalciferol 50,000 IU 1 tablet WEEKLY for 12 weeks, then she will need to take vitamin D3 1000 IU daily forever.     Follow up in 3 months.

## 2023-12-12 ENCOUNTER — TELEMEDICINE (OUTPATIENT)
Dept: FAMILY MEDICINE CLINIC | Facility: TELEHEALTH | Age: 38
End: 2023-12-12
Payer: COMMERCIAL

## 2023-12-12 DIAGNOSIS — N76.0 BV (BACTERIAL VAGINOSIS): Primary | ICD-10-CM

## 2023-12-12 DIAGNOSIS — B96.89 BV (BACTERIAL VAGINOSIS): Primary | ICD-10-CM

## 2023-12-12 RX ORDER — FLUCONAZOLE 150 MG/1
150 TABLET ORAL ONCE
Qty: 2 TABLET | Refills: 0 | Status: SHIPPED | OUTPATIENT
Start: 2023-12-12 | End: 2023-12-12

## 2023-12-12 RX ORDER — METRONIDAZOLE 500 MG/1
500 TABLET ORAL 2 TIMES DAILY
Qty: 20 TABLET | Refills: 0 | Status: SHIPPED | OUTPATIENT
Start: 2023-12-12 | End: 2023-12-22

## 2023-12-12 NOTE — PROGRESS NOTES
You have chosen to receive care through a telehealth visit.  Do you consent to use a video/audio connection for your medical care today? Yes     CHIEF COMPLAINT  No chief complaint on file.        HPI  Rai Guillory is a 38 y.o. female  presents with complaint of clear/white vaginal discharge that has an odor.  Symptoms started a couple of weeks ago.  She states that she has had BV before and the symptoms are the same as previous episodes.    Review of Systems   Genitourinary:  Positive for vaginal discharge (clear/white with odor).   All other systems reviewed and are negative.      Past Medical History:   Diagnosis Date    Cancer     Breast     Carcinoma of upper-outer quadrant of left breast in female, estrogen receptor positive 2021    History of abnormal cervical Pap smear     Mastitis     Ovarian cyst     STD exposure     Chlamydia and GC    Tattoos     x 3 above waist        Family History   Problem Relation Age of Onset    No Known Problems Mother     Diabetes Father     Heart disease Father     Alcohol abuse Father     Hypertension Father     Breast cancer Paternal Cousin 23    Ovarian cancer Neg Hx        Social History     Socioeconomic History    Marital status: Single   Tobacco Use    Smoking status: Former     Packs/day: .25     Types: Cigarettes     Start date:      Quit date:      Years since quittin.9    Smokeless tobacco: Never   Vaping Use    Vaping Use: Every day    Substances: CBD   Substance and Sexual Activity    Alcohol use: Yes     Alcohol/week: 2.0 standard drinks of alcohol     Types: 2 Shots of liquor per week     Comment: once or twice a wk    Drug use: Yes     Types: Marijuana     Comment: Almost every day     Sexual activity: Yes     Partners: Male     Birth control/protection: Surgical     Comment: Tubal ligation       Rai Guillory  reports that she quit smoking about 2 years ago. Her smoking use included cigarettes. She started smoking about  18 years ago. She smoked an average of .25 packs per day. She has never used smokeless tobacco..       There were no vitals taken for this visit.    PHYSICAL EXAM  Physical Exam   Constitutional: She appears well-developed and well-nourished.   HENT:   Head: Normocephalic.   Eyes: Pupils are equal, round, and reactive to light.   Pulmonary/Chest: Effort normal.   Musculoskeletal: Normal range of motion.   Neurological: She is alert.   Psychiatric: She has a normal mood and affect.       Results for orders placed or performed in visit on 11/03/23   Hemoglobin A1c    Specimen: Blood   Result Value Ref Range    Hemoglobin A1C 5.60 4.80 - 5.60 %   Vitamin D,25-Hydroxy    Specimen: Blood   Result Value Ref Range    25 Hydroxy, Vitamin D 8.4 (L) 30.0 - 100.0 ng/ml   Vitamin B12    Specimen: Blood   Result Value Ref Range    Vitamin B-12 611 211 - 946 pg/mL   Folate    Specimen: Blood   Result Value Ref Range    Folate 3.82 (L) 4.78 - 24.20 ng/mL   Iron Profile    Specimen: Blood   Result Value Ref Range    Iron 53 37 - 145 mcg/dL    Iron Saturation (TSAT) 13 (L) 20 - 50 %    Transferrin 279 200 - 360 mg/dL    TIBC 416 298 - 536 mcg/dL   Lipid Panel    Specimen: Blood   Result Value Ref Range    Total Cholesterol 171 0 - 200 mg/dL    Triglycerides 81 0 - 150 mg/dL    HDL Cholesterol 85 (H) 40 - 60 mg/dL    LDL Cholesterol  71 0 - 100 mg/dL    VLDL Cholesterol 15 5 - 40 mg/dL    LDL/HDL Ratio 0.82    CBC Auto Differential    Specimen: Blood   Result Value Ref Range    WBC 5.59 3.40 - 10.80 10*3/mm3    RBC 4.63 3.77 - 5.28 10*6/mm3    Hemoglobin 14.7 12.0 - 15.9 g/dL    Hematocrit 43.6 34.0 - 46.6 %    MCV 94.2 79.0 - 97.0 fL    MCH 31.7 26.6 - 33.0 pg    MCHC 33.7 31.5 - 35.7 g/dL    RDW 13.0 12.3 - 15.4 %    RDW-SD 44.3 37.0 - 54.0 fl    MPV 10.6 6.0 - 12.0 fL    Platelets 294 140 - 450 10*3/mm3    Neutrophil % 59.5 42.7 - 76.0 %    Lymphocyte % 28.3 19.6 - 45.3 %    Monocyte % 9.3 5.0 - 12.0 %    Eosinophil % 2.3 0.3 -  6.2 %    Basophil % 0.4 0.0 - 1.5 %    Immature Grans % 0.2 0.0 - 0.5 %    Neutrophils, Absolute 3.33 1.70 - 7.00 10*3/mm3    Lymphocytes, Absolute 1.58 0.70 - 3.10 10*3/mm3    Monocytes, Absolute 0.52 0.10 - 0.90 10*3/mm3    Eosinophils, Absolute 0.13 0.00 - 0.40 10*3/mm3    Basophils, Absolute 0.02 0.00 - 0.20 10*3/mm3    Immature Grans, Absolute 0.01 0.00 - 0.05 10*3/mm3    nRBC 0.0 0.0 - 0.2 /100 WBC       Diagnoses and all orders for this visit:    1. BV (bacterial vaginosis) (Primary)  -     metroNIDAZOLE (Flagyl) 500 MG tablet; Take 1 tablet by mouth 2 (Two) Times a Day for 10 days.  Dispense: 20 tablet; Refill: 0  -     fluconazole (Diflucan) 150 MG tablet; Take 1 tablet by mouth 1 (One) Time for 1 dose.  Dispense: 2 tablet; Refill: 0          FOLLOW-UP  As discussed during visit with PCP/[facility] if no improvement or Urgent Care/Emergency Department if worsening of symptoms    Patient verbalizes understanding of medication dosage, comfort measures, instructions for treatment and follow-up.    Gayla Donis, APRN  12/12/2023  00:07 EST    The use of a video visit has been reviewed with the patient and verbal informed consent has been obtained. Myself and Rai Guillory participated in this visit. The patient is located in [address] [place] [address].    I am located in [place], [place]. Mychart and Twilio were utilized. I spent 10 minutes in the patient's chart for this visit.

## 2024-03-15 ENCOUNTER — OFFICE VISIT (OUTPATIENT)
Dept: FAMILY MEDICINE CLINIC | Facility: CLINIC | Age: 39
End: 2024-03-15
Payer: COMMERCIAL

## 2024-03-15 VITALS
DIASTOLIC BLOOD PRESSURE: 72 MMHG | TEMPERATURE: 98.6 F | HEIGHT: 65 IN | HEART RATE: 86 BPM | OXYGEN SATURATION: 95 % | WEIGHT: 145.2 LBS | RESPIRATION RATE: 21 BRPM | BODY MASS INDEX: 24.19 KG/M2 | SYSTOLIC BLOOD PRESSURE: 120 MMHG

## 2024-03-15 DIAGNOSIS — Z56.6 STRESS AT WORK: ICD-10-CM

## 2024-03-15 DIAGNOSIS — Z56.6 WORK-RELATED STRESS: Primary | ICD-10-CM

## 2024-03-15 DIAGNOSIS — F41.8 SITUATIONAL ANXIETY: ICD-10-CM

## 2024-03-15 SDOH — HEALTH STABILITY - MENTAL HEALTH: OTHER PHYSICAL AND MENTAL STRAIN RELATED TO WORK: Z56.6

## 2024-03-15 NOTE — LETTER
March 15, 2024     Patient: Rai Guillory   YOB: 1985   Date of Visit: 3/15/2024       To Whom It May Concern:    It is my medical opinion that Rai Guillory may return to work on Wednesday March 20th, 2024.          Sincerely,        LETA Kennedy    CC: No Recipients

## 2024-03-15 NOTE — PROGRESS NOTES
"Chief Complaint  Stress    Subjective          Rai Guillory presents to [facility]  History of Present Illness  Patient is a 38-year-old female.  She is here for complaint of stress. She is stating she is having work place related stress and anxiety. She and a co worker are having some situational issues. \" I am being picked on and it is race related\". She denies any thoughts of self harm or suicide.   She follows with Behavioral health Sarah Hansen. She has stopped taking her effexor and elavil on her own. She states she does not want to restart medication.   \" I just need off a few days so I can get my life in order\". \" I have an interview with another company on Monday.           The following portions of the patient's history were reviewed and updated as appropriate: allergies, current medications, past family history, past medical history, past social history, past surgical history and problem list.    Review of Systems   HENT: Negative.     Respiratory: Negative.     Cardiovascular: Negative.    Musculoskeletal: Negative.    Skin: Negative.    Neurological: Negative.    Psychiatric/Behavioral:  Positive for sleep disturbance. The patient is nervous/anxious.         Will intermittently take the elavil for sleep.         Objective   Vital Signs:   /72 (BP Location: Right arm, Patient Position: Sitting, Cuff Size: Adult)   Pulse 86   Temp 98.6 °F (37 °C) (Infrared)   Resp 21   Ht 165.1 cm (65\")   Wt 65.9 kg (145 lb 3.2 oz)   SpO2 95%   BMI 24.16 kg/m²    BMI is within normal parameters. No other follow-up for BMI required.      PHQ-2/9 Depression Screening  PHQ-9 Total Score: 5    TARA-7 Anxiety Screening  TARA-7  Feeling nervous, anxious or on edge: Not at all  Not being able to stop or control worrying: Several days  Worrying too much about different things: Not at all  Trouble Relaxing: Not at all  Being so restless that it is hard to sit still: Not at " all  Feeling afraid as if something awful might happen: Not at all  Becoming easily annoyed or irritable: Not at all  TARA 7 Total Score: 1  If you checked any problems, how difficult have these problems made it for you to do your work, take care of things at home, or get along with other people: Somewhat difficult          Physical Exam  Vitals reviewed.   Constitutional:       Appearance: She is well-developed. She is not ill-appearing.   HENT:      Head: Normocephalic.      Right Ear: Tympanic membrane, ear canal and external ear normal.      Left Ear: Tympanic membrane, ear canal and external ear normal.      Nose: Nose normal.      Mouth/Throat:      Mouth: Mucous membranes are moist.   Eyes:      Conjunctiva/sclera: Conjunctivae normal.      Pupils: Pupils are equal, round, and reactive to light.   Cardiovascular:      Rate and Rhythm: Normal rate and regular rhythm.      Heart sounds: Normal heart sounds.   Pulmonary:      Effort: Pulmonary effort is normal.      Breath sounds: Normal breath sounds.   Abdominal:      General: Bowel sounds are normal.      Palpations: Abdomen is soft.   Musculoskeletal:         General: Normal range of motion.      Cervical back: Normal range of motion.   Lymphadenopathy:      Cervical: No cervical adenopathy.   Skin:     General: Skin is warm and dry.      Capillary Refill: Capillary refill takes less than 2 seconds.   Neurological:      Mental Status: She is alert and oriented to person, place, and time.   Psychiatric:         Mood and Affect: Mood normal.         Speech: Speech normal.         Behavior: Behavior normal. Behavior is cooperative.         Thought Content: Thought content normal.         Judgment: Judgment normal.        Result Review :                 Assessment and Plan    Diagnoses and all orders for this visit:    1. Work-related stress (Primary)    2. Stress at work    3. Situational anxiety    Discussed following up with behavioral health.   Off until  March 20, 2023 for work place anxiety/ situational anxiety.     Go to ER for any thoughts of self harm or suicidal ideations.         Follow Up   Return if symptoms worsen or fail to improve.  Patient was given instructions and counseling regarding her condition or for health maintenance advice. Please see specific information pulled into the AVS if appropriate.

## 2024-03-25 ENCOUNTER — TELEMEDICINE (OUTPATIENT)
Dept: PSYCHIATRY | Facility: CLINIC | Age: 39
End: 2024-03-25
Payer: COMMERCIAL

## 2024-03-25 DIAGNOSIS — F41.1 GENERALIZED ANXIETY DISORDER: Primary | ICD-10-CM

## 2024-03-25 PROCEDURE — 99212 OFFICE O/P EST SF 10 MIN: CPT | Performed by: NURSE PRACTITIONER

## 2024-03-25 PROCEDURE — 1159F MED LIST DOCD IN RCRD: CPT | Performed by: NURSE PRACTITIONER

## 2024-03-25 PROCEDURE — 1160F RVW MEDS BY RX/DR IN RCRD: CPT | Performed by: NURSE PRACTITIONER

## 2024-03-25 RX ORDER — TAMOXIFEN CITRATE 20 MG/1
20 TABLET ORAL DAILY
Qty: 90 TABLET | Refills: 3
Start: 2024-03-25

## 2024-03-25 NOTE — PROGRESS NOTES
"  Subjective   Rai Guillory is a 38 y.o. female who is here today for medication management follow up. Patient consented to an audio and video enabled telemedicine encounter.     TIME IN: 350  TIME OUT:405    I spent 15 minutes in patient care: reviewing records prior to the visit, assessing the patient, entering orders and documentation.    PATIENT AT: THEIR PLACE OF RESIDENCE    PROVIDER AT:   [facility]  [address]  [place] [address]        Chief Complaint: stress situational    History of Present Illness Patient reports she is doing much better now. She was in hostile work environment she states and now not working for them as of Friday. 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 She did get severance pay and will hold her over for a little bit. She denies panic. She has not been taking any of her meds except the Tamoxifen. She reports she at the time felt she needed to talk with someone but she has good support from friends. Sleeping, eating well. Denies depression or panic and anxiety down \"not having to go there to that job\".    (Scales based on 0 - 10 with 10 being the worst)         The following portions of the patient's history were reviewed and updated as appropriate: allergies, current medications, past family history, past medical history, past social history, past surgical history, and problem list.    Review of Systems  A 14 point review of systems was performed and is negative except as noted above.    Objective   Physical Exam  not currently breastfeeding.    No Known Allergies    Current Medications:   Current Outpatient Medications   Medication Sig Dispense Refill    tamoxifen (NOLVADEX) 20 MG chemo tablet Take 1 tablet by mouth Daily. 90 tablet 3     No current facility-administered medications for this visit.       Lab Results:  Lab on 11/03/2023   Component " Date Value Ref Range Status    Hemoglobin A1C 11/03/2023 5.60  4.80 - 5.60 % Final    25 Hydroxy, Vitamin D 11/03/2023 8.4 (L)  30.0 - 100.0 ng/ml Final    Vitamin B-12 11/03/2023 611  211 - 946 pg/mL Final    Folate 11/03/2023 3.82 (L)  4.78 - 24.20 ng/mL Final    Iron 11/03/2023 53  37 - 145 mcg/dL Final    Iron Saturation (TSAT) 11/03/2023 13 (L)  20 - 50 % Final    Transferrin 11/03/2023 279  200 - 360 mg/dL Final    TIBC 11/03/2023 416  298 - 536 mcg/dL Final    Total Cholesterol 11/03/2023 171  0 - 200 mg/dL Final    Triglycerides 11/03/2023 81  0 - 150 mg/dL Final    HDL Cholesterol 11/03/2023 85 (H)  40 - 60 mg/dL Final    LDL Cholesterol  11/03/2023 71  0 - 100 mg/dL Final    VLDL Cholesterol 11/03/2023 15  5 - 40 mg/dL Final    LDL/HDL Ratio 11/03/2023 0.82   Final    WBC 11/03/2023 5.59  3.40 - 10.80 10*3/mm3 Final    RBC 11/03/2023 4.63  3.77 - 5.28 10*6/mm3 Final    Hemoglobin 11/03/2023 14.7  12.0 - 15.9 g/dL Final    Hematocrit 11/03/2023 43.6  34.0 - 46.6 % Final    MCV 11/03/2023 94.2  79.0 - 97.0 fL Final    MCH 11/03/2023 31.7  26.6 - 33.0 pg Final    MCHC 11/03/2023 33.7  31.5 - 35.7 g/dL Final    RDW 11/03/2023 13.0  12.3 - 15.4 % Final    RDW-SD 11/03/2023 44.3  37.0 - 54.0 fl Final    MPV 11/03/2023 10.6  6.0 - 12.0 fL Final    Platelets 11/03/2023 294  140 - 450 10*3/mm3 Final    Neutrophil % 11/03/2023 59.5  42.7 - 76.0 % Final    Lymphocyte % 11/03/2023 28.3  19.6 - 45.3 % Final    Monocyte % 11/03/2023 9.3  5.0 - 12.0 % Final    Eosinophil % 11/03/2023 2.3  0.3 - 6.2 % Final    Basophil % 11/03/2023 0.4  0.0 - 1.5 % Final    Immature Grans % 11/03/2023 0.2  0.0 - 0.5 % Final    Neutrophils, Absolute 11/03/2023 3.33  1.70 - 7.00 10*3/mm3 Final    Lymphocytes, Absolute 11/03/2023 1.58  0.70 - 3.10 10*3/mm3 Final    Monocytes, Absolute 11/03/2023 0.52  0.10 - 0.90 10*3/mm3 Final    Eosinophils, Absolute 11/03/2023 0.13  0.00 - 0.40 10*3/mm3 Final    Basophils, Absolute 11/03/2023 0.02  0.00 -  "0.20 10*3/mm3 Final    Immature Grans, Absolute 11/03/2023 0.01  0.00 - 0.05 10*3/mm3 Final    nRBC 11/03/2023 0.0  0.0 - 0.2 /100 WBC Final   Office Visit on 11/03/2023   Component Date Value Ref Range Status    TSH 11/03/2023 1.170  0.270 - 4.200 uIU/mL Final           Appearance: WNL  Hygiene:  REPORTS good  Cooperation:  Cooperative  Eye Contact:  direct  Psychomotor Behavior:  denies psychomotor agitation/retardation, No EPS, No motor tics  Mood:  within normal limits  Affect:  congruent  Hopelessness: Denies  Speech:  Normal  Thought Process:  Linear  Thought Content:  Normal  Concentration: Normal   Suicidal: denies  Homicidal:  None  Hallucinations:  None  Delusion:  None  Memory:  Intact  Orientation:  Person, Place, Time and Situation  Reliability:  good  Insight:  Fair  Judgement: good  Impulse Control: good  Estimated Intelligence: average range    VIJI REVIEWED NO RED FLAGS    Assessment & Plan   TARA      PLAN:  pt not taking venlafaxine or amitriptyline \"don't want to be on meds\" she also marked not taking folic acid or iron, she was seen by her PCP   She is taking the Tamoxifen for breast cancer treated in 2021.     Discussed from now on we can get her a referral to General Behavioral Health instead of Oncology. [facility] has a remote Behavioral health program pt prefers.        Assisted patient in identifying risk factors which would indicate the need for higher level of care including thoughts to harm self or others and/or self-harming behavior and encouraged patient to contact this office, call 911, or present to the nearest emergency room should any of these events occur. Discussed crisis intervention services and means to access.  Patient adamantly and convincingly denies current suicidal or homicidal ideation or perceptual disturbance.      Instructed to call for questions or concerns and return early if necessary.       Return referral to remote behavioral health can be made , she will call if " needed.

## 2024-05-08 ENCOUNTER — OFFICE VISIT (OUTPATIENT)
Dept: FAMILY MEDICINE CLINIC | Facility: CLINIC | Age: 39
End: 2024-05-08
Payer: COMMERCIAL

## 2024-05-08 ENCOUNTER — LAB (OUTPATIENT)
Dept: LAB | Facility: HOSPITAL | Age: 39
End: 2024-05-08
Payer: COMMERCIAL

## 2024-05-08 VITALS
SYSTOLIC BLOOD PRESSURE: 120 MMHG | DIASTOLIC BLOOD PRESSURE: 80 MMHG | HEART RATE: 93 BPM | HEIGHT: 65 IN | WEIGHT: 149.8 LBS | BODY MASS INDEX: 24.96 KG/M2 | OXYGEN SATURATION: 97 % | TEMPERATURE: 98 F

## 2024-05-08 DIAGNOSIS — Z01.84 IMMUNITY STATUS TESTING: ICD-10-CM

## 2024-05-08 DIAGNOSIS — Z01.84 IMMUNITY STATUS TESTING: Primary | ICD-10-CM

## 2024-05-08 DIAGNOSIS — Z23 NEED FOR TDAP VACCINATION: ICD-10-CM

## 2024-05-08 LAB — HBV SURFACE AB SER RIA-ACNC: REACTIVE

## 2024-05-08 PROCEDURE — 86787 VARICELLA-ZOSTER ANTIBODY: CPT

## 2024-05-08 PROCEDURE — 1159F MED LIST DOCD IN RCRD: CPT | Performed by: NURSE PRACTITIONER

## 2024-05-08 PROCEDURE — 1126F AMNT PAIN NOTED NONE PRSNT: CPT | Performed by: NURSE PRACTITIONER

## 2024-05-08 PROCEDURE — 1160F RVW MEDS BY RX/DR IN RCRD: CPT | Performed by: NURSE PRACTITIONER

## 2024-05-08 PROCEDURE — 86735 MUMPS ANTIBODY: CPT

## 2024-05-08 PROCEDURE — 86762 RUBELLA ANTIBODY: CPT

## 2024-05-08 PROCEDURE — 90471 IMMUNIZATION ADMIN: CPT | Performed by: NURSE PRACTITIONER

## 2024-05-08 PROCEDURE — 86765 RUBEOLA ANTIBODY: CPT

## 2024-05-08 PROCEDURE — 36415 COLL VENOUS BLD VENIPUNCTURE: CPT

## 2024-05-08 PROCEDURE — 90715 TDAP VACCINE 7 YRS/> IM: CPT | Performed by: NURSE PRACTITIONER

## 2024-05-08 PROCEDURE — 99213 OFFICE O/P EST LOW 20 MIN: CPT | Performed by: NURSE PRACTITIONER

## 2024-05-08 PROCEDURE — 86706 HEP B SURFACE ANTIBODY: CPT

## 2024-05-09 LAB
MEV IGG SER IA-ACNC: 93 AU/ML
MUV IGG SER IA-ACNC: 225 AU/ML
RUBV IGG SERPL IA-ACNC: 1.1 INDEX
VZV IGG SER IA-ACNC: 2375 INDEX
VZV IGM SER IA-ACNC: <0.91 INDEX (ref 0–0.9)

## 2024-05-13 ENCOUNTER — CLINICAL SUPPORT (OUTPATIENT)
Dept: FAMILY MEDICINE CLINIC | Facility: CLINIC | Age: 39
End: 2024-05-13
Payer: COMMERCIAL

## 2024-05-13 DIAGNOSIS — Z23 NEED FOR MMR VACCINE: ICD-10-CM

## 2024-05-13 DIAGNOSIS — Z23 NEED FOR MMR VACCINE: Primary | ICD-10-CM

## 2024-05-13 PROCEDURE — 90471 IMMUNIZATION ADMIN: CPT | Performed by: NURSE PRACTITIONER

## 2024-05-13 PROCEDURE — 90707 MMR VACCINE SC: CPT | Performed by: NURSE PRACTITIONER

## 2024-05-15 ENCOUNTER — TELEPHONE (OUTPATIENT)
Dept: FAMILY MEDICINE CLINIC | Facility: CLINIC | Age: 39
End: 2024-05-15
Payer: COMMERCIAL

## 2024-05-15 NOTE — TELEPHONE ENCOUNTER
Caller: Rai Guillory    Relationship: Self    Best call back number: [phone]    What medication are you requesting: ZOFRAN    What are your current symptoms: NAUSEA FROM MEDICATIONS    Have you had these symptoms before:    [x] Yes  [] No    Have you been treated for these symptoms before:   [x] Yes  [] No    If a prescription is needed, what is your preferred pharmacy and phone number:    [facility] [phone]  Additional notes:  PATIENT HAS BEEN EXPERIENCING BAD NAUSEA WITH HER MEDICATION AND WOULD LIKE THIS MEDICATION TO HELP WITH THE NAUSEA. PLEASE ADVISE

## 2024-07-30 ENCOUNTER — PATIENT ROUNDING (BHMG ONLY) (OUTPATIENT)
Dept: URGENT CARE | Facility: CLINIC | Age: 39
End: 2024-07-30
Payer: COMMERCIAL

## 2024-09-14 ENCOUNTER — OFFICE VISIT (OUTPATIENT)
Dept: FAMILY MEDICINE CLINIC | Facility: CLINIC | Age: 39
End: 2024-09-14
Payer: COMMERCIAL

## 2024-09-14 VITALS
BODY MASS INDEX: 23.66 KG/M2 | OXYGEN SATURATION: 97 % | HEART RATE: 106 BPM | HEIGHT: 65 IN | DIASTOLIC BLOOD PRESSURE: 78 MMHG | WEIGHT: 142 LBS | SYSTOLIC BLOOD PRESSURE: 120 MMHG | TEMPERATURE: 98.6 F

## 2024-09-14 DIAGNOSIS — E53.8 LOW FOLIC ACID: ICD-10-CM

## 2024-09-14 DIAGNOSIS — E55.9 VITAMIN D DEFICIENCY: ICD-10-CM

## 2024-09-14 DIAGNOSIS — E61.1 IRON DEFICIENCY: ICD-10-CM

## 2024-09-14 DIAGNOSIS — R11.0 NAUSEA: ICD-10-CM

## 2024-09-14 DIAGNOSIS — G47.00 INSOMNIA, UNSPECIFIED TYPE: Primary | ICD-10-CM

## 2024-09-14 DIAGNOSIS — Z12.4 CERVICAL CANCER SCREENING: ICD-10-CM

## 2024-09-14 DIAGNOSIS — Z76.89 ENCOUNTER TO ESTABLISH CARE WITH NEW DOCTOR: ICD-10-CM

## 2024-09-14 PROCEDURE — 99214 OFFICE O/P EST MOD 30 MIN: CPT | Performed by: FAMILY MEDICINE

## 2024-09-14 PROCEDURE — 1160F RVW MEDS BY RX/DR IN RCRD: CPT | Performed by: FAMILY MEDICINE

## 2024-09-14 PROCEDURE — 1126F AMNT PAIN NOTED NONE PRSNT: CPT | Performed by: FAMILY MEDICINE

## 2024-09-14 PROCEDURE — 1159F MED LIST DOCD IN RCRD: CPT | Performed by: FAMILY MEDICINE

## 2024-09-14 RX ORDER — PROMETHAZINE HYDROCHLORIDE 25 MG/1
25 TABLET ORAL EVERY 8 HOURS PRN
Qty: 16 TABLET | Refills: 1 | Status: SHIPPED | OUTPATIENT
Start: 2024-09-14

## 2024-09-14 RX ORDER — TRAZODONE HYDROCHLORIDE 50 MG/1
25 TABLET, FILM COATED ORAL NIGHTLY
Qty: 30 TABLET | Refills: 1 | Status: SHIPPED | OUTPATIENT
Start: 2024-09-14

## 2024-09-14 NOTE — PROGRESS NOTES
Follow Up Office Visit      Date: 2024   Patient Name: Rai Guillory  : 1985   MRN: [id number]     Chief Complaint:    Chief Complaint   Patient presents with    Insomnia       History of Present Illness: Rai Guillory is a 39 y.o. female who presents today for follow for insomnia and for med refill.  Previously seeing  LETA Barron patient and LETA Garcia.    Patient was last seen by Chrsity on 2024.    Follows with oncology due to history of breast cancer.   Follows next year.  Reports she had cancer in left breast and double mastectomy.  Taking Tamoxifen for 5 years.  Had Chemo and Radiation.    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        Had taken Trazodone and has had side effects such as feeling hung over.    Prev taking Effexor for anxiety.  Reports not taking anymore.    Reports last period was early part of this year.  Having some hot flashes.  Perimenopause.      Subjective      Review of Systems:   Review of Systems   Constitutional:  Negative for chills and fever.   Gastrointestinal:  Negative for nausea and vomiting.   Neurological:  Negative for seizures and syncope.   Psychiatric/Behavioral:          Insomnia         I have reviewed the patients family history, social history, past medical history, past surgical history and have updated it as appropriate.     Medications:     Current Outpatient Medications:     promethazine (PHENERGAN) 25 MG tablet, Take 1 tablet by mouth Every 8 (Eight) Hours As Needed for Nausea or Vomiting., Disp: 16 tablet, Rfl: 1    tamoxifen (NOLVADEX) 20 MG chemo tablet, Take 1 tablet by mouth Daily., Disp: 90 tablet, Rfl: 3    traZODone (DESYREL) 50 MG tablet, Take 0.5 tablets by mouth Every Night., Disp: 30 tablet, Rfl: 1    Allergies:   No Known Allergies    Objective     Physical Exam: Please see above  Vital Signs:   Vitals:    24 1135   BP: 120/78   Pulse: 106   Temp: 98.6 °F (37 °C)   TempSrc: Temporal  "  SpO2: 97%   Weight: 64.4 kg (142 lb)   Height: 165.1 cm (65\")   PainSc: 0-No pain     Body mass index is 23.63 kg/m².  BMI is within normal parameters. No other follow-up for BMI required.       Physical Exam  Vitals and nursing note reviewed.   Constitutional:       Appearance: Normal appearance.   HENT:      Head: Normocephalic and atraumatic.   Neck:      Vascular: No carotid bruit.   Cardiovascular:      Rate and Rhythm: Normal rate and regular rhythm.      Heart sounds: Normal heart sounds. No murmur heard.  Pulmonary:      Effort: Pulmonary effort is normal.      Breath sounds: Normal breath sounds.      Comments: Bilat mastectomy  Abdominal:      General: Bowel sounds are normal.      Palpations: Abdomen is soft. There is no mass.      Tenderness: There is no abdominal tenderness.   Musculoskeletal:      Right lower leg: No edema.      Left lower leg: No edema.   Skin:     Coloration: Skin is not jaundiced or pale.      Findings: No erythema.   Neurological:      Mental Status: She is alert. Mental status is at baseline.   Psychiatric:         Mood and Affect: Mood normal.         Behavior: Behavior normal.         Procedures    Results:   Labs:   Hemoglobin A1C   Date Value Ref Range Status   11/03/2023 5.60 4.80 - 5.60 % Final     TSH   Date Value Ref Range Status   11/03/2023 1.170 0.270 - 4.200 uIU/mL Final        POCT Results (if applicable):   Results for orders placed or performed in visit on 05/08/24   Measles / Mumps / Rubella Immunity    Specimen: Blood   Result Value Ref Range    Rubella Antibodies, IgG 1.10 Immune >0.99 index    Rubeola IgG 93.0 Immune >16.4 AU/mL    Mumps IgG 225.0 Immune >10.9 AU/mL   Hepatitis B Surface Antibody    Specimen: Blood   Result Value Ref Range    Hep B S Ab Reactive    Varicella Zoster Antibodies, IgG / IgM    Specimen: Blood   Result Value Ref Range    Varicella IgG 2375 Immune >165 index    Varicella IgM <0.91 0.00 - 0.90 index       Imaging:   No valid " procedures specified.     Measures:   Advanced Care Planning:   Patient does not have an advance directive, declines further assistance.    Smoking Cessation:   Does not smoke      Assessment / Plan      Assessment/Plan:     1. Insomnia, unspecified type  Restart Trazodone at 25-50 mg at bedtime.    - traZODone (DESYREL) 50 MG tablet; Take 0.5 tablets by mouth Every Night.  Dispense: 30 tablet; Refill: 1    2. Nausea  Promethazine as ordered as needed for nausea.    - promethazine (PHENERGAN) 25 MG tablet; Take 1 tablet by mouth Every 8 (Eight) Hours As Needed for Nausea or Vomiting.  Dispense: 16 tablet; Refill: 1    3. Vitamin D deficiency  Check Vit D.    - Vitamin D,25-Hydroxy; Future    4. Iron deficiency  Check Iron profile and Ferritin.    - Iron Profile; Future  - Ferritin; Future    5. Low folic acid  Check Folic acid.    - Folate; Future    6. Cervical cancer screening  Referral to Gyn.    - Ambulatory Referral to Gynecology    7. Encounter to establish care with new doctor        Part of this note may be an electronic transcription/translation of spoken language to printed text using the Dragon Dictation System.        Vaccine Counseling:      Follow Up:   Return in about 2 months (around 11/14/2024).      DO DES Ferguson

## 2024-09-14 NOTE — PATIENT INSTRUCTIONS
Take medications as ordered.  Low fat, low salt diet.  Exercise as tolerated.   Labs today.  Restart Trazodone.

## 2024-11-11 ENCOUNTER — OFFICE VISIT (OUTPATIENT)
Dept: FAMILY MEDICINE CLINIC | Facility: CLINIC | Age: 39
End: 2024-11-11
Payer: COMMERCIAL

## 2024-11-11 ENCOUNTER — LAB (OUTPATIENT)
Dept: LAB | Facility: HOSPITAL | Age: 39
End: 2024-11-11
Payer: COMMERCIAL

## 2024-11-11 VITALS
RESPIRATION RATE: 20 BRPM | HEIGHT: 65 IN | WEIGHT: 149 LBS | BODY MASS INDEX: 24.83 KG/M2 | OXYGEN SATURATION: 98 % | HEART RATE: 84 BPM | DIASTOLIC BLOOD PRESSURE: 70 MMHG | SYSTOLIC BLOOD PRESSURE: 110 MMHG | TEMPERATURE: 98.6 F

## 2024-11-11 DIAGNOSIS — E53.8 LOW FOLIC ACID: ICD-10-CM

## 2024-11-11 DIAGNOSIS — F41.9 ANXIETY: ICD-10-CM

## 2024-11-11 DIAGNOSIS — G47.00 INSOMNIA, UNSPECIFIED TYPE: ICD-10-CM

## 2024-11-11 DIAGNOSIS — Z85.3 HISTORY OF LEFT BREAST CANCER: ICD-10-CM

## 2024-11-11 DIAGNOSIS — E55.9 VITAMIN D DEFICIENCY: ICD-10-CM

## 2024-11-11 DIAGNOSIS — E61.1 IRON DEFICIENCY: ICD-10-CM

## 2024-11-11 DIAGNOSIS — L29.9 ITCHING: Primary | ICD-10-CM

## 2024-11-11 LAB
25(OH)D3 SERPL-MCNC: 9.7 NG/ML (ref 30–100)
FERRITIN SERPL-MCNC: 124 NG/ML (ref 13–150)
FOLATE SERPL-MCNC: 4.49 NG/ML (ref 4.78–24.2)
IRON 24H UR-MRATE: 117 MCG/DL (ref 37–145)
IRON SATN MFR SERPL: 31 % (ref 20–50)
TIBC SERPL-MCNC: 375 MCG/DL (ref 298–536)
TRANSFERRIN SERPL-MCNC: 252 MG/DL (ref 200–360)

## 2024-11-11 PROCEDURE — 82306 VITAMIN D 25 HYDROXY: CPT

## 2024-11-11 PROCEDURE — 82728 ASSAY OF FERRITIN: CPT

## 2024-11-11 PROCEDURE — 83540 ASSAY OF IRON: CPT

## 2024-11-11 PROCEDURE — 84466 ASSAY OF TRANSFERRIN: CPT

## 2024-11-11 PROCEDURE — 82746 ASSAY OF FOLIC ACID SERUM: CPT

## 2024-11-11 PROCEDURE — 1126F AMNT PAIN NOTED NONE PRSNT: CPT | Performed by: FAMILY MEDICINE

## 2024-11-11 PROCEDURE — 99214 OFFICE O/P EST MOD 30 MIN: CPT | Performed by: FAMILY MEDICINE

## 2024-11-11 RX ORDER — HYDROXYZINE HYDROCHLORIDE 25 MG/1
25 TABLET, FILM COATED ORAL 3 TIMES DAILY PRN
Qty: 60 TABLET | Refills: 0 | Status: SHIPPED | OUTPATIENT
Start: 2024-11-11

## 2024-11-11 NOTE — PROGRESS NOTES
Follow Up Office Visit      Date: 2024   Patient Name: Rai Guillory  : 1985   MRN: [id number]     Chief Complaint:    Chief Complaint   Patient presents with    Insomnia    Itching     Upper back area         History of Present Illness: Rai Guillory is a 39 y.o. female who presents today for follow-up.  Had previously been seeing LETA Barron, and LETA Garcia.    Follows with oncology due to history of breast cancer.  History of cancer in left breast and double mastectomy.  Taking tamoxifen for 5 years.  Had chemo and radiation.      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    Patient was referred to GYN last visit.  Has not seen.    Patient had reported insomnia and was to try trazodone 25 to 50 mg at bedtime.  Taking half tablet.  Ports the medication can make her feel foggy         Reports having trouble with itch upper back.    No new detergent.  No new lotio or creams.  Thinks it may be her being anxious and stressed.          Answers submitted by the patient for this visit:  Primary Reason for Visit (Submitted on 2024)  What is the primary reason for your visit?: Anxiety  Anxiety (Submitted on 2024)  Chief Complaint: Anxiety  Visit: follow-up  Frequency: occasionally  Severity: moderate  dry mouth: No  excessive worry: Yes  insomnia: Yes  irritability: Yes  malaise/fatigue: Yes  obsessions: No  Sleep quality: fair  Hours of sleep per night: 5 Hours  Aggravated by: family issues, work stress  Medication compliance: 26-50%  Side effects: nausea, constipation/diarrhea  Additional information: Causing skin irritation              Subjective      Review of Systems:   Review of Systems   Respiratory:  Negative for shortness of breath.    Cardiovascular:  Negative for chest pain.   Gastrointestinal:  Positive for nausea.   Neurological:  Negative for dizziness and confusion.   Psychiatric/Behavioral:  Positive for depressed  "mood.        I have reviewed the patients family history, social history, past medical history, past surgical history and have updated it as appropriate.     Medications:     Current Outpatient Medications:     promethazine (PHENERGAN) 25 MG tablet, Take 1 tablet by mouth Every 8 (Eight) Hours As Needed for Nausea or Vomiting., Disp: 16 tablet, Rfl: 1    tamoxifen (NOLVADEX) 20 MG chemo tablet, Take 1 tablet by mouth Daily., Disp: 90 tablet, Rfl: 3    traZODone (DESYREL) 50 MG tablet, Take 0.5 tablets by mouth Every Night., Disp: 30 tablet, Rfl: 1    hydrOXYzine (ATARAX) 25 MG tablet, Take 1 tablet by mouth 3 (Three) Times a Day As Needed for Itching or Anxiety., Disp: 60 tablet, Rfl: 0    Allergies:   No Known Allergies    Objective     Physical Exam: Please see above  Vital Signs:   Vitals:    11/11/24 0950   BP: 110/70   Pulse: 84   Resp: 20   Temp: 98.6 °F (37 °C)   TempSrc: Temporal   SpO2: 98%   Weight: 67.6 kg (149 lb)   Height: 165.1 cm (65\")   PainSc: 0-No pain     Body mass index is 24.79 kg/m².  BMI is within normal parameters. No other follow-up for BMI required.       Physical Exam  Vitals and nursing note reviewed.   Constitutional:       Appearance: Normal appearance.   HENT:      Head: Normocephalic and atraumatic.   Neck:      Vascular: No carotid bruit.   Cardiovascular:      Rate and Rhythm: Normal rate and regular rhythm.      Heart sounds: Normal heart sounds. No murmur heard.  Pulmonary:      Effort: Pulmonary effort is normal.      Breath sounds: Normal breath sounds.   Abdominal:      General: Bowel sounds are normal.      Palpations: Abdomen is soft. There is no mass.      Tenderness: There is no abdominal tenderness.   Musculoskeletal:      Right lower leg: No edema.      Left lower leg: No edema.   Skin:     Coloration: Skin is not jaundiced or pale.      Findings: No erythema.   Neurological:      Mental Status: She is alert. Mental status is at baseline.   Psychiatric:         Mood and " Affect: Mood normal.         Behavior: Behavior normal.         Procedures    Results:   Labs:   Hemoglobin A1C   Date Value Ref Range Status   11/03/2023 5.60 4.80 - 5.60 % Final     TSH   Date Value Ref Range Status   11/03/2023 1.170 0.270 - 4.200 uIU/mL Final        POCT Results (if applicable):   Results for orders placed or performed in visit on 11/11/24   Folate    Collection Time: 11/11/24  9:18 AM    Specimen: Blood   Result Value Ref Range    Folate 4.49 (L) 4.78 - 24.20 ng/mL   Iron Profile    Collection Time: 11/11/24  9:18 AM    Specimen: Blood   Result Value Ref Range    Iron 117 37 - 145 mcg/dL    Iron Saturation (TSAT) 31 20 - 50 %    Transferrin 252 200 - 360 mg/dL    TIBC 375 298 - 536 mcg/dL   Ferritin    Collection Time: 11/11/24  9:18 AM    Specimen: Blood   Result Value Ref Range    Ferritin 124.00 13.00 - 150.00 ng/mL   Vitamin D,25-Hydroxy    Collection Time: 11/11/24  9:18 AM    Specimen: Blood   Result Value Ref Range    25 Hydroxy, Vitamin D 9.7 (L) 30.0 - 100.0 ng/ml       PHQ-9: PHQ-9 Total Score:       Imaging:   No valid procedures specified.     Measures:   Advanced Care Planning:   Patient does not have an advance directive, declines further assistance.    Smoking Cessation:   Does not smoke      Assessment / Plan      Assessment/Plan:     1. Itching  Will prescribe hydroxyzine to be taken to help with itching.  Discussed not taking this if if she plans to drive, if it makes her sleepy  - hydrOXYzine (ATARAX) 25 MG tablet; Take 1 tablet by mouth 3 (Three) Times a Day As Needed for Itching or Anxiety.  Dispense: 60 tablet; Refill: 0    2. Insomnia, unspecified type  Patient to continue trazodone.  Can try hydroxyzine if needed for sleep    3. Anxiety  Hydroxyzine as ordered as needed for anxiety.  - hydrOXYzine (ATARAX) 25 MG tablet; Take 1 tablet by mouth 3 (Three) Times a Day As Needed for Itching or Anxiety.  Dispense: 60 tablet; Refill: 0    4. History of left breast  cancer  Patient to continue to follow with oncology.        Part of this note may be an electronic transcription/translation of spoken language to printed text using the Dragon Dictation System.        Vaccine Counseling:      Follow Up:   Return in about 3 months (around 2/11/2025).      DO DES Ferguson

## 2024-11-11 NOTE — PATIENT INSTRUCTIONS
Atarax as ordered.  Take medications as ordered.  Low fat, low salt diet.  Exercise as tolerated. Follow with specialist.

## 2024-12-20 DIAGNOSIS — G47.00 INSOMNIA, UNSPECIFIED TYPE: ICD-10-CM

## 2024-12-20 RX ORDER — TRAZODONE HYDROCHLORIDE 50 MG/1
25 TABLET, FILM COATED ORAL
Qty: 45 TABLET | Refills: 0 | Status: SHIPPED | OUTPATIENT
Start: 2024-12-20

## 2025-03-11 ENCOUNTER — CLINICAL SUPPORT (OUTPATIENT)
Dept: FAMILY MEDICINE CLINIC | Facility: CLINIC | Age: 40
End: 2025-03-11
Payer: COMMERCIAL

## 2025-03-11 DIAGNOSIS — Z11.1 SCREENING FOR TUBERCULOSIS: Primary | ICD-10-CM

## 2025-03-14 ENCOUNTER — LAB (OUTPATIENT)
Dept: LAB | Facility: HOSPITAL | Age: 40
End: 2025-03-14
Payer: COMMERCIAL

## 2025-03-14 DIAGNOSIS — Z11.1 SCREENING FOR TUBERCULOSIS: Primary | ICD-10-CM

## 2025-03-14 DIAGNOSIS — Z11.1 SCREENING FOR TUBERCULOSIS: ICD-10-CM

## 2025-03-14 LAB
INDURATION: 0 MM (ref 0–?)
TB SKIN TEST: NORMAL

## 2025-03-14 PROCEDURE — 36415 COLL VENOUS BLD VENIPUNCTURE: CPT

## 2025-03-14 PROCEDURE — 86480 TB TEST CELL IMMUN MEASURE: CPT

## 2025-03-18 LAB
GAMMA INTERFERON BACKGROUND BLD IA-ACNC: 0.03 IU/ML
M TB IFN-G BLD-IMP: NEGATIVE
M TB IFN-G CD4+ BCKGRND COR BLD-ACNC: 0.03 IU/ML
M TB IFN-G CD4+CD8+ BCKGRND COR BLD-ACNC: 0.04 IU/ML
MITOGEN IGNF BCKGRD COR BLD-ACNC: >10 IU/ML
QUANTIFERON INCUBATION: NORMAL
SERVICE CMNT-IMP: NORMAL

## 2025-04-09 ENCOUNTER — TELEPHONE (OUTPATIENT)
Dept: FAMILY MEDICINE CLINIC | Facility: CLINIC | Age: 40
End: 2025-04-09
Payer: COMMERCIAL

## 2025-04-09 NOTE — TELEPHONE ENCOUNTER
Caller: Rai Guillory    Relationship: Self    Best call back number: [phone]     What form or medical record are you requesting: PATIENT NEEDS DOCUMENTATION THAT SHE HAS HAD HEP B VACCINE, VARICELLA VACCINE AND TB TEST.     Who is requesting this form or medical record from you: [facility]    How would you like to receive the form or medical records (pick-up, mail, fax): PATIENT WILL     Timeframe paperwork needed: ASAP    Additional notes: IF THERE IS NO DOCUMENTATION SHE WOULD LIKE TO GET THESE TAKEN CARE OF ASAP AND HAVE THEM ADMINISTERED. PLEASE ADVISE.

## 2025-04-16 ENCOUNTER — TELEPHONE (OUTPATIENT)
Dept: FAMILY MEDICINE CLINIC | Facility: CLINIC | Age: 40
End: 2025-04-16
Payer: COMMERCIAL

## 2025-04-16 DIAGNOSIS — R11.0 NAUSEA: Primary | ICD-10-CM

## 2025-04-16 NOTE — TELEPHONE ENCOUNTER
Caller: Rai Guillory    Relationship: Self    Best call back number: [phone]     What medication are you requesting: ZOFRAN    What are your current symptoms: NAUSEA IN MORNING DUE TO PERIMENOPAUSAL STATUS    If a prescription is needed, what is your preferred pharmacy and phone number: [id number] - [place], [place] - [address]  [address] - [phone]  - [phone] FX

## 2025-04-20 RX ORDER — ONDANSETRON 4 MG/1
4 TABLET, ORALLY DISINTEGRATING ORAL EVERY 8 HOURS PRN
Qty: 16 TABLET | Refills: 0 | Status: SHIPPED | OUTPATIENT
Start: 2025-04-20

## 2025-04-21 NOTE — TELEPHONE ENCOUNTER
Let patient know have sent in Zofran prescription to be taken up to 3 times a day as needed for nausea.

## 2025-05-05 ENCOUNTER — TELEPHONE (OUTPATIENT)
Dept: FAMILY MEDICINE CLINIC | Facility: CLINIC | Age: 40
End: 2025-05-05
Payer: COMMERCIAL

## 2025-05-05 NOTE — TELEPHONE ENCOUNTER
Caller: Rai Guillory    Relationship: Self    Best call back number: [phone]    What medication are you requesting: SOMETHING FOR HOT FLASHES     What are your current symptoms: HOT FLASHES         Have you had these symptoms before:    [x] Yes  [] No    Have you been treated for these symptoms before:   [x] Yes  [] No    If a prescription is needed, what is your preferred pharmacy and phone number: [id number] - [place] [address] AT [facility] [address]  - [phone]  - [phone] FX     Additional notes: THE PATIENT WOULD LIKE TO KNOW IF THE DOCTOR CAN CALL IN SOMETHING FOR HER HOT FLASHES

## 2025-05-14 ENCOUNTER — LAB (OUTPATIENT)
Dept: LAB | Facility: HOSPITAL | Age: 40
End: 2025-05-14
Payer: COMMERCIAL

## 2025-05-14 ENCOUNTER — OFFICE VISIT (OUTPATIENT)
Dept: FAMILY MEDICINE CLINIC | Facility: CLINIC | Age: 40
End: 2025-05-14
Payer: COMMERCIAL

## 2025-05-14 VITALS
DIASTOLIC BLOOD PRESSURE: 78 MMHG | OXYGEN SATURATION: 97 % | TEMPERATURE: 98.6 F | HEART RATE: 89 BPM | BODY MASS INDEX: 22.96 KG/M2 | SYSTOLIC BLOOD PRESSURE: 110 MMHG | WEIGHT: 138 LBS

## 2025-05-14 DIAGNOSIS — F41.9 ANXIETY: Primary | ICD-10-CM

## 2025-05-14 DIAGNOSIS — R23.2 HOT FLASHES: ICD-10-CM

## 2025-05-14 DIAGNOSIS — E55.9 VITAMIN D DEFICIENCY: ICD-10-CM

## 2025-05-14 DIAGNOSIS — R11.0 NAUSEA: ICD-10-CM

## 2025-05-14 DIAGNOSIS — Z13.29 SCREENING FOR THYROID DISORDER: ICD-10-CM

## 2025-05-14 PROCEDURE — 1159F MED LIST DOCD IN RCRD: CPT | Performed by: FAMILY MEDICINE

## 2025-05-14 PROCEDURE — 82306 VITAMIN D 25 HYDROXY: CPT | Performed by: FAMILY MEDICINE

## 2025-05-14 PROCEDURE — 84443 ASSAY THYROID STIM HORMONE: CPT | Performed by: FAMILY MEDICINE

## 2025-05-14 PROCEDURE — 99214 OFFICE O/P EST MOD 30 MIN: CPT | Performed by: FAMILY MEDICINE

## 2025-05-14 PROCEDURE — 1160F RVW MEDS BY RX/DR IN RCRD: CPT | Performed by: FAMILY MEDICINE

## 2025-05-14 PROCEDURE — 83001 ASSAY OF GONADOTROPIN (FSH): CPT | Performed by: FAMILY MEDICINE

## 2025-05-14 PROCEDURE — 1126F AMNT PAIN NOTED NONE PRSNT: CPT | Performed by: FAMILY MEDICINE

## 2025-05-14 PROCEDURE — 36415 COLL VENOUS BLD VENIPUNCTURE: CPT | Performed by: FAMILY MEDICINE

## 2025-05-14 PROCEDURE — 83002 ASSAY OF GONADOTROPIN (LH): CPT | Performed by: FAMILY MEDICINE

## 2025-05-14 RX ORDER — ONDANSETRON 4 MG/1
4 TABLET, ORALLY DISINTEGRATING ORAL EVERY 8 HOURS PRN
Qty: 16 TABLET | Refills: 2 | Status: SHIPPED | OUTPATIENT
Start: 2025-05-14

## 2025-05-14 RX ORDER — VENLAFAXINE HYDROCHLORIDE 37.5 MG/1
37.5 CAPSULE, EXTENDED RELEASE ORAL DAILY
Qty: 30 CAPSULE | Refills: 2 | Status: SHIPPED | OUTPATIENT
Start: 2025-05-14

## 2025-05-14 NOTE — PROGRESS NOTES
Follow Up Office Visit      Date: 2025   Patient Name: Rai Guillory  : 1985   MRN: [id number]     Chief Complaint:    Chief Complaint   Patient presents with    Menopause     Needs med refills. Hot flashes and nausea 3 -4 times a week.         History of Present Illness: Rai Guillory is a 40 y.o. female who presents today reporting having hot flashes and nausea 3-4 times a week.      Is on Tamoxifen 20 mg daily.  Has history of breast cancerx.    Reports she is having bad hot flashes and intermittent nausea.    Zofran helps with nausea.          Subjective      Review of Systems:   Review of Systems   Constitutional:  Negative for chills and fever.        Hot flashes   Gastrointestinal:  Positive for nausea.   Neurological:  Negative for seizures and syncope.       I have reviewed the patients family history, social history, past medical history, past surgical history and have updated it as appropriate.     Medications:     Current Outpatient Medications:     hydrOXYzine (ATARAX) 25 MG tablet, Take 1 tablet by mouth 3 (Three) Times a Day As Needed for Itching or Anxiety., Disp: 60 tablet, Rfl: 0    ondansetron ODT (ZOFRAN-ODT) 4 MG disintegrating tablet, Place 1 tablet on the tongue Every 8 (Eight) Hours As Needed for Nausea or Vomiting., Disp: 16 tablet, Rfl: 2    tamoxifen (NOLVADEX) 20 MG chemo tablet, Take 1 tablet by mouth Daily., Disp: 90 tablet, Rfl: 3    promethazine (PHENERGAN) 25 MG tablet, Take 1 tablet by mouth Every 8 (Eight) Hours As Needed for Nausea or Vomiting. (Patient not taking: Reported on 2025), Disp: 16 tablet, Rfl: 1    traZODone (DESYREL) 50 MG tablet, TAKE 1/2 TABLET BY MOUTH EVERY NIGHT (Patient not taking: Reported on 2025), Disp: 45 tablet, Rfl: 0    venlafaxine XR (Effexor XR) 37.5 MG 24 hr capsule, Take 1 capsule by mouth Daily., Disp: 30 capsule, Rfl: 2    Allergies:   No Known Allergies    Objective     Physical Exam: Please see  above  Vital Signs:   Vitals:    05/14/25 1605   BP: 110/78   Pulse: 89   Temp: 98.6 °F (37 °C)   TempSrc: Temporal   SpO2: 97%   Weight: 62.6 kg (138 lb)   PainSc: 0-No pain     Body mass index is 22.96 kg/m².  BMI is within normal parameters. No other follow-up for BMI required.       Physical Exam  Constitutional:       Appearance: Normal appearance.   HENT:      Head: Normocephalic and atraumatic.   Cardiovascular:      Rate and Rhythm: Normal rate and regular rhythm.   Pulmonary:      Effort: Pulmonary effort is normal.   Neurological:      Mental Status: She is alert.   Psychiatric:         Mood and Affect: Mood normal.         Procedures    Results:   Labs:   Hemoglobin A1C   Date Value Ref Range Status   11/03/2023 5.60 4.80 - 5.60 % Final     TSH   Date Value Ref Range Status   05/14/2025 0.509 0.270 - 4.200 uIU/mL Final        POCT Results (if applicable):   Results for orders placed or performed in visit on 05/14/25   FSH & LH    Collection Time: 05/14/25  4:41 PM    Specimen: Blood   Result Value Ref Range    FSH 91.40 mIU/mL    LH 74.20 mIU/mL   Vitamin D,25-Hydroxy    Collection Time: 05/14/25  4:41 PM    Specimen: Blood   Result Value Ref Range    25 Hydroxy, Vitamin D 7.2 (L) 30.0 - 100.0 ng/ml   TSH    Collection Time: 05/14/25  4:41 PM    Specimen: Blood   Result Value Ref Range    TSH 0.509 0.270 - 4.200 uIU/mL       PHQ-9: PHQ-9 Total Score:       Imaging:   No valid procedures specified.     Measures:   Advanced Care Planning:   Patient does not have an advance directive, declines further assistance.    Smoking Cessation:   Does not smoke    Assessment / Plan      Assessment/Plan:     1. Nausea  Will prescribe/refill Zofran to help with nausea.  - ondansetron ODT (ZOFRAN-ODT) 4 MG disintegrating tablet; Place 1 tablet on the tongue Every 8 (Eight) Hours As Needed for Nausea or Vomiting.  Dispense: 16 tablet; Refill: 2    2. Hot flashes  Patient currently taking tamoxifen due to history of breast  cancer.  Will check FSH and LH.  Will restart Effexor at low-dose 37.5 mg 24-hour capsule daily.    - venlafaxine XR (Effexor XR) 37.5 MG 24 hr capsule; Take 1 capsule by mouth Daily.  Dispense: 30 capsule; Refill: 2  - FSH & LH; Future  - FSH & LH    3. Anxiety  As above.  - venlafaxine XR (Effexor XR) 37.5 MG 24 hr capsule; Take 1 capsule by mouth Daily.  Dispense: 30 capsule; Refill: 2    4. Vitamin D deficiency  Check vitamin D level.  - Vitamin D,25-Hydroxy; Future  - Vitamin D,25-Hydroxy    5. Screening for thyroid disorder  Check TFTs  - TSH; Future  - TSH        Part of this note may be an electronic transcription/translation of spoken language to printed text using the Dragon Dictation System.      Vaccine Counseling:      Follow Up:   Return if symptoms worsen or fail to improve, or as sccheduled..      DO DES Ferguson

## 2025-05-15 LAB
25(OH)D3 SERPL-MCNC: 7.2 NG/ML (ref 30–100)
FSH SERPL-ACNC: 91.4 MIU/ML
LH SERPL-ACNC: 74.2 MIU/ML
TSH SERPL DL<=0.05 MIU/L-ACNC: 0.51 UIU/ML (ref 0.27–4.2)

## 2025-05-23 ENCOUNTER — TELEMEDICINE (OUTPATIENT)
Dept: FAMILY MEDICINE CLINIC | Facility: TELEHEALTH | Age: 40
End: 2025-05-23
Payer: COMMERCIAL

## 2025-05-23 DIAGNOSIS — R11.0 NAUSEA: Primary | ICD-10-CM

## 2025-05-23 RX ORDER — PROMETHAZINE HYDROCHLORIDE 25 MG/1
25 TABLET ORAL EVERY 8 HOURS PRN
Qty: 15 TABLET | Refills: 0 | Status: SHIPPED | OUTPATIENT
Start: 2025-05-23

## 2025-05-23 NOTE — LETTER
May 23, 2025     Patient: Rai Guillory   YOB: 1985   Date of Visit: 5/23/2025       To Whom It May Concern:    It is my medical opinion that Rai Guillory may return to work on Saturday, May 24, 2025.  Please excuse her absence from work on Friday, May 23, 2025 due to illness.             Sincerely,        LETA Gant    CC: No Recipients

## 2025-05-23 NOTE — PROGRESS NOTES
"You have chosen to receive care through a telehealth visit.  Do you consent to use a video/audio connection for your medical care today? Yes     Patient or patient representative verbalized consent for the use of Ambient Listening during the visit with  LETA Gant for chart documentation. 5/23/2025  08:34 EDT    CHIEF COMPLAINT  No chief complaint on file.        HPI  History of Present Illness  The patient is a 40-year-old female presenting with complaints of nausea.    She reports experiencing severe hot flashes, which she attributes to menopause. This morning, she experienced an episode of dry heaving and described a sensation of her stomach \"doing flip flops.\" She has been self-medicating with Zofran, taking two doses, which provided only minimal relief. She expresses a desire to try Phenergan as a potential treatment option. She also requests a work note for today's absence due to her current state of nausea. She anticipates returning to work tomorrow. She is currently on tamoxifen, with three doses remaining, and speculates that discontinuing this medication may alleviate her symptoms.    She had her vitamin D levels checked and they were off the charts. She was advised to wait until she comes off the tamoxifen because it might be throwing off her TSH.       Review of Systems  See HPI    Past Medical History:   Diagnosis Date    Anxiety     Cancer     Breast     Carcinoma of upper-outer quadrant of left breast in female, estrogen receptor positive 04/29/2021    Depression     History of abnormal cervical Pap smear     Mastitis     Ovarian cyst     STD exposure     Chlamydia and GC       Family History   Problem Relation Age of Onset    No Known Problems Mother     Diabetes Father     Heart disease Father     Alcohol abuse Father     Hypertension Father     Drug abuse Father     Breast cancer Paternal Cousin 23    Ovarian cancer Neg Hx        Social History     Socioeconomic History    Marital status: " Single   Tobacco Use    Smoking status: Never     Passive exposure: Yes    Smokeless tobacco: Never   Vaping Use    Vaping status: Former    Passive vaping exposure: Yes   Substance and Sexual Activity    Alcohol use: Yes     Alcohol/week: 7.0 standard drinks of alcohol     Types: 2 Shots of liquor, 5 Drinks containing 0.5 oz of alcohol per week     Comment: once or twice a wk    Drug use: Yes     Types: Marijuana     Comment: Almost every day     Sexual activity: Yes     Partners: Male     Birth control/protection: Tubal ligation, Surgical     Comment: Tubal ligation       Rai Guillory  reports that she has never smoked. She has been exposed to tobacco smoke. She has never used smokeless tobacco.             There were no vitals taken for this visit.    PHYSICAL EXAM  Physical Exam   Constitutional: She is oriented to person, place, and time. She appears well-developed and well-nourished. She does not have a sickly appearance. She appears ill.   HENT:   Head: Normocephalic and atraumatic.   Pulmonary/Chest: Effort normal.  No respiratory distress.  Neurological: She is alert and oriented to person, place, and time.       Results for orders placed or performed in visit on 05/14/25   FSH & LH    Collection Time: 05/14/25  4:41 PM    Specimen: Blood   Result Value Ref Range    FSH 91.40 mIU/mL    LH 74.20 mIU/mL   Vitamin D,25-Hydroxy    Collection Time: 05/14/25  4:41 PM    Specimen: Blood   Result Value Ref Range    25 Hydroxy, Vitamin D 7.2 (L) 30.0 - 100.0 ng/ml   TSH    Collection Time: 05/14/25  4:41 PM    Specimen: Blood   Result Value Ref Range    TSH 0.509 0.270 - 4.200 uIU/mL       Diagnoses and all orders for this visit:    1. Nausea (Primary)  -     promethazine (PHENERGAN) 25 MG tablet; Take 1 tablet by mouth Every 8 (Eight) Hours As Needed for Nausea or Vomiting.  Dispense: 15 tablet; Refill: 0    --take medications as prescribed  --increase fluids, rest as needed, tylenol or ibuprofen for  pain  --f/u in 2-3 days if no improvement      Assessment & Plan  1. Nausea.  - Reports experiencing nausea, hot flashes, and dry heaving, which she attributes to menopause and her current medication, tamoxifen.  - Zofran has provided only slight relief.  - Discussed the condition and prescribed Phenergan 25 mg every 8 hours as needed to manage her nausea.  - A work note has been provided, allowing her to return to work tomorrow. If her condition does not improve by tomorrow, she is advised to inform via "[employer]t, and an additional day off will be granted.         FOLLOW-UP  As discussed during visit with PCP/[facility] Care if no improvement or Urgent Care/Emergency Department if worsening of symptoms    Patient verbalizes understanding of medication dosage, comfort measures, instructions for treatment and follow-up.    Janiya Lopez, APRN  05/23/2025  08:34 EDT    Mode of Visit: Video  Location of patient: -HOME-  Location of provider: +HOME+  You have chosen to receive care through a telehealth visit.  The patient has signed the video visit consent form.  The visit included audio and video interaction. No technical issues occurred during this visit.    The use of a video visit has been reviewed with the patient and verbal informed consent has been obtained. Myself and Rai Guillory     participated in this visit. The patient is located in [address] [place] [address]  I am located in [place], [place]. [employer] and [employer] Client were utilized. I spent 1 minutes in the patient's chart for this visit.      Note Disclaimer: At [facility], we believe that sharing information builds trust and better   relationships. You are receiving this note because you recently visited [facility]. It is possible you   will see health information before a provider has talked with you about it. This kind of information can   be easy to misunderstand. To help you fully understand what it means for your health,  we urge you to   discuss this note with your provider.

## 2025-06-23 ENCOUNTER — TELEMEDICINE (OUTPATIENT)
Dept: FAMILY MEDICINE CLINIC | Facility: TELEHEALTH | Age: 40
End: 2025-06-23
Payer: COMMERCIAL

## 2025-06-23 DIAGNOSIS — L02.421 FURUNCLE OF RIGHT AXILLA: Primary | ICD-10-CM

## 2025-06-23 PROCEDURE — 1160F RVW MEDS BY RX/DR IN RCRD: CPT | Performed by: NURSE PRACTITIONER

## 2025-06-23 PROCEDURE — 99213 OFFICE O/P EST LOW 20 MIN: CPT | Performed by: NURSE PRACTITIONER

## 2025-06-23 PROCEDURE — 1159F MED LIST DOCD IN RCRD: CPT | Performed by: NURSE PRACTITIONER

## 2025-06-23 RX ORDER — CEPHALEXIN 500 MG/1
500 CAPSULE ORAL 4 TIMES DAILY
Qty: 28 CAPSULE | Refills: 0 | Status: SHIPPED | OUTPATIENT
Start: 2025-06-23 | End: 2025-06-30

## 2025-06-23 RX ORDER — FLUCONAZOLE 150 MG/1
150 TABLET ORAL ONCE
Qty: 2 TABLET | Refills: 0 | Status: SHIPPED | OUTPATIENT
Start: 2025-06-24 | End: 2025-06-24

## 2025-06-24 NOTE — PROGRESS NOTES
You have chosen to receive care through a telehealth visit.  Do you consent to use a video/audio connection for your medical care today? Yes     CHIEF COMPLAINT  Chief Complaint   Patient presents with    Abscess         HPI  Rai Guillory is a 40 y.o. female  presents with complaint of small  boil to right axilla that has not changed in size and is slightly tender x 3 days.  She is concerned due to her history of breast cancer, bilateral mastectomy with lymph node removal.    Review of Systems   Skin:  Positive for wound (boil).   All other systems reviewed and are negative.      Past Medical History:   Diagnosis Date    Anxiety     Cancer     Breast     Carcinoma of upper-outer quadrant of left breast in female, estrogen receptor positive 04/29/2021    Depression     History of abnormal cervical Pap smear     Mastitis     Ovarian cyst     STD exposure     Chlamydia and GC       Family History   Problem Relation Age of Onset    No Known Problems Mother     Diabetes Father     Heart disease Father     Alcohol abuse Father     Hypertension Father     Drug abuse Father     Breast cancer Paternal Cousin 23    Ovarian cancer Neg Hx        Social History     Socioeconomic History    Marital status: Single   Tobacco Use    Smoking status: Never     Passive exposure: Yes    Smokeless tobacco: Never   Vaping Use    Vaping status: Former    Passive vaping exposure: Yes   Substance and Sexual Activity    Alcohol use: Yes     Alcohol/week: 7.0 standard drinks of alcohol     Types: 2 Shots of liquor, 5 Drinks containing 0.5 oz of alcohol per week     Comment: once or twice a wk    Drug use: Yes     Types: Marijuana     Comment: Almost every day     Sexual activity: Yes     Partners: Male     Birth control/protection: Tubal ligation, Surgical     Comment: Tubal ligation       Rai Guillory  reports that she has never smoked. She has been exposed to tobacco smoke. She has never used smokeless tobacco.            There were no vitals taken for this visit.    PHYSICAL EXAM  Physical Exam   Constitutional: She appears well-developed and well-nourished.   HENT:   Head: Normocephalic.   Eyes: Pupils are equal, round, and reactive to light.   Pulmonary/Chest: Effort normal.   Skin: There is erythema. Pallor: small boil to right axilla.   Psychiatric: She has a normal mood and affect.       Results for orders placed or performed in visit on 05/14/25   FSH & LH    Collection Time: 05/14/25  4:41 PM    Specimen: Blood   Result Value Ref Range    FSH 91.40 mIU/mL    LH 74.20 mIU/mL   Vitamin D,25-Hydroxy    Collection Time: 05/14/25  4:41 PM    Specimen: Blood   Result Value Ref Range    25 Hydroxy, Vitamin D 7.2 (L) 30.0 - 100.0 ng/ml   TSH    Collection Time: 05/14/25  4:41 PM    Specimen: Blood   Result Value Ref Range    TSH 0.509 0.270 - 4.200 uIU/mL       Diagnoses and all orders for this visit:    1. Furuncle of right axilla (Primary)  -     cephalexin (KEFLEX) 500 MG capsule; Take 1 capsule by mouth 4 (Four) Times a Day for 7 days.  Dispense: 28 capsule; Refill: 0  -     fluconazole (Diflucan) 150 MG tablet; Take 1 tablet by mouth 1 (One) Time for 1 dose. Take 1 tablet by mouth on Day 1 and 1 tablet by mouth on Day 3  Dispense: 2 tablet; Refill: 0          FOLLOW-UP  As discussed during visit with PCP/[facility] Care if no improvement or Urgent Care/Emergency Department if worsening of symptoms    Patient verbalizes understanding of medication dosage, comfort measures, instructions for treatment and follow-up.    Gayla Donis, LETA  06/23/2025  00:08 EDT    Mode of Visit: Video  Location of patient: -HOME-  Location of provider: +HOME+  You have chosen to receive care through a telehealth visit.  The patient has signed the video visit consent form.  The visit included audio and video interaction. No technical issues occurred during this visit.      The use of a video visit has been reviewed with the patient and verbal  informed consent has been obtained. Myself and Rai Kanique Keithej participated in this visit. The patient is located in [address] [place] [address].   I am located in [place], [place]. [employer] and [employer] Video Client were utilized. I spent 10 minutes in the patient's chart for this visit.         Note Disclaimer: At [facility], we believe that sharing information builds trust and better   relationships. You are receiving this note because you recently visited [facility]. It is possible you   will see health information before a provider has talked with you about it. This kind of information can   be easy to misunderstand. To help you fully understand what it means for your health, we urge you to   discuss this note with your provider.

## 2025-07-02 DIAGNOSIS — R11.0 NAUSEA: ICD-10-CM

## 2025-07-02 RX ORDER — PROMETHAZINE HYDROCHLORIDE 25 MG/1
25 TABLET ORAL EVERY 8 HOURS PRN
Qty: 10 TABLET | Refills: 0 | Status: SHIPPED | OUTPATIENT
Start: 2025-07-02

## 2025-07-02 NOTE — TELEPHONE ENCOUNTER
Caller: Rai Guillory    Relationship: Self    Best call back number: [phone]     Requested Prescriptions:   Requested Prescriptions     Pending Prescriptions Disp Refills    promethazine (PHENERGAN) 25 MG tablet 15 tablet 0     Sig: Take 1 tablet by mouth Every 8 (Eight) Hours As Needed for Nausea or Vomiting.        Pharmacy where request should be sent: [facility] [id number] [address]  AT [facility] [address] [address] - [phone]  - [phone] FX     Last office visit with prescribing clinician: 5/14/2025   Last telemedicine visit with prescribing clinician: Visit date not found   Next office visit with prescribing clinician: Visit date not found     Additional details provided by patient: PATIENT IS EXPERIENCING INTENSE NAUSEA, PATIENT HAS TWO PILLS LEFT    Does the patient have less than a 3 day supply:  [x] Yes  [] No    Would you like a call back once the refill request has been completed: [x] Yes [] No    If the office needs to give you a call back, can they leave a voicemail: [x] Yes [] No    Walker Laughlin Rep   07/02/25 10:36 EDT

## 2025-07-23 ENCOUNTER — OFFICE VISIT (OUTPATIENT)
Dept: FAMILY MEDICINE CLINIC | Facility: CLINIC | Age: 40
End: 2025-07-23
Payer: COMMERCIAL

## 2025-07-23 VITALS
DIASTOLIC BLOOD PRESSURE: 72 MMHG | SYSTOLIC BLOOD PRESSURE: 100 MMHG | OXYGEN SATURATION: 98 % | BODY MASS INDEX: 23.46 KG/M2 | HEART RATE: 120 BPM | TEMPERATURE: 98.9 F | WEIGHT: 141 LBS

## 2025-07-23 DIAGNOSIS — R05.1 ACUTE COUGH: ICD-10-CM

## 2025-07-23 DIAGNOSIS — L98.9 SKIN LESION: ICD-10-CM

## 2025-07-23 DIAGNOSIS — R05.9 COUGH, UNSPECIFIED TYPE: Primary | ICD-10-CM

## 2025-07-23 DIAGNOSIS — R09.82 PND (POST-NASAL DRIP): ICD-10-CM

## 2025-07-23 LAB
EXPIRATION DATE: NORMAL
FLUAV AG NPH QL: NEGATIVE
FLUBV AG NPH QL: NEGATIVE
INTERNAL CONTROL: NORMAL
Lab: NORMAL
S PYO AG THROAT QL: NEGATIVE
SARS-COV-2 AG UPPER RESP QL IA.RAPID: NOT DETECTED

## 2025-07-23 RX ORDER — MUPIROCIN 2 %
1 OINTMENT (GRAM) TOPICAL 2 TIMES DAILY
Qty: 15 G | Refills: 0 | Status: SHIPPED | OUTPATIENT
Start: 2025-07-23 | End: 2025-07-30

## 2025-07-23 RX ORDER — DEXTROMETHORPHAN HYDROBROMIDE AND PROMETHAZINE HYDROCHLORIDE 15; 6.25 MG/5ML; MG/5ML
5 SYRUP ORAL 4 TIMES DAILY PRN
Qty: 118 ML | Refills: 0 | Status: SHIPPED | OUTPATIENT
Start: 2025-07-23

## 2025-07-23 RX ORDER — CETIRIZINE HYDROCHLORIDE 10 MG/1
10 TABLET ORAL DAILY
Qty: 30 TABLET | Refills: 0 | Status: SHIPPED | OUTPATIENT
Start: 2025-07-23

## 2025-07-23 NOTE — PROGRESS NOTES
Follow Up Office Visit      Date: 2025   Patient Name: Rai Guillory  : 1985   MRN: [id number]     Chief Complaint:    Chief Complaint   Patient presents with    cold or flu     Symptoms started 2025    Headache    Nasal Congestion    Sore Throat    Fatigue       History of Present Illness: Rai Guillory is a 40 y.o. female who presents today for possible cold or flu.    Reports congestion, sore throat, headache, fatigue    Onset of symptoms was    [place] at  and has  an elderly lady as a patient who was coughing.    Reports she helped her get into and out of bed as well.    Patient symptoms started yesterday afternoon.    Reports she has been more tired and with symptoms as above.  Yesterday slept more and this morning did not even hear her alarm.      Rai's patient goes to  [employer].                History of Present Illness        Subjective      Review of Systems:   Review of Systems   Constitutional:  Positive for fatigue.   HENT:  Positive for congestion and sore throat.    Respiratory:  Positive for cough.        I have reviewed the patients family history, social history, past medical history, past surgical history and have updated it as appropriate.     Medications:     Current Outpatient Medications:     hydrOXYzine (ATARAX) 25 MG tablet, Take 1 tablet by mouth 3 (Three) Times a Day As Needed for Itching or Anxiety., Disp: 60 tablet, Rfl: 0    promethazine (PHENERGAN) 25 MG tablet, Take 1 tablet by mouth Every 8 (Eight) Hours As Needed for Nausea or Vomiting., Disp: 10 tablet, Rfl: 0    cetirizine (zyrTEC) 10 MG tablet, Take 1 tablet by mouth Daily., Disp: 30 tablet, Rfl: 0    mupirocin (BACTROBAN) 2 % ointment, Apply 1 Application topically to the appropriate area as directed 2 (Two) Times a Day for 7 days., Disp: 15 g, Rfl: 0    ondansetron ODT (ZOFRAN-ODT) 4 MG disintegrating tablet, Place 1 tablet on the tongue Every 8 (Eight) Hours  As Needed for Nausea or Vomiting. (Patient not taking: Reported on 7/23/2025), Disp: 16 tablet, Rfl: 2    promethazine-dextromethorphan (PROMETHAZINE-DM) 6.25-15 MG/5ML syrup, Take 5 mL by mouth 4 (Four) Times a Day As Needed for Cough., Disp: 118 mL, Rfl: 0    venlafaxine XR (Effexor XR) 37.5 MG 24 hr capsule, Take 1 capsule by mouth Daily. (Patient not taking: Reported on 7/23/2025), Disp: 30 capsule, Rfl: 2    Allergies:   No Known Allergies    Objective     Physical Exam: Please see above  Vital Signs:   Vitals:    07/23/25 1038   BP: 100/72   Pulse: 120   Temp: 98.9 °F (37.2 °C)   TempSrc: Temporal   SpO2: 98%   Weight: 64 kg (141 lb)   PainSc: 0-No pain     Body mass index is 23.46 kg/m².  BMI is within normal parameters. No other follow-up for BMI required.       Physical Exam  Vitals and nursing note reviewed.   Constitutional:       Appearance: Normal appearance.   HENT:      Head: Normocephalic and atraumatic.      Mouth/Throat:      Comments: Drainage noted posterior pharynx.  Neck:      Vascular: No carotid bruit.   Cardiovascular:      Rate and Rhythm: Normal rate and regular rhythm.      Heart sounds: Normal heart sounds. No murmur heard.  Pulmonary:      Effort: Pulmonary effort is normal.      Breath sounds: Normal breath sounds.   Abdominal:      General: Bowel sounds are normal.      Palpations: Abdomen is soft. There is no mass.      Tenderness: There is no abdominal tenderness.   Musculoskeletal:      Right lower leg: No edema.      Left lower leg: No edema.   Skin:     Coloration: Skin is not jaundiced or pale.      Findings: No erythema.      Comments: Right axilla with ill-defined rough skin lesion   Neurological:      Mental Status: She is alert. Mental status is at baseline.   Psychiatric:         Mood and Affect: Mood normal.         Behavior: Behavior normal.         Procedures    Results:   Labs:   Hemoglobin A1C   Date Value Ref Range Status   11/03/2023 5.60 4.80 - 5.60 % Final     TSH    Date Value Ref Range Status   05/14/2025 0.509 0.270 - 4.200 uIU/mL Final        POCT Results (if applicable):   Results for orders placed or performed in visit on 07/23/25   POC Influenza A / B    Collection Time: 07/23/25 11:03 AM    Specimen: Swab   Result Value Ref Range    Rapid Influenza A Ag Negative Negative    Rapid Influenza B Ag Negative Negative    Internal Control Passed Passed    Lot Number [id number]     Expiration Date 10/17/2026    POCT SARS-CoV-2 Antigen    Collection Time: 07/23/25 11:04 AM    Specimen: Nasopharynx; Swab   Result Value Ref Range    SARS Antigen Not Detected Not Detected, Presumptive Negative    Internal Control Passed Passed    Lot Number [id number]     Expiration Date 02/13/2026    POC Rapid Strep A    Collection Time: 07/23/25 11:04 AM    Specimen: Swab   Result Value Ref Range    Rapid Strep A Screen Negative Negative, VALID, INVALID, Not Performed    Internal Control Passed Passed    Lot Number [id number]     Expiration Date 04/03/2027        PHQ-9: PHQ-9 Total Score:       Imaging:   No valid procedures specified.     Measures:   Advanced Care Planning:   Patient does not have an advance directive, declines further assistance.    Smoking Cessation:   Does not smoke    Assessment / Plan      Assessment/Plan:     Strip, influenza, and COVID testing all negative in office today on 7/23/2025  Assessment & Plan      1. Cough, unspecified type    - POC Influenza A / B  - POCT SARS-CoV-2 Antigen  - POC Rapid Strep A    2. skin lesion  Will prescribe Bactroban ointment to be applied to skin lesion right axilla, twice daily.  Patient to wash area at least daily with antibacterial soap and water and rinse with clean water.  - mupirocin (BACTROBAN) 2 % ointment; Apply 1 Application topically to the appropriate area as directed 2 (Two) Times a Day for 7 days.  Dispense: 15 g; Refill: 0    3. PND (post-nasal drip)  Patient to start Zyrtec daily.  Patient instructed to take this in the  evening.    - cetirizine (zyrTEC) 10 MG tablet; Take 1 tablet by mouth Daily.  Dispense: 30 tablet; Refill: 0    4. Acute cough  Will prescribe Promethazine DM to take for cough.    - promethazine-dextromethorphan (PROMETHAZINE-DM) 6.25-15 MG/5ML syrup; Take 5 mL by mouth 4 (Four) Times a Day As Needed for Cough.  Dispense: 118 mL; Refill: 0          Part of this note may be an electronic transcription/translation of spoken language to printed text using the Dragon Dictation System.      Vaccine Counseling:      Follow Up:   Return if symptoms worsen or fail to improve.          DES Aleman

## 2025-07-23 NOTE — PATIENT INSTRUCTIONS
Promethazine DM as ordered for cough.  Start Zyrtec daily.  Drink plenty of fluids.  Bactroban as ordered.  Rest.

## 2025-08-20 DIAGNOSIS — R11.0 NAUSEA: ICD-10-CM

## 2025-08-20 RX ORDER — PROMETHAZINE HYDROCHLORIDE 25 MG/1
25 TABLET ORAL EVERY 8 HOURS PRN
Qty: 10 TABLET | Refills: 0 | Status: SHIPPED | OUTPATIENT
Start: 2025-08-20

## (undated) DEVICE — SHEET,DRAPE,40X58,STERILE: Brand: MEDLINE

## (undated) DEVICE — ANTIBACTERIAL UNDYED BRAIDED (POLYGLACTIN 910), SYNTHETIC ABSORBABLE SUTURE: Brand: COATED VICRYL

## (undated) DEVICE — SPNG LAP PREWSH SFTPK 18X18IN STRL PK/5

## (undated) DEVICE — JP PERF DRN SIL FLT 10MM FULL: Brand: CARDINAL HEALTH

## (undated) DEVICE — SUT SILK 2/0 TIES 18IN A185H

## (undated) DEVICE — STCKNT STRL 4X48 IN

## (undated) DEVICE — DISH PETRI 3.5IN MD STRL LF

## (undated) DEVICE — STPLR SKIN SUBCUTICULAR INSORB 2030

## (undated) DEVICE — JACKSON-PRATT 100CC BULB RESERVOIR: Brand: CARDINAL HEALTH

## (undated) DEVICE — PATIENT RETURN ELECTRODE, SINGLE-USE, CONTACT QUALITY MONITORING, ADULT, WITH 9FT CORD, FOR PATIENTS WEIGING OVER 33LBS. (15KG): Brand: MEGADYNE

## (undated) DEVICE — 3M™ STERI-STRIP™ REINFORCED ADHESIVE SKIN CLOSURES, R1547, 1/2 IN X 4 IN (12 MM X 100 MM), 6 STRIPS/ENVELOPE: Brand: 3M™ STERI-STRIP™

## (undated) DEVICE — GOWN,NON-REINFORCED,SIRUS,SET IN SLV,XL: Brand: MEDLINE

## (undated) DEVICE — DRSNG SURESITE WNDW 4X4.5

## (undated) DEVICE — SUT SILK 2/0 PS 18IN 1588H

## (undated) DEVICE — GLV SURG BIOGEL LTX PF 7 1/2

## (undated) DEVICE — ELECTRD BLD MEGADYNE 2.5IN SS

## (undated) DEVICE — SUT SILK 3/0 TIES 18IN A184H

## (undated) DEVICE — LEX GENERAL BREAST: Brand: MEDLINE INDUSTRIES, INC.

## (undated) DEVICE — INTRAOPERATIVE COVER KIT, 10 PACK: Brand: SITE-RITE

## (undated) DEVICE — CLN MEGADYNE TP SS